# Patient Record
Sex: FEMALE | Race: WHITE | NOT HISPANIC OR LATINO | Employment: UNEMPLOYED | URBAN - NONMETROPOLITAN AREA
[De-identification: names, ages, dates, MRNs, and addresses within clinical notes are randomized per-mention and may not be internally consistent; named-entity substitution may affect disease eponyms.]

---

## 2019-05-22 ENCOUNTER — LAB (OUTPATIENT)
Dept: LAB | Facility: HOSPITAL | Age: 49
End: 2019-05-22

## 2019-05-22 ENCOUNTER — OFFICE VISIT (OUTPATIENT)
Dept: FAMILY MEDICINE CLINIC | Facility: CLINIC | Age: 49
End: 2019-05-22

## 2019-05-22 VITALS
RESPIRATION RATE: 18 BRPM | SYSTOLIC BLOOD PRESSURE: 138 MMHG | HEART RATE: 102 BPM | WEIGHT: 293 LBS | BODY MASS INDEX: 48.82 KG/M2 | HEIGHT: 65 IN | OXYGEN SATURATION: 98 % | DIASTOLIC BLOOD PRESSURE: 98 MMHG

## 2019-05-22 DIAGNOSIS — G89.29 CHRONIC PAIN OF BOTH KNEES: ICD-10-CM

## 2019-05-22 DIAGNOSIS — Z76.89 ENCOUNTER TO ESTABLISH CARE: ICD-10-CM

## 2019-05-22 DIAGNOSIS — M94.0 COSTOCHONDRITIS: ICD-10-CM

## 2019-05-22 DIAGNOSIS — R10.11 RUQ PAIN: ICD-10-CM

## 2019-05-22 DIAGNOSIS — Z00.00 ENCOUNTER FOR SCREENING AND PREVENTATIVE CARE: Primary | ICD-10-CM

## 2019-05-22 DIAGNOSIS — R07.89 LEFT-SIDED CHEST WALL PAIN: ICD-10-CM

## 2019-05-22 DIAGNOSIS — Z00.00 ENCOUNTER FOR SCREENING AND PREVENTATIVE CARE: ICD-10-CM

## 2019-05-22 DIAGNOSIS — M25.562 CHRONIC PAIN OF BOTH KNEES: ICD-10-CM

## 2019-05-22 DIAGNOSIS — M25.561 CHRONIC PAIN OF BOTH KNEES: ICD-10-CM

## 2019-05-22 LAB
ALBUMIN SERPL-MCNC: 4 G/DL (ref 3.5–5.2)
ALBUMIN/GLOB SERPL: 1.1 G/DL
ALP SERPL-CCNC: 105 U/L (ref 39–117)
ALT SERPL W P-5'-P-CCNC: 27 U/L (ref 1–33)
ANION GAP SERPL CALCULATED.3IONS-SCNC: 12 MMOL/L
AST SERPL-CCNC: 23 U/L (ref 1–32)
BASOPHILS # BLD AUTO: 0.04 10*3/MM3 (ref 0–0.2)
BASOPHILS NFR BLD AUTO: 0.7 % (ref 0–1.5)
BILIRUB SERPL-MCNC: 0.4 MG/DL (ref 0.2–1.2)
BUN BLD-MCNC: 10 MG/DL (ref 6–20)
BUN/CREAT SERPL: 10.8 (ref 7–25)
CALCIUM SPEC-SCNC: 9.1 MG/DL (ref 8.6–10.5)
CHLORIDE SERPL-SCNC: 104 MMOL/L (ref 98–107)
CHOLEST SERPL-MCNC: 186 MG/DL (ref 0–200)
CO2 SERPL-SCNC: 26 MMOL/L (ref 22–29)
CREAT BLD-MCNC: 0.93 MG/DL (ref 0.57–1)
DEPRECATED RDW RBC AUTO: 43.5 FL (ref 37–54)
EOSINOPHIL # BLD AUTO: 0.13 10*3/MM3 (ref 0–0.4)
EOSINOPHIL NFR BLD AUTO: 2.1 % (ref 0.3–6.2)
ERYTHROCYTE [DISTWIDTH] IN BLOOD BY AUTOMATED COUNT: 12.8 % (ref 12.3–15.4)
GFR SERPL CREATININE-BSD FRML MDRD: 64 ML/MIN/1.73
GLOBULIN UR ELPH-MCNC: 3.6 GM/DL
GLUCOSE BLD-MCNC: 88 MG/DL (ref 65–99)
HBA1C MFR BLD: 5.2 % (ref 4.8–5.6)
HCT VFR BLD AUTO: 44.1 % (ref 34–46.6)
HDLC SERPL-MCNC: 48 MG/DL (ref 40–60)
HGB BLD-MCNC: 14.1 G/DL (ref 12–15.9)
IMM GRANULOCYTES # BLD AUTO: 0.02 10*3/MM3 (ref 0–0.05)
IMM GRANULOCYTES NFR BLD AUTO: 0.3 % (ref 0–0.5)
LDLC SERPL CALC-MCNC: 111 MG/DL (ref 0–100)
LDLC/HDLC SERPL: 2.32 {RATIO}
LYMPHOCYTES # BLD AUTO: 2.36 10*3/MM3 (ref 0.7–3.1)
LYMPHOCYTES NFR BLD AUTO: 38.7 % (ref 19.6–45.3)
MCH RBC QN AUTO: 29.4 PG (ref 26.6–33)
MCHC RBC AUTO-ENTMCNC: 32 G/DL (ref 31.5–35.7)
MCV RBC AUTO: 92.1 FL (ref 79–97)
MONOCYTES # BLD AUTO: 0.43 10*3/MM3 (ref 0.1–0.9)
MONOCYTES NFR BLD AUTO: 7 % (ref 5–12)
NEUTROPHILS # BLD AUTO: 3.12 10*3/MM3 (ref 1.7–7)
NEUTROPHILS NFR BLD AUTO: 51.2 % (ref 42.7–76)
NRBC BLD AUTO-RTO: 0 /100 WBC (ref 0–0.2)
PLATELET # BLD AUTO: 290 10*3/MM3 (ref 140–450)
PMV BLD AUTO: 10.3 FL (ref 6–12)
POTASSIUM BLD-SCNC: 4.2 MMOL/L (ref 3.5–5.2)
PROT SERPL-MCNC: 7.6 G/DL (ref 6–8.5)
RBC # BLD AUTO: 4.79 10*6/MM3 (ref 3.77–5.28)
SODIUM BLD-SCNC: 142 MMOL/L (ref 136–145)
TRIGL SERPL-MCNC: 134 MG/DL (ref 0–150)
TSH SERPL DL<=0.05 MIU/L-ACNC: 1.73 MIU/ML (ref 0.27–4.2)
VLDLC SERPL-MCNC: 26.8 MG/DL
WBC NRBC COR # BLD: 6.1 10*3/MM3 (ref 3.4–10.8)

## 2019-05-22 PROCEDURE — 80061 LIPID PANEL: CPT

## 2019-05-22 PROCEDURE — 84443 ASSAY THYROID STIM HORMONE: CPT

## 2019-05-22 PROCEDURE — 80053 COMPREHEN METABOLIC PANEL: CPT

## 2019-05-22 PROCEDURE — 36415 COLL VENOUS BLD VENIPUNCTURE: CPT

## 2019-05-22 PROCEDURE — 83036 HEMOGLOBIN GLYCOSYLATED A1C: CPT

## 2019-05-22 PROCEDURE — 85025 COMPLETE CBC W/AUTO DIFF WBC: CPT

## 2019-05-22 PROCEDURE — 99203 OFFICE O/P NEW LOW 30 MIN: CPT | Performed by: NURSE PRACTITIONER

## 2019-05-22 RX ORDER — MELOXICAM 7.5 MG/1
7.5 TABLET ORAL DAILY
Qty: 30 TABLET | Refills: 2 | Status: SHIPPED | OUTPATIENT
Start: 2019-05-22 | End: 2019-08-17 | Stop reason: SDUPTHER

## 2019-05-22 NOTE — PROGRESS NOTES
"Subjective   Letty Bach is a 49 y.o. female.     Ms. Bach is a 49-year-old female who presents today to establish care for the management and evaluation of right upper quadrant pain, left upper quadrant/lower left chest wall pain, chronic bilateral knee pain.  She states she has recurrent right upper quadrant pain that is worse when she eats and sometimes in the evenings.  She does have her gallbladder but has had no evaluation for the functionality of her gallbladder or for gallstones.  She denies vomiting, diarrhea.  She does occasionally have nighttime nausea.  She also reports pain in her left lower chest/upper abdomen area.  Pain is made worse with eating or with range of motion.  She states she has a bowel movement 1-2 times a day.  She denies constipation or dry hard stools.  She also reports having bilateral knee pain \"for years\".  She denies any weakness or instability but states that her knees \"pop and grind\".  She takes no over-the-counter medication for her symptoms.         The following portions of the patient's history were reviewed and updated as appropriate: allergies, current medications, past family history, past medical history, past social history, past surgical history and problem list.    Review of Systems   Constitutional: Negative for activity change, appetite change, chills, diaphoresis, fatigue, fever, unexpected weight gain and unexpected weight loss.   HENT: Negative for congestion, sore throat, trouble swallowing and voice change.    Eyes: Negative for blurred vision, double vision, photophobia, pain and visual disturbance.   Respiratory: Negative for cough, chest tightness, shortness of breath and wheezing.    Cardiovascular: Negative for chest pain, palpitations and leg swelling.   Gastrointestinal: Positive for abdominal pain. Negative for abdominal distention, anal bleeding, blood in stool, constipation, diarrhea, nausea, vomiting, GERD and indigestion.   Endocrine: Negative for " cold intolerance, heat intolerance, polydipsia, polyphagia and polyuria.   Genitourinary: Negative for dysuria, frequency, hematuria and urgency.   Musculoskeletal: Positive for arthralgias and gait problem. Negative for myalgias.   Skin: Negative for rash.   Allergic/Immunologic: Negative.    Neurological: Negative for dizziness, syncope, weakness, light-headedness and headache.   Hematological: Negative.    Psychiatric/Behavioral: The patient is not nervous/anxious.        Objective   Physical Exam   Constitutional: She is oriented to person, place, and time. She appears well-developed and well-nourished. No distress.   HENT:   Head: Normocephalic and atraumatic.   Right Ear: External ear normal.   Left Ear: External ear normal.   Nose: Nose normal.   Mouth/Throat: Oropharynx is clear and moist.   Eyes: Conjunctivae and EOM are normal. Pupils are equal, round, and reactive to light.   Neck: Normal range of motion. Neck supple. No tracheal deviation present. No thyromegaly present.   Cardiovascular: Normal rate, regular rhythm, normal heart sounds and intact distal pulses. Exam reveals no gallop and no friction rub.   No murmur heard.  Pulmonary/Chest: Effort normal and breath sounds normal. No stridor. No respiratory distress. She has no wheezes. She has no rales. She exhibits tenderness. She exhibits no mass, no crepitus, no edema and no swelling.       Abdominal: Soft. Bowel sounds are normal. She exhibits no distension and no mass. There is no tenderness. There is no rebound and no guarding. No hernia.   Musculoskeletal: Normal range of motion. She exhibits no edema.        Right knee: She exhibits normal range of motion. Tenderness found.        Left knee: She exhibits normal range of motion. Tenderness found.   Crepitus noted to bilateral knees.   Lymphadenopathy:     She has no cervical adenopathy.   Neurological: She is alert and oriented to person, place, and time. No cranial nerve deficit. Coordination  normal.   Skin: Skin is warm and dry. No rash noted. She is not diaphoretic. No erythema. No pallor.   Psychiatric: She has a normal mood and affect. Her behavior is normal. Judgment and thought content normal.   Nursing note and vitals reviewed.        Assessment/Plan   Letty was seen today for establish care and abdominal pain.    Diagnoses and all orders for this visit:    Encounter for screening and preventative care  -     Hemoglobin A1c; Future  -     Comprehensive Metabolic Panel; Future  -     CBC & Differential; Future  -     TSH; Future  -     Lipid Panel; Future    Encounter to establish care    RUQ pain  -     US Gallbladder; Future    Left-sided chest wall pain  -     XR Abdomen Flat & Upright    Chronic pain of both knees  -     Cancel: XR Knee AP & Lateral; Future  -     XR knee 4+ vw bilateral; Future    Costochondritis    Other orders  -     meloxicam (MOBIC) 7.5 MG tablet; Take 1 tablet by mouth Daily.    1.  Right upper quadrant pain- ultrasound gallbladder.  Will call with results.  Instructed in low-fat diet.  No right upper quadrant pain on exam today.    2.  Left-sided chest wall pain/costochondritis- flat and upright of abdomen.  Will call with results.  Meloxicam 7 1/2 mg 1 tablet p.o. daily as needed for pain.    3.  Chronic bilateral knee pain-x-ray bilateral knees.  Will call with results.  Mobic 7 1/2 mg p.o. daily as needed for knee pain.    4.  Health maintenance-routine labs ordered.  Will call the results.    5.  Follow-up in 3 months or sooner if needed.            This document has been electronically signed by MEL Houser on May 22, 2019 12:16 PM

## 2019-05-23 NOTE — PROGRESS NOTES
Labs within normal limits except for LDL cholesterol was slightly elevated.  Instruct in low-fat diet and exercise.

## 2019-05-24 DIAGNOSIS — K80.20 GALLSTONES: ICD-10-CM

## 2019-05-24 DIAGNOSIS — R10.11 RUQ PAIN: Primary | ICD-10-CM

## 2019-06-04 ENCOUNTER — CONSULT (OUTPATIENT)
Dept: SURGERY | Facility: CLINIC | Age: 49
End: 2019-06-04

## 2019-06-04 ENCOUNTER — APPOINTMENT (OUTPATIENT)
Dept: PREADMISSION TESTING | Facility: HOSPITAL | Age: 49
End: 2019-06-04

## 2019-06-04 VITALS
OXYGEN SATURATION: 97 % | SYSTOLIC BLOOD PRESSURE: 126 MMHG | RESPIRATION RATE: 16 BRPM | WEIGHT: 293 LBS | DIASTOLIC BLOOD PRESSURE: 84 MMHG | HEIGHT: 65 IN | HEART RATE: 77 BPM | BODY MASS INDEX: 48.82 KG/M2

## 2019-06-04 VITALS
BODY MASS INDEX: 48.82 KG/M2 | TEMPERATURE: 97.2 F | DIASTOLIC BLOOD PRESSURE: 84 MMHG | SYSTOLIC BLOOD PRESSURE: 126 MMHG | HEIGHT: 65 IN | HEART RATE: 88 BPM | WEIGHT: 293 LBS

## 2019-06-04 DIAGNOSIS — K80.10 CALCULUS OF GALLBLADDER WITH CHRONIC CHOLECYSTITIS WITHOUT OBSTRUCTION: Primary | ICD-10-CM

## 2019-06-04 PROCEDURE — 99204 OFFICE O/P NEW MOD 45 MIN: CPT | Performed by: SURGERY

## 2019-06-04 RX ORDER — SODIUM CHLORIDE, SODIUM GLUCONATE, SODIUM ACETATE, POTASSIUM CHLORIDE, AND MAGNESIUM CHLORIDE 526; 502; 368; 37; 30 MG/100ML; MG/100ML; MG/100ML; MG/100ML; MG/100ML
1000 INJECTION, SOLUTION INTRAVENOUS CONTINUOUS
Status: CANCELLED | OUTPATIENT
Start: 2019-06-06

## 2019-06-04 RX ORDER — CEFAZOLIN SODIUM IN 0.9 % NACL 3 G/100 ML
3 INTRAVENOUS SOLUTION, PIGGYBACK (ML) INTRAVENOUS ONCE
Status: CANCELLED | OUTPATIENT
Start: 2019-06-06 | End: 2019-06-04

## 2019-06-04 NOTE — PATIENT INSTRUCTIONS

## 2019-06-04 NOTE — PROGRESS NOTES
Subjective   Letty Bach is a 49 y.o. female     Chief Complaint: Symptomatic gallstones    History of Present Illness referred by MEL Esteves after patient was found to have gallstones on ultrasound.  Patient presented to him to set up primary care provider and described intermittent right shoulder and intermittent left upper quadrant/flank pain.  Patient describes his pain is intermittent cramping in nature.  Is associated with foods.  No history of pancreatitis no history of being jaundiced.  No prior upper abdominal surgery.  Patient does have some mild reflux symptoms which she occasionally takes Tums for.  No dysphasia.  Never been told she had a hilar hernia or peptic ulcer disease.  No history of kidney stones.  Only past surgical history is been for hysterectomy.  Severity of the pain is described as moderate.  Duration tends to be for minutes.  Timing is 1-2 times per day  On ultrasound she looks like she has a contracted gallbladder with a fairly large stone present.  The bladder wall was measured at 5 mm and there was no pericholecystic fluid.  Common bile duct was 6.1 mm without any obvious common duct stones seen.  LFTs 3 weeks ago were all within normal limits and white count was normal.    Review of Systems   Constitutional: Negative.    HENT: Negative.    Eyes: Negative.    Respiratory: Negative.    Cardiovascular: Positive for leg swelling.   Gastrointestinal: Positive for abdominal pain.        Heartburn   Endocrine: Negative.    Genitourinary: Positive for frequency.   Musculoskeletal: Negative.    Skin: Negative.    Allergic/Immunologic: Negative.    Neurological: Negative.    Hematological: Negative.    Psychiatric/Behavioral: Negative.      Past Medical History:   Diagnosis Date   • GERD (gastroesophageal reflux disease)    • Urinary tract infection      Past Surgical History:   Procedure Laterality Date   • HYSTERECTOMY  2007     Family History   Problem Relation Age of Onset   • Breast  cancer Mother    • Thyroid disease Mother    • Hypertension Mother    • Hypertension Father    • Diabetes Father    • Kidney disease Father    • Heart disease Father    • No Known Problems Sister    • No Known Problems Brother    • Seizures Daughter    • Hypertension Daughter    • COPD Maternal Grandmother    • Asthma Maternal Grandmother    • No Known Problems Sister    • ADD / ADHD Daughter    • Polymyositis Daughter      Social History     Socioeconomic History   • Marital status:      Spouse name: Not on file   • Number of children: Not on file   • Years of education: Not on file   • Highest education level: Not on file   Tobacco Use   • Smoking status: Former Smoker   • Smokeless tobacco: Never Used   Substance and Sexual Activity   • Alcohol use: No     Frequency: Never   • Drug use: No   • Sexual activity: Yes     Partners: Male     No Known Allergies    Home Medications:  Prior to Admission medications    Medication Sig Start Date End Date Taking? Authorizing Provider   meloxicam (MOBIC) 7.5 MG tablet Take 1 tablet by mouth Daily. 5/22/19  Yes Aldair Esteves APRN       Objective   Physical Exam   Constitutional: She is oriented to person, place, and time. She appears well-developed and well-nourished. No distress.   HENT:   Head: Normocephalic and atraumatic.   Nose: Nose normal.   Eyes: Conjunctivae are normal.   Neck: Normal range of motion. No tracheal deviation present. No thyromegaly present.   Cardiovascular: Normal rate, regular rhythm and normal heart sounds.   No murmur heard.  Pulmonary/Chest: Effort normal and breath sounds normal. No respiratory distress. She has no wheezes. She has no rales. She exhibits no tenderness.   Abdominal: Soft. She exhibits no distension. There is no tenderness. There is no rebound and no guarding. No hernia.   Musculoskeletal: She exhibits no tenderness or deformity.   Neurological: She is alert and oriented to person, place, and time.   Skin: Skin is warm  and dry. No rash noted.   Psychiatric: She has a normal mood and affect. Her behavior is normal. Judgment and thought content normal.   Vitals reviewed.         Assessment/Plan  Symptomatic cholelithiasis.  Fully discussed her symptoms which a portion of her atypical and others are more typical.  Patient is not interested in further work-up for reflux she says it is not much of a problem for her at this point.  She wishes to go ahead and proceed with laparoscopic cholecystectomy and intraoperative cholangiogram.  I fully discussed the procedure alternatives risk and benefits with her.  I reviewed her ultrasound of the studies with her and her mother and they clearly understand the situation and wished to proceed.      The encounter diagnosis was Calculus of gallbladder with chronic cholecystitis without obstruction.                     This document has been electronically signed by Felipe Overton MD on June 4, 2019 11:48 AM

## 2019-06-05 ENCOUNTER — ANESTHESIA EVENT (OUTPATIENT)
Dept: PERIOP | Facility: HOSPITAL | Age: 49
End: 2019-06-05

## 2019-06-06 ENCOUNTER — HOSPITAL ENCOUNTER (OUTPATIENT)
Facility: HOSPITAL | Age: 49
Setting detail: HOSPITAL OUTPATIENT SURGERY
Discharge: HOME | End: 2019-06-06
Attending: SURGERY | Admitting: SURGERY

## 2019-06-06 ENCOUNTER — ANESTHESIA (OUTPATIENT)
Dept: PERIOP | Facility: HOSPITAL | Age: 49
End: 2019-06-06

## 2019-06-06 ENCOUNTER — APPOINTMENT (OUTPATIENT)
Dept: GENERAL RADIOLOGY | Facility: HOSPITAL | Age: 49
End: 2019-06-06

## 2019-06-06 VITALS
DIASTOLIC BLOOD PRESSURE: 78 MMHG | HEART RATE: 69 BPM | HEIGHT: 65 IN | OXYGEN SATURATION: 98 % | BODY MASS INDEX: 48.74 KG/M2 | RESPIRATION RATE: 18 BRPM | SYSTOLIC BLOOD PRESSURE: 129 MMHG | WEIGHT: 292.55 LBS | TEMPERATURE: 97.8 F

## 2019-06-06 DIAGNOSIS — K80.10 CALCULUS OF GALLBLADDER WITH CHRONIC CHOLECYSTITIS WITHOUT OBSTRUCTION: ICD-10-CM

## 2019-06-06 PROCEDURE — 47563 LAPARO CHOLECYSTECTOMY/GRAPH: CPT | Performed by: SURGERY

## 2019-06-06 PROCEDURE — 25010000002 MIDAZOLAM PER 1 MG: Performed by: NURSE ANESTHETIST, CERTIFIED REGISTERED

## 2019-06-06 PROCEDURE — 25010000002 ONDANSETRON PER 1 MG: Performed by: NURSE ANESTHETIST, CERTIFIED REGISTERED

## 2019-06-06 PROCEDURE — 88304 TISSUE EXAM BY PATHOLOGIST: CPT | Performed by: SURGERY

## 2019-06-06 PROCEDURE — 25010000002 FENTANYL CITRATE (PF) 100 MCG/2ML SOLUTION: Performed by: NURSE ANESTHETIST, CERTIFIED REGISTERED

## 2019-06-06 PROCEDURE — 25010000002 DEXAMETHASONE PER 1 MG: Performed by: NURSE ANESTHETIST, CERTIFIED REGISTERED

## 2019-06-06 PROCEDURE — 25010000002 IOPAMIDOL 61 % SOLUTION: Performed by: SURGERY

## 2019-06-06 PROCEDURE — 74300 X-RAY BILE DUCTS/PANCREAS: CPT | Performed by: SURGERY

## 2019-06-06 PROCEDURE — 76000 FLUOROSCOPY <1 HR PHYS/QHP: CPT

## 2019-06-06 PROCEDURE — 25010000002 NEOSTIGMINE 4 MG/4ML SOLUTION PREFILLED SYRINGE: Performed by: NURSE ANESTHETIST, CERTIFIED REGISTERED

## 2019-06-06 PROCEDURE — 25010000002 HYDROMORPHONE 1 MG/ML SOLUTION: Performed by: NURSE ANESTHETIST, CERTIFIED REGISTERED

## 2019-06-06 PROCEDURE — 25010000002 PROPOFOL 10 MG/ML EMULSION: Performed by: NURSE ANESTHETIST, CERTIFIED REGISTERED

## 2019-06-06 PROCEDURE — 88304 TISSUE EXAM BY PATHOLOGIST: CPT | Performed by: PATHOLOGY

## 2019-06-06 RX ORDER — ROCURONIUM BROMIDE 10 MG/ML
INJECTION, SOLUTION INTRAVENOUS AS NEEDED
Status: DISCONTINUED | OUTPATIENT
Start: 2019-06-06 | End: 2019-06-06 | Stop reason: SURG

## 2019-06-06 RX ORDER — BUPIVACAINE HYDROCHLORIDE AND EPINEPHRINE 5; 5 MG/ML; UG/ML
INJECTION, SOLUTION EPIDURAL; INTRACAUDAL; PERINEURAL AS NEEDED
Status: DISCONTINUED | OUTPATIENT
Start: 2019-06-06 | End: 2019-06-06 | Stop reason: HOSPADM

## 2019-06-06 RX ORDER — DEXAMETHASONE SODIUM PHOSPHATE 4 MG/ML
INJECTION, SOLUTION INTRA-ARTICULAR; INTRALESIONAL; INTRAMUSCULAR; INTRAVENOUS; SOFT TISSUE AS NEEDED
Status: DISCONTINUED | OUTPATIENT
Start: 2019-06-06 | End: 2019-06-06 | Stop reason: SURG

## 2019-06-06 RX ORDER — HYDROCODONE BITARTRATE AND ACETAMINOPHEN 7.5; 325 MG/1; MG/1
1 TABLET ORAL EVERY 6 HOURS PRN
Qty: 15 TABLET | Refills: 0 | Status: SHIPPED | OUTPATIENT
Start: 2019-06-06 | End: 2019-08-22

## 2019-06-06 RX ORDER — EPHEDRINE SULFATE 50 MG/ML
INJECTION, SOLUTION INTRAVENOUS AS NEEDED
Status: DISCONTINUED | OUTPATIENT
Start: 2019-06-06 | End: 2019-06-06 | Stop reason: SURG

## 2019-06-06 RX ORDER — NEOSTIGMINE METHYLSULFATE 4 MG/4 ML
SYRINGE (ML) INTRAVENOUS AS NEEDED
Status: DISCONTINUED | OUTPATIENT
Start: 2019-06-06 | End: 2019-06-06 | Stop reason: SURG

## 2019-06-06 RX ORDER — ONDANSETRON 2 MG/ML
4 INJECTION INTRAMUSCULAR; INTRAVENOUS ONCE
Status: DISCONTINUED | OUTPATIENT
Start: 2019-06-06 | End: 2019-06-06 | Stop reason: HOSPADM

## 2019-06-06 RX ORDER — MIDAZOLAM HYDROCHLORIDE 1 MG/ML
INJECTION INTRAMUSCULAR; INTRAVENOUS AS NEEDED
Status: DISCONTINUED | OUTPATIENT
Start: 2019-06-06 | End: 2019-06-06 | Stop reason: SURG

## 2019-06-06 RX ORDER — 0.9 % SODIUM CHLORIDE 0.9 %
VIAL (ML) INJECTION AS NEEDED
Status: DISCONTINUED | OUTPATIENT
Start: 2019-06-06 | End: 2019-06-06 | Stop reason: HOSPADM

## 2019-06-06 RX ORDER — PROPOFOL 10 MG/ML
VIAL (ML) INTRAVENOUS AS NEEDED
Status: DISCONTINUED | OUTPATIENT
Start: 2019-06-06 | End: 2019-06-06 | Stop reason: SURG

## 2019-06-06 RX ORDER — MAGNESIUM HYDROXIDE 1200 MG/15ML
LIQUID ORAL AS NEEDED
Status: DISCONTINUED | OUTPATIENT
Start: 2019-06-06 | End: 2019-06-06 | Stop reason: HOSPADM

## 2019-06-06 RX ORDER — FENTANYL CITRATE 50 UG/ML
INJECTION, SOLUTION INTRAMUSCULAR; INTRAVENOUS AS NEEDED
Status: DISCONTINUED | OUTPATIENT
Start: 2019-06-06 | End: 2019-06-06 | Stop reason: SURG

## 2019-06-06 RX ORDER — CEFAZOLIN SODIUM IN 0.9 % NACL 3 G/100 ML
3 INTRAVENOUS SOLUTION, PIGGYBACK (ML) INTRAVENOUS ONCE
Status: COMPLETED | OUTPATIENT
Start: 2019-06-06 | End: 2019-06-06

## 2019-06-06 RX ORDER — LIDOCAINE HYDROCHLORIDE 20 MG/ML
INJECTION, SOLUTION INFILTRATION; PERINEURAL AS NEEDED
Status: DISCONTINUED | OUTPATIENT
Start: 2019-06-06 | End: 2019-06-06 | Stop reason: SURG

## 2019-06-06 RX ORDER — ONDANSETRON 2 MG/ML
INJECTION INTRAMUSCULAR; INTRAVENOUS AS NEEDED
Status: DISCONTINUED | OUTPATIENT
Start: 2019-06-06 | End: 2019-06-06 | Stop reason: SURG

## 2019-06-06 RX ORDER — SODIUM CHLORIDE, SODIUM GLUCONATE, SODIUM ACETATE, POTASSIUM CHLORIDE, AND MAGNESIUM CHLORIDE 526; 502; 368; 37; 30 MG/100ML; MG/100ML; MG/100ML; MG/100ML; MG/100ML
1000 INJECTION, SOLUTION INTRAVENOUS CONTINUOUS
Status: DISCONTINUED | OUTPATIENT
Start: 2019-06-06 | End: 2019-06-06 | Stop reason: HOSPADM

## 2019-06-06 RX ADMIN — EPHEDRINE SULFATE 10 MG: 50 INJECTION INTRAVENOUS at 09:12

## 2019-06-06 RX ADMIN — FENTANYL CITRATE 150 MCG: 50 INJECTION, SOLUTION INTRAMUSCULAR; INTRAVENOUS at 09:06

## 2019-06-06 RX ADMIN — CEFAZOLIN 2 G: 1 INJECTION, POWDER, FOR SOLUTION INTRAMUSCULAR; INTRAVENOUS; PARENTERAL at 08:42

## 2019-06-06 RX ADMIN — SODIUM CHLORIDE, SODIUM GLUCONATE, SODIUM ACETATE, POTASSIUM CHLORIDE, AND MAGNESIUM CHLORIDE 1000 ML: 526; 502; 368; 37; 30 INJECTION, SOLUTION INTRAVENOUS at 07:35

## 2019-06-06 RX ADMIN — Medication 3 MG: at 10:25

## 2019-06-06 RX ADMIN — ROCURONIUM BROMIDE 10 MG: 10 INJECTION INTRAVENOUS at 09:39

## 2019-06-06 RX ADMIN — ROCURONIUM BROMIDE 45 MG: 10 INJECTION INTRAVENOUS at 08:51

## 2019-06-06 RX ADMIN — HYDROMORPHONE HYDROCHLORIDE 0.5 MG: 1 INJECTION, SOLUTION INTRAMUSCULAR; INTRAVENOUS; SUBCUTANEOUS at 11:03

## 2019-06-06 RX ADMIN — FENTANYL CITRATE 100 MCG: 50 INJECTION, SOLUTION INTRAMUSCULAR; INTRAVENOUS at 08:44

## 2019-06-06 RX ADMIN — ONDANSETRON 4 MG: 2 INJECTION INTRAMUSCULAR; INTRAVENOUS at 10:13

## 2019-06-06 RX ADMIN — SODIUM CHLORIDE, SODIUM GLUCONATE, SODIUM ACETATE, POTASSIUM CHLORIDE, AND MAGNESIUM CHLORIDE: 526; 502; 368; 37; 30 INJECTION, SOLUTION INTRAVENOUS at 10:13

## 2019-06-06 RX ADMIN — MIDAZOLAM HYDROCHLORIDE 2 MG: 2 INJECTION, SOLUTION INTRAMUSCULAR; INTRAVENOUS at 08:41

## 2019-06-06 RX ADMIN — Medication 1 MG: at 10:27

## 2019-06-06 RX ADMIN — LIDOCAINE HYDROCHLORIDE 80 MG: 20 INJECTION, SOLUTION INFILTRATION; PERINEURAL at 08:49

## 2019-06-06 RX ADMIN — ROCURONIUM BROMIDE 5 MG: 10 INJECTION INTRAVENOUS at 08:45

## 2019-06-06 RX ADMIN — HYDROMORPHONE HYDROCHLORIDE 0.5 MG: 1 INJECTION, SOLUTION INTRAMUSCULAR; INTRAVENOUS; SUBCUTANEOUS at 11:16

## 2019-06-06 RX ADMIN — DEXAMETHASONE SODIUM PHOSPHATE 4 MG: 4 INJECTION, SOLUTION INTRAMUSCULAR; INTRAVENOUS at 09:02

## 2019-06-06 RX ADMIN — FENTANYL CITRATE 25 MCG: 50 INJECTION, SOLUTION INTRAMUSCULAR; INTRAVENOUS at 10:09

## 2019-06-06 RX ADMIN — GLYCOPYRROLATE 0.6 MG: 0.2 INJECTION, SOLUTION INTRAMUSCULAR; INTRAVITREAL at 10:25

## 2019-06-06 RX ADMIN — HYDROMORPHONE HYDROCHLORIDE 0.5 MG: 1 INJECTION, SOLUTION INTRAMUSCULAR; INTRAVENOUS; SUBCUTANEOUS at 10:53

## 2019-06-06 RX ADMIN — PROPOFOL 180 MG: 10 INJECTION, EMULSION INTRAVENOUS at 08:48

## 2019-06-06 NOTE — OP NOTE
CHOLECYSTECTOMY LAPAROSCOPIC INTRAOPERATIVE CHOLANGIOGRAM  Procedure Note    Letty Bach  6/6/2019    Pre-op Diagnosis:   Calculus of gallbladder with chronic cholecystitis without obstruction [K80.10]    Post-op Diagnosis:     Post-Op Diagnosis Codes:     * Calculus of gallbladder with chronic cholecystitis without obstruction [K80.10]    Procedure/CPT® Codes:      Procedure(s):  CHOLECYSTECTOMY LAPAROSCOPIC INTRAOPERATIVE CHOLANGIOGRAM      (c-arm#2)    Surgeon(s):  Felipe Overton MD    Anesthesia: General    Staff:   Circulator: Koki Garrett RN  Scrub Person: Macie King  Assistant: Kaelyn Smith CSA    Estimated Blood Loss: minimal    Specimens:                ID Type Source Tests Collected by Time   A : gallbladder and contents Tissue Gallbladder TISSUE PATHOLOGY EXAM Felipe Overton MD 6/6/2019 0913         Drains:   Closed/Suction Drain 1 Right Abdomen Bulb 19 Fr. (Active)       Indications: 49-year-old female presents with intermittent upper abdominal pain as well as shoulder and left flank pain.  Found to have gallstones by ultrasound.  Gallbladder wall was 5 mm and patient has a contracted gallbladder with at least one large stone present.  Common bile duct was 6 mm.  LFTs are within normal limits.    Findings: Normal intraoperative cholangiogram.  Good spontaneous flow into the duodenum no evidence of any common duct stones with adequate flow up in the hepatic radicles and adequate cystic duct.    Complications: None    Procedure: The patient was brought to the operating room where she was placed under general endotracheal anesthesia without any difficulty. She had SCDs in place. She received Ancef IV antibiotic perioperatively. The abdomen was prepped and draped in the normal sterile fashion. She was positioned on the table with the foot rest in case she needed to be positioned in reverse Trendelenburg position. Appropriate timeout was taken. Everyone in the room was in agreement.  Cutdown was performed in the supraumbilical position 2 cm above the umbilicus due to her body habitus. A 10/11 Uzair trocar was placed without any difficulty. TAP abdominal wall block was then performed with a total of 30 mL of 0.5% Marcaine; 20 mL was injected on the right side of the abdomen and 10 mL on the left side using the laparoscope for guidance. Once that was done, a 5 mm trocar was placed in the epigastrium and two 5 mm trocars along the costal margin laterally. The patient was positioned and we were able to see and access the gallbladder. We began by taking the gallbladder down in the dome down type technique. We took it down approximately half way in the bed of the liver and then pulled the gallbladder over the edge of the liver. We opened the peritoneum at the anterior and posterior aspects of Calot's triangle up on the neck of the gallbladder and followed that down along the neck of the gallbladder to the junction with the cystic duct. We were able to get around the neck of the gallbladder and got a critical view. We placed a Srivastava clamp across the neck of the gallbladder. Catheter was placed into position and cholangiogram was performed and confirmed that we were right at the neck of the gallbladder junction with the cystic duct. Findings as described with the cholangiogram. At that point the catheter and needle were removed and we placed 2 clips at that level. We then divided the cystic duct up on the neck of the gallbladder and then placed a #1 PDS Endoloop. There was concern that we did not get completely around it so we placed a 2nd one and this time we did get around the entire cystic duct just below the clips. We then removed the gallbladder using the Harmonic scalpel. The cystic artery was addressed with the Harmonic scalpel. We copiously irrigated. There was some spillage of bile at the end of the procedure and we copiously irrigated that area. Gallbladder was placed in an Endo Catch bag. We  then placed a 10 mm Gary-Martins drain brought out through the most lateral trocar. We placed it in the bed of the liver in the area of dissection and then secured it to skin with a 2-0 silk suture. Good hemostasis was noted at the time and no evidence of any significant bile leak. The gallbladder was then removed as the trocars were removed with the Endo Catch bag. There was a large stone present within the gallbladder. Good hemostasis was noted at all sites. There fascia was closed at the cutdown site with 2-0 Vicryl figure-of-eight stitch. Skin was closed at all sites with 4-0 Monocryl subcuticular stitch. Glue was used for final skin closure. The patient was awakened, extubated, and transferred to the recovery room in awake and stable condition.             Disposition: Transfer to the recovery room awake stable condition.        Felipe Overton MD     Date: 6/6/2019  Time: 10:55 AM

## 2019-06-06 NOTE — ANESTHESIA PROCEDURE NOTES
Airway  Urgency: elective    Date/Time: 6/6/2019 8:53 AM  End Time:6/6/2019 8:54 AM  Airway not difficult    General Information and Staff    Patient location during procedure: OR    Indications and Patient Condition  Indications for airway management: airway protection    Preoxygenated: yes  MILS not maintained throughout  Mask difficulty assessment: 1 - vent by mask    Final Airway Details  Final airway type: endotracheal airway      Successful airway: ETT  Cuffed: yes   Successful intubation technique: direct laryngoscopy  Facilitating devices/methods: intubating stylet and Bougie  Endotracheal tube insertion site: oral  Blade: Sravanthi  Blade size: 3  ETT size (mm): 7.5  Cormack-Lehane Classification: grade I - full view of glottis  Placement verified by: chest auscultation and capnometry   Measured from: lips  ETT to lips (cm): 21  Number of attempts at approach: 2

## 2019-06-06 NOTE — ANESTHESIA POSTPROCEDURE EVALUATION
Patient: Letty Bach    Procedure Summary     Date:  06/06/19 Room / Location:  [facility] OR 09 / [facility] OR    Anesthesia Start:  0842 Anesthesia Stop:  1047    Procedure:  CHOLECYSTECTOMY LAPAROSCOPIC INTRAOPERATIVE CHOLANGIOGRAM      (c-arm#2) (N/A Abdomen) Diagnosis:       Calculus of gallbladder with chronic cholecystitis without obstruction      (Calculus of gallbladder with chronic cholecystitis without obstruction [K80.10])    Surgeon:  Felipe Overton MD Provider:  Awais Sales MD    Anesthesia Type:  general ASA Status:  2          Anesthesia Type: general  Last vitals  BP   123/69 (06/06/19 0724)   Temp   97.5 °F (36.4 °C) (06/06/19 0724)   Pulse   70 (06/06/19 0724)   Resp   18 (06/06/19 0724)     SpO2   95 % (06/06/19 0724)     Post Anesthesia Care and Evaluation    Patient location during evaluation: PACU  Patient participation: complete - patient participated  Level of consciousness: awake and alert  Pain score: 0  Pain management: adequate  Airway patency: patent  Anesthetic complications: No anesthetic complications  PONV Status: none  Cardiovascular status: acceptable  Respiratory status: acceptable  Hydration status: acceptable

## 2019-06-07 ENCOUNTER — OFFICE VISIT (OUTPATIENT)
Dept: SURGERY | Facility: CLINIC | Age: 49
End: 2019-06-07

## 2019-06-07 VITALS
TEMPERATURE: 97.2 F | DIASTOLIC BLOOD PRESSURE: 74 MMHG | BODY MASS INDEX: 48.82 KG/M2 | SYSTOLIC BLOOD PRESSURE: 166 MMHG | HEIGHT: 65 IN | HEART RATE: 56 BPM | WEIGHT: 293 LBS

## 2019-06-07 DIAGNOSIS — K80.10 CALCULUS OF GALLBLADDER WITH CHRONIC CHOLECYSTITIS WITHOUT OBSTRUCTION: Primary | ICD-10-CM

## 2019-06-07 LAB
LAB AP CASE REPORT: NORMAL
PATH REPORT.FINAL DX SPEC: NORMAL
PATH REPORT.GROSS SPEC: NORMAL

## 2019-06-07 PROCEDURE — 99024 POSTOP FOLLOW-UP VISIT: CPT | Performed by: SURGERY

## 2019-06-07 NOTE — PATIENT INSTRUCTIONS

## 2019-06-17 ENCOUNTER — OFFICE VISIT (OUTPATIENT)
Dept: SURGERY | Facility: CLINIC | Age: 49
End: 2019-06-17

## 2019-06-17 VITALS
TEMPERATURE: 97.5 F | WEIGHT: 293 LBS | BODY MASS INDEX: 48.82 KG/M2 | SYSTOLIC BLOOD PRESSURE: 132 MMHG | HEART RATE: 75 BPM | DIASTOLIC BLOOD PRESSURE: 84 MMHG | HEIGHT: 65 IN

## 2019-06-17 DIAGNOSIS — K80.10 CALCULUS OF GALLBLADDER WITH CHRONIC CHOLECYSTITIS WITHOUT OBSTRUCTION: Primary | ICD-10-CM

## 2019-06-17 PROCEDURE — 99024 POSTOP FOLLOW-UP VISIT: CPT | Performed by: SURGERY

## 2019-06-17 NOTE — PROGRESS NOTES
Patient is now week and half out from her laparoscopic cholecystectomy normal intraoperative cholangiogram.  Patient had acute and chronic cholecystitis and a drain was placed and that is subsequently been removed.  Patient feels much better.  Pain she had in her right shoulder has resolved.  She is nonicteric her abdomen is soft her incisions are clean and healing nicely.  She will follow-up with us on a as needed basis

## 2019-08-19 RX ORDER — MELOXICAM 7.5 MG/1
TABLET ORAL
Qty: 30 TABLET | Refills: 1 | Status: SHIPPED | OUTPATIENT
Start: 2019-08-19 | End: 2019-09-05 | Stop reason: ALTCHOICE

## 2019-08-22 ENCOUNTER — OFFICE VISIT (OUTPATIENT)
Dept: FAMILY MEDICINE CLINIC | Facility: CLINIC | Age: 49
End: 2019-08-22

## 2019-08-22 ENCOUNTER — LAB (OUTPATIENT)
Dept: LAB | Facility: HOSPITAL | Age: 49
End: 2019-08-22

## 2019-08-22 VITALS
RESPIRATION RATE: 20 BRPM | OXYGEN SATURATION: 98 % | SYSTOLIC BLOOD PRESSURE: 140 MMHG | WEIGHT: 285.1 LBS | DIASTOLIC BLOOD PRESSURE: 98 MMHG | HEIGHT: 65 IN | HEART RATE: 73 BPM | BODY MASS INDEX: 47.5 KG/M2

## 2019-08-22 DIAGNOSIS — M94.0 COSTOCHONDRITIS: ICD-10-CM

## 2019-08-22 DIAGNOSIS — M25.561 CHRONIC PAIN OF BOTH KNEES: Primary | ICD-10-CM

## 2019-08-22 DIAGNOSIS — M25.562 CHRONIC PAIN OF BOTH KNEES: Primary | ICD-10-CM

## 2019-08-22 DIAGNOSIS — G89.29 CHRONIC PAIN OF BOTH KNEES: Primary | ICD-10-CM

## 2019-08-22 DIAGNOSIS — H92.01 RIGHT EAR PAIN: ICD-10-CM

## 2019-08-22 DIAGNOSIS — E78.00 ELEVATED LDL CHOLESTEROL LEVEL: ICD-10-CM

## 2019-08-22 LAB
CHOLEST SERPL-MCNC: 144 MG/DL (ref 0–200)
HDLC SERPL-MCNC: 45 MG/DL (ref 40–60)
LDLC SERPL CALC-MCNC: 83 MG/DL (ref 0–100)
LDLC/HDLC SERPL: 1.84 {RATIO}
TRIGL SERPL-MCNC: 81 MG/DL (ref 0–150)
VLDLC SERPL-MCNC: 16.2 MG/DL (ref 5–40)

## 2019-08-22 PROCEDURE — 96372 THER/PROPH/DIAG INJ SC/IM: CPT | Performed by: NURSE PRACTITIONER

## 2019-08-22 PROCEDURE — 99214 OFFICE O/P EST MOD 30 MIN: CPT | Performed by: NURSE PRACTITIONER

## 2019-08-22 PROCEDURE — 80061 LIPID PANEL: CPT

## 2019-08-22 PROCEDURE — 36415 COLL VENOUS BLD VENIPUNCTURE: CPT

## 2019-08-22 RX ORDER — TRIAMCINOLONE ACETONIDE 40 MG/ML
40 INJECTION, SUSPENSION INTRA-ARTICULAR; INTRAMUSCULAR ONCE
Status: COMPLETED | OUTPATIENT
Start: 2019-08-22 | End: 2019-08-22

## 2019-08-22 RX ORDER — FLUTICASONE PROPIONATE 50 MCG
2 SPRAY, SUSPENSION (ML) NASAL DAILY PRN
Qty: 18.2 ML | Refills: 0 | Status: SHIPPED | OUTPATIENT
Start: 2019-08-22 | End: 2020-01-21

## 2019-08-22 RX ORDER — LORATADINE 10 MG/1
10 TABLET ORAL DAILY PRN
Qty: 30 TABLET | Refills: 5 | Status: SHIPPED | OUTPATIENT
Start: 2019-08-22 | End: 2020-01-21

## 2019-08-22 RX ADMIN — TRIAMCINOLONE ACETONIDE 40 MG: 40 INJECTION, SUSPENSION INTRA-ARTICULAR; INTRAMUSCULAR at 10:34

## 2019-08-22 NOTE — PROGRESS NOTES
Normal x-ray of chest per radiology report.  Continue meloxicam and follow-up with orthopedic surgery as scheduled.

## 2019-08-22 NOTE — PROGRESS NOTES
Subjective   Letty Bach is a 49 y.o. female.     Ms. Bach is a 49-year-old female who presents today for follow-up related to bilateral knee pain, left costochondritis, elevated LDL and with acute report of right ear pain.  Patient has tried Mobic 7.5 mg p.o. daily for knee pain.  She states little to no improvement when taking medication.  She also states that it has not affected her costochondritis pain either.  She has lost 7 pounds over the last 2 months but states this has not affected her knee pain or costochondritis pain either.  She is following a low-fat diet related to history of elevated LDL.  Over the last 3 days she has reported right ear pain and occasional dizziness with range of motion of head.  She denies any URI symptoms.  She has not taken any over-the-counter medication.         The following portions of the patient's history were reviewed and updated as appropriate: allergies, current medications, past family history, past medical history, past social history, past surgical history and problem list.    Review of Systems   Constitutional: Negative for activity change, appetite change, fatigue, unexpected weight gain and unexpected weight loss.   HENT: Positive for ear pain (Right ear). Negative for congestion, ear discharge, postnasal drip, rhinorrhea, sinus pressure, sneezing, sore throat, trouble swallowing and voice change.    Eyes: Negative.    Respiratory: Negative for cough, chest tightness, shortness of breath and wheezing.    Cardiovascular: Negative for chest pain, palpitations and leg swelling.   Gastrointestinal: Negative for abdominal pain, constipation, diarrhea, nausea and vomiting.   Endocrine: Negative.    Genitourinary: Negative for dysuria.   Musculoskeletal: Positive for arthralgias and gait problem. Negative for myalgias.        Chronic bilateral knee pain.   Skin: Negative for rash.   Allergic/Immunologic: Negative.    Hematological: Negative.    Psychiatric/Behavioral:  Negative.        Objective   Physical Exam   Constitutional: She is oriented to person, place, and time. She appears well-developed and well-nourished. No distress.   HENT:   Head: Normocephalic and atraumatic.   Right Ear: External ear normal.   Left Ear: External ear normal.   Nose: Nose normal.   Mouth/Throat: Oropharynx is clear and moist.   Eyes: Conjunctivae are normal.   Neck: Normal range of motion.   Cardiovascular: Normal rate, regular rhythm and normal heart sounds.   Pulmonary/Chest: Effort normal and breath sounds normal. No respiratory distress. She has no wheezes. She has no rales.   Musculoskeletal: She exhibits no edema.        Right knee: She exhibits decreased range of motion. Tenderness found.        Left knee: She exhibits decreased range of motion. Tenderness found.        Legs:  Lymphadenopathy:     She has no cervical adenopathy.   Neurological: She is alert and oriented to person, place, and time.   Skin: Skin is warm and dry. No rash noted. She is not diaphoretic. No erythema. No pallor.   Psychiatric: She has a normal mood and affect. Her behavior is normal. Judgment and thought content normal.   Nursing note and vitals reviewed.        Assessment/Plan   Letty was seen today for follow-up.    Diagnoses and all orders for this visit:    Chronic pain of both knees  -     triamcinolone acetonide (KENALOG-40) injection 40 mg  -     Ambulatory Referral to Orthopedic Surgery    Costochondritis  Comments:  left    Orders:  -     XR Ribs Left With PA Chest  -     triamcinolone acetonide (KENALOG-40) injection 40 mg  -     Ambulatory Referral to Orthopedic Surgery    Elevated LDL cholesterol level  -     Lipid Panel; Future    Right ear pain  -     triamcinolone acetonide (KENALOG-40) injection 40 mg    Other orders  -     loratadine (CLARITIN) 10 MG tablet; Take 1 tablet by mouth Daily As Needed for Allergies.  -     fluticasone (FLONASE) 50 MCG/ACT nasal spray; 2 sprays into the nostril(s) as  directed by provider Daily As Needed for Rhinitis.    1.  Chronic pain to bilateral knees-referral to orthopedic surgery for further evaluation.  Kenalog 40 mg IM x1 today.    2.  Left-sided costochondritis- x-ray left side ribs with PA chest.  Will call the results.  Kenalog 40 mg IM x1 today.  Referral to orthopedic surgery for further evaluation.    3.  Elevated LDL cholesterol level-lipid panel.  Will call the results.  Reinforced low-fat diet and exercise.  Continue with weight loss.    4.  Right ear pain-ear exam unremarkable.  Kenalog 40 mg IM x1 today.  Claritin 10 mg 1 tablet daily for 7 days.  Flonase 50 MCG/ACT 2 sprays each nostril daily for 7 days.    5.  Follow-up in 6 months or sooner if needed.            This document has been electronically signed by MEL Houser on August 22, 2019 12:53 PM

## 2019-09-05 ENCOUNTER — OFFICE VISIT (OUTPATIENT)
Dept: ORTHOPEDIC SURGERY | Facility: CLINIC | Age: 49
End: 2019-09-05

## 2019-09-05 VITALS — BODY MASS INDEX: 46.98 KG/M2 | HEIGHT: 65 IN | WEIGHT: 282 LBS

## 2019-09-05 DIAGNOSIS — K21.9 GERD WITHOUT ESOPHAGITIS: ICD-10-CM

## 2019-09-05 DIAGNOSIS — E66.01 MORBID OBESITY WITH BMI OF 45.0-49.9, ADULT (HCC): ICD-10-CM

## 2019-09-05 DIAGNOSIS — M25.562 CHRONIC PAIN OF BOTH KNEES: Primary | ICD-10-CM

## 2019-09-05 DIAGNOSIS — M25.561 CHRONIC PAIN OF BOTH KNEES: Primary | ICD-10-CM

## 2019-09-05 DIAGNOSIS — G89.29 CHRONIC PAIN OF BOTH KNEES: Primary | ICD-10-CM

## 2019-09-05 PROCEDURE — 99204 OFFICE O/P NEW MOD 45 MIN: CPT | Performed by: ORTHOPAEDIC SURGERY

## 2019-09-05 RX ORDER — NABUMETONE 750 MG/1
750 TABLET, FILM COATED ORAL 2 TIMES DAILY
Qty: 60 TABLET | Refills: 2 | Status: SHIPPED | OUTPATIENT
Start: 2019-09-05 | End: 2019-11-11

## 2019-09-05 NOTE — PROGRESS NOTES
Letty Bach is a 49 y.o. female   Primary provider:  Aldair Esteves APRN       Chief Complaint   Patient presents with   • Left Knee - Pain   • Right Knee - Pain       HISTORY OF PRESENT ILLNESS: mathieu. Knee pain started about 7 months ago, no known injury. Patient referred by Steve Esteves.   7-month history of bilateral knee pain.  Dull achy pain most of the time.  Occasionally severe pains.  No specific injury.  She has taken Advil and Tylenol without improvement.  Currently taking meloxicam.  No numbness or tingling.  No back pain and no radiation down her leg.    Pain   This is a chronic problem. The current episode started more than 1 month ago. The problem occurs constantly. The problem has been unchanged. Pertinent negatives include no chills or fever. Associated symptoms comments: Clicking, . Exacerbated by: standing, sitting.  She has tried NSAIDs (meloxicam. ) for the symptoms.        CONCURRENT MEDICAL HISTORY:    Past Medical History:   Diagnosis Date   • GERD (gastroesophageal reflux disease)    • Urinary tract infection        No Known Allergies      Current Outpatient Medications:   •  fluticasone (FLONASE) 50 MCG/ACT nasal spray, 2 sprays into the nostril(s) as directed by provider Daily As Needed for Rhinitis., Disp: 18.2 mL, Rfl: 0  •  loratadine (CLARITIN) 10 MG tablet, Take 1 tablet by mouth Daily As Needed for Allergies., Disp: 30 tablet, Rfl: 5  •  nabumetone (RELAFEN) 750 MG tablet, Take 1 tablet by mouth 2 (Two) Times a Day., Disp: 60 tablet, Rfl: 2    Past Surgical History:   Procedure Laterality Date   • CHOLECYSTECTOMY  06/2019   • CHOLECYSTECTOMY WITH INTRAOPERATIVE CHOLANGIOGRAM N/A 6/6/2019    Procedure: CHOLECYSTECTOMY LAPAROSCOPIC INTRAOPERATIVE CHOLANGIOGRAM      (c-arm#2);  Surgeon: Felipe Overton MD;  Location: [facility];  Service: General   • HYSTERECTOMY  2007       Family History   Problem Relation Age of Onset   • Breast cancer Mother    • Thyroid disease Mother    •  "Hypertension Mother    • Hypertension Father    • Diabetes Father    • Kidney disease Father    • Heart disease Father    • No Known Problems Sister    • No Known Problems Brother    • Seizures Daughter    • Hypertension Daughter    • COPD Maternal Grandmother    • Asthma Maternal Grandmother    • No Known Problems Sister    • ADD / ADHD Daughter    • Polymyositis Daughter        Social History     Socioeconomic History   • Marital status:      Spouse name: Not on file   • Number of children: Not on file   • Years of education: Not on file   • Highest education level: Not on file   Tobacco Use   • Smoking status: Former Smoker   • Smokeless tobacco: Never Used   Substance and Sexual Activity   • Alcohol use: No     Frequency: Never   • Drug use: No   • Sexual activity: Defer        Review of Systems   Constitutional: Negative.  Negative for chills and fever.   HENT: Negative.    Eyes: Negative.    Respiratory: Negative.    Cardiovascular: Negative.    Gastrointestinal: Negative.    Endocrine: Negative.    Genitourinary: Negative.    Musculoskeletal:        Knee pain   Skin: Negative.    Allergic/Immunologic: Negative.    Neurological: Negative.    Hematological: Negative.    Psychiatric/Behavioral: Negative.    All other systems reviewed and are negative.      PHYSICAL EXAMINATION:       Ht 165.1 cm (65\")   Wt 128 kg (282 lb)   BMI 46.93 kg/m²     Physical Exam   Constitutional: She is oriented to person, place, and time. She appears well-developed and well-nourished. No distress.   Eyes: Conjunctivae are normal. Pupils are equal, round, and reactive to light.   Neck: Neck supple. No tracheal deviation present. No thyromegaly present.   Cardiovascular: Normal rate and intact distal pulses.   Pulmonary/Chest: Effort normal. She exhibits no tenderness.   Abdominal: Soft. She exhibits no distension and no mass. There is no tenderness.   Neurological: She is alert and oriented to person, place, and time. "   Skin: Skin is warm. Capillary refill takes less than 2 seconds. No rash noted.   Psychiatric: She has a normal mood and affect. Her behavior is normal. Judgment and thought content normal.       GAIT:     []  Normal  [x]  Antalgic    Assistive device: [x]  None  []  Walker     []  Crutches  []  Cane     []  Wheelchair  []  Stretcher    Right Knee Exam     Muscle Strength   The patient has normal right knee strength.    Tenderness   Right knee tenderness location: Diffusely tender with mild tenderness over the patellar tendon.    Range of Motion   Extension: 0   Flexion: 130     Tests   Varus: negative Valgus: negative  Drawer:  Anterior - negative        Other   Erythema: absent  Sensation: normal  Pulse: present  Swelling: none      Left Knee Exam     Muscle Strength   The patient has normal left knee strength.    Tenderness   Left knee tenderness location: Diffusely tender with mild tenderness over the patellar tendon.    Range of Motion   Extension: 0   Flexion: 130     Tests   Varus: negative Valgus: negative  Drawer:  Anterior - negative         Other   Erythema: absent  Sensation: normal  Pulse: present  Swelling: none              Procedure: XR KNEE 4 OR MORE VIEWS BILATERAL.     History: bilateral knee pain, M25.561 Pain in right knee M25.562  Pain in left knee G89.29 Other chronic pain.     Comparison: None     Findings:  Four views of the right knee and four views of the left knee were  obtained.  There is no radiographic evidence of fracture, dislocation or  other significant bony abnormality. No joint effusion is seen.        IMPRESSION:  Impression: No radiographic evidence of fracture.     Electronically signed by:  Lukasz Loyola MD  5/22/2019 4:21 PM CDT  Workstation: [id number]            ASSESSMENT:    Diagnoses and all orders for this visit:    Chronic pain of both knees  -     Ambulatory Referral to Physical Therapy Evaluate and treat; Heat (aquatics), Electrotherapy; Stretching, ROM,  Strengthening    Morbid obesity with BMI of 45.0-49.9, adult (CMS/[facility])    GERD without esophagitis    Other orders  -     nabumetone (RELAFEN) 750 MG tablet; Take 1 tablet by mouth 2 (Two) Times a Day.          PLAN    We discussed beginning a different anti-inflammatory medicine and she was switched to nabumetone.  She is going to begin physical therapy with range of motion and strengthening of both knees, modalities, aquatics, and teach home exercise program.  We discussed weight loss and healthy lifestyle choices.  Slowly progress motion and activity as tolerated.  Discussed possible MRI if symptoms do not improve.    Patient's Body mass index is 46.93 kg/m². BMI is above normal parameters. Recommendations include: exercise counseling and nutrition counseling.    Return in about 6 weeks (around 10/17/2019) for recheck.    Jose Alberto Amos MD

## 2019-09-12 ENCOUNTER — HOSPITAL ENCOUNTER (OUTPATIENT)
Dept: PHYSICAL THERAPY | Facility: HOSPITAL | Age: 49
Setting detail: THERAPIES SERIES
Discharge: HOME | End: 2019-09-12

## 2019-09-12 DIAGNOSIS — G89.29 CHRONIC PAIN OF BOTH KNEES: Primary | ICD-10-CM

## 2019-09-12 DIAGNOSIS — M25.561 CHRONIC PAIN OF BOTH KNEES: Primary | ICD-10-CM

## 2019-09-12 DIAGNOSIS — M25.562 CHRONIC PAIN OF BOTH KNEES: Primary | ICD-10-CM

## 2019-09-12 PROCEDURE — 97110 THERAPEUTIC EXERCISES: CPT | Performed by: PHYSICAL THERAPIST

## 2019-09-12 PROCEDURE — 97161 PT EVAL LOW COMPLEX 20 MIN: CPT | Performed by: PHYSICAL THERAPIST

## 2019-09-12 NOTE — THERAPY EVALUATION
Outpatient Physical Therapy Ortho Initial Evaluation  [facility]     Patient Name: Letty Bach  : 1970  MRN: [id number]  Today's Date: 2019      Visit Date: 2019  Visit  recert date 10-3-19 progress 0% return to md 10-24-19    Medications (Admitted on 2019)     fluticasone (FLONASE) 50 MCG/ACT nasal spray     loratadine (CLARITIN) 10 MG tablet     nabumetone (RELAFEN) 750 MG tablet          Patient Active Problem List   Diagnosis   • Calculus of gallbladder with chronic cholecystitis without obstruction   • Chronic pain of both knees   • Costochondritis   • Elevated LDL cholesterol level        Past Medical History:   Diagnosis Date   • GERD (gastroesophageal reflux disease)    • Urinary tract infection         Past Surgical History:   Procedure Laterality Date   • CHOLECYSTECTOMY  2019   • CHOLECYSTECTOMY WITH INTRAOPERATIVE CHOLANGIOGRAM N/A 2019    Procedure: CHOLECYSTECTOMY LAPAROSCOPIC INTRAOPERATIVE CHOLANGIOGRAM      (c-arm#2);  Surgeon: Felipe Overton MD;  Location: [facility];  Service: General   • HYSTERECTOMY         Visit Dx:     ICD-10-CM ICD-9-CM   1. Chronic pain of both knees M25.561 719.46    M25.562 338.29    G89.29          Patient History     Row Name 19 0900             History    Chief Complaint  Pain  -SW      Type of Pain  Knee pain  -SW      Date Current Problem(s) Began  -- 7 months ago  -SW      Brief Description of Current Complaint  Patient reports insidious onset bilateral knee pain. She initially saw PCP and was then referred to orthopedist. She has a new med, but she is not tkaing it due to side effects.   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  yes  -SW    Pre-Treatment Pain Level  5  -SW    Post-Treatment Pain Level  5  -SW       General ROM    LT Lower Ext  --  -SW       Right Lower Ext    Rt Knee Extension/Flexion AROM  0/105  -SW       Left Lower Ext    Lt Knee Extension/Flexion AROM  0/105  -SW       MMT (Manual Muscle Testing)    Rt Lower Ext  Rt Hip WNL;Rt Knee WFL  -SW    Lt Lower Ext  Lt Hip WNL;Lt Knee WNL  -SW    General MMT Comments  mild difficulty moving sit to stand without UE support  -SW       Flexibility    Flexibility Tested?  Lower Extremity  -SW       Lower Extremity Flexibility    Hamstrings  WNL;Right:;Left:  -SW    Quadriceps  Mildly limited;Right:;Left:  -SW      User Key  (r) = Recorded By, (t) = Taken By, (c) = Cosigned By    Initials Name Provider Type    Misa Munroe Physical Therapist                      Therapy Education  Education Details: importance of developing appropriate strength and flexibility to reduce stress on knee joints  Given: HEP  Program: New  How Provided: Verbal, Demonstration, Written  Provided to: Patient  Level of Understanding: Verbalized, Demonstrated     PT OP Goals     Row Name 09/12/19 0900          PT Short Term Goals    STG Date to Achieve  09/26/19  -SW     STG 1  patient independent with HEP.  -SW        Long Term Goals    LTG Date to Achieve  09/24/20  -SW     LTG 1  Patient will demonstrate 0-120 degrees of bilateral knee flexion AROM.   -SW     LTG 2  Patient will demonstrate normal quad flexibility bilaterally.   -     LTG 3  Patient will be able to perform 20 consecutive sit to stand without difficulty.   -     LTG 4  Patient will demonstrate ability to navigate a flight of stairs without difficulty.   -        Time Calculation    PT Goal Re-Cert Due Date  10/03/19  -       User Key  (r) = Recorded By, (t) = Taken By, (c) = Cosigned By    Initials Name Provider Type    Misa Munroe Physical Therapist          PT Assessment/Plan     Row Name 09/12/19 0900          PT Assessment    Functional Limitations  Impaired locomotion;Limitations in community activities;Impaired gait;Performance in leisure activities;Performance in self-care ADL  -     Impairments  Balance;Impaired muscle endurance;Impaired muscle length;Joint mobility;Muscle strength;Pain  -     Assessment Comments  49 yof presents with insidious onset chronic bilateral knee pain, left > right, with reduced bilateral flexion ROM, bilateral quad flexibility, balance, and quadriceps strength.   -SW     Rehab Potential  Good  -SW     Patient/caregiver participated in establishment of treatment plan and goals  Yes  -SW     Patient would benefit from skilled therapy intervention  Yes  -SW        PT Plan    PT Frequency  2x/week  -     Predicted Duration of Therapy Intervention (Therapy Eval)  6 weeks  -SW     Planned CPT's?  PT EVAL LOW COMPLEXITY: 73094;PT THER PROC EA 15 MIN: 89011;PT THER ACT EA 15 MIN: 50358;PT MANUAL THERAPY EA 15 MIN: 19355;PT NEUROMUSC RE-EDUCATION EA 15 MIN: 42442;PT HOT OR COLD PACK TREAT MCARE;PT ELECTRICAL STIM UNATTEND:   -     PT Plan Comments  Focus on developing HEP that patient can continue to follow independently either in gym or at home.   -       User Key  (r) = Recorded By, (t) = Taken By, (c) = Cosigned By    Initials Name Provider Type    Misa Munroe Physical Therapist            OP Exercises     Row Name 09/12/19 0900             Subjective Pain    Able to rate subjective pain?  yes  -SW       Pre-Treatment Pain Level  5  -SW      Post-Treatment Pain Level  5  -SW         Exercise 1    Exercise Name 1  sit to stand  -SW      Reps 1  20  -SW         Exercise 2    Exercise Name 2  Straight leg raise  -SW      Reps 2  20  -SW         Exercise 3    Exercise Name 3  bridge  -SW      Reps 3  20  -SW         Exercise 4    Exercise Name 4  553  -SW        User Key  (r) = Recorded By, (t) = Taken By, (c) = Cosigned By    Initials Name Provider Type    Misa Munroe Physical Therapist                        Outcome Measure Options: Lower Extremity Functional Scale (LEFS)  Lower Extremity Functional Index  Any of your usual work, housework or school activities: Moderate difficulty  Your usual hobbies, recreational or sporting activities: Moderate difficulty  Getting into or out of the bath: A little bit of difficulty  Walking between rooms: No difficulty  Putting on your shoes or socks: No difficulty  Squatting: Moderate difficulty  Lifting an object, like a bag of groceries from the floor: No difficulty  Performing light activities around your home: No difficulty  Performing heavy activities around your home: No difficulty  Getting into or out of a car: No difficulty  Walking 2 blocks: Moderate difficulty  Walking a mile: Moderate difficulty  Going up or down 10 stairs (about 1 flight of stairs): Quite a bit of difficulty  Standing for 1 hour: A little bit of difficulty  Sitting for 1 hour: A little bit of difficulty  Running on even ground: Extreme difficulty or unable to perform activity  Running on uneven ground: Extreme difficulty or unable to perform activity  Making sharp turns while running fast: Extreme difficulty or unable to perform activity  Hopping: Extreme difficulty or unable to perform activity  Rolling over in bed: No difficulty  Total: 48      Time Calculation:     Start Time: 0900  Stop Time: 0936  Time Calculation (min): 36 min     Therapy Charges for Today     Code Description Service Date  Service Provider Modifiers Qty    68604700844 HC PT EVAL LOW COMPLEXITY 2 9/12/2019 Misa Clifton GP 1    13224084951 HC PT THER PROC EA 15 MIN 9/12/2019 Misa Clifton GP 1          PT G-Codes  Outcome Measure Options: Lower Extremity Functional Scale (LEFS)  Total: 48         Misa Clifton  9/12/2019

## 2019-09-16 ENCOUNTER — HOSPITAL ENCOUNTER (OUTPATIENT)
Dept: PHYSICAL THERAPY | Facility: HOSPITAL | Age: 49
Setting detail: THERAPIES SERIES
Discharge: HOME | End: 2019-09-16

## 2019-09-16 DIAGNOSIS — M25.562 CHRONIC PAIN OF BOTH KNEES: Primary | ICD-10-CM

## 2019-09-16 DIAGNOSIS — M25.561 CHRONIC PAIN OF BOTH KNEES: Primary | ICD-10-CM

## 2019-09-16 DIAGNOSIS — G89.29 CHRONIC PAIN OF BOTH KNEES: Primary | ICD-10-CM

## 2019-09-16 PROCEDURE — 97110 THERAPEUTIC EXERCISES: CPT

## 2019-09-16 NOTE — THERAPY TREATMENT NOTE
Outpatient Physical Therapy Ortho Treatment Note  [facility]     Patient Name: Letty Bach  : 1970  MRN: [id number]  Today's Date: 2019      Visit Date: 2019     Subjective Improvement 0  Visits 2/2  Visits approved 60 per year  RTMD 10-  Recert Date 10-    Bilateral knee pain    Visit Dx:    ICD-10-CM ICD-9-CM   1. Chronic pain of both knees M25.561 719.46    M25.562 338.29    G89.29        Patient Active Problem List   Diagnosis   • Calculus of gallbladder with chronic cholecystitis without obstruction   • Chronic pain of both knees   • Costochondritis   • Elevated LDL cholesterol level        Past Medical History:   Diagnosis Date   • GERD (gastroesophageal reflux disease)    • Urinary tract infection         Past Surgical History:   Procedure Laterality Date   • CHOLECYSTECTOMY  2019   • CHOLECYSTECTOMY WITH INTRAOPERATIVE CHOLANGIOGRAM N/A 2019    Procedure: CHOLECYSTECTOMY LAPAROSCOPIC INTRAOPERATIVE CHOLANGIOGRAM      (c-arm#2);  Surgeon: Felipe Overton MD;  Location: [facility];  Service: General   • HYSTERECTOMY                         PT Assessment/Plan     Row Name 19 1048          PT Assessment    Assessment Comments  Pain with SAQ more in left knee than right.  Patient is eager to try aquatics  -CP        PT Plan    PT Frequency  2x/week  -CP     Predicted Duration of Therapy Intervention (Therapy Eval)  3-4 weeks  -CP     PT Plan Comments  Cont with POC.  Aquatics next visit  -CP       User Key  (r) = Recorded By, (t) = Taken By, (c) = Cosigned By    Initials Name Provider Type    CP Reshma Gutierrez PTA Physical Therapy Assistant          Modalities     Row Name 19 0800             Ice    Ice Applied  Yes  -CP      Location  bilateral knee  -CP      Rx Minutes  15 mins  -CP      Ice S/P Rx  Yes  -CP        User Key  (r) = Recorded By, (t) = Taken By, (c) = Cosigned By    Initials Name Provider Type    CP Reshma Gutierrez PTA  "Physical Therapy Assistant        OP Exercises     Row Name 09/16/19 0800             Subjective Comments    Subjective Comments  States that pain isnt too bad today as she hasnt done anything.  Reports that MD wants her to get into the pool and ex.  -CP         Subjective Pain    Able to rate subjective pain?  yes  -CP      Pre-Treatment Pain Level  5  -CP      Subjective Pain Comment  left knee greater than right knee  -CP         Exercise 1    Exercise Name 1  Pro II level 3  -CP      Time 1  10  -CP         Exercise 2    Exercise Name 2  --  -CP      Cueing 2  --  -CP      Sets 2  --  -CP      Time 2  --  -CP         Exercise 3    Exercise Name 3  Standing HS stretch  -CP      Cueing 3  Verbal  -CP      Sets 3  3  -CP      Time 3  30  -CP      Additional Comments  bialteral  -CP         Exercise 4    Exercise Name 4  LAQ  -CP      Cueing 4  Verbal;Demo  -CP      Sets 4  2  -CP      Reps 4  10  -CP      Time 4  3-5\" hold  -CP      Additional Comments  bilateral  -CP         Exercise 5    Exercise Name 5  QS  -CP      Cueing 5  Verbal;Tactile  -CP      Sets 5  2  -CP      Reps 5  10  -CP      Time 5  3-5\" hold  -CP      Additional Comments  bilateral  -CP         Exercise 6    Exercise Name 6  SAQ  -CP      Cueing 6  Verbal;Tactile  -CP      Sets 6  2  -CP      Reps 6  10  -CP      Time 6  bilateral  -CP        User Key  (r) = Recorded By, (t) = Taken By, (c) = Cosigned By    Initials Name Provider Type    CP Reshma Gutierrez, PTA Physical Therapy Assistant                       PT OP Goals     Row Name 09/16/19 1000          PT Short Term Goals    STG Date to Achieve  09/26/19  -CP     STG 1  patient independent with HEP.  -CP        Long Term Goals    LTG Date to Achieve  09/24/20  -CP     LTG 1  Patient will demonstrate 0-120 degrees of bilateral knee flexion AROM.   -CP     LTG 1 Progress  Progressing  -CP     LTG 2  Patient will demonstrate normal quad flexibility bilaterally.  -CP     LTG 2 Progress  " Progressing  -CP     LTG 3  Patient will be able to perform 20 consecutive sit to stand without difficulty.   -CP     LTG 3 Progress  Not Met  -CP     LTG 4  Patient will demonstrate ability to navigate a flight of stairs without difficulty.   -CP     LTG 4 Progress  Progressing  -CP        Time Calculation    PT Goal Re-Cert Due Date  10/03/19  -CP       User Key  (r) = Recorded By, (t) = Taken By, (c) = Cosigned By    Initials Name Provider Type    CP Reshma Gutierrez, PTA Physical Therapy Assistant          Therapy Education  Education Details: LAQ, QS, SAQ  Given: HEP  Program: New  How Provided: Verbal, Demonstration, Written  Provided to: Patient  Level of Understanding: Teach back education performed, Verbalized, Demonstrated              Time Calculation:   Start Time: 0840  Stop Time: 0930  Time Calculation (min): 50 min  Total Timed Code Minutes- PT: 25 minute(s)  Therapy Charges for Today     Code Description Service Date Service Provider Modifiers Qty    24640037876 HC PT THER SUPP EA 15 MIN 9/16/2019 Reshma Gutierrez, ELISSA GP 1    19307882142 HC PT THER PROC EA 15 MIN 9/16/2019 Reshma Gutierrez PTA GP 2                    Reshma Gutierrez PTA  9/16/2019

## 2019-09-17 ENCOUNTER — TELEPHONE (OUTPATIENT)
Dept: FAMILY MEDICINE CLINIC | Facility: CLINIC | Age: 49
End: 2019-09-17

## 2019-09-17 DIAGNOSIS — Z12.31 ENCOUNTER FOR SCREENING MAMMOGRAM FOR BREAST CANCER: Primary | ICD-10-CM

## 2019-09-18 ENCOUNTER — HOSPITAL ENCOUNTER (OUTPATIENT)
Dept: PHYSICAL THERAPY | Facility: HOSPITAL | Age: 49
Setting detail: THERAPIES SERIES
Discharge: HOME | End: 2019-09-18

## 2019-09-18 DIAGNOSIS — M25.562 CHRONIC PAIN OF BOTH KNEES: Primary | ICD-10-CM

## 2019-09-18 DIAGNOSIS — G89.29 CHRONIC PAIN OF BOTH KNEES: Primary | ICD-10-CM

## 2019-09-18 DIAGNOSIS — M25.561 CHRONIC PAIN OF BOTH KNEES: Primary | ICD-10-CM

## 2019-09-18 PROCEDURE — 97110 THERAPEUTIC EXERCISES: CPT

## 2019-09-18 NOTE — THERAPY TREATMENT NOTE
Outpatient Physical Therapy Ortho Treatment Note  [facility]     Patient Name: Letty Bach  : 1970  MRN: [id number]  Today's Date: 2019      Visit Date: 2019     Subjective Improvement 0  Visits 3/3  Visits approved 60 per year  RTMD 10-  Recert Date 10-    Bilateral knee pain    Visit Dx:    ICD-10-CM ICD-9-CM   1. Chronic pain of both knees M25.561 719.46    M25.562 338.29    G89.29        Patient Active Problem List   Diagnosis   • Calculus of gallbladder with chronic cholecystitis without obstruction   • Chronic pain of both knees   • Costochondritis   • Elevated LDL cholesterol level        Past Medical History:   Diagnosis Date   • GERD (gastroesophageal reflux disease)    • Urinary tract infection         Past Surgical History:   Procedure Laterality Date   • CHOLECYSTECTOMY  2019   • CHOLECYSTECTOMY WITH INTRAOPERATIVE CHOLANGIOGRAM N/A 2019    Procedure: CHOLECYSTECTOMY LAPAROSCOPIC INTRAOPERATIVE CHOLANGIOGRAM      (c-arm#2);  Surgeon: Felipe Overton MD;  Location: [facility];  Service: General   • HYSTERECTOMY                         PT Assessment/Plan     Row Name 19 0858          PT Assessment    Assessment Comments  Patient did have icnrease left knee pain after and during aquatics.  She wants to keep trying aquatics and hopes it will help.  -CP        PT Plan    PT Frequency  2x/week  -CP     Predicted Duration of Therapy Intervention (Therapy Eval)  3-4 weeks  -CP     PT Plan Comments  Cont with POC.  aquatics next visit  -CP       User Key  (r) = Recorded By, (t) = Taken By, (c) = Cosigned By    Initials Name Provider Type    Reshma Lawrence PTA Physical Therapy Assistant            OP Exercises     Row Name 19 0800             Subjective Comments    Subjective Comments  Patient reports increase left knee pain since last therapy visit  -CP         Subjective Pain    Able to rate subjective pain?  yes  -CP      Pre-Treatment  Pain Level  6  -CP      Post-Treatment Pain Level  7  -CP         Aquatics    Aquatics performed?  Yes  -CP         Exercise 1    Exercise Name 1  see aquatic flowsheet  -CP        User Key  (r) = Recorded By, (t) = Taken By, (c) = Cosigned By    Initials Name Provider Type    Reshma Lawrence PTA Physical Therapy Assistant                       PT OP Goals     Row Name 09/18/19 0800          PT Short Term Goals    STG Date to Achieve  09/26/19  -CP     STG 1  patient independent with HEP.  -CP     STG 1 Progress  Ongoing  -CP        Long Term Goals    LTG Date to Achieve  09/24/20  -CP     LTG 1  Patient will demonstrate 0-120 degrees of bilateral knee flexion AROM.   -CP     LTG 1 Progress  Progressing  -CP     LTG 2  Patient will demonstrate normal quad flexibility bilaterally.  -CP     LTG 2 Progress  Progressing  -CP     LTG 3  Patient will be able to perform 20 consecutive sit to stand without difficulty.   -CP     LTG 3 Progress  Not Met  -CP     LTG 4  Patient will demonstrate ability to navigate a flight of stairs without difficulty.   -CP     LTG 4 Progress  Progressing  -CP        Time Calculation    PT Goal Re-Cert Due Date  10/30/19  -CP       User Key  (r) = Recorded By, (t) = Taken By, (c) = Cosigned By    Initials Name Provider Type    CP Reshma Gutierrez PTA Physical Therapy Assistant                         Time Calculation:   Start Time: 0803  Stop Time: 0843  Time Calculation (min): 40 min  Total Timed Code Minutes- PT: 40 minute(s)  Therapy Charges for Today     Code Description Service Date Service Provider Modifiers Qty    90973206924 HC PT THER PROC EA 15 MIN 9/18/2019 Reshma Gutierrez PTA GP 3                    Reshma Gutierrez PTA  9/18/2019

## 2019-09-23 ENCOUNTER — HOSPITAL ENCOUNTER (OUTPATIENT)
Dept: PHYSICAL THERAPY | Facility: HOSPITAL | Age: 49
Setting detail: THERAPIES SERIES
Discharge: HOME | End: 2019-09-23

## 2019-09-23 DIAGNOSIS — G89.29 CHRONIC PAIN OF BOTH KNEES: Primary | ICD-10-CM

## 2019-09-23 DIAGNOSIS — M25.561 CHRONIC PAIN OF BOTH KNEES: Primary | ICD-10-CM

## 2019-09-23 DIAGNOSIS — M25.562 CHRONIC PAIN OF BOTH KNEES: Primary | ICD-10-CM

## 2019-09-23 PROCEDURE — 97110 THERAPEUTIC EXERCISES: CPT

## 2019-09-23 PROCEDURE — G0283 ELEC STIM OTHER THAN WOUND: HCPCS

## 2019-09-23 NOTE — THERAPY TREATMENT NOTE
Outpatient Physical Therapy Ortho Treatment Note  [facility]     Patient Name: Letty Bach  : 1970  MRN: [id number]  Today's Date: 2019      Visit Date: 2019     Subjective Improvement not sure  Visits 4/4  Visits approved 60 per year  RTMD 10-  Recert Date 10-    Bilateral Knee pain    Visit Dx:    ICD-10-CM ICD-9-CM   1. Chronic pain of both knees M25.561 719.46    M25.562 338.29    G89.29        Patient Active Problem List   Diagnosis   • Calculus of gallbladder with chronic cholecystitis without obstruction   • Chronic pain of both knees   • Costochondritis   • Elevated LDL cholesterol level        Past Medical History:   Diagnosis Date   • GERD (gastroesophageal reflux disease)    • Urinary tract infection         Past Surgical History:   Procedure Laterality Date   • CHOLECYSTECTOMY  2019   • CHOLECYSTECTOMY WITH INTRAOPERATIVE CHOLANGIOGRAM N/A 2019    Procedure: CHOLECYSTECTOMY LAPAROSCOPIC INTRAOPERATIVE CHOLANGIOGRAM      (c-arm#2);  Surgeon: Felipe Overton MD;  Location: [facility];  Service: General   • HYSTERECTOMY                         PT Assessment/Plan     Row Name 19 0952          PT Assessment    Assessment Comments  Very good tolerance with new ther ex.  No goals met this date.  -CP        PT Plan    PT Frequency  2x/week  -CP     Predicted Duration of Therapy Intervention (Therapy Eval)  3-4 weeks  -CP     PT Plan Comments  Cont with POC.  aquatics next visit  -CP       User Key  (r) = Recorded By, (t) = Taken By, (c) = Cosigned By    Initials Name Provider Type    CP Reshma Gutierrez PTA Physical Therapy Assistant          Modalities     Row Name 19 0800             Ice    Ice Applied  Yes  -CP      Location  bilateral knee  -CP      Rx Minutes  15 mins  -CP      Ice S/P Rx  Yes  -CP         ELECTRICAL STIMULATION    Attended/Unattended  Unattended  -CP      Stimulation Type  IFC  -CP      Location/Electrode Placement/Other  " left knee  -CP       PT E-Stim Unattended (Manual) Minutes  15  -CP        User Key  (r) = Recorded By, (t) = Taken By, (c) = Cosigned By    Initials Name Provider Type    CP Reshma Gutierrez, ELISSA Physical Therapy Assistant        OP Exercises     Row Name 09/23/19 0800             Subjective Comments    Subjective Comments  Patient reports decrease pain today but states that she hasnt done much today  -CP         Subjective Pain    Able to rate subjective pain?  yes  -CP      Pre-Treatment Pain Level  1  -CP      Post-Treatment Pain Level  0  -CP      Subjective Pain Comment  left knee pain  -CP         Exercise 1    Exercise Name 1  Pro II level 3  -CP      Time 1  10  -CP         Exercise 2    Exercise Name 2  incline stretch  -CP      Cueing 2  Verbal  -CP      Sets 2  3  -CP      Time 2  30  -CP         Exercise 3    Exercise Name 3  Standing HS stretch  -CP      Cueing 3  Verbal  -CP      Sets 3  3  -CP      Time 3  30  -CP      Additional Comments  bilateral  -CP         Exercise 4    Exercise Name 4  CR/TR  -CP      Cueing 4  Verbal  -CP      Sets 4  2  -CP      Reps 4  10  -CP         Exercise 5    Exercise Name 5  Step up 4\"  -CP      Cueing 5  Verbal;Demo  -CP      Sets 5  2  -CP      Reps 5  10  -CP      Time 5  bilateral  -CP         Exercise 6    Exercise Name 6  resisted walks on CC  -CP      Cueing 6  Verbal  -CP      Reps 6  5  -CP      Time 6  FW/BW  -CP      Additional Comments  4 plates  -CP         Exercise 7    Exercise Name 7  resisted walks on CC  -CP      Cueing 7  Verbal  -CP      Reps 7  5  -CP      Time 7  lat stepping  -CP      Additional Comments  3 plates  -CP         Exercise 8    Exercise Name 8  SLR  -CP      Cueing 8  Verbal  -CP      Sets 8  2  -CP      Reps 8  10  -CP      Time 8  bilateral  -CP        User Key  (r) = Recorded By, (t) = Taken By, (c) = Cosigned By    Initials Name Provider Type    CP Reshma Gutierrez PTA Physical Therapy Assistant                 "       PT OP Goals     Row Name 09/23/19 0900          PT Short Term Goals    STG Date to Achieve  09/26/19  -CP     STG 1  patient independent with HEP.  -CP     STG 1 Progress  Ongoing  -CP        Long Term Goals    LTG Date to Achieve  09/24/20  -CP     LTG 1  Patient will demonstrate 0-120 degrees of bilateral knee flexion AROM.   -CP     LTG 1 Progress  Progressing  -CP     LTG 2  Patient will demonstrate normal quad flexibility bilaterally.  -CP     LTG 2 Progress  Progressing  -CP     LTG 3  Patient will be able to perform 20 consecutive sit to stand without difficulty.   -CP     LTG 3 Progress  Not Met  -CP     LTG 4  Patient will demonstrate ability to navigate a flight of stairs without difficulty.   -CP     LTG 4 Progress  Progressing  -CP        Time Calculation    PT Goal Re-Cert Due Date  10/03/19  -CP       User Key  (r) = Recorded By, (t) = Taken By, (c) = Cosigned By    Initials Name Provider Type    CP Reshma Gutierrez, PTA Physical Therapy Assistant                         Time Calculation:   Start Time: 0845  Stop Time: 0954  Time Calculation (min): 69 min  Total Timed Code Minutes- PT: 41 minute(s)  Therapy Charges for Today     Code Description Service Date Service Provider Modifiers Qty    51930092083 HC PT ELECTRICAL STIM UNATTENDED 9/23/2019 Reshma Gutierrez, PTA  1    44392488383 HC PT THER PROC EA 15 MIN 9/23/2019 Reshma Gutierrez PTA GP 3                    Reshma Gutierrez PTA  9/23/2019

## 2019-09-26 ENCOUNTER — HOSPITAL ENCOUNTER (OUTPATIENT)
Dept: PHYSICAL THERAPY | Facility: HOSPITAL | Age: 49
Setting detail: THERAPIES SERIES
Discharge: HOME | End: 2019-09-26

## 2019-09-26 DIAGNOSIS — G89.29 CHRONIC PAIN OF BOTH KNEES: Primary | ICD-10-CM

## 2019-09-26 DIAGNOSIS — M25.562 CHRONIC PAIN OF BOTH KNEES: Primary | ICD-10-CM

## 2019-09-26 DIAGNOSIS — M25.561 CHRONIC PAIN OF BOTH KNEES: Primary | ICD-10-CM

## 2019-09-26 PROCEDURE — 97110 THERAPEUTIC EXERCISES: CPT

## 2019-09-26 NOTE — THERAPY TREATMENT NOTE
Outpatient Physical Therapy Ortho Treatment Note  [facility]     Patient Name: Letty Bach  : 1970  MRN: [id number]  Today's Date: 2019      Visit Date: 2019    Subjective Improvement not much  Visits 5/5  Visits approved 60 per year  RTMD 10-  Recert Date 10-    Bilateral knee pain    Visit Dx:    ICD-10-CM ICD-9-CM   1. Chronic pain of both knees M25.561 719.46    M25.562 338.29    G89.29        Patient Active Problem List   Diagnosis   • Calculus of gallbladder with chronic cholecystitis without obstruction   • Chronic pain of both knees   • Costochondritis   • Elevated LDL cholesterol level        Past Medical History:   Diagnosis Date   • GERD (gastroesophageal reflux disease)    • Urinary tract infection         Past Surgical History:   Procedure Laterality Date   • CHOLECYSTECTOMY  2019   • CHOLECYSTECTOMY WITH INTRAOPERATIVE CHOLANGIOGRAM N/A 2019    Procedure: CHOLECYSTECTOMY LAPAROSCOPIC INTRAOPERATIVE CHOLANGIOGRAM      (c-arm#2);  Surgeon: Felipe Overton MD;  Location: [facility];  Service: General   • HYSTERECTOMY                         PT Assessment/Plan     Row Name 19 0957          PT Assessment    Assessment Comments  Patient present with cont reports of left knee pain.  Patient reports increase left and right knee pain with step up in pool  -CP        PT Plan    PT Frequency  2x/week  -CP     Predicted Duration of Therapy Intervention (Therapy Eval)  3-4 weeks  -CP     PT Plan Comments  Cont wit POC.  aquatics next visit, recheck scheduled for next week  -CP       User Key  (r) = Recorded By, (t) = Taken By, (c) = Cosigned By    Initials Name Provider Type    Reshma Lawrence, PTA Physical Therapy Assistant            OP Exercises     Row Name 19 0900             Subjective Comments    Subjective Comments  Patient cont to reports left knee pain which is greater than right knee pain.  States that knees are painful and stiff   -CP         Subjective Pain    Able to rate subjective pain?  yes  -CP      Pre-Treatment Pain Level  4  -CP      Post-Treatment Pain Level  5  -CP      Subjective Pain Comment  left knee pain  -CP         Aquatics    Aquatics performed?  Yes  -CP         Exercise 1    Exercise Name 1  see aquatic flowsheet  -CP        User Key  (r) = Recorded By, (t) = Taken By, (c) = Cosigned By    Initials Name Provider Type    CP Reshma Gutierrez PTA Physical Therapy Assistant                       PT OP Goals     Row Name 09/26/19 0900          PT Short Term Goals    STG Date to Achieve  09/26/19  -CP     STG 1  patient independent with HEP.  -CP     STG 1 Progress  Ongoing  -CP        Long Term Goals    LTG Date to Achieve  09/24/20  -CP     LTG 1  Patient will demonstrate 0-120 degrees of bilateral knee flexion AROM.   -CP     LTG 1 Progress  Progressing  -CP     LTG 2  Patient will demonstrate normal quad flexibility bilaterally.  -CP     LTG 2 Progress  Progressing  -CP     LTG 3  Patient will be able to perform 20 consecutive sit to stand without difficulty.   -CP     LTG 3 Progress  Not Met  -CP     LTG 4  Patient will demonstrate ability to navigate a flight of stairs without difficulty.   -CP     LTG 4 Progress  Progressing  -CP        Time Calculation    PT Goal Re-Cert Due Date  10/03/19  -CP       User Key  (r) = Recorded By, (t) = Taken By, (c) = Cosigned By    Initials Name Provider Type    CP Reshma Gutierrez PTA Physical Therapy Assistant                         Time Calculation:   Start Time: 0850  Stop Time: 0930  Time Calculation (min): 40 min  Total Timed Code Minutes- PT: 40 minute(s)  Therapy Charges for Today     Code Description Service Date Service Provider Modifiers Qty    04385659081 HC PT THER PROC EA 15 MIN 9/26/2019 Reshma Gutierrez PTA GP 3                    Reshma Gutierrez PTA  9/26/2019

## 2019-10-01 ENCOUNTER — HOSPITAL ENCOUNTER (OUTPATIENT)
Dept: PHYSICAL THERAPY | Facility: HOSPITAL | Age: 49
Setting detail: THERAPIES SERIES
Discharge: HOME | End: 2019-10-01

## 2019-10-01 DIAGNOSIS — M25.562 CHRONIC PAIN OF BOTH KNEES: Primary | ICD-10-CM

## 2019-10-01 DIAGNOSIS — G89.29 CHRONIC PAIN OF BOTH KNEES: Primary | ICD-10-CM

## 2019-10-01 DIAGNOSIS — M25.561 CHRONIC PAIN OF BOTH KNEES: Primary | ICD-10-CM

## 2019-10-01 PROCEDURE — 97110 THERAPEUTIC EXERCISES: CPT

## 2019-10-01 NOTE — THERAPY TREATMENT NOTE
Outpatient Physical Therapy Ortho Treatment Note  [facility]     Patient Name: Letty Bach  : 1970  MRN: [id number]  Today's Date: 10/1/2019      Visit Date: 10/01/2019     Subjective Improvement 0  Visits 6/  Visits approved 60 per year  RTMD 10-  Recert Date 10-    Bilateral knee pain    Visit Dx:    ICD-10-CM ICD-9-CM   1. Chronic pain of both knees M25.561 719.46    M25.562 338.29    G89.29        Patient Active Problem List   Diagnosis   • Calculus of gallbladder with chronic cholecystitis without obstruction   • Chronic pain of both knees   • Costochondritis   • Elevated LDL cholesterol level        Past Medical History:   Diagnosis Date   • GERD (gastroesophageal reflux disease)    • Urinary tract infection         Past Surgical History:   Procedure Laterality Date   • CHOLECYSTECTOMY  2019   • CHOLECYSTECTOMY WITH INTRAOPERATIVE CHOLANGIOGRAM N/A 2019    Procedure: CHOLECYSTECTOMY LAPAROSCOPIC INTRAOPERATIVE CHOLANGIOGRAM      (c-arm#2);  Surgeon: Felipe Overton MD;  Location: [facility];  Service: General   • HYSTERECTOMY                         PT Assessment/Plan     Row Name 10/01/19 0940          PT Assessment    Assessment Comments  No increase pain after pool today.  Last pool session, patient performed step up which may have increased her knee pain  -CP        PT Plan    PT Frequency  2x/week  -CP     Predicted Duration of Therapy Intervention (Therapy Eval)  3-4 weeks  -CP     PT Plan Comments  Cont with pOC.  progressing in lLE strength as tolerated  -CP       User Key  (r) = Recorded By, (t) = Taken By, (c) = Cosigned By    Initials Name Provider Type    Reshma Lawrence, PTA Physical Therapy Assistant            OP Exercises     Row Name 10/01/19 0900             Subjective Comments    Subjective Comments  Patient states that she had increase pain after last pool therapy.  The next day afterward, she had a very hard time walking.  Today pain has  decrease somewhat  -CP         Subjective Pain    Able to rate subjective pain?  yes  -CP      Pre-Treatment Pain Level  5  -CP      Post-Treatment Pain Level  3  -CP         Aquatics    Aquatics performed?  Yes  -CP         Exercise 1    Exercise Name 1  see aquatic flowsheet  -CP        User Key  (r) = Recorded By, (t) = Taken By, (c) = Cosigned By    Initials Name Provider Type    CP Reshma Gutierrez PTA Physical Therapy Assistant                       PT OP Goals     Row Name 10/01/19 0900          PT Short Term Goals    STG Date to Achieve  09/26/19  -CP     STG 1  patient independent with HEP.  -CP     STG 1 Progress  Ongoing  -CP        Long Term Goals    LTG Date to Achieve  09/24/20  -CP     LTG 1  Patient will demonstrate 0-120 degrees of bilateral knee flexion AROM.   -CP     LTG 1 Progress  Progressing  -CP     LTG 2  Patient will demonstrate normal quad flexibility bilaterally.  -CP     LTG 2 Progress  Progressing  -CP     LTG 3  Patient will be able to perform 20 consecutive sit to stand without difficulty.   -CP     LTG 3 Progress  Not Met  -CP     LTG 4  Patient will demonstrate ability to navigate a flight of stairs without difficulty.   -CP     LTG 4 Progress  Progressing  -CP        Time Calculation    PT Goal Re-Cert Due Date  10/03/19  -CP       User Key  (r) = Recorded By, (t) = Taken By, (c) = Cosigned By    Initials Name Provider Type    CP Reshma Gutierrez PTA Physical Therapy Assistant                         Time Calculation:   Start Time: 0810  Stop Time: 0846  Time Calculation (min): 36 min  Total Timed Code Minutes- PT: 36 minute(s)  Therapy Charges for Today     Code Description Service Date Service Provider Modifiers Qty    43809680479  PT THER PROC EA 15 MIN 10/1/2019 Reshma Gutierrez PTA GP 2                    Reshma Gutierrez PTA  10/1/2019

## 2019-10-02 ENCOUNTER — OFFICE VISIT (OUTPATIENT)
Dept: FAMILY MEDICINE CLINIC | Facility: CLINIC | Age: 49
End: 2019-10-02

## 2019-10-02 ENCOUNTER — LAB (OUTPATIENT)
Dept: LAB | Facility: HOSPITAL | Age: 49
End: 2019-10-02

## 2019-10-02 VITALS
HEART RATE: 60 BPM | HEIGHT: 65 IN | OXYGEN SATURATION: 98 % | BODY MASS INDEX: 46.35 KG/M2 | DIASTOLIC BLOOD PRESSURE: 98 MMHG | SYSTOLIC BLOOD PRESSURE: 140 MMHG | WEIGHT: 278.2 LBS | RESPIRATION RATE: 18 BRPM

## 2019-10-02 DIAGNOSIS — R53.83 FATIGUE, UNSPECIFIED TYPE: ICD-10-CM

## 2019-10-02 DIAGNOSIS — H92.01 RIGHT EAR PAIN: ICD-10-CM

## 2019-10-02 DIAGNOSIS — R10.12 LUQ ABDOMINAL PAIN: Primary | ICD-10-CM

## 2019-10-02 DIAGNOSIS — H93.8X1 MASS OF RIGHT EAR CANAL: ICD-10-CM

## 2019-10-02 LAB
25(OH)D3 SERPL-MCNC: 35.2 NG/ML (ref 30–100)
BASOPHILS # BLD AUTO: 0.03 10*3/MM3 (ref 0–0.2)
BASOPHILS NFR BLD AUTO: 0.5 % (ref 0–1.5)
DEPRECATED RDW RBC AUTO: 43.8 FL (ref 37–54)
EOSINOPHIL # BLD AUTO: 0.09 10*3/MM3 (ref 0–0.4)
EOSINOPHIL NFR BLD AUTO: 1.6 % (ref 0.3–6.2)
ERYTHROCYTE [DISTWIDTH] IN BLOOD BY AUTOMATED COUNT: 13.1 % (ref 12.3–15.4)
FERRITIN SERPL-MCNC: 65.6 NG/ML (ref 13–150)
HCT VFR BLD AUTO: 45 % (ref 34–46.6)
HGB BLD-MCNC: 15 G/DL (ref 12–15.9)
IMM GRANULOCYTES # BLD AUTO: 0.01 10*3/MM3 (ref 0–0.05)
IMM GRANULOCYTES NFR BLD AUTO: 0.2 % (ref 0–0.5)
IRON 24H UR-MRATE: 115 MCG/DL (ref 37–145)
IRON SATN MFR SERPL: 33 % (ref 20–50)
LYMPHOCYTES # BLD AUTO: 1.87 10*3/MM3 (ref 0.7–3.1)
LYMPHOCYTES NFR BLD AUTO: 34.2 % (ref 19.6–45.3)
MCH RBC QN AUTO: 30.6 PG (ref 26.6–33)
MCHC RBC AUTO-ENTMCNC: 33.3 G/DL (ref 31.5–35.7)
MCV RBC AUTO: 91.8 FL (ref 79–97)
MONOCYTES # BLD AUTO: 0.39 10*3/MM3 (ref 0.1–0.9)
MONOCYTES NFR BLD AUTO: 7.1 % (ref 5–12)
NEUTROPHILS # BLD AUTO: 3.08 10*3/MM3 (ref 1.7–7)
NEUTROPHILS NFR BLD AUTO: 56.4 % (ref 42.7–76)
NRBC BLD AUTO-RTO: 0 /100 WBC (ref 0–0.2)
PLATELET # BLD AUTO: 275 10*3/MM3 (ref 140–450)
PMV BLD AUTO: 11.3 FL (ref 6–12)
RBC # BLD AUTO: 4.9 10*6/MM3 (ref 3.77–5.28)
TIBC SERPL-MCNC: 349 MCG/DL (ref 298–536)
TRANSFERRIN SERPL-MCNC: 234 MG/DL (ref 200–360)
VIT B12 BLD-MCNC: 516 PG/ML (ref 211–946)
WBC NRBC COR # BLD: 5.47 10*3/MM3 (ref 3.4–10.8)

## 2019-10-02 PROCEDURE — 83540 ASSAY OF IRON: CPT

## 2019-10-02 PROCEDURE — 82306 VITAMIN D 25 HYDROXY: CPT

## 2019-10-02 PROCEDURE — 85025 COMPLETE CBC W/AUTO DIFF WBC: CPT

## 2019-10-02 PROCEDURE — 99214 OFFICE O/P EST MOD 30 MIN: CPT | Performed by: NURSE PRACTITIONER

## 2019-10-02 PROCEDURE — 82607 VITAMIN B-12: CPT

## 2019-10-02 PROCEDURE — 82728 ASSAY OF FERRITIN: CPT

## 2019-10-02 PROCEDURE — 84466 ASSAY OF TRANSFERRIN: CPT

## 2019-10-02 PROCEDURE — 36415 COLL VENOUS BLD VENIPUNCTURE: CPT

## 2019-10-02 NOTE — PROGRESS NOTES
Subjective   Letty Bach is a 49 y.o. female.     Ms. Bach is a 49-year-old female who presents today for evaluation of right ear pain, left upper quadrant pain and fatigue.  Patient has been treated symptomatically for right ear pain with antihistamines and nasal steroids.  This provided no relief in her symptoms.  Last ear exam was unremarkable.  Patient continues to have left upper quadrant pain.  Patient did have x-ray examination for this pain which was unremarkable.  However patient continues to have pain.  Pain is exacerbated with movement, deep breathing and eating.  She denies any changes in her bowel habits, fever, chills, nausea, vomiting.  Patient also reports generalized fatigue.         The following portions of the patient's history were reviewed and updated as appropriate: allergies, current medications, past family history, past medical history, past social history, past surgical history and problem list.    Review of Systems   Constitutional: Negative for activity change, appetite change, chills, diaphoresis, fatigue, fever, unexpected weight gain and unexpected weight loss.   HENT: Positive for ear pain. Negative for congestion, ear discharge, sore throat, trouble swallowing and voice change.    Eyes: Negative.    Respiratory: Negative for cough, chest tightness, shortness of breath and wheezing.    Cardiovascular: Negative for chest pain, palpitations and leg swelling.   Gastrointestinal: Positive for abdominal pain (LUQ pain, recent unremarkable xray). Negative for abdominal distention, anal bleeding, blood in stool, constipation, diarrhea, nausea, rectal pain, vomiting, GERD and indigestion.   Endocrine: Negative.    Genitourinary: Negative for dysuria.   Musculoskeletal: Negative for arthralgias and myalgias.   Skin: Negative for rash.   Allergic/Immunologic: Negative.    Neurological: Negative.  Negative for dizziness.   Hematological: Negative.    Psychiatric/Behavioral: Negative.         Objective   Physical Exam   Constitutional: She is oriented to person, place, and time. She appears well-developed and well-nourished. No distress.   HENT:   Head: Normocephalic and atraumatic.   Right Ear: External ear normal. There is tenderness. No drainage or swelling. No foreign bodies. Tympanic membrane is not injected, not perforated and not erythematous. No middle ear effusion. No decreased hearing is noted. cerumen impaction is not present.  Left Ear: External ear normal. No drainage, swelling or tenderness. No foreign bodies. Tympanic membrane is not injected, not perforated and not erythematous.  No middle ear effusion. No decreased hearing is noted. An impacted cerumen is not present.  Ears:    Nose: Nose normal.   Mouth/Throat: Oropharynx is clear and moist. No oropharyngeal exudate.   Eyes: Conjunctivae and EOM are normal. Pupils are equal, round, and reactive to light.   Neck: Normal range of motion.   Cardiovascular: Normal rate, regular rhythm and normal heart sounds.   Pulmonary/Chest: Effort normal and breath sounds normal. No respiratory distress. She has no wheezes. She has no rales.   Abdominal: Soft. Normal appearance and bowel sounds are normal. She exhibits no distension and no mass. There is tenderness in the left upper quadrant. There is no rebound and no guarding. No hernia.   Musculoskeletal: Normal range of motion. She exhibits no edema or tenderness.   Lymphadenopathy:     She has no cervical adenopathy.   Neurological: She is alert and oriented to person, place, and time.   Skin: Skin is warm and dry. No rash noted. She is not diaphoretic. No erythema. No pallor.   Psychiatric: She has a normal mood and affect. Her behavior is normal. Judgment and thought content normal.   Nursing note and vitals reviewed.        Assessment/Plan   Letty was seen today for earache.    Diagnoses and all orders for this visit:    LUQ abdominal pain  -     CT Abdomen Pelvis Without Contrast;  Future    Right ear pain  -     CT Head Without Contrast; Future  -     Ambulatory Referral to ENT (Otolaryngology)    Mass of right ear canal  Comments:  inferior    Orders:  -     Ambulatory Referral to ENT (Otolaryngology)    Fatigue, unspecified type  -     Iron Profile; Future  -     Vitamin B12; Future  -     Ferritin; Future  -     Vitamin D 25 Hydroxy; Future  -     CBC & Differential; Future    1.  Left upper quadrant abdominal pain- CT abdomen pelvis without contrast.  Will call with results.    2.  Right ear pain/mass of right ear canal- CT head without contrast.  Will call the results.  Referral to ENT for further evaluation.    3.  Fatigue-iron profile, vitamin B12, ferritin, vitamin D, CBC.  Will call the results.    4.  Further follow-up and plan of care pending lab and radiology results.            This document has been electronically signed by MEL Houser on October 2, 2019 11:55 AM

## 2019-10-03 ENCOUNTER — HOSPITAL ENCOUNTER (OUTPATIENT)
Dept: PHYSICAL THERAPY | Facility: HOSPITAL | Age: 49
Setting detail: THERAPIES SERIES
Discharge: HOME | End: 2019-10-03

## 2019-10-03 DIAGNOSIS — G89.29 CHRONIC PAIN OF BOTH KNEES: Primary | ICD-10-CM

## 2019-10-03 DIAGNOSIS — M25.561 CHRONIC PAIN OF BOTH KNEES: Primary | ICD-10-CM

## 2019-10-03 DIAGNOSIS — M25.562 CHRONIC PAIN OF BOTH KNEES: Primary | ICD-10-CM

## 2019-10-03 PROCEDURE — 97110 THERAPEUTIC EXERCISES: CPT | Performed by: PHYSICAL THERAPIST

## 2019-10-03 NOTE — THERAPY RE-EVALUATION
Outpatient Physical Therapy Ortho Re-Evaluation  [facility]     Patient Name: Letty Bach  : 1970  MRN: [id number]  Today's Date: 10/3/2019      Visit Date: 10/03/2019        Subjective Improvement 40%  Visits /  Visits approved 60 per year  RTMD 10-  Recert Date 10-         Patient Active Problem List   Diagnosis   • Calculus of gallbladder with chronic cholecystitis without obstruction   • Chronic pain of both knees   • Costochondritis   • Elevated LDL cholesterol level        Past Medical History:   Diagnosis Date   • GERD (gastroesophageal reflux disease)    • Urinary tract infection         Past Surgical History:   Procedure Laterality Date   • CHOLECYSTECTOMY  2019   • CHOLECYSTECTOMY WITH INTRAOPERATIVE CHOLANGIOGRAM N/A 2019    Procedure: CHOLECYSTECTOMY LAPAROSCOPIC INTRAOPERATIVE CHOLANGIOGRAM      (c-arm#2);  Surgeon: Felipe Overton MD;  Location: [facility];  Service: General   • HYSTERECTOMY         Visit Dx:     ICD-10-CM ICD-9-CM   1. Chronic pain of both knees M25.561 719.46    M25.562 338.29    G89.29              PT Ortho     Row Name 10/03/19 0800       Subjective Comments    Subjective Comments  Patient states she feels pool therapy is improving knee pain. She is now able to walk 2 miles with only mild pain and can navigate stairs more easily. The hamstring stretch with foot on stair is the only exercises that aggravates symptoms, so that exercise has bee eliminated from her pool routine.   -SW       Subjective Pain    Post-Treatment Pain Level  2  -SW       Right Lower Ext    Rt Knee Extension/Flexion AROM  0-115 degrees  -SW       Left Lower Ext    Lt Knee Extension/Flexion AROM  0-115 degrees (painful at end range flexion)  -SW       MMT Right Lower Ext    Rt Hip Flexion MMT, Gross Movement  (5/5) normal  -SW    Rt Hip Extension MMT, Gross Movement  (5/5) normal  -SW    Rt Hip ABduction MMT, Gross Movement  (5/5) normal  -SW    Rt Knee Extension  MMT, Gross Movement  (5/5) normal  -SW    Rt Knee Flexion MMT, Gross Movement  (5/5) normal  -SW    Rt Lower Extremity Comments   able to move sit to stand 20 consecutive times with only mild exacerbation of symptoms  -SW       MMT Left Lower Ext    Lt Hip Flexion MMT, Gross Movement  (4+/5) good plus  -SW    Lt Hip Extension MMT, Gross Movement  (4/5) good  -SW    Lt Hip ABduction MMT, Gross Movement  (4+/5) good plus  -SW    Lt Knee Extension MMT, Gross Movement  (5/5) normal  -SW    Lt Knee Flexion MMT, Gross Movement  (5/5) normal  -SW      User Key  (r) = Recorded By, (t) = Taken By, (c) = Cosigned By    Initials Name Provider Type    Misa Munroe Physical Therapist                      Therapy Education  Education Details: SLR abduction and extension  Given: HEP, Symptoms/condition management  Program: New, Reinforced  How Provided: Verbal, Demonstration  Provided to: Patient  Level of Understanding: Verbalized, Demonstrated     PT OP Goals     Row Name 10/03/19 0900          PT Short Term Goals    STG Date to Achieve  10/03/19  -     STG 1  patient independent with HEP.  -     STG 1 Progress  Met  -        Long Term Goals    LTG Date to Achieve  10/24/19  -     LTG 1  Patient will demonstrate 0-120 degrees of bilateral knee flexion AROM.   -     LTG 1 Progress  Partially Met  -     LTG 2  Patient will demonstrate normal quad flexibility bilaterally.  -     LTG 2 Progress  Partially Met  -     LTG 3  Patient will be able to perform 20 consecutive sit to stand without difficulty.   -     LTG 3 Progress  Partially Met  -     LTG 4  Patient will demonstrate ability to navigate a flight of stairs without difficulty.   -     LTG 4 Progress  Partially Met  -     LTG 5  Patient will obtain a 58 on LEFS.  -     LTG 5 Progress  New  -        Time Calculation    PT Goal Re-Cert Due Date  10/24/19  -       User Key  (r) = Recorded By, (t) = Taken By, (c) = Cosigned By    Initials Name  Provider Type    Misa Munroe Physical Therapist          PT Assessment/Plan     Row Name 10/03/19 0900          PT Assessment    Assessment Comments  Patient exhibits an improvement in bilateral knee pain, flexion ROM, strength, and quad flexilbility. She has returned to being able to walk 2 miles an dis navigating stairs with greater ease.   -SW        PT Plan    PT Frequency  2x/week  -SW     Predicted Duration of Therapy Intervention (Therapy Eval)  3 weeks  -SW     Planned CPT's?  PT RE-EVAL: 97788;PT THER PROC EA 15 MIN: 98148;PT THER ACT EA 15 MIN: 54488;PT MANUAL THERAPY EA 15 MIN: 17976;PT NEUROMUSC RE-EDUCATION EA 15 MIN: 53751;PT AQUATIC THERAPY EA 15 MIN: 89414;PT SELF CARE/HOME MGMT/TRAIN EA 15: 79256;PT HOT OR COLD PACK TREAT [facility]ARE  -     PT Plan Comments  Continue with aquatic therapy for 3 weeks then dc to independent HEP.   -SW       User Key  (r) = Recorded By, (t) = Taken By, (c) = Cosigned By    Initials Name Provider Type    Misa Munroe Physical Therapist            OP Exercises     Row Name 10/03/19 0800             Subjective Comments    Subjective Comments  Patient states she feels pool therapy is improving knee pain. She is now able to walk 2 miles with only mild pain and can navigate stairs more easily. The hamstring stretch with foot on stair is the only exercises that aggravates symptoms, so that exercise has bee eliminated from her pool routine.   -SW         Subjective Pain    Able to rate subjective pain?  yes  -SW      Pre-Treatment Pain Level  2  -SW      Post-Treatment Pain Level  2  -SW         Exercise 1    Exercise Name 1  sit to stand  -SW      Reps 1  20  -SW         Exercise 2    Exercise Name 2  bridging  -SW      Reps 2  20  -SW         Exercise 3    Exercise Name 3  SLR 3 way  -SW      Reps 3  20 ea side   -SW         Exercise 4    Exercise Name 4  553  -SW        User Key  (r) = Recorded By, (t) = Taken By, (c) = Cosigned By    Initials Name Provider Type    IVON  Misa Clifton Physical Therapist                        Outcome Measure Options: Lower Extremity Functional Scale (LEFS)  Lower Extremity Functional Index  Any of your usual work, housework or school activities: A little bit of difficulty  Your usual hobbies, recreational or sporting activities: A little bit of difficulty  Getting into or out of the bath: No difficulty  Walking between rooms: No difficulty  Putting on your shoes or socks: A little bit of difficulty  Squatting: Moderate difficulty  Lifting an object, like a bag of groceries from the floor: No difficulty  Performing light activities around your home: No difficulty  Performing heavy activities around your home: No difficulty  Getting into or out of a car: No difficulty  Walking 2 blocks: Moderate difficulty  Walking a mile: Moderate difficulty  Going up or down 10 stairs (about 1 flight of stairs): Moderate difficulty  Standing for 1 hour: Quite a bit of difficulty  Sitting for 1 hour: A little bit of difficulty  Running on even ground: Moderate difficulty  Running on uneven ground: Moderate difficulty  Making sharp turns while running fast: Extreme difficulty or unable to perform activity  Hopping: Extreme difficulty or unable to perform activity  Rolling over in bed: No difficulty  Total: 53      Time Calculation:     Start Time: 0845  Stop Time: 0915  Time Calculation (min): 30 min     Therapy Charges for Today     Code Description Service Date Service Provider Modifiers Qty    44883293027 HC PT THER PROC EA 15 MIN 10/3/2019 Misa Clifton GP 2          PT G-Codes  Outcome Measure Options: Lower Extremity Functional Scale (LEFS)  Total: 53         Misa Clifton  10/3/2019

## 2019-10-14 ENCOUNTER — HOSPITAL ENCOUNTER (OUTPATIENT)
Dept: PHYSICAL THERAPY | Facility: HOSPITAL | Age: 49
Setting detail: THERAPIES SERIES
Discharge: HOME | End: 2019-10-14

## 2019-10-14 DIAGNOSIS — G89.29 CHRONIC PAIN OF BOTH KNEES: Primary | ICD-10-CM

## 2019-10-14 DIAGNOSIS — M25.562 CHRONIC PAIN OF BOTH KNEES: Primary | ICD-10-CM

## 2019-10-14 DIAGNOSIS — M25.561 CHRONIC PAIN OF BOTH KNEES: Primary | ICD-10-CM

## 2019-10-14 PROCEDURE — 97110 THERAPEUTIC EXERCISES: CPT

## 2019-10-14 NOTE — THERAPY TREATMENT NOTE
Outpatient Physical Therapy Ortho Treatment Note  [facility]     Patient Name: Letty Bach  : 1970  MRN: [id number]  Today's Date: 10/14/2019      Visit Date: 10/14/2019     Subjective Improvement 70%  Visits   Visits approved 60 with $35 co-pay  RTMD 10-  Recert Date 10-    Bilateral knee pain    Visit Dx:    ICD-10-CM ICD-9-CM   1. Chronic pain of both knees M25.561 719.46    M25.562 338.29    G89.29        Patient Active Problem List   Diagnosis   • Calculus of gallbladder with chronic cholecystitis without obstruction   • Chronic pain of both knees   • Costochondritis   • Elevated LDL cholesterol level        Past Medical History:   Diagnosis Date   • GERD (gastroesophageal reflux disease)    • Urinary tract infection         Past Surgical History:   Procedure Laterality Date   • CHOLECYSTECTOMY  2019   • CHOLECYSTECTOMY WITH INTRAOPERATIVE CHOLANGIOGRAM N/A 2019    Procedure: CHOLECYSTECTOMY LAPAROSCOPIC INTRAOPERATIVE CHOLANGIOGRAM      (c-arm#2);  Surgeon: Felipe Overton MD;  Location: [facility];  Service: General   • HYSTERECTOMY                         PT Assessment/Plan     Row Name 10/14/19 0941          PT Assessment    Assessment Comments  No reported pain with ther ex this date.  Appears to be progressing well.  -CP        PT Plan    PT Frequency  2x/week  -CP     Predicted Duration of Therapy Intervention (Therapy Eval)  3 weeks  -CP     PT Plan Comments  Cont with POC.  May switch to [place] ther ex next week.  -CP       User Key  (r) = Recorded By, (t) = Taken By, (c) = Cosigned By    Initials Name Provider Type    Reshma Lawrence, PTA Physical Therapy Assistant            OP Exercises     Row Name 10/14/19 0900             Subjective Comments    Subjective Comments  Patient was in [place] last week.  she did have some increase bilateral knee pain while walking on the beach.  Today she reports no pain  -CP         Subjective Pain    Able to  rate subjective pain?  yes  -CP      Pre-Treatment Pain Level  0  -CP      Post-Treatment Pain Level  0  -CP         Aquatics    Aquatics performed?  Yes  -CP         Exercise 1    Exercise Name 1  see flowsheet  -CP        User Key  (r) = Recorded By, (t) = Taken By, (c) = Cosigned By    Initials Name Provider Type    CP Reshma Gutierrez PTA Physical Therapy Assistant                       PT OP Goals     Row Name 10/14/19 0900          PT Short Term Goals    STG Date to Achieve  10/03/19  -CP     STG 1  patient independent with HEP.  -CP     STG 1 Progress  Met  -CP        Long Term Goals    LTG Date to Achieve  10/24/19  -CP     LTG 1  Patient will demonstrate 0-120 degrees of bilateral knee flexion AROM.   -CP     LTG 1 Progress  Partially Met  -CP     LTG 2  Patient will demonstrate normal quad flexibility bilaterally.  -CP     LTG 2 Progress  Partially Met  -CP     LTG 3  Patient will be able to perform 20 consecutive sit to stand without difficulty.   -CP     LTG 3 Progress  Partially Met  -CP     LTG 4  Patient will demonstrate ability to navigate a flight of stairs without difficulty.   -CP     LTG 4 Progress  Partially Met  -CP     LTG 5  Patient will obtain a 58 on LEFS.  -CP     LTG 5 Progress  New  -CP        Time Calculation    PT Goal Re-Cert Due Date  10/24/19  -CP       User Key  (r) = Recorded By, (t) = Taken By, (c) = Cosigned By    Initials Name Provider Type    CP Reshma Gutierrez PTA Physical Therapy Assistant                         Time Calculation:   Start Time: 0845  Stop Time: 0925  Time Calculation (min): 40 min  Total Timed Code Minutes- PT: 40 minute(s)  Therapy Charges for Today     Code Description Service Date Service Provider Modifiers Qty    30038435423 HC PT THER PROC EA 15 MIN 10/14/2019 Reshma Gutierrez PTA GP 3                    Reshma Gutierrez PTA  10/14/2019

## 2019-10-16 ENCOUNTER — HOSPITAL ENCOUNTER (OUTPATIENT)
Dept: PHYSICAL THERAPY | Facility: HOSPITAL | Age: 49
Setting detail: THERAPIES SERIES
Discharge: HOME | End: 2019-10-16

## 2019-10-16 DIAGNOSIS — M25.561 CHRONIC PAIN OF BOTH KNEES: Primary | ICD-10-CM

## 2019-10-16 DIAGNOSIS — M25.562 CHRONIC PAIN OF BOTH KNEES: Primary | ICD-10-CM

## 2019-10-16 DIAGNOSIS — G89.29 CHRONIC PAIN OF BOTH KNEES: Primary | ICD-10-CM

## 2019-10-16 PROCEDURE — 97110 THERAPEUTIC EXERCISES: CPT

## 2019-10-16 NOTE — THERAPY TREATMENT NOTE
Outpatient Physical Therapy Ortho Treatment Note  [facility]     Patient Name: Letty Bach  : 1970  MRN: [id number]  Today's Date: 10/16/2019      Visit Date: 10/16/2019     Subjective Improvement 75%  Visits   Visits approved 60 with $35 co-pay  RTMD 10-    Bilateral Knee pain    Visit Dx:    ICD-10-CM ICD-9-CM   1. Chronic pain of both knees M25.561 719.46    M25.562 338.29    G89.29        Patient Active Problem List   Diagnosis   • Calculus of gallbladder with chronic cholecystitis without obstruction   • Chronic pain of both knees   • Costochondritis   • Elevated LDL cholesterol level        Past Medical History:   Diagnosis Date   • GERD (gastroesophageal reflux disease)    • Urinary tract infection         Past Surgical History:   Procedure Laterality Date   • CHOLECYSTECTOMY  2019   • CHOLECYSTECTOMY WITH INTRAOPERATIVE CHOLANGIOGRAM N/A 2019    Procedure: CHOLECYSTECTOMY LAPAROSCOPIC INTRAOPERATIVE CHOLANGIOGRAM      (c-arm#2);  Surgeon: Felipe Overton MD;  Location: [facility];  Service: General   • HYSTERECTOMY                         PT Assessment/Plan     Row Name 10/16/19 0963          PT Assessment    Assessment Comments  Patient had good tolerance to new aquatic ther ex without reports of increase pain  -CP        PT Plan    PT Frequency  2x/week  -CP     Predicted Duration of Therapy Intervention (Therapy Eval)  3 weeks  -CP     PT Plan Comments  Cont with POC.  Recheck next week  -CP       User Key  (r) = Recorded By, (t) = Taken By, (c) = Cosigned By    Initials Name Provider Type    Reshma Lawrence PTA Physical Therapy Assistant            OP Exercises     Row Name 10/16/19 0900             Subjective Comments    Subjective Comments  Patient states her knee is better.  she is ablt to bend the knees without a lot of pain  -CP         Subjective Pain    Able to rate subjective pain?  yes  -CP      Pre-Treatment Pain Level  1  -CP      Post-Treatment  Pain Level  1  -CP         Aquatics    Aquatics performed?  Yes  -CP         Exercise 1    Exercise Name 1  see flowsheet  -CP        User Key  (r) = Recorded By, (t) = Taken By, (c) = Cosigned By    Initials Name Provider Type    Reshma Lawrence PTA Physical Therapy Assistant                       PT OP Goals     Row Name 10/16/19 0900          PT Short Term Goals    STG Date to Achieve  10/03/19  -CP     STG 1  patient independent with HEP.  -CP     STG 1 Progress  Met  -CP        Long Term Goals    LTG Date to Achieve  10/24/19  -CP     LTG 1  Patient will demonstrate 0-120 degrees of bilateral knee flexion AROM.   -CP     LTG 1 Progress  Partially Met  -CP     LTG 2  Patient will demonstrate normal quad flexibility bilaterally.  -CP     LTG 2 Progress  Partially Met  -CP     LTG 3  Patient will be able to perform 20 consecutive sit to stand without difficulty.   -CP     LTG 3 Progress  Partially Met  -CP     LTG 4  Patient will demonstrate ability to navigate a flight of stairs without difficulty.   -CP     LTG 4 Progress  Partially Met  -CP     LTG 5  Patient will obtain a 58 on LEFS.  -CP     LTG 5 Progress  New  -CP        Time Calculation    PT Goal Re-Cert Due Date  10/24/19  -CP       User Key  (r) = Recorded By, (t) = Taken By, (c) = Cosigned By    Initials Name Provider Type    CP Reshma Gutierrez PTA Physical Therapy Assistant                         Time Calculation:   Start Time: 0848  Stop Time: 0929  Time Calculation (min): 41 min  Total Timed Code Minutes- PT: 41 minute(s)  Therapy Charges for Today     Code Description Service Date Service Provider Modifiers Qty    51264488732 HC PT THER PROC EA 15 MIN 10/16/2019 Reshma Gutierrez PTA GP 3                    Reshma Gutierrez PTA  10/16/2019

## 2019-10-21 ENCOUNTER — HOSPITAL ENCOUNTER (OUTPATIENT)
Dept: PHYSICAL THERAPY | Facility: HOSPITAL | Age: 49
Setting detail: THERAPIES SERIES
Discharge: HOME | End: 2019-10-21

## 2019-10-21 DIAGNOSIS — M25.562 CHRONIC PAIN OF BOTH KNEES: Primary | ICD-10-CM

## 2019-10-21 DIAGNOSIS — G89.29 CHRONIC PAIN OF BOTH KNEES: Primary | ICD-10-CM

## 2019-10-21 DIAGNOSIS — M25.561 CHRONIC PAIN OF BOTH KNEES: Primary | ICD-10-CM

## 2019-10-21 PROCEDURE — 97110 THERAPEUTIC EXERCISES: CPT

## 2019-10-21 NOTE — THERAPY TREATMENT NOTE
Outpatient Physical Therapy Ortho Treatment Note  [facility]     Patient Name: Letty Bach  : 1970  MRN: [id number]  Today's Date: 10/21/2019      Visit Date: 10/21/2019     Subjective Improvement 80%  Visits 10-10  Visits approved 60 combined with $35 co-pay  RTMD 2019  Recert Date 10-    Bilateral knee pain    Visit Dx:    ICD-10-CM ICD-9-CM   1. Chronic pain of both knees M25.561 719.46    M25.562 338.29    G89.29        Patient Active Problem List   Diagnosis   • Calculus of gallbladder with chronic cholecystitis without obstruction   • Chronic pain of both knees   • Costochondritis   • Elevated LDL cholesterol level        Past Medical History:   Diagnosis Date   • GERD (gastroesophageal reflux disease)    • Urinary tract infection         Past Surgical History:   Procedure Laterality Date   • CHOLECYSTECTOMY  2019   • CHOLECYSTECTOMY WITH INTRAOPERATIVE CHOLANGIOGRAM N/A 2019    Procedure: CHOLECYSTECTOMY LAPAROSCOPIC INTRAOPERATIVE CHOLANGIOGRAM      (c-arm#2);  Surgeon: Felipe Overton MD;  Location: [facility];  Service: General   • HYSTERECTOMY                         PT Assessment/Plan     Row Name 10/21/19 0944          PT Assessment    Assessment Comments  Patient is independent with aquatic ther ex.    -CP        PT Plan    PT Frequency  2x/week  -CP     Predicted Duration of Therapy Intervention (Therapy Eval)  3 weeks  -CP     PT Plan Comments  Recheck next visits with possible D/C to home management at that time.  -CP       User Key  (r) = Recorded By, (t) = Taken By, (c) = Cosigned By    Initials Name Provider Type    Reshma Lawrence, PTA Physical Therapy Assistant            OP Exercises     Row Name 10/21/19 0900             Subjective Comments    Subjective Comments  Patient states that she is 80% better.  Reprots tripping on a blanket and twisting her knee over the weekemd.  She would like to cont therapy for one more week but does also states  that she could do the ex in pool on her own.   -CP         Subjective Pain    Able to rate subjective pain?  yes  -CP      Pre-Treatment Pain Level  1  -CP      Post-Treatment Pain Level  1  -CP         Aquatics    Aquatics performed?  Yes  -CP         Exercise 1    Exercise Name 1  see flowsheet  -CP        User Key  (r) = Recorded By, (t) = Taken By, (c) = Cosigned By    Initials Name Provider Type    CP Reshma Gutierrez PTA Physical Therapy Assistant                       PT OP Goals     Row Name 10/21/19 0900          PT Short Term Goals    STG Date to Achieve  10/03/19  -CP     STG 1  patient independent with HEP.  -CP     STG 1 Progress  Met  -CP        Long Term Goals    LTG Date to Achieve  10/24/19  -CP     LTG 1  Patient will demonstrate 0-120 degrees of bilateral knee flexion AROM.   -CP     LTG 1 Progress  Partially Met  -CP     LTG 2  Patient will demonstrate normal quad flexibility bilaterally.  -CP     LTG 2 Progress  Partially Met  -CP     LTG 3  Patient will be able to perform 20 consecutive sit to stand without difficulty.   -CP     LTG 3 Progress  Partially Met  -CP     LTG 4  Patient will demonstrate ability to navigate a flight of stairs without difficulty.   -CP     LTG 4 Progress  Partially Met  -CP     LTG 5  Patient will obtain a 58 on LEFS.  -CP     LTG 5 Progress  New  -CP       User Key  (r) = Recorded By, (t) = Taken By, (c) = Cosigned By    Initials Name Provider Type    CP Reshma Gutierrez PTA Physical Therapy Assistant                         Time Calculation:   Start Time: 0850  Stop Time: 0929  Time Calculation (min): 39 min  Total Timed Code Minutes- PT: 39 minute(s)  Therapy Charges for Today     Code Description Service Date Service Provider Modifiers Qty    50613132115 HC PT THER PROC EA 15 MIN 10/21/2019 Reshma Gutierrez PTA GP 3                    Reshma Gutierrez PTA  10/21/2019

## 2019-10-22 DIAGNOSIS — R10.12 LUQ ABDOMINAL PAIN: Primary | ICD-10-CM

## 2019-10-23 ENCOUNTER — HOSPITAL ENCOUNTER (OUTPATIENT)
Dept: PHYSICAL THERAPY | Facility: HOSPITAL | Age: 49
Setting detail: THERAPIES SERIES
Discharge: HOME | End: 2019-10-23

## 2019-10-23 DIAGNOSIS — M25.561 CHRONIC PAIN OF BOTH KNEES: Primary | ICD-10-CM

## 2019-10-23 DIAGNOSIS — G89.29 CHRONIC PAIN OF BOTH KNEES: Primary | ICD-10-CM

## 2019-10-23 DIAGNOSIS — M25.562 CHRONIC PAIN OF BOTH KNEES: Primary | ICD-10-CM

## 2019-10-23 PROCEDURE — 97110 THERAPEUTIC EXERCISES: CPT | Performed by: PHYSICAL THERAPIST

## 2019-10-23 NOTE — THERAPY PROGRESS REPORT/RE-CERT
Outpatient Physical Therapy Ortho Progress Note  [facility]     Patient Name: Letty Bach  : 1970  MRN: [id number]  Today's Date: 10/23/2019      Visit Date: 10/23/2019  Subjective Improvement 80%  Visits   Visits approved 60 combined with $35 co-pay  RTMD 2019  Recert Date 2019     Visit Dx:    ICD-10-CM ICD-9-CM   1. Chronic pain of both knees M25.561 719.46    M25.562 338.29    G89.29        Patient Active Problem List   Diagnosis   • Calculus of gallbladder with chronic cholecystitis without obstruction   • Chronic pain of both knees   • Costochondritis   • Elevated LDL cholesterol level        Past Medical History:   Diagnosis Date   • GERD (gastroesophageal reflux disease)    • Urinary tract infection         Past Surgical History:   Procedure Laterality Date   • CHOLECYSTECTOMY  2019   • CHOLECYSTECTOMY WITH INTRAOPERATIVE CHOLANGIOGRAM N/A 2019    Procedure: CHOLECYSTECTOMY LAPAROSCOPIC INTRAOPERATIVE CHOLANGIOGRAM      (c-arm#2);  Surgeon: Felipe Overton MD;  Location: [facility];  Service: General   • HYSTERECTOMY  2007       PT Ortho     Row Name 10/23/19 1000       General ROM    GENERAL ROM COMMENTS  Bilateral knee flexion 125 degrees  -SW    Row Name 10/23/19 0900       Subjective Pain    Able to rate subjective pain?  yes  -SW    Pre-Treatment Pain Level  0  -SW    Post-Treatment Pain Level  0  -SW       MMT Right Lower Ext    Rt Hip Flexion MMT, Gross Movement  (5/5) normal  -SW    Rt Hip Extension MMT, Gross Movement  (5/5) normal  -SW    Rt Hip ABduction MMT, Gross Movement  (5/5) normal  -SW    Rt Knee Extension MMT, Gross Movement  (5/5) normal  -SW    Rt Knee Flexion MMT, Gross Movement  (5/5) normal  -SW    Rt Lower Extremity Comments   able to move sit to stand 20 consecutive times without increase in knee pain  -SW       MMT Left Lower Ext    Lt Hip Flexion MMT, Gross Movement  (5/5) normal  -SW    Lt Hip Extension MMT, Gross Movement  (4+/5) good  plus  -SW    Lt Hip ABduction MMT, Gross Movement  (5/5) normal  -SW    Lt Knee Extension MMT, Gross Movement  (5/5) normal  -SW    Lt Knee Flexion MMT, Gross Movement  (5/5) normal  -SW      User Key  (r) = Recorded By, (t) = Taken By, (c) = Cosigned By    Initials Name Provider Type    Misa Munroe Physical Therapist                      PT Assessment/Plan     Row Name 10/23/19 0900          PT Assessment    Functional Limitations  Performance in leisure activities;Performance in self-care ADL;Limitations in functional capacity and performance  -SW     Impairments  Joint mobility;Muscle strength;Pain  -SW     Assessment Comments  Patient exhibits an improvement in LE strength and function including ability to return to walking 2 miles and improved stair navigation.   -SW        PT Plan    PT Frequency  2x/week  -SW     Predicted Duration of Therapy Intervention (Therapy Eval)  2 weeks  -SW     Planned CPT's?  PT RE-EVAL: 84712;PT THER PROC EA 15 MIN: 05722;PT THER ACT EA 15 MIN: 28974;PT MANUAL THERAPY EA 15 MIN: 16483;PT NEUROMUSC RE-EDUCATION EA 15 MIN: 93849;PT AQUATIC THERAPY EA 15 MIN: 27252;PT HOT OR COLD PACK TREAT MCARE  -SW     PT Plan Comments  Continue this week and next week then dc to independent HEP.   -SW       User Key  (r) = Recorded By, (t) = Taken By, (c) = Cosigned By    Initials Name Provider Type    Misa Munroe Physical Therapist            OP Exercises     Row Name 10/23/19 0900             Subjective Comments    Subjective Comments  Patient reports she is walking 2 miles again. She is having less difficulty with stairs. She reports compliance with land based HEP. She reports [facility] will most likely join gym for pool membership to continue exercises in aquatice environement.   -SW         Subjective Pain    Able to rate subjective pain?  yes  -SW      Pre-Treatment Pain Level  0  -SW      Post-Treatment Pain Level  0  -SW         Aquatics    Aquatics performed?  No  -SW          "Exercise 1    Exercise Name 1  Pro II L2  -SW      Time 1  10'  -SW         Exercise 2    Exercise Name 2  sit to stand  -SW      Reps 2  20  -SW      Additional Comments  no pain  -SW         Exercise 3    Exercise Name 3  SLS  -SW      Reps 3  2 x ea side  -SW      Additional Comments  R 15\", 11\", L 6\", 14\"  -SW         Exercise 4    Exercise Name 4  bridging  -SW      Reps 4  20  -SW         Exercise 5    Exercise Name 5  SLR 3 way  -SW      Reps 5  20  -SW      Additional Comments  2#  -SW         Exercise 6    Exercise Name 6  prone quad stretch  -SW      Reps 6  30\"x2 B  -SW         Exercise 7    Exercise Name 7  stair navigation  -SW      Reps 7  3  -SW        User Key  (r) = Recorded By, (t) = Taken By, (c) = Cosigned By    Initials Name Provider Type    Misa Munroe Physical Therapist                       PT OP Goals     Row Name 10/23/19 0900          PT Short Term Goals    STG Date to Achieve  10/03/19  -     STG 1  patient independent with HEP.  -     STG 1 Progress  Met  -        Long Term Goals    LTG Date to Achieve  10/24/19  -     LTG 1  Patient will demonstrate 0-120 degrees of bilateral knee flexion AROM.   -     LTG 1 Progress  Met  -     LTG 2  Patient will demonstrate normal quad flexibility bilaterally.  -     LTG 2 Progress  Partially Met  -     LTG 3  Patient will be able to perform 20 consecutive sit to stand without difficulty.   -     LTG 3 Progress  Met  -     LTG 4  Patient will demonstrate ability to navigate a flight of stairs without difficulty.   -     LTG 4 Progress  Partially Met  -     LTG 5  Patient will obtain a 58 on LEFS.  -     LTG 5 Progress  Met  -        Time Calculation    PT Goal Re-Cert Due Date  11/13/19  -       User Key  (r) = Recorded By, (t) = Taken By, (c) = Cosigned By    Initials Name Provider Type    Misa Munroe Physical Therapist          Therapy Education  Education Details: prone quad stretch  Given: HEP, " Symptoms/condition management  Program: Reinforced, New  How Provided: Verbal, Demonstration  Provided to: Patient  Level of Understanding: Verbalized, Demonstrated    Outcome Measure Options: Lower Extremity Functional Scale (LEFS)  Lower Extremity Functional Index  Any of your usual work, housework or school activities: No difficulty  Your usual hobbies, recreational or sporting activities: No difficulty  Getting into or out of the bath: A little bit of difficulty  Walking between rooms: No difficulty  Putting on your shoes or socks: A little bit of difficulty  Squatting: No difficulty  Lifting an object, like a bag of groceries from the floor: No difficulty  Performing light activities around your home: No difficulty  Performing heavy activities around your home: No difficulty  Getting into or out of a car: A little bit of difficulty  Walking 2 blocks: A little bit of difficulty  Walking a mile: A little bit of difficulty  Going up or down 10 stairs (about 1 flight of stairs): Moderate difficulty  Standing for 1 hour: No difficulty  Sitting for 1 hour: No difficulty  Running on even ground: Moderate difficulty  Running on uneven ground: Moderate difficulty  Making sharp turns while running fast: Extreme difficulty or unable to perform activity  Hopping: Extreme difficulty or unable to perform activity  Rolling over in bed: No difficulty  Total: 61      Time Calculation:   Start Time: 0930  Stop Time: 1010  Time Calculation (min): 40 min  Therapy Charges for Today     Code Description Service Date Service Provider Modifiers Qty    89031065574 HC PT THER PROC EA 15 MIN 10/23/2019 Misa Clifton GP 3          PT G-Codes  Outcome Measure Options: Lower Extremity Functional Scale (LEFS)  Total: 61         Misa Clifton  10/23/2019

## 2019-10-24 ENCOUNTER — APPOINTMENT (OUTPATIENT)
Dept: PHYSICAL THERAPY | Facility: HOSPITAL | Age: 49
End: 2019-10-24

## 2019-10-28 ENCOUNTER — HOSPITAL ENCOUNTER (OUTPATIENT)
Dept: PHYSICAL THERAPY | Facility: HOSPITAL | Age: 49
Setting detail: THERAPIES SERIES
Discharge: HOME | End: 2019-10-28

## 2019-10-28 DIAGNOSIS — G89.29 CHRONIC PAIN OF BOTH KNEES: Primary | ICD-10-CM

## 2019-10-28 DIAGNOSIS — M25.562 CHRONIC PAIN OF BOTH KNEES: Primary | ICD-10-CM

## 2019-10-28 DIAGNOSIS — M25.561 CHRONIC PAIN OF BOTH KNEES: Primary | ICD-10-CM

## 2019-10-28 PROCEDURE — 97110 THERAPEUTIC EXERCISES: CPT

## 2019-10-28 NOTE — THERAPY TREATMENT NOTE
Outpatient Physical Therapy Ortho Treatment Note  [facility]     Patient Name: Letty Bach  : 1970  MRN: [id number]  Today's Date: 10/28/2019      Visit Date: 10/28/2019     Subjective Improvement not assessed this date  Visits   Visits approved 60 combined with $35 co-pay  RTMD 2019  Recert Date 2019    Bilateral Knee pain    Visit Dx:    ICD-10-CM ICD-9-CM   1. Chronic pain of both knees M25.561 719.46    M25.562 338.29    G89.29        Patient Active Problem List   Diagnosis   • Calculus of gallbladder with chronic cholecystitis without obstruction   • Chronic pain of both knees   • Costochondritis   • Elevated LDL cholesterol level        Past Medical History:   Diagnosis Date   • GERD (gastroesophageal reflux disease)    • Urinary tract infection         Past Surgical History:   Procedure Laterality Date   • CHOLECYSTECTOMY  2019   • CHOLECYSTECTOMY WITH INTRAOPERATIVE CHOLANGIOGRAM N/A 2019    Procedure: CHOLECYSTECTOMY LAPAROSCOPIC INTRAOPERATIVE CHOLANGIOGRAM      (c-arm#2);  Surgeon: Felipe Overton MD;  Location: [facility];  Service: General   • HYSTERECTOMY                         PT Assessment/Plan     Row Name 10/28/19 2268          PT Assessment    Assessment Comments  Very good tolerance to aquatics.  Patient is independent with aquatic program.  -CP        PT Plan    PT Plan Comments  One more aquatic visit.  D/C next visit with one free month fitness membership.  -CP       User Key  (r) = Recorded By, (t) = Taken By, (c) = Cosigned By    Initials Name Provider Type    Reshma Lawrence PTA Physical Therapy Assistant            OP Exercises     Row Name 10/28/19 7085             Subjective Comments    Subjective Comments  Patient reports no real pain today just a dull ache.  she is able to do more at home.  -CP         Subjective Pain    Able to rate subjective pain?  yes  -CP      Pre-Treatment Pain Level  0  -CP      Post-Treatment Pain Level  0   -CP         Aquatics    Aquatics performed?  Yes  -CP         Exercise 1    Exercise Name 1  see aquatics flowsheet  -CP        User Key  (r) = Recorded By, (t) = Taken By, (c) = Cosigned By    Initials Name Provider Type    CP Reshma Gutierrez PTA Physical Therapy Assistant                       PT OP Goals     Row Name 10/28/19 0900          PT Short Term Goals    STG Date to Achieve  10/03/19  -CP     STG 1  patient independent with HEP.  -CP     STG 1 Progress  Met  -CP        Long Term Goals    LTG Date to Achieve  10/24/19  -CP     LTG 1  Patient will demonstrate 0-120 degrees of bilateral knee flexion AROM.   -CP     LTG 1 Progress  Met  -CP     LTG 2  Patient will demonstrate normal quad flexibility bilaterally.  -CP     LTG 2 Progress  Partially Met  -CP     LTG 3  Patient will be able to perform 20 consecutive sit to stand without difficulty.   -CP     LTG 3 Progress  Met  -CP     LTG 4  Patient will demonstrate ability to navigate a flight of stairs without difficulty.   -CP     LTG 4 Progress  Partially Met  -CP     LTG 5  Patient will obtain a 58 on LEFS.  -CP     LTG 5 Progress  Met  -CP        Time Calculation    PT Goal Re-Cert Due Date  11/13/19  -CP       User Key  (r) = Recorded By, (t) = Taken By, (c) = Cosigned By    Initials Name Provider Type    CP Reshma Gutierrez PTA Physical Therapy Assistant                         Time Calculation:   Start Time: 0848  Stop Time: 0930  Time Calculation (min): 42 min  Total Timed Code Minutes- PT: 42 minute(s)  Therapy Charges for Today     Code Description Service Date Service Provider Modifiers Qty    68684856304 HC PT THER PROC EA 15 MIN 10/28/2019 Reshma Gutierrez PTA GP 3                    Reshma Gutierrez PTA  10/28/2019

## 2019-10-30 ENCOUNTER — HOSPITAL ENCOUNTER (OUTPATIENT)
Dept: PHYSICAL THERAPY | Facility: HOSPITAL | Age: 49
Setting detail: THERAPIES SERIES
Discharge: HOME | End: 2019-10-30

## 2019-10-30 ENCOUNTER — OFFICE VISIT (OUTPATIENT)
Dept: GASTROENTEROLOGY | Facility: CLINIC | Age: 49
End: 2019-10-30

## 2019-10-30 VITALS
SYSTOLIC BLOOD PRESSURE: 120 MMHG | HEART RATE: 78 BPM | OXYGEN SATURATION: 97 % | BODY MASS INDEX: 45.68 KG/M2 | DIASTOLIC BLOOD PRESSURE: 80 MMHG | WEIGHT: 274.2 LBS | HEIGHT: 65 IN

## 2019-10-30 DIAGNOSIS — G89.29 CHRONIC PAIN OF BOTH KNEES: Primary | ICD-10-CM

## 2019-10-30 DIAGNOSIS — M25.561 CHRONIC PAIN OF BOTH KNEES: Primary | ICD-10-CM

## 2019-10-30 DIAGNOSIS — R10.12 LUQ PAIN: Primary | ICD-10-CM

## 2019-10-30 DIAGNOSIS — M25.562 CHRONIC PAIN OF BOTH KNEES: Primary | ICD-10-CM

## 2019-10-30 PROCEDURE — 99203 OFFICE O/P NEW LOW 30 MIN: CPT | Performed by: INTERNAL MEDICINE

## 2019-10-30 PROCEDURE — 97113 AQUATIC THERAPY/EXERCISES: CPT

## 2019-10-30 RX ORDER — OMEPRAZOLE 40 MG/1
40 CAPSULE, DELAYED RELEASE ORAL DAILY
Qty: 30 CAPSULE | Refills: 11 | Status: SHIPPED | OUTPATIENT
Start: 2019-10-30 | End: 2020-10-19 | Stop reason: SDDI

## 2019-10-30 RX ORDER — DEXTROSE AND SODIUM CHLORIDE 5; .45 G/100ML; G/100ML
30 INJECTION, SOLUTION INTRAVENOUS CONTINUOUS PRN
Status: CANCELLED | OUTPATIENT
Start: 2019-11-14

## 2019-10-30 NOTE — PROGRESS NOTES
[facility]      Chief Complaint:   Chief Complaint   Patient presents with   • Left Upper Quadrant Pain       Subjective     HPI:   Patient is a 49-year-old  female with past medical history of urinary tract infection and GERD presenting for evaluation for left upper quadrant pain.  She has intermittent bouts of left upper quadrant pain for past 2 years which is worse with food intake and sometimes with activity.  She also has post prandial bloating.  Denied fatty food intolerance, nausea, vomiting, diarrhea, constipation, rectal bleeding or weight loss.  She was noted to have gallstones and acute cholecystitis requiring cholecystectomy in June of this year.  She was started on Relafen for possible costochondritis which she was not able to tolerate.  She also reports taking NSAIDs prior to the onset of symptoms.    Past Medical History:   Past Medical History:   Diagnosis Date   • GERD (gastroesophageal reflux disease)    • Urinary tract infection        Past Surgical History:  Past Surgical History:   Procedure Laterality Date   • CHOLECYSTECTOMY  06/06/2019   • CHOLECYSTECTOMY WITH INTRAOPERATIVE CHOLANGIOGRAM N/A 6/6/2019    Procedure: CHOLECYSTECTOMY LAPAROSCOPIC INTRAOPERATIVE CHOLANGIOGRAM      (c-arm#2);  Surgeon: Felipe Overton MD;  Location: [facility];  Service: General   • HYSTERECTOMY  2007       Family History:  Family History   Problem Relation Age of Onset   • Breast cancer Mother    • Thyroid disease Mother    • Hypertension Mother    • Hypertension Father    • Diabetes Father    • Kidney disease Father    • Heart disease Father    • No Known Problems Sister    • No Known Problems Brother    • Seizures Daughter    • Hypertension Daughter    • COPD Maternal Grandmother    • Asthma Maternal Grandmother    • No Known Problems Sister    • ADD / ADHD Daughter    • Polymyositis Daughter        Social History:   reports that she has quit smoking. Her smoking use included  cigarettes. She has a 2.50 pack-year smoking history. She has never used smokeless tobacco. She reports that she does not drink alcohol or use drugs.    Medications:   Prior to Admission medications    Medication Sig Start Date End Date Taking? Authorizing Provider   fluticasone (FLONASE) 50 MCG/ACT nasal spray 2 sprays into the nostril(s) as directed by provider Daily As Needed for Rhinitis. 8/22/19  Yes Aldair Esteves APRN   loratadine (CLARITIN) 10 MG tablet Take 1 tablet by mouth Daily As Needed for Allergies. 8/22/19  Yes Aldair Esteves I APRN   nabumetone (RELAFEN) 750 MG tablet Take 1 tablet by mouth 2 (Two) Times a Day. 9/5/19   Jose Alberto Amos MD   omeprazole (PrilOSEC) 40 MG capsule Take 1 capsule by mouth Daily. 10/30/19   Joao Bruce MD       Allergies:  Patient has no known allergies.    ROS:    Review of Systems   Constitutional: Negative for chills, fatigue, fever and unexpected weight change.   HENT: Negative for congestion, ear discharge, hearing loss, nosebleeds and sore throat.    Eyes: Negative for pain, discharge and redness.   Respiratory: Negative for cough, chest tightness, shortness of breath and wheezing.    Cardiovascular: Negative for chest pain and palpitations.   Gastrointestinal: Positive for abdominal pain. Negative for abdominal distention, blood in stool, constipation, diarrhea, nausea and vomiting.   Endocrine: Negative for cold intolerance, polydipsia, polyphagia and polyuria.   Genitourinary: Negative for dysuria, flank pain, frequency, hematuria and urgency.   Musculoskeletal: Negative for arthralgias, back pain, joint swelling and myalgias.   Skin: Negative for color change, pallor and rash.   Neurological: Negative for tremors, seizures, syncope, weakness and headaches.   Hematological: Negative for adenopathy. Does not bruise/bleed easily.   Psychiatric/Behavioral: Negative for behavioral problems, confusion, dysphoric mood, hallucinations and suicidal  "ideas. The patient is not nervous/anxious.      Objective     Blood pressure 120/80, pulse 78, height 165.1 cm (65\"), weight 124 kg (274 lb 3.2 oz), SpO2 97 %.    Physical Exam   Constitutional: She is oriented to person, place, and time. She appears well-developed and well-nourished.   HENT:   Head: Normocephalic and atraumatic.   Mouth/Throat: Oropharynx is clear and moist.   Eyes: Conjunctivae and EOM are normal. Pupils are equal, round, and reactive to light.   Neck: Normal range of motion. Neck supple. No thyromegaly present.   Cardiovascular: Normal rate, regular rhythm and normal heart sounds.   No murmur heard.  Pulmonary/Chest: Effort normal and breath sounds normal. She has no wheezes.   Abdominal: Soft. Bowel sounds are normal. She exhibits no distension and no mass. There is no tenderness. No hernia.   Genitourinary:   Genitourinary Comments: No lesions noted   Musculoskeletal: Normal range of motion. She exhibits no edema or tenderness.   Lymphadenopathy:     She has no cervical adenopathy.   Neurological: She is alert and oriented to person, place, and time. No cranial nerve deficit.   Skin: Skin is warm and dry. No rash noted.   Psychiatric: She has a normal mood and affect. Thought content normal.      Extremities: No edema, cyanosis or clubbing.    Assessment/Plan   Letty was seen today for left upper quadrant pain.    Diagnoses and all orders for this visit:    LUQ pain  -     Case Request; Standing  -     dextrose 5 % and sodium chloride 0.45 % infusion  -     Case Request    Other orders  -     Follow Anesthesia Guidelines / Standing Orders; Future  -     Obtain Informed Consent; Future  -     Implement Anesthesia Orders Day of Procedure; Standing  -     Obtain Informed Consent; Standing  -     POC Glucose Once; Standing  -     omeprazole (PrilOSEC) 40 MG capsule; Take 1 capsule by mouth Daily.      1.  Left upper quadrant pain with postprandial bloating rule out peptic ulcer disease, gastritis " and pancreatic or biliary pathology.  Could also be due to costochondritis.  Add Prilosec 40 mg p.o. daily.  Schedule EGD for further evaluation.  2.  Obesity, recommend exercise and diet control.  ESOPHAGOGASTRODUODENOSCOPY (N/A)     Diagnosis Plan   1. LUQ pain  Case Request    dextrose 5 % and sodium chloride 0.45 % infusion    Case Request       Anticipated Surgical Procedure:  Orders Placed This Encounter   Procedures   • Follow Anesthesia Guidelines / Standing Orders     Standing Status:   Future   • Obtain Informed Consent     Standing Status:   Future     Order Specific Question:   Informed Consent Given For     Answer:   ESOPHAGOGASTRODUODENOSCOPY       The risks, benefits, and alternatives of this procedure have been discussed with the patient or the responsible party- the patient understands and agrees to proceed.            This document has been electronically signed by Joao Bruce MD on October 30, 2019 4:51 PM

## 2019-10-30 NOTE — PATIENT INSTRUCTIONS
Abdominal Pain, Adult    Many things can cause belly (abdominal) pain. Most times, belly pain is not dangerous. Many cases of belly pain can be watched and treated at home. Sometimes belly pain is serious, though. Your doctor will try to find the cause of your belly pain.  Follow these instructions at home:  · Take over-the-counter and prescription medicines only as told by your doctor. Do not take medicines that help you poop (laxatives) unless told to by your doctor.  · Drink enough fluid to keep your pee (urine) clear or pale yellow.  · Watch your belly pain for any changes.  · Keep all follow-up visits as told by your doctor. This is important.  Contact a doctor if:  · Your belly pain changes or gets worse.  · You are not hungry, or you lose weight without trying.  · You are having trouble pooping (constipated) or have watery poop (diarrhea) for more than 2-3 days.  · You have pain when you pee or poop.  · Your belly pain wakes you up at night.  · Your pain gets worse with meals, after eating, or with certain foods.  · You are throwing up and cannot keep anything down.  · You have a fever.  Get help right away if:  · Your pain does not go away as soon as your doctor says it should.  · You cannot stop throwing up.  · Your pain is only in areas of your belly, such as the right side or the left lower part of the belly.  · You have bloody or black poop, or poop that looks like tar.  · You have very bad pain, cramping, or bloating in your belly.  · You have signs of not having enough fluid or water in your body (dehydration), such as:  ? Dark pee, very little pee, or no pee.  ? Cracked lips.  ? Dry mouth.  ? Sunken eyes.  ? Sleepiness.  ? Weakness.  This information is not intended to replace advice given to you by your health care provider. Make sure you discuss any questions you have with your health care provider.  Document Released: 06/05/2009 Document Revised: 07/07/2017 Document Reviewed: 05/31/2017  [employer]  Interactive Patient Education © 2019 [employer]

## 2019-10-30 NOTE — THERAPY TREATMENT NOTE
Outpatient Physical Therapy Ortho Treatment Note  [facility]     Patient Name: Letty Bach  : 1970  MRN: [id number]  Today's Date: 10/30/2019      Visit Date: 10/30/2019  Pt has attended 13/14 visits  Subjective improvement 95 percent  MD 19  Renettart 19  Visit Dx:    ICD-10-CM ICD-9-CM   1. Chronic pain of both knees M25.561 719.46    M25.562 338.29    G89.29        Patient Active Problem List   Diagnosis   • Calculus of gallbladder with chronic cholecystitis without obstruction   • Chronic pain of both knees   • Costochondritis   • Elevated LDL cholesterol level        Past Medical History:   Diagnosis Date   • GERD (gastroesophageal reflux disease)    • Urinary tract infection         Past Surgical History:   Procedure Laterality Date   • CHOLECYSTECTOMY  2019   • CHOLECYSTECTOMY WITH INTRAOPERATIVE CHOLANGIOGRAM N/A 2019    Procedure: CHOLECYSTECTOMY LAPAROSCOPIC INTRAOPERATIVE CHOLANGIOGRAM      (c-arm#2);  Surgeon: Felipe Overton MD;  Location: [facility];  Service: General   • HYSTERECTOMY                         PT Assessment/Plan     Row Name 10/30/19 1051          PT Assessment    Assessment Comments  Tolerates pool well--has met goals and hopeful to continue with free month fitness membership  -SP        PT Plan    PT Plan Comments  D/C with one month free fitness membership  -SP       User Key  (r) = Recorded By, (t) = Taken By, (c) = Cosigned By    Initials Name Provider Type    Martha Granger PTA Physical Therapy Assistant            OP Exercises     Row Name 10/30/19 1000             Subjective Comments    Subjective Comments  Notes she thinks the pool has helped her  -SP         Subjective Pain    Able to rate subjective pain?  yes  -SP      Pre-Treatment Pain Level  0  -SP      Post-Treatment Pain Level  0  -SP         Aquatics    Aquatics performed?  Yes  -SP         Exercise 1    Exercise Name 1  see aquatics flow sheet  -SP        User Key  (r) =  Recorded By, (t) = Taken By, (c) = Cosigned By    Initials Name Provider Type    Martha Granger PTA Physical Therapy Assistant                       PT OP Goals     Row Name 10/30/19 1052 10/30/19 1000       PT Short Term Goals    STG Date to Achieve  --  10/03/19  -SP    STG 1  --  patient independent with HEP.  -SP    STG 1 Progress  --  Met  -SP       Long Term Goals    LTG Date to Achieve  --  10/24/19  -SP    LTG 1  --  Patient will demonstrate 0-120 degrees of bilateral knee flexion AROM.   -SP    LTG 1 Progress  --  Met  -SP    LTG 2  --  Patient will demonstrate normal quad flexibility bilaterally.  -SP    LTG 2 Progress  --  Progressing  -SP    LTG 3  --  Patient will be able to perform 20 consecutive sit to stand without difficulty.   -SP    LTG 3 Progress  --  Met  -SP    LTG 4  --  Patient will demonstrate ability to navigate a flight of stairs without difficulty.   -SP    LTG 4 Progress  --  Partially Met  -SP    LTG 5  --  Patient will obtain a 58 on LEFS.  -SP    LTG 5 Progress  --  Met  -SP       Time Calculation    PT Goal Re-Cert Due Date  11/13/19  -SP  11/13/19  -SP      User Key  (r) = Recorded By, (t) = Taken By, (c) = Cosigned By    Initials Name Provider Type    Martha Granger PTA Physical Therapy Assistant                         Time Calculation:   Start Time: 0932  Stop Time: 1015  Time Calculation (min): 43 min  Total Timed Code Minutes- PT: 43 minute(s)  Therapy Charges for Today     Code Description Service Date Service Provider Modifiers Qty    [id number]  PT AQUATIC THERAPY EA 15 MIN 10/30/2019 Martha Malik PTA GP 3                    Martha Malik PTA  10/30/2019

## 2019-11-11 ENCOUNTER — OFFICE VISIT (OUTPATIENT)
Dept: OTOLARYNGOLOGY | Facility: CLINIC | Age: 49
End: 2019-11-11

## 2019-11-11 ENCOUNTER — CLINICAL SUPPORT (OUTPATIENT)
Dept: AUDIOLOGY | Facility: CLINIC | Age: 49
End: 2019-11-11

## 2019-11-11 VITALS — BODY MASS INDEX: 45.48 KG/M2 | TEMPERATURE: 96.8 F | WEIGHT: 273 LBS | HEIGHT: 65 IN

## 2019-11-11 DIAGNOSIS — H92.01 OTALGIA, RIGHT: ICD-10-CM

## 2019-11-11 DIAGNOSIS — H93.8X1 EAR FULLNESS, RIGHT: ICD-10-CM

## 2019-11-11 DIAGNOSIS — Q18.1 CYST OF EAR CANAL: Primary | ICD-10-CM

## 2019-11-11 DIAGNOSIS — H92.01 OTALGIA, RIGHT EAR: Primary | ICD-10-CM

## 2019-11-11 PROCEDURE — 92567 TYMPANOMETRY: CPT | Performed by: AUDIOLOGIST

## 2019-11-11 PROCEDURE — 92557 COMPREHENSIVE HEARING TEST: CPT | Performed by: AUDIOLOGIST

## 2019-11-11 PROCEDURE — 99204 OFFICE O/P NEW MOD 45 MIN: CPT | Performed by: OTOLARYNGOLOGY

## 2019-11-11 RX ORDER — MELOXICAM 15 MG/1
15 TABLET ORAL DAILY
Qty: 15 TABLET | Refills: 0 | Status: SHIPPED | OUTPATIENT
Start: 2019-11-11 | End: 2019-11-20

## 2019-11-11 RX ORDER — NEOMYCIN SULFATE, POLYMYXIN B SULFATE, HYDROCORTISONE 3.5; 10000; 1 MG/ML; [USP'U]/ML; MG/ML
3 SOLUTION/ DROPS AURICULAR (OTIC) 2 TIMES DAILY
Qty: 10 ML | Refills: 0 | Status: SHIPPED | OUTPATIENT
Start: 2019-11-11 | End: 2019-11-20

## 2019-11-11 NOTE — PROGRESS NOTES
STANDARD AUDIOMETRIC EVALUATION      Name:  Letty Bach  :  1970  Age:  49 y.o.  Date of Evaluation:  2019      HISTORY    Reason for visit:  Letty Bach was seen today for a hearing evaluation at the request of Dr. Marques Baires.   She presented with complaints of otalgia and aural pressure in the right ear. She reported that the onset of her symptoms was in May approximately six months ago. Ms. Bach's history is negative for recurrent otitis media or prior ear surgeries. She does not feel the pressure and pain has affected her hearing sensitivity. Ms. Bach does not have any complaints regarding her left ear. No other significant audiologic information was reported.      EVALUATION    See Audiogram    RESULTS        Otoscopy and Tympanometry 226 Hz :  Right Ear:  Otoscopy:  Visible ear drum          Tympanometry:  Middle ear function within normal limits    Left Ear:   Otoscopy:  Visible ear drum        Tympanometry:  Middle ear function within normal limits    Test technique:  Standard Audiometry     Pure Tone Audiometry:   Patient responded to pure tones at 5-15 dB for 250-8000 Hz in right ear, and at 0-15 dB for 250-8000 Hz in left ear.       Speech Audiometry:        Right Ear:  Speech Reception Threshold (SRT) was obtained at 10 dBHL                 Speech Discrimination scores were 100% in quiet when words were presented at 50 dBHL       Left Ear:  Speech Reception Threshold (SRT) was obtained at 0 dBHL                 Speech Discrimination scores were 100% in quiet when words were presented at 40 dBHL    Reliability:   excellent    IMPRESSIONS:  1.  Tympanometry results are consistent with Middle ear function within normal limits in both ears.  2.  Pure tone results are consistent with normal peripheral hearing sensitivity across all frequencies tested bilaterally.      RECOMMENDATIONS:  Patient is seeing the Ear Nose and Throat physician immediately following this examination.  It was a  pleasure seeing Letty Bach in Audiology today.  We would be happy to do further testing or discuss these test as necessary.              This document has been electronically signed by CORNELL Verduzco on November 11, 2019 10:47 AM

## 2019-11-11 NOTE — PATIENT INSTRUCTIONS

## 2019-11-11 NOTE — PROGRESS NOTES
Subjective   Letty Bach is a 49 y.o. female.   Intermittent right otalgia  History of Present Illness   Patient's had intermittent right otalgia told she had fluid on one occasion and then an abnormality of her ear canal and another does not have any drainage do not have any current pain she says the pain can last a day or can be a few minutes comes and goes no apparent reason not associated with chewing swallowing she has no otorrhea does feel like her ear stopped up with some slight decrease in hearing      The following portions of the patient's history were reviewed and updated as appropriate: allergies, current medications, past family history, past medical history, past social history, past surgical history and problem list.      Letty Bach reports that she has quit smoking. Her smoking use included cigarettes. She has a 2.50 pack-year smoking history. She has never used smokeless tobacco. She reports that she does not drink alcohol or use drugs.  Patient is not a tobacco user and has been counseled for use of tobacco products    Family History   Problem Relation Age of Onset   • Breast cancer Mother    • Thyroid disease Mother    • Hypertension Mother    • Hypertension Father    • Diabetes Father    • Kidney disease Father    • Heart disease Father    • No Known Problems Sister    • No Known Problems Brother    • Seizures Daughter    • Hypertension Daughter    • COPD Maternal Grandmother    • Asthma Maternal Grandmother    • No Known Problems Sister    • ADD / ADHD Daughter    • Polymyositis Daughter          Current Outpatient Medications:   •  loratadine (CLARITIN) 10 MG tablet, Take 1 tablet by mouth Daily As Needed for Allergies., Disp: 30 tablet, Rfl: 5  •  omeprazole (PrilOSEC) 40 MG capsule, Take 1 capsule by mouth Daily., Disp: 30 capsule, Rfl: 11  •  fluticasone (FLONASE) 50 MCG/ACT nasal spray, 2 sprays into the nostril(s) as directed by provider Daily As Needed for Rhinitis., Disp: 18.2 mL,  Rfl: 0  •  nabumetone (RELAFEN) 750 MG tablet, Take 1 tablet by mouth 2 (Two) Times a Day., Disp: 60 tablet, Rfl: 2    No Known Allergies    Past Medical History:   Diagnosis Date   • GERD (gastroesophageal reflux disease)    • Urinary tract infection        Past Surgical History:   Procedure Laterality Date   • CHOLECYSTECTOMY  06/06/2019   • CHOLECYSTECTOMY WITH INTRAOPERATIVE CHOLANGIOGRAM N/A 6/6/2019    Procedure: CHOLECYSTECTOMY LAPAROSCOPIC INTRAOPERATIVE CHOLANGIOGRAM      (c-arm#2);  Surgeon: Felipe Overton MD;  Location: [facility];  Service: General   • HYSTERECTOMY  2007       Review of Systems   HENT: Positive for ear pain. Negative for ear discharge, hearing loss, rhinorrhea and sore throat.    Respiratory: Negative for cough.    Gastrointestinal: Negative for abdominal pain, diarrhea and vomiting.   Musculoskeletal: Negative for neck pain.   Skin: Negative for rash.   Neurological: Negative for headaches.           Objective   Physical Exam   Constitutional: She is oriented to person, place, and time. She appears well-developed.   HENT:   Head: Normocephalic.   Right Ear: Tympanic membrane and external ear normal.   Left Ear: Tympanic membrane and external ear normal.   Ears:    Nose: Nose normal.   Mouth/Throat: Oropharynx is clear and moist.   Eyes: Conjunctivae are normal.   Neck: Normal range of motion.   Lymphadenopathy:     She has no cervical adenopathy.   Neurological: She is alert and oriented to person, place, and time. No cranial nerve deficit.   Skin: Skin is warm.   Psychiatric: She has a normal mood and affect. Thought content normal.   Nursing note and vitals reviewed.           Audiogram reveals normal hearing overall the slightly worse than the right there is no evidence of fluid or pressure and actual tympanogram was shown to the patient    Otomicroscopic exam reveals small cyst outer ear canal left and one mid canal and right not particularly tender eardrums otherwise normal  without evidence of fluid    Assessment/Plan   Letty was seen today for ear problem.    Diagnoses and all orders for this visit:    Cyst of ear canal    Otalgia, right      However he can use the medicines to use the drops for 5 days and then the pills for 2 weeks  When using his preventive medicine she is to take with food she is already off her previous anti-inflammatories while use this I do not see any evidence of significant fluid or hearing loss I do not think the cyst actually account for pain persisted we could consider removal though she actually has a cyst on the opposite side as well not having pain on that side it does not appear to be inflamed and many times these are benign and can be followed.  We will recheck in 4 weeks but she is not better within 2 she is to let us know and all questions were answered

## 2019-11-12 ENCOUNTER — OFFICE VISIT (OUTPATIENT)
Dept: ORTHOPEDIC SURGERY | Facility: CLINIC | Age: 49
End: 2019-11-12

## 2019-11-12 VITALS — HEIGHT: 65 IN | WEIGHT: 273 LBS | BODY MASS INDEX: 45.48 KG/M2

## 2019-11-12 DIAGNOSIS — M25.562 CHRONIC PAIN OF BOTH KNEES: ICD-10-CM

## 2019-11-12 DIAGNOSIS — E66.01 MORBID OBESITY WITH BMI OF 45.0-49.9, ADULT (HCC): ICD-10-CM

## 2019-11-12 DIAGNOSIS — G89.29 CHRONIC PAIN OF BOTH KNEES: ICD-10-CM

## 2019-11-12 DIAGNOSIS — K21.9 GERD WITHOUT ESOPHAGITIS: ICD-10-CM

## 2019-11-12 DIAGNOSIS — M23.92 INTERNAL DERANGEMENT OF LEFT KNEE: Primary | ICD-10-CM

## 2019-11-12 DIAGNOSIS — M25.561 CHRONIC PAIN OF BOTH KNEES: ICD-10-CM

## 2019-11-12 PROCEDURE — 99213 OFFICE O/P EST LOW 20 MIN: CPT | Performed by: ORTHOPAEDIC SURGERY

## 2019-11-12 NOTE — PROGRESS NOTES
"Letty Bach is a 49 y.o. female returns for     Chief Complaint   Patient presents with   • Left Knee - Follow-up, Pain   • Right Knee - Pain, Follow-up       HISTORY OF PRESENT ILLNESS: f/u bilateral knee pain. Patient completed 13 visits of physical therapy at sports medicine, patient states that therapy has helped still having problems with left knee.   Right knee doing well  Left knee is some better but still with pain pivoting and twisting   Still having swelling and pain with activity   Pain with prolonged standing and walking    Mostly dull ache but occasional sharp stabbing pains.     CONCURRENT MEDICAL HISTORY:    The following portions of the patient's history were reviewed and updated as appropriate: allergies, current medications, past family history, past medical history, past social history, past surgical history and problem list.     ROS  No fevers or chills.  No chest pain or shortness of air.  No GI or  disturbances.    PHYSICAL EXAMINATION:       Ht 165.1 cm (65\")   Wt 124 kg (273 lb)   BMI 45.43 kg/m²     Physical Exam   Constitutional: She is oriented to person, place, and time. She appears well-developed and well-nourished.   Neurological: She is alert and oriented to person, place, and time.   Psychiatric: She has a normal mood and affect. Her behavior is normal. Judgment and thought content normal.       GAIT:     []  Normal  [x]  Antalgic    Assistive device: [x]  None  []  Walker     []  Crutches  []  Cane     []  Wheelchair  []  Stretcher    Left Knee Exam     Muscle Strength   The patient has normal left knee strength.    Tenderness   Left knee tenderness location: Diffusely tender with mild tenderness over the patellar tendon and specific tenderness over the medial joint line.    Range of Motion   Extension: 0   Flexion: 130     Tests   Brodie:  Medial - positive   Varus: negative Valgus: negative  Drawer:  Anterior - negative         Other   Erythema: absent  Sensation: " normal  Pulse: present  Swelling: none                        ASSESSMENT:    Diagnoses and all orders for this visit:    Internal derangement of left knee    Chronic pain of both knees    Morbid obesity with BMI of 45.0-49.9, adult (CMS/HCC)    GERD without esophagitis          PLAN    She is continued having pain and has not gotten better with conservative measures.  We discussed proceeding with an MRI to assess for internal derangement and to help direct further treatment options.  Follow-up after the MRI.        Patient's Body mass index is 45.43 kg/m². BMI is above normal parameters. Recommendations include: exercise counseling and nutrition counseling.    Return for recheck for MRI results.    Jose Alberto Amos MD

## 2019-11-13 PROBLEM — E78.00 ELEVATED LDL CHOLESTEROL LEVEL: Status: RESOLVED | Noted: 2019-08-22 | Resolved: 2019-11-13

## 2019-11-14 ENCOUNTER — HOSPITAL ENCOUNTER (OUTPATIENT)
Facility: HOSPITAL | Age: 49
Setting detail: HOSPITAL OUTPATIENT SURGERY
Discharge: HOME | End: 2019-11-14
Attending: INTERNAL MEDICINE | Admitting: INTERNAL MEDICINE

## 2019-11-14 ENCOUNTER — ANESTHESIA (OUTPATIENT)
Dept: GASTROENTEROLOGY | Facility: HOSPITAL | Age: 49
End: 2019-11-14

## 2019-11-14 ENCOUNTER — ANESTHESIA EVENT (OUTPATIENT)
Dept: GASTROENTEROLOGY | Facility: HOSPITAL | Age: 49
End: 2019-11-14

## 2019-11-14 VITALS
DIASTOLIC BLOOD PRESSURE: 60 MMHG | HEART RATE: 91 BPM | TEMPERATURE: 97.9 F | HEIGHT: 65 IN | SYSTOLIC BLOOD PRESSURE: 112 MMHG | BODY MASS INDEX: 44.9 KG/M2 | OXYGEN SATURATION: 95 % | WEIGHT: 269.5 LBS | RESPIRATION RATE: 18 BRPM

## 2019-11-14 DIAGNOSIS — R10.12 LUQ PAIN: ICD-10-CM

## 2019-11-14 PROCEDURE — 88305 TISSUE EXAM BY PATHOLOGIST: CPT | Performed by: INTERNAL MEDICINE

## 2019-11-14 PROCEDURE — 25010000002 PROPOFOL 10 MG/ML EMULSION: Performed by: NURSE ANESTHETIST, CERTIFIED REGISTERED

## 2019-11-14 PROCEDURE — 88305 TISSUE EXAM BY PATHOLOGIST: CPT | Performed by: PATHOLOGY

## 2019-11-14 PROCEDURE — 25010000002 MIDAZOLAM PER 1 MG: Performed by: NURSE ANESTHETIST, CERTIFIED REGISTERED

## 2019-11-14 PROCEDURE — 43239 EGD BIOPSY SINGLE/MULTIPLE: CPT | Performed by: INTERNAL MEDICINE

## 2019-11-14 RX ORDER — SUCRALFATE 1 G/1
1 TABLET ORAL 4 TIMES DAILY
Qty: 120 TABLET | Refills: 5 | Status: SHIPPED | OUTPATIENT
Start: 2019-11-14 | End: 2020-10-19 | Stop reason: SDDI

## 2019-11-14 RX ORDER — DEXTROSE AND SODIUM CHLORIDE 5; .45 G/100ML; G/100ML
30 INJECTION, SOLUTION INTRAVENOUS CONTINUOUS PRN
Status: DISCONTINUED | OUTPATIENT
Start: 2019-11-14 | End: 2019-11-14 | Stop reason: HOSPADM

## 2019-11-14 RX ORDER — MIDAZOLAM HYDROCHLORIDE 1 MG/ML
INJECTION INTRAMUSCULAR; INTRAVENOUS AS NEEDED
Status: DISCONTINUED | OUTPATIENT
Start: 2019-11-14 | End: 2019-11-14 | Stop reason: SURG

## 2019-11-14 RX ORDER — PROPOFOL 10 MG/ML
VIAL (ML) INTRAVENOUS AS NEEDED
Status: DISCONTINUED | OUTPATIENT
Start: 2019-11-14 | End: 2019-11-14 | Stop reason: SURG

## 2019-11-14 RX ORDER — LIDOCAINE HYDROCHLORIDE 20 MG/ML
INJECTION, SOLUTION INTRAVENOUS AS NEEDED
Status: DISCONTINUED | OUTPATIENT
Start: 2019-11-14 | End: 2019-11-14 | Stop reason: SURG

## 2019-11-14 RX ADMIN — PROPOFOL 20 MG: 10 INJECTION, EMULSION INTRAVENOUS at 13:53

## 2019-11-14 RX ADMIN — PROPOFOL 150 MG: 10 INJECTION, EMULSION INTRAVENOUS at 13:51

## 2019-11-14 RX ADMIN — DEXTROSE AND SODIUM CHLORIDE 30 ML/HR: 5; 450 INJECTION, SOLUTION INTRAVENOUS at 13:46

## 2019-11-14 RX ADMIN — MIDAZOLAM HYDROCHLORIDE 2 MG: 2 INJECTION, SOLUTION INTRAMUSCULAR; INTRAVENOUS at 13:47

## 2019-11-14 RX ADMIN — LIDOCAINE HYDROCHLORIDE 100 MG: 20 INJECTION, SOLUTION INTRAVENOUS at 13:51

## 2019-11-14 NOTE — ANESTHESIA PREPROCEDURE EVALUATION
Anesthesia Evaluation     NPO Solid Status: > 8 hours  NPO Liquid Status: > 2 hours           Airway   Mallampati: III  TM distance: >3 FB  Neck ROM: full  Possible difficult intubation  Dental - normal exam     Pulmonary - normal exam   Cardiovascular - normal exam        Neuro/Psych  GI/Hepatic/Renal/Endo    (+) morbid obesity, GERD,      Musculoskeletal     Abdominal    Substance History      OB/GYN          Other                      Anesthesia Plan    ASA 3     MAC     intravenous induction     Anesthetic plan, all risks, benefits, and alternatives have been provided, discussed and informed consent has been obtained with: patient.

## 2019-11-14 NOTE — ANESTHESIA POSTPROCEDURE EVALUATION
Patient: Letty Bach    Procedure Summary     Date:  11/14/19 Room / Location:  [facility] ENDOSCOPY 1 / [facility] ENDOSCOPY    Anesthesia Start:  1347 Anesthesia Stop:  1356    Procedure:  ESOPHAGOGASTRODUODENOSCOPY (N/A ) Diagnosis:       LUQ pain      (LUQ pain [R10.12])    Surgeon:  Joao Bruce MD Provider:  Tu Gil CRNA    Anesthesia Type:  MAC ASA Status:  3          Anesthesia Type: MAC  Last vitals  BP   142/86 (11/14/19 1331)   Temp   97.5 °F (36.4 °C) (11/14/19 1331)   Pulse   80 (11/14/19 1331)   Resp   18 (11/14/19 1331)     SpO2   96 % (11/14/19 1331)     Post Anesthesia Care and Evaluation    Patient location during evaluation: bedside  Patient participation: waiting for patient participation  Level of consciousness: responsive to verbal stimuli  Pain management: adequate  Airway patency: patent  Anesthetic complications: No anesthetic complications  PONV Status: none  Cardiovascular status: acceptable  Respiratory status: acceptable and nasal cannula  Hydration status: acceptable

## 2019-11-18 LAB
LAB AP CASE REPORT: NORMAL
PATH REPORT.FINAL DX SPEC: NORMAL
PATH REPORT.GROSS SPEC: NORMAL

## 2019-11-20 ENCOUNTER — OFFICE VISIT (OUTPATIENT)
Dept: GASTROENTEROLOGY | Facility: CLINIC | Age: 49
End: 2019-11-20

## 2019-11-20 VITALS
WEIGHT: 270.2 LBS | HEART RATE: 99 BPM | BODY MASS INDEX: 45.02 KG/M2 | HEIGHT: 65 IN | DIASTOLIC BLOOD PRESSURE: 74 MMHG | SYSTOLIC BLOOD PRESSURE: 116 MMHG

## 2019-11-20 DIAGNOSIS — R10.12 LUQ PAIN: ICD-10-CM

## 2019-11-20 DIAGNOSIS — E66.01 MORBID OBESITY WITH BMI OF 45.0-49.9, ADULT (HCC): Primary | ICD-10-CM

## 2019-11-20 PROCEDURE — 99213 OFFICE O/P EST LOW 20 MIN: CPT | Performed by: INTERNAL MEDICINE

## 2019-11-20 RX ORDER — DICYCLOMINE HCL 20 MG
20 TABLET ORAL EVERY 6 HOURS
Qty: 90 TABLET | Refills: 5 | Status: SHIPPED | OUTPATIENT
Start: 2019-11-20 | End: 2019-12-20

## 2019-11-21 NOTE — PROGRESS NOTES
Chief Complaint   Patient presents with   • Abdominal Pain       Subjective    Letty Bach is a 49 y.o. female.    History of Present Illness  She presented to GI clinic for follow-up visit today.  Has intermittent bouts of left upper quadrant abdominal pain.  Pain is aching in nature, moderate, nonradiating and is unchanged with activity, food intake and defecation.  EGD was consistent with bile reflux gastritis.       The following portions of the patient's history were reviewed and updated as appropriate:   Past Medical History:   Diagnosis Date   • GERD (gastroesophageal reflux disease)    • Urinary tract infection      Past Surgical History:   Procedure Laterality Date   • CHOLECYSTECTOMY  06/06/2019   • CHOLECYSTECTOMY WITH INTRAOPERATIVE CHOLANGIOGRAM N/A 6/6/2019    Procedure: CHOLECYSTECTOMY LAPAROSCOPIC INTRAOPERATIVE CHOLANGIOGRAM      (c-arm#2);  Surgeon: Felipe Overton MD;  Location: Health system OR;  Service: General   • ENDOSCOPY N/A 11/14/2019    Procedure: ESOPHAGOGASTRODUODENOSCOPY;  Surgeon: Joao Bruce MD;  Location: Health system ENDOSCOPY;  Service: Gastroenterology   • HYSTERECTOMY  2007     Family History   Problem Relation Age of Onset   • Breast cancer Mother    • Thyroid disease Mother    • Hypertension Mother    • Hypertension Father    • Diabetes Father    • Kidney disease Father    • Heart disease Father    • No Known Problems Sister    • No Known Problems Brother    • Seizures Daughter    • Hypertension Daughter    • COPD Maternal Grandmother    • Asthma Maternal Grandmother    • No Known Problems Sister    • ADD / ADHD Daughter    • Polymyositis Daughter      OB History     No data available        Prior to Admission medications    Medication Sig Start Date End Date Taking? Authorizing Provider   fluticasone (FLONASE) 50 MCG/ACT nasal spray 2 sprays into the nostril(s) as directed by provider Daily As Needed for Rhinitis. 8/22/19  Yes Aldair Esteves APRN   loratadine (CLARITIN)  10 MG tablet Take 1 tablet by mouth Daily As Needed for Allergies. 8/22/19  Yes Aldair Esteves APRN   omeprazole (PrilOSEC) 40 MG capsule Take 1 capsule by mouth Daily. 10/30/19  Yes Joao Bruce MD   sucralfate (CARAFATE) 1 g tablet Take 1 tablet by mouth 4 (Four) Times a Day. 11/14/19  Yes Joao Bruce MD   dicyclomine (BENTYL) 20 MG tablet Take 1 tablet by mouth Every 6 (Six) Hours for 30 days. 11/20/19 12/20/19  Joao Bruce MD   meloxicam (MOBIC) 15 MG tablet Take 1 tablet by mouth Daily. 11/11/19 11/20/19  Marques Baires MD   neomycin-polymyxin-hydrocortisone (CORTISPORIN) 1 % solution otic solution Administer 3 drops to the right ear 2 (Two) Times a Day. For 5 days 11/11/19 11/20/19  Marques Baires MD     No Known Allergies  Social History     Socioeconomic History   • Marital status:      Spouse name: Not on file   • Number of children: Not on file   • Years of education: Not on file   • Highest education level: Not on file   Tobacco Use   • Smoking status: Former Smoker     Packs/day: 0.50     Years: 5.00     Pack years: 2.50     Types: Cigarettes   • Smokeless tobacco: Never Used   Substance and Sexual Activity   • Alcohol use: No     Frequency: Never   • Drug use: No   • Sexual activity: Defer       Review of Systems  Review of Systems   Constitutional: Negative for chills, fatigue, fever and unexpected weight change.   HENT: Negative for congestion, ear discharge, hearing loss, nosebleeds and sore throat.    Eyes: Negative for pain, discharge and redness.   Respiratory: Negative for cough, chest tightness, shortness of breath and wheezing.    Cardiovascular: Negative for chest pain and palpitations.   Gastrointestinal: Positive for abdominal pain. Negative for abdominal distention, blood in stool, constipation, diarrhea, nausea and vomiting.   Endocrine: Negative for cold intolerance, polydipsia, polyphagia and polyuria.   Genitourinary: Negative for dysuria, flank  "pain, frequency, hematuria and urgency.   Musculoskeletal: Negative for arthralgias, back pain, joint swelling and myalgias.   Skin: Negative for color change, pallor and rash.   Neurological: Negative for tremors, seizures, syncope, weakness and headaches.   Hematological: Negative for adenopathy. Does not bruise/bleed easily.   Psychiatric/Behavioral: Negative for behavioral problems, confusion, dysphoric mood, hallucinations and suicidal ideas. The patient is not nervous/anxious.         /74 (BP Location: Left arm)   Pulse 99   Ht 165.1 cm (65\")   Wt 123 kg (270 lb 3.2 oz)   BMI 44.96 kg/m²     Objective    Physical Exam   Constitutional: She is oriented to person, place, and time. She appears well-developed and well-nourished.   HENT:   Head: Normocephalic and atraumatic.   Mouth/Throat: Oropharynx is clear and moist.   Eyes: Conjunctivae and EOM are normal. Pupils are equal, round, and reactive to light.   Neck: Normal range of motion. Neck supple. No thyromegaly present.   Cardiovascular: Normal rate, regular rhythm and normal heart sounds.   No murmur heard.  Pulmonary/Chest: Effort normal and breath sounds normal. She has no wheezes.   Abdominal: Soft. Bowel sounds are normal. She exhibits no distension and no mass. There is no tenderness. No hernia.   Genitourinary:   Genitourinary Comments: No lesions noted   Musculoskeletal: Normal range of motion. She exhibits no edema or tenderness.   Lymphadenopathy:     She has no cervical adenopathy.   Neurological: She is alert and oriented to person, place, and time. No cranial nerve deficit.   Skin: Skin is warm and dry. No rash noted.   Psychiatric: She has a normal mood and affect. Thought content normal.     Admission on 11/14/2019, Discharged on 11/14/2019   Component Date Value Ref Range Status   • Case Report 11/14/2019    Final                    Value:Surgical Pathology Report                         Case: [id number]                               "    Authorizing Provider:  Joao Bruce MD      Collected:           11/14/2019 01:54 PM          Ordering Location:     [facility]             Received:            11/15/2019 07:15 AM                                 [place] [facility]                                                     Pathologist:           Adal Becker MD                                                         Specimen:    Gastric, Antrum                                                                           • Final Diagnosis 11/14/2019    Final                    Value:This result contains rich text formatting which cannot be displayed here.   • Gross Description 11/14/2019    Final                    Value:This result contains rich text formatting which cannot be displayed here.     Assessment/Plan    No diagnosis found..   1.  Left upper quadrant pain likely due to bile reflux gastritis.  Could also be due to pancreatic or biliary pathology and costochondritis.  Add Carafate 1 g p.o. 4 times a day and continue Prilosec 40 mg p.o. daily.    CT abdomen if symptoms continue  2.  Obesity, recommend exercise and diet control.    Orders placed during this encounter include:  No orders of the defined types were placed in this encounter.      * Surgery not found *    Review and/or summary of lab tests, radiology, procedures, medications. Review and summary of old records and obtaining of history. The risks and benefits of my recommendations, as well as other treatment options were discussed with the patient today. Questions were answered.    New Medications Ordered This Visit   Medications   • dicyclomine (BENTYL) 20 MG tablet     Sig: Take 1 tablet by mouth Every 6 (Six) Hours for 30 days.     Dispense:  90 tablet     Refill:  5       Follow-up: Return in about 2 months (around 1/20/2020).               Results for orders placed or performed during the hospital encounter of 11/14/19   Tissue Pathology Exam   Result Value Ref  "Range    Case Report       Surgical Pathology Report                         Case: [id number]                                  Authorizing Provider:  Joao Bruce MD      Collected:           11/14/2019 01:54 PM          Ordering Location:     [facility]             Received:            11/15/2019 07:15 AM                                 [place] [facility]                                                     Pathologist:           Adal Becker MD                                                         Specimen:    Gastric, Antrum                                                                            Final Diagnosis       MUCOSA, ANTRUM OF STOMACH:  REACTIVE GASTROPATHY.      Gross Description       The container is labeled \"antrum of stomach\" and has a nodular fragment of white soft tissue measuring 0.4 cm in greatest dimension.  It is entirely embedded as 1A.     Results for orders placed or performed in visit on 10/02/19   CBC Auto Differential   Result Value Ref Range    WBC 5.47 3.40 - 10.80 10*3/mm3    RBC 4.90 3.77 - 5.28 10*6/mm3    Hemoglobin 15.0 12.0 - 15.9 g/dL    Hematocrit 45.0 34.0 - 46.6 %    MCV 91.8 79.0 - 97.0 fL    MCH 30.6 26.6 - 33.0 pg    MCHC 33.3 31.5 - 35.7 g/dL    RDW 13.1 12.3 - 15.4 %    RDW-SD 43.8 37.0 - 54.0 fl    MPV 11.3 6.0 - 12.0 fL    Platelets 275 140 - 450 10*3/mm3    Neutrophil % 56.4 42.7 - 76.0 %    Lymphocyte % 34.2 19.6 - 45.3 %    Monocyte % 7.1 5.0 - 12.0 %    Eosinophil % 1.6 0.3 - 6.2 %    Basophil % 0.5 0.0 - 1.5 %    Immature Grans % 0.2 0.0 - 0.5 %    Neutrophils, Absolute 3.08 1.70 - 7.00 10*3/mm3    Lymphocytes, Absolute 1.87 0.70 - 3.10 10*3/mm3    Monocytes, Absolute 0.39 0.10 - 0.90 10*3/mm3    Eosinophils, Absolute 0.09 0.00 - 0.40 10*3/mm3    Basophils, Absolute 0.03 0.00 - 0.20 10*3/mm3    Immature Grans, Absolute 0.01 0.00 - 0.05 10*3/mm3    nRBC 0.0 0.0 - 0.2 /100 WBC   Iron Profile   Result Value Ref Range    Iron 115 37 - 145 " mcg/dL    Iron Saturation 33 20 - 50 %    Transferrin 234 200 - 360 mg/dL    TIBC 349 298 - 536 mcg/dL   Vitamin D 25 Hydroxy   Result Value Ref Range    25 Hydroxy, Vitamin D 35.2 30.0 - 100.0 ng/ml   Ferritin   Result Value Ref Range    Ferritin 65.60 13.00 - 150.00 ng/mL   Vitamin B12   Result Value Ref Range    Vitamin B-12 516 211 - 946 pg/mL   Results for orders placed or performed in visit on 08/22/19   Lipid Panel   Result Value Ref Range    Total Cholesterol 144 0 - 200 mg/dL    Triglycerides 81 0 - 150 mg/dL    HDL Cholesterol 45 40 - 60 mg/dL    LDL Cholesterol  83 0 - 100 mg/dL    VLDL Cholesterol 16.2 5 - 40 mg/dL    LDL/HDL Ratio 1.84    Results for orders placed or performed during the hospital encounter of 06/06/19   Tissue Pathology Exam   Result Value Ref Range    Case Report       Surgical Pathology Report                         Case: [id number]                                  Authorizing Provider:  Felipe Overton MD      Collected:           06/06/2019 09:13 AM          Ordering Location:     [facility]             Received:            06/06/2019 10:44 AM                                 [place] [facility]                                                              Pathologist:           Artur Faustin MD                                                           Specimen:    Gallbladder, gallbladder and contents                                                      Final Diagnosis       GALLBLADDER:  CHRONIC CHOLECYSTITIS.  CHOLELITHIASIS.      Gross Description       The gallbladder measures 8.0 cm in length and 4.0 cm in maximum diameter.  The serosal surface is invested in adipose tissue.  Impacted in the fundus of the gallbladder is a greenish brown stone measuring 4.0 x 3.0 x 2.2 cm.  The gallbladder mucosa is reddish tan, and the wall measures up to 0.3 cm in thickness.  Sections of gallbladder are submitted in one block.  A small fragment of tan coagulum is also present in the  container and is included in the same block.     Results for orders placed or performed in visit on 05/22/19   CBC Auto Differential   Result Value Ref Range    WBC 6.10 3.40 - 10.80 10*3/mm3    RBC 4.79 3.77 - 5.28 10*6/mm3    Hemoglobin 14.1 12.0 - 15.9 g/dL    Hematocrit 44.1 34.0 - 46.6 %    MCV 92.1 79.0 - 97.0 fL    MCH 29.4 26.6 - 33.0 pg    MCHC 32.0 31.5 - 35.7 g/dL    RDW 12.8 12.3 - 15.4 %    RDW-SD 43.5 37.0 - 54.0 fl    MPV 10.3 6.0 - 12.0 fL    Platelets 290 140 - 450 10*3/mm3    Neutrophil % 51.2 42.7 - 76.0 %    Lymphocyte % 38.7 19.6 - 45.3 %    Monocyte % 7.0 5.0 - 12.0 %    Eosinophil % 2.1 0.3 - 6.2 %    Basophil % 0.7 0.0 - 1.5 %    Immature Grans % 0.3 0.0 - 0.5 %    Neutrophils, Absolute 3.12 1.70 - 7.00 10*3/mm3    Lymphocytes, Absolute 2.36 0.70 - 3.10 10*3/mm3    Monocytes, Absolute 0.43 0.10 - 0.90 10*3/mm3    Eosinophils, Absolute 0.13 0.00 - 0.40 10*3/mm3    Basophils, Absolute 0.04 0.00 - 0.20 10*3/mm3    Immature Grans, Absolute 0.02 0.00 - 0.05 10*3/mm3    nRBC 0.0 0.0 - 0.2 /100 WBC   TSH   Result Value Ref Range    TSH 1.730 0.270 - 4.200 mIU/mL   Hemoglobin A1c   Result Value Ref Range    Hemoglobin A1C 5.20 4.80 - 5.60 %   Lipid Panel   Result Value Ref Range    Total Cholesterol 186 0 - 200 mg/dL    Triglycerides 134 0 - 150 mg/dL    HDL Cholesterol 48 40 - 60 mg/dL    LDL Cholesterol  111 (H) 0 - 100 mg/dL    VLDL Cholesterol 26.8 mg/dL    LDL/HDL Ratio 2.32    Comprehensive Metabolic Panel   Result Value Ref Range    Glucose 88 65 - 99 mg/dL    BUN 10 6 - 20 mg/dL    Creatinine 0.93 0.57 - 1.00 mg/dL    Sodium 142 136 - 145 mmol/L    Potassium 4.2 3.5 - 5.2 mmol/L    Chloride 104 98 - 107 mmol/L    CO2 26.0 22.0 - 29.0 mmol/L    Calcium 9.1 8.6 - 10.5 mg/dL    Total Protein 7.6 6.0 - 8.5 g/dL    Albumin 4.00 3.50 - 5.20 g/dL    ALT (SGPT) 27 1 - 33 U/L    AST (SGOT) 23 1 - 32 U/L    Alkaline Phosphatase 105 39 - 117 U/L    Total Bilirubin 0.4 0.2 - 1.2 mg/dL    eGFR Non African  Amer 64 >60 mL/min/1.73    Globulin 3.6 gm/dL    A/G Ratio 1.1 g/dL    BUN/Creatinine Ratio 10.8 7.0 - 25.0    Anion Gap 12.0 mmol/L   Results for orders placed or performed in visit on 11/08/07   Converted Surgical Pathology   Result Value Ref Range    Spec Descr 1 SPECIMEN(S): A OVARY & FALLOPIAN TUBE, BILATERAL     Specimen description 2 SPECIMEN(S): B UTERUS     Clinical Information   CLINICAL HISTORY:  None Given       Gross Description         GROSS DESCRIPTION:  The first specimen consists of 2 ovaries with their corresponding  fallopian tubes.  One ovary is larger weighing 30 grams and measuring  5.0 x 4.0 x 4.0 cm.  Its cortex is shiny with underlying engorged  vessels.  Upon further examination several subcapsular cystic structures  of different sizes are noted measuring from 1.0-2.0 cm in greatest  diameter.  These cysts have slimy colorless fluid and their internal  linings are smooth and free of any papillary excrescences.  The  remainder of the ovarian parenchyma shows mild edema and devoid of any  other significant lesions.  Firmly attached to it is its corresponding  fallopian tube with fimbria measuring 5.0 x 1.0 cm.  A few paratubal  cysts are noted distally measuring up to 0.5 cm in greatest dimension  containing colorless fluid.  The smaller ovary is normal in size and  shape measuring 2.5 x 2.0 x 2.0 cm.  Its cortex is cerebriform and on  serial sectioning the ovarian parenchyma is of the usual appearance an d  no significant mass is noted.  Firmly attached to it is its  corresponding fallopian tube with intact fimbria measuring 5.0 x 0.8 cm.  Its external surface as well as cut surface is grossly unremarkable.  Representative sections are embedded as A.  The second specimen consists of a uterus weighing 115 grams and  measuring 9.0 x 6.0 x 5.0 cm.  The corpus uteri is symmetrical and on  serial sectioning no myometrial lesions are identified.  The myometrium  is 2.0 cm in thickness and the  endometrial cavity is lined by a mildly  gray endometrium which is 0.3 cm in thickness.  The endocervical canal  is lined by a mucous coated epithelium but the exocervix appears clean.  Representative sections are embedded as B.      Frozen Diagnosis         FROZEN SECTION DIAGNOSIS:  A.  Big ovary with follicular cyst.      Final Diagnosis         FINAL DIAGNOSIS:  A.  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  B.  UTERUS:            PROLIFERATIVE ENDOMETRIUM.             SUPERFICIAL ADENOMYOSIS.        CERVIX:             CHRONIC CERVICITIS.    DIAGNOSIS CODE:  8      Comment         CLINICAL DIAGNOSIS:  Ovary & fallopian tube, bilateral, uterus      CONVERTED (HISTORICAL) FINAL PATHOLOGIST       Diagnostician:  AMANDA WILLIAM M.D.  Pathologist  Electronically Signed 11/14/2007       *Note: Due to a large number of results and/or encounters for the requested time period, some results have not been displayed. A complete set of results can be found in Results Review.         This document has been electronically signed by Joao Bruce MD on November 20, 2019 10:14 PM

## 2019-11-22 DIAGNOSIS — M25.562 LEFT KNEE PAIN, UNSPECIFIED CHRONICITY: ICD-10-CM

## 2019-11-22 DIAGNOSIS — M23.92 INTERNAL DERANGEMENT OF LEFT KNEE: Primary | ICD-10-CM

## 2019-12-09 ENCOUNTER — HOSPITAL ENCOUNTER (OUTPATIENT)
Dept: MRI IMAGING | Facility: HOSPITAL | Age: 49
Discharge: HOME | End: 2019-12-09
Admitting: ORTHOPAEDIC SURGERY

## 2019-12-09 DIAGNOSIS — M25.562 LEFT KNEE PAIN, UNSPECIFIED CHRONICITY: ICD-10-CM

## 2019-12-09 DIAGNOSIS — M23.92 INTERNAL DERANGEMENT OF LEFT KNEE: ICD-10-CM

## 2019-12-09 PROCEDURE — 73721 MRI JNT OF LWR EXTRE W/O DYE: CPT

## 2019-12-13 ENCOUNTER — OFFICE VISIT (OUTPATIENT)
Dept: ORTHOPEDIC SURGERY | Facility: CLINIC | Age: 49
End: 2019-12-13

## 2019-12-13 VITALS — HEIGHT: 65 IN | WEIGHT: 268 LBS | BODY MASS INDEX: 44.65 KG/M2

## 2019-12-13 DIAGNOSIS — E66.01 MORBID OBESITY WITH BMI OF 40.0-44.9, ADULT (HCC): ICD-10-CM

## 2019-12-13 DIAGNOSIS — M25.562 LEFT KNEE PAIN, UNSPECIFIED CHRONICITY: ICD-10-CM

## 2019-12-13 DIAGNOSIS — M23.92 INTERNAL DERANGEMENT OF LEFT KNEE: Primary | ICD-10-CM

## 2019-12-13 PROCEDURE — 20610 DRAIN/INJ JOINT/BURSA W/O US: CPT | Performed by: ORTHOPAEDIC SURGERY

## 2019-12-13 PROCEDURE — 99213 OFFICE O/P EST LOW 20 MIN: CPT | Performed by: ORTHOPAEDIC SURGERY

## 2019-12-13 RX ADMIN — TRIAMCINOLONE ACETONIDE 40 MG: 40 INJECTION, SUSPENSION INTRA-ARTICULAR; INTRAMUSCULAR at 10:36

## 2019-12-13 RX ADMIN — LIDOCAINE HYDROCHLORIDE 2 ML: 10 INJECTION, SOLUTION EPIDURAL; INFILTRATION; INTRACAUDAL; PERINEURAL at 10:36

## 2019-12-13 NOTE — PROGRESS NOTES
"Letty Bach is a 49 y.o. female returns for     Chief Complaint   Patient presents with   • Left Knee - Follow-up   • Results     MRI Restorationist 12/9/19       HISTORY OF PRESENT ILLNESS:  Left knee is some better but still with pain pivoting and twisting              Still having swelling and pain with activity              Pain with prolonged standing and walking     Mostly dull ache but occasional sharp stabbing pains.    Has been taken off of NSAIDS by GI due to stomach irritation.     CONCURRENT MEDICAL HISTORY:    The following portions of the patient's history were reviewed and updated as appropriate: allergies, current medications, past family history, past medical history, past social history, past surgical history and problem list.     ROS  No fevers or chills.  No chest pain or shortness of air.  No GI or  disturbances.    PHYSICAL EXAMINATION:       Ht 165.1 cm (65\")   Wt 122 kg (268 lb)   BMI 44.60 kg/m²     Physical Exam   Constitutional: She is oriented to person, place, and time. She appears well-developed and well-nourished.   Neurological: She is alert and oriented to person, place, and time.   Psychiatric: She has a normal mood and affect. Her behavior is normal. Judgment and thought content normal.       GAIT:     [x]  Normal  []  Antalgic    Assistive device: [x]  None  []  Walker     []  Crutches  []  Cane     []  Wheelchair  []  Stretcher    Left Knee Exam     Muscle Strength   The patient has normal left knee strength.    Tenderness   Left knee tenderness location: Diffusely tender with mild tenderness over the patellar tendon and specific tenderness over the medial joint line.    Range of Motion   Extension: 0   Flexion: 130     Tests   Brodie:  Medial - positive   Varus: negative Valgus: negative  Drawer:  Anterior - negative         Other   Erythema: absent  Sensation: normal  Pulse: present  Swelling: mild              Mri Knee Left Without Contrast    Result Date: " 12/9/2019  Narrative: MRI left knee. HISTORY: Left knee pain. Prior exam bilateral knee x-ray May 22, 2019. TECHNIQUE: Multiplanar multisequence noncontrast images left knee. FINDINGS: Small intra-articular and supra patellar effusion. Areas of grade IV chondromalacia  osteochondral defects patellar articular hyaline cartilage midline and lateral articular facet. Series 2 image 12. There is also 0.9 cm lateral patellar tracking. Normal patellar and quadriceps tendon. Normal medial meniscus. Normal lateral meniscus. Normal anterior and posterior cruciate ligaments.     Impression: CONCLUSION: Small intra-articular and supra patellar effusion. Areas of grade IV chondromalacia , osteochondral defects patellar articular hyaline cartilage. 0.92 cm lateral patellar tracking with respect to the distal femur. MRI left knee is otherwise unremarkable. Electronically signed by:  Manolo King MD  12/9/2019 1:08 PM CST Workstation: [id number]            ASSESSMENT:    Diagnoses and all orders for this visit:    Internal derangement of left knee  -     Large Joint Arthrocentesis: L knee    Left knee pain, unspecified chronicity  -     Large Joint Arthrocentesis: L knee    Morbid obesity with BMI of 40.0-44.9, adult (CMS/[facility])  -     Large Joint Arthrocentesis: L knee          PLAN      We discussed a trial of a steroid injection into her left knee.  She will continue with strength and conditioning exercises as tolerated.  Should continue with activity as tolerated.  We discussed the possibility of repeat steroid injection or the possibility of arthroscopy depending on her results and response to this injection.  We discussed continued efforts for healthy lifestyle choices.        Large Joint Arthrocentesis: L knee  Date/Time: 12/13/2019 10:36 AM  Consent given by: patient  Site marked: site marked  Timeout: Immediately prior to procedure a time out was called to verify the correct patient, procedure, equipment, support staff and  site/side marked as required   Supporting Documentation  Indications: pain   Procedure Details  Location: knee - L knee  Preparation: Patient was prepped and draped in the usual sterile fashion  Needle size: 22 G  Approach: anteromedial  Medications administered: 40 mg triamcinolone acetonide 40 MG/ML; 2 mL lidocaine PF 1% 1 %  Patient tolerance: patient tolerated the procedure well with no immediate complications            Patient's Body mass index is 44.6 kg/m². BMI is above normal parameters. Recommendations include: exercise counseling and nutrition counseling.      Return in about 6 weeks (around 1/24/2020) for recheck.    Jose Alberto Amos MD

## 2019-12-15 RX ORDER — LIDOCAINE HYDROCHLORIDE 10 MG/ML
2 INJECTION, SOLUTION EPIDURAL; INFILTRATION; INTRACAUDAL; PERINEURAL
Status: COMPLETED | OUTPATIENT
Start: 2019-12-13 | End: 2019-12-13

## 2019-12-15 RX ORDER — TRIAMCINOLONE ACETONIDE 40 MG/ML
40 INJECTION, SUSPENSION INTRA-ARTICULAR; INTRAMUSCULAR
Status: COMPLETED | OUTPATIENT
Start: 2019-12-13 | End: 2019-12-13

## 2020-01-21 ENCOUNTER — OFFICE VISIT (OUTPATIENT)
Dept: GASTROENTEROLOGY | Facility: CLINIC | Age: 50
End: 2020-01-21

## 2020-01-21 VITALS
BODY MASS INDEX: 42.45 KG/M2 | HEIGHT: 65 IN | HEART RATE: 89 BPM | DIASTOLIC BLOOD PRESSURE: 85 MMHG | WEIGHT: 254.8 LBS | SYSTOLIC BLOOD PRESSURE: 129 MMHG

## 2020-01-21 DIAGNOSIS — R10.12 LUQ PAIN: Primary | ICD-10-CM

## 2020-01-21 PROCEDURE — 99212 OFFICE O/P EST SF 10 MIN: CPT | Performed by: INTERNAL MEDICINE

## 2020-01-21 RX ORDER — DICYCLOMINE HCL 20 MG
TABLET ORAL
COMMUNITY
Start: 2020-01-07 | End: 2020-10-19 | Stop reason: SDDI

## 2020-01-24 ENCOUNTER — OFFICE VISIT (OUTPATIENT)
Dept: ORTHOPEDIC SURGERY | Facility: CLINIC | Age: 50
End: 2020-01-24

## 2020-01-24 VITALS — WEIGHT: 253 LBS | HEIGHT: 65 IN | BODY MASS INDEX: 42.15 KG/M2

## 2020-01-24 DIAGNOSIS — M23.92 INTERNAL DERANGEMENT OF LEFT KNEE: Primary | ICD-10-CM

## 2020-01-24 DIAGNOSIS — E66.01 MORBID OBESITY WITH BMI OF 45.0-49.9, ADULT (HCC): ICD-10-CM

## 2020-01-24 DIAGNOSIS — M25.562 LEFT KNEE PAIN, UNSPECIFIED CHRONICITY: ICD-10-CM

## 2020-01-24 PROCEDURE — 99213 OFFICE O/P EST LOW 20 MIN: CPT | Performed by: ORTHOPAEDIC SURGERY

## 2020-01-24 NOTE — PROGRESS NOTES
"Letty Bach is a 49 y.o. female returns for     Chief Complaint   Patient presents with   • Left Knee - Follow-up       HISTORY OF PRESENT ILLNESS:  Follow up left knee pain.  Patient was given a steroid injection on 12/13/19.  She reports no improvement.  She says her knee pain got worse and it felt her her lower leg was \"asleep\" for a few days after.  Pain level 3.5 today.  Doing HEP after having gone to PT  Has lost 15 pounds  No new injury       CONCURRENT MEDICAL HISTORY:    The following portions of the patient's history were reviewed and updated as appropriate: allergies, current medications, past family history, past medical history, past social history, past surgical history and problem list.     ROS  No fevers or chills.  No chest pain or shortness of air.  No GI or  disturbances.    PHYSICAL EXAMINATION:       Ht 165.1 cm (65\")   Wt 115 kg (253 lb)   BMI 42.10 kg/m²     Physical Exam   Constitutional: She is oriented to person, place, and time. She appears well-developed and well-nourished.   Neurological: She is alert and oriented to person, place, and time.   Psychiatric: She has a normal mood and affect. Her behavior is normal. Judgment and thought content normal.       GAIT:     [x]  Normal  []  Antalgic    Assistive device: [x]  None  []  Walker     []  Crutches  []  Cane     []  Wheelchair  []  Stretcher    Left Knee Exam     Muscle Strength   The patient has normal left knee strength.    Tenderness   Left knee tenderness location: Diffusely tender with mild tenderness over the patellar tendon and specific tenderness over the medial joint line.    Range of Motion   Extension: 0   Flexion: 130     Tests   Brodie:  Medial - positive   Varus: negative Valgus: negative  Drawer:  Anterior - negative         Other   Erythema: absent  Sensation: normal  Pulse: present  Swelling: mild                        ASSESSMENT:    Diagnoses and all orders for this visit:    Internal derangement of left " knee    Left knee pain, unspecified chronicity    Morbid obesity with BMI of 45.0-49.9, adult (CMS/[facility])          PLAN    Will try viscosupplentation for chondromalacia patella and patellofemoral groove.  Continue strength and conditioning  Continue healthy lifestyle choices.  Slowly progress as tolerated.      Patient's Body mass index is 42.1 kg/m². BMI is above normal parameters. Recommendations include: exercise counseling and nutrition counseling.      Return for visco-supplementation.    Jose Alberto Amos MD

## 2020-02-14 ENCOUNTER — DOCUMENTATION (OUTPATIENT)
Dept: OTOLARYNGOLOGY | Facility: CLINIC | Age: 50
End: 2020-02-14

## 2020-02-24 ENCOUNTER — OFFICE VISIT (OUTPATIENT)
Dept: FAMILY MEDICINE CLINIC | Facility: CLINIC | Age: 50
End: 2020-02-24

## 2020-02-24 VITALS
WEIGHT: 249.6 LBS | DIASTOLIC BLOOD PRESSURE: 98 MMHG | SYSTOLIC BLOOD PRESSURE: 150 MMHG | HEIGHT: 65 IN | RESPIRATION RATE: 20 BRPM | BODY MASS INDEX: 41.59 KG/M2 | HEART RATE: 74 BPM | OXYGEN SATURATION: 98 %

## 2020-02-24 DIAGNOSIS — R10.12 LUQ PAIN: ICD-10-CM

## 2020-02-24 DIAGNOSIS — M25.562 CHRONIC PAIN OF BOTH KNEES: Primary | ICD-10-CM

## 2020-02-24 DIAGNOSIS — M25.561 CHRONIC PAIN OF BOTH KNEES: Primary | ICD-10-CM

## 2020-02-24 DIAGNOSIS — F41.9 ANXIETY: ICD-10-CM

## 2020-02-24 DIAGNOSIS — G89.29 CHRONIC PAIN OF BOTH KNEES: Primary | ICD-10-CM

## 2020-02-24 DIAGNOSIS — E78.00 ELEVATED LDL CHOLESTEROL LEVEL: ICD-10-CM

## 2020-02-24 DIAGNOSIS — E66.01 CLASS 3 SEVERE OBESITY DUE TO EXCESS CALORIES WITHOUT SERIOUS COMORBIDITY WITH BODY MASS INDEX (BMI) OF 40.0 TO 44.9 IN ADULT (HCC): ICD-10-CM

## 2020-02-24 PROCEDURE — 99214 OFFICE O/P EST MOD 30 MIN: CPT | Performed by: NURSE PRACTITIONER

## 2020-02-24 NOTE — PROGRESS NOTES
Subjective   Letty Bach is a 49 y.o. female.     Ms. Bach is a 49-year-old female who presents today for follow-up related to elevated LDL, bilateral knee pain, left upper quadrant pain, obesity and anxiety.  Patient has been actively dieting and exercising in an effort to control her obesity, bilateral knee pain and elevated cholesterol.  She has lost almost 50 pounds since May of last year.  She has done this through diet and exercise.  She is still following up with orthopedic surgery related to bilateral knee pain.  Her left upper quadrant pain has significantly improved if she takes Carafate, Prilosec and Bentyl as prescribed by GI.  She has upcoming scheduled follow-up with GI.  Patient reports having some anxiety but denies suicidal homicidal ideations.  She has many family members with numerous medical issues which is adding significant stress.       The following portions of the patient's history were reviewed and updated as appropriate: allergies, current medications, past family history, past medical history, past social history, past surgical history and problem list.    Review of Systems   Constitutional: Negative for activity change, appetite change, fatigue, unexpected weight gain and unexpected weight loss.   HENT: Negative for congestion, sore throat, trouble swallowing and voice change.    Eyes: Negative.    Respiratory: Negative for cough, chest tightness, shortness of breath and wheezing.    Cardiovascular: Negative for chest pain, palpitations and leg swelling.   Gastrointestinal: Negative for abdominal pain (LUQ resolved with medication), constipation, diarrhea, nausea and vomiting.   Endocrine: Negative.  Negative for cold intolerance, heat intolerance, polydipsia, polyphagia and polyuria.   Genitourinary: Negative for dysuria.   Musculoskeletal: Positive for arthralgias (bilateral knees). Negative for myalgias.   Skin: Negative for rash.   Allergic/Immunologic: Negative.     Neurological: Negative.    Hematological: Negative.    Psychiatric/Behavioral: Negative.  Positive for stress (numerous medical issues with  and daughter). Negative for self-injury, suicidal ideas and depressed mood. The patient is not nervous/anxious.        Objective   Physical Exam   Constitutional: She is oriented to person, place, and time. She appears well-developed and well-nourished. No distress.   HENT:   Head: Normocephalic and atraumatic.   Eyes: Conjunctivae are normal.   Neck: Normal range of motion.   Cardiovascular: Normal rate, regular rhythm, normal heart sounds and intact distal pulses. Exam reveals no gallop and no friction rub.   No murmur heard.  Pulmonary/Chest: Effort normal and breath sounds normal. No respiratory distress. She has no wheezes. She has no rales.   Abdominal: Soft. Bowel sounds are normal. She exhibits no distension and no mass. There is no tenderness. There is no rebound and no guarding. No hernia.   Musculoskeletal: Normal range of motion. She exhibits no edema.        Right knee: Tenderness found.        Left knee: Tenderness found.   Neurological: She is alert and oriented to person, place, and time.   Skin: Skin is warm and dry. No rash noted. She is not diaphoretic. No erythema. No pallor.   Psychiatric: Her speech is normal and behavior is normal. Judgment and thought content normal. Her mood appears anxious. Her affect is not angry, not blunt, not labile and not inappropriate. She is not actively hallucinating. Cognition and memory are normal. She does not exhibit a depressed mood. She is attentive.   Nursing note and vitals reviewed.        Assessment/Plan   Letty was seen today for follow-up.    Diagnoses and all orders for this visit:    Chronic pain of both knees    LUQ pain    Class 3 severe obesity due to excess calories without serious comorbidity with body mass index (BMI) of 40.0 to 44.9 in adult (CMS/[facility])    Elevated LDL cholesterol  level    Anxiety    1.  Chronic pain of bilateral knees-stable.  Continue follow-up with orthopedic surgery.    2.  Left upper quadrant pain-improving.  Continue medication as prescribed and continue follow-up with GI.    3.  Class III severe obesity due to excess calories without serious comorbidity with a BMI of 41.5- continue diet and exercise.  Congratulated patient on weight loss thus far.    4.  Elevated LDL cholesterol- continue diet and exercise.  We will recheck cholesterol in next follow-up.    5.  Anxiety-patient states anxiety is moderately controlled.  She wishes to continue to pursue diet and exercise as a form of treatment as opposed to medicine or counseling.  She denies suicidal homicidal ideations.  We will continue to monitor.    6.  Follow-up in 6 months or sooner if needed.            This document has been electronically signed by MEL Houser on February 24, 2020 11:32 AM

## 2020-03-12 ENCOUNTER — OFFICE VISIT (OUTPATIENT)
Dept: ORTHOPEDIC SURGERY | Facility: CLINIC | Age: 50
End: 2020-03-12

## 2020-03-12 VITALS — HEIGHT: 65 IN | BODY MASS INDEX: 41.32 KG/M2 | WEIGHT: 248 LBS

## 2020-03-12 DIAGNOSIS — M23.92 INTERNAL DERANGEMENT OF LEFT KNEE: Primary | ICD-10-CM

## 2020-03-12 DIAGNOSIS — M25.562 LEFT KNEE PAIN, UNSPECIFIED CHRONICITY: ICD-10-CM

## 2020-03-12 PROCEDURE — 20610 DRAIN/INJ JOINT/BURSA W/O US: CPT | Performed by: ORTHOPAEDIC SURGERY

## 2020-03-12 RX ADMIN — TRIAMCINOLONE ACETONIDE 40 MG: 40 INJECTION, SUSPENSION INTRA-ARTICULAR; INTRAMUSCULAR at 10:55

## 2020-03-12 RX ADMIN — LIDOCAINE HYDROCHLORIDE 2 ML: 10 INJECTION, SOLUTION EPIDURAL; INFILTRATION; INTRACAUDAL; PERINEURAL at 10:55

## 2020-03-12 NOTE — PROGRESS NOTES
"Letty Bach is a 50 y.o. female returns for     Chief Complaint   Patient presents with   • Left Knee - Follow-up       HISTORY OF PRESENT ILLNESS: Follow up on left knee. Patient is wanting injection. Pain level of 1/10.     CONCURRENT MEDICAL HISTORY:    The following portions of the patient's history were reviewed and updated as appropriate: allergies, current medications, past family history, past medical history, past social history, past surgical history and problem list.     ROS  No fevers or chills.  No chest pain or shortness of air.  No GI or  disturbances.    PHYSICAL EXAMINATION:       Ht 165.1 cm (65\")   Wt 112 kg (248 lb)   BMI 41.27 kg/m²     Physical Exam   Constitutional: She is oriented to person, place, and time. She appears well-developed and well-nourished.   Neurological: She is alert and oriented to person, place, and time.   Psychiatric: She has a normal mood and affect. Her behavior is normal. Judgment and thought content normal.         GAIT:     [x]  Normal  []  Antalgic    Assistive device: [x]  None  []  Walker     []  Crutches  []  Cane     []  Wheelchair  []  Stretcher                    ASSESSMENT:    Diagnoses and all orders for this visit:    Internal derangement of left knee  -     Large Joint Arthrocentesis: L knee    Left knee pain, unspecified chronicity  -     Large Joint Arthrocentesis: L knee          PLAN    Inject left knee  Slowly progress as tolerated  Activity as pain allows      Large Joint Arthrocentesis: L knee  Date/Time: 3/12/2020 10:55 AM  Consent given by: patient  Site marked: site marked  Timeout: Immediately prior to procedure a time out was called to verify the correct patient, procedure, equipment, support staff and site/side marked as required   Supporting Documentation  Indications: pain   Procedure Details  Location: knee - L knee  Preparation: Patient was prepped and draped in the usual sterile fashion  Needle size: 22 G  Approach: " anteromedial  Medications administered: 40 mg triamcinolone acetonide 40 MG/ML; 2 mL lidocaine PF 1% 1 %  Patient tolerance: patient tolerated the procedure well with no immediate complications          Patient's Body mass index is 41.27 kg/m². BMI is above normal parameters. Recommendations include: exercise counseling and nutrition counseling.    Return if symptoms worsen or fail to improve, for recheck.    Jose Alberto Amos MD

## 2020-03-15 RX ORDER — TRIAMCINOLONE ACETONIDE 40 MG/ML
40 INJECTION, SUSPENSION INTRA-ARTICULAR; INTRAMUSCULAR
Status: COMPLETED | OUTPATIENT
Start: 2020-03-12 | End: 2020-03-12

## 2020-03-15 RX ORDER — LIDOCAINE HYDROCHLORIDE 10 MG/ML
2 INJECTION, SOLUTION EPIDURAL; INFILTRATION; INTRACAUDAL; PERINEURAL
Status: COMPLETED | OUTPATIENT
Start: 2020-03-12 | End: 2020-03-12

## 2020-04-21 ENCOUNTER — OFFICE VISIT (OUTPATIENT)
Dept: GASTROENTEROLOGY | Facility: CLINIC | Age: 50
End: 2020-04-21

## 2020-04-21 VITALS
WEIGHT: 237.6 LBS | HEIGHT: 65 IN | DIASTOLIC BLOOD PRESSURE: 84 MMHG | HEART RATE: 72 BPM | BODY MASS INDEX: 39.58 KG/M2 | SYSTOLIC BLOOD PRESSURE: 124 MMHG

## 2020-04-21 DIAGNOSIS — E66.01 MORBID OBESITY WITH BMI OF 45.0-49.9, ADULT (HCC): Primary | ICD-10-CM

## 2020-04-21 DIAGNOSIS — R10.12 LUQ PAIN: ICD-10-CM

## 2020-04-21 PROCEDURE — 99212 OFFICE O/P EST SF 10 MIN: CPT | Performed by: INTERNAL MEDICINE

## 2020-04-21 NOTE — PROGRESS NOTES
Chief Complaint   Patient presents with   • 3 month follow-up       Subjective    Letty Bach is a 50 y.o. female.    History of Present Illness    Patient presented to GI clinic for follow-up visit today.  She feels well currently.  No further abdominal pain.  Bloating is well controlled with medications.  Lost 17 pounds weight since last visit on diet and exercise regimen.     The following portions of the patient's history were reviewed and updated as appropriate:   Past Medical History:   Diagnosis Date   • GERD (gastroesophageal reflux disease)    • Urinary tract infection      Past Surgical History:   Procedure Laterality Date   • CHOLECYSTECTOMY  06/06/2019   • CHOLECYSTECTOMY WITH INTRAOPERATIVE CHOLANGIOGRAM N/A 6/6/2019    Procedure: CHOLECYSTECTOMY LAPAROSCOPIC INTRAOPERATIVE CHOLANGIOGRAM      (c-arm#2);  Surgeon: Felipe Overton MD;  Location: [facility] OR;  Service: General   • ENDOSCOPY N/A 11/14/2019    Procedure: ESOPHAGOGASTRODUODENOSCOPY;  Surgeon: Joao Bruce MD;  Location: [facility] ENDOSCOPY;  Service: Gastroenterology   • HYSTERECTOMY  2007     Family History   Problem Relation Age of Onset   • Breast cancer Mother    • Thyroid disease Mother    • Hypertension Mother    • Hypertension Father    • Diabetes Father    • Kidney disease Father    • Heart disease Father    • No Known Problems Sister    • No Known Problems Brother    • Seizures Daughter    • Hypertension Daughter    • COPD Maternal Grandmother    • Asthma Maternal Grandmother    • No Known Problems Sister    • ADD / ADHD Daughter    • Polymyositis Daughter      OB History    None       Prior to Admission medications    Medication Sig Start Date End Date Taking? Authorizing Provider   dicyclomine (BENTYL) 20 MG tablet  1/7/20  Yes ProviderDarryn MD   omeprazole (PrilOSEC) 40 MG capsule Take 1 capsule by mouth Daily. 10/30/19  Yes Joao Bruce MD   sucralfate (CARAFATE) 1 g tablet Take 1 tablet by mouth 4 (Four)  "Times a Day. 11/14/19  Yes Joao Bruce MD     No Known Allergies  Social History     Socioeconomic History   • Marital status:      Spouse name: Not on file   • Number of children: Not on file   • Years of education: Not on file   • Highest education level: Not on file   Tobacco Use   • Smoking status: Former Smoker     Packs/day: 0.50     Years: 5.00     Pack years: 2.50     Types: Cigarettes   • Smokeless tobacco: Never Used   Substance and Sexual Activity   • Alcohol use: No     Frequency: Never   • Drug use: No   • Sexual activity: Defer       Review of Systems  Review of Systems   Constitutional: Positive for unexpected weight change. Negative for chills, fatigue and fever.   HENT: Negative for congestion, ear discharge, hearing loss, nosebleeds and sore throat.    Eyes: Negative for pain, discharge and redness.   Respiratory: Negative for cough, chest tightness, shortness of breath and wheezing.    Cardiovascular: Negative for chest pain and palpitations.   Gastrointestinal: Positive for abdominal pain, nausea and vomiting. Negative for abdominal distention, blood in stool, constipation and diarrhea.   Endocrine: Negative for cold intolerance, polydipsia, polyphagia and polyuria.   Genitourinary: Negative for dysuria, flank pain, frequency, hematuria and urgency.   Musculoskeletal: Negative for arthralgias, back pain, joint swelling and myalgias.   Skin: Negative for color change, pallor and rash.   Neurological: Negative for tremors, seizures, syncope, weakness and headaches.   Hematological: Negative for adenopathy. Does not bruise/bleed easily.   Psychiatric/Behavioral: Negative for behavioral problems, confusion, dysphoric mood, hallucinations and suicidal ideas. The patient is not nervous/anxious.         /84 (BP Location: Left arm, Patient Position: Sitting)   Pulse 72   Ht 165.1 cm (65\")   Wt 108 kg (237 lb 9.6 oz)   BMI 39.54 kg/m²     Objective    Physical Exam "   Constitutional: She is oriented to person, place, and time. She appears well-developed and well-nourished.   HENT:   Head: Normocephalic and atraumatic.   Mouth/Throat: Oropharynx is clear and moist.   Eyes: Pupils are equal, round, and reactive to light. Conjunctivae and EOM are normal.   Neck: Normal range of motion. Neck supple. No thyromegaly present.   Cardiovascular: Normal rate, regular rhythm and normal heart sounds.   No murmur heard.  Pulmonary/Chest: Effort normal and breath sounds normal. She has no wheezes.   Abdominal: Soft. Bowel sounds are normal. She exhibits no distension and no mass. There is no tenderness. No hernia.   Genitourinary:   Genitourinary Comments: No lesions noted   Musculoskeletal: Normal range of motion. She exhibits no edema or tenderness.   Lymphadenopathy:     She has no cervical adenopathy.   Neurological: She is alert and oriented to person, place, and time. No cranial nerve deficit.   Skin: Skin is warm and dry. No rash noted.   Psychiatric: She has a normal mood and affect. Thought content normal.     Admission on 11/14/2019, Discharged on 11/14/2019   Component Date Value Ref Range Status   • Case Report 11/14/2019    Final                    Value:Surgical Pathology Report                         Case: [id number]                                  Authorizing Provider:  Joao Bruce MD      Collected:           11/14/2019 01:54 PM          Ordering Location:     [facility]             Received:            11/15/2019 07:15 AM                                 [facility]                                                     Pathologist:           Adal Becker MD                                                         Specimen:    Gastric, Antrum                                                                           • Final Diagnosis 11/14/2019    Final                    Value:This result contains rich text formatting which cannot be displayed  "here.   • Gross Description 11/14/2019    Final                    Value:This result contains rich text formatting which cannot be displayed here.     Assessment/Plan    No diagnosis found..   1. Left upper quadrant pain and bloating, resolved.  2.  Obesity, continue exercise and diet control.    Orders placed during this encounter include:  No orders of the defined types were placed in this encounter.      * Surgery not found *    Review and/or summary of lab tests, radiology, procedures, medications. Review and summary of old records and obtaining of history. The risks and benefits of my recommendations, as well as other treatment options were discussed with the patient today. Questions were answered.    No orders of the defined types were placed in this encounter.      Follow-up: No follow-ups on file.               Results for orders placed or performed during the hospital encounter of 11/14/19   Tissue Pathology Exam   Result Value Ref Range    Case Report       Surgical Pathology Report                         Case: [id number]                                  Authorizing Provider:  Joao Bruce MD      Collected:           11/14/2019 01:54 PM          Ordering Location:     [facility]             Received:            11/15/2019 07:15 AM                                 [place] [facility]                                                     Pathologist:           Adal Becker MD                                                         Specimen:    Gastric, Antrum                                                                            Final Diagnosis       MUCOSA, ANTRUM OF STOMACH:  REACTIVE GASTROPATHY.      Gross Description       The container is labeled \"antrum of stomach\" and has a nodular fragment of white soft tissue measuring 0.4 cm in greatest dimension.  It is entirely embedded as 1A.     Results for orders placed or performed in visit on 10/02/19   CBC Auto Differential   Result " Value Ref Range    WBC 5.47 3.40 - 10.80 10*3/mm3    RBC 4.90 3.77 - 5.28 10*6/mm3    Hemoglobin 15.0 12.0 - 15.9 g/dL    Hematocrit 45.0 34.0 - 46.6 %    MCV 91.8 79.0 - 97.0 fL    MCH 30.6 26.6 - 33.0 pg    MCHC 33.3 31.5 - 35.7 g/dL    RDW 13.1 12.3 - 15.4 %    RDW-SD 43.8 37.0 - 54.0 fl    MPV 11.3 6.0 - 12.0 fL    Platelets 275 140 - 450 10*3/mm3    Neutrophil % 56.4 42.7 - 76.0 %    Lymphocyte % 34.2 19.6 - 45.3 %    Monocyte % 7.1 5.0 - 12.0 %    Eosinophil % 1.6 0.3 - 6.2 %    Basophil % 0.5 0.0 - 1.5 %    Immature Grans % 0.2 0.0 - 0.5 %    Neutrophils, Absolute 3.08 1.70 - 7.00 10*3/mm3    Lymphocytes, Absolute 1.87 0.70 - 3.10 10*3/mm3    Monocytes, Absolute 0.39 0.10 - 0.90 10*3/mm3    Eosinophils, Absolute 0.09 0.00 - 0.40 10*3/mm3    Basophils, Absolute 0.03 0.00 - 0.20 10*3/mm3    Immature Grans, Absolute 0.01 0.00 - 0.05 10*3/mm3    nRBC 0.0 0.0 - 0.2 /100 WBC   Iron Profile   Result Value Ref Range    Iron 115 37 - 145 mcg/dL    Iron Saturation 33 20 - 50 %    Transferrin 234 200 - 360 mg/dL    TIBC 349 298 - 536 mcg/dL   Vitamin D 25 Hydroxy   Result Value Ref Range    25 Hydroxy, Vitamin D 35.2 30.0 - 100.0 ng/ml   Ferritin   Result Value Ref Range    Ferritin 65.60 13.00 - 150.00 ng/mL   Vitamin B12   Result Value Ref Range    Vitamin B-12 516 211 - 946 pg/mL   Results for orders placed or performed in visit on 08/22/19   Lipid Panel   Result Value Ref Range    Total Cholesterol 144 0 - 200 mg/dL    Triglycerides 81 0 - 150 mg/dL    HDL Cholesterol 45 40 - 60 mg/dL    LDL Cholesterol  83 0 - 100 mg/dL    VLDL Cholesterol 16.2 5 - 40 mg/dL    LDL/HDL Ratio 1.84    Results for orders placed or performed during the hospital encounter of 06/06/19   Tissue Pathology Exam   Result Value Ref Range    Case Report       Surgical Pathology Report                         Case: [id number]                                  Authorizing Provider:  Felipe Overton MD      Collected:           06/06/2019 09:13  AM          Ordering Location:     [facility]             Received:            06/06/2019 10:44 AM                                 [facility]                                                              Pathologist:           Artur Faustin MD                                                           Specimen:    Gallbladder, gallbladder and contents                                                      Final Diagnosis       GALLBLADDER:  CHRONIC CHOLECYSTITIS.  CHOLELITHIASIS.      Gross Description       The gallbladder measures 8.0 cm in length and 4.0 cm in maximum diameter.  The serosal surface is invested in adipose tissue.  Impacted in the fundus of the gallbladder is a greenish brown stone measuring 4.0 x 3.0 x 2.2 cm.  The gallbladder mucosa is reddish tan, and the wall measures up to 0.3 cm in thickness.  Sections of gallbladder are submitted in one block.  A small fragment of tan coagulum is also present in the container and is included in the same block.     Results for orders placed or performed in visit on 05/22/19   CBC Auto Differential   Result Value Ref Range    WBC 6.10 3.40 - 10.80 10*3/mm3    RBC 4.79 3.77 - 5.28 10*6/mm3    Hemoglobin 14.1 12.0 - 15.9 g/dL    Hematocrit 44.1 34.0 - 46.6 %    MCV 92.1 79.0 - 97.0 fL    MCH 29.4 26.6 - 33.0 pg    MCHC 32.0 31.5 - 35.7 g/dL    RDW 12.8 12.3 - 15.4 %    RDW-SD 43.5 37.0 - 54.0 fl    MPV 10.3 6.0 - 12.0 fL    Platelets 290 140 - 450 10*3/mm3    Neutrophil % 51.2 42.7 - 76.0 %    Lymphocyte % 38.7 19.6 - 45.3 %    Monocyte % 7.0 5.0 - 12.0 %    Eosinophil % 2.1 0.3 - 6.2 %    Basophil % 0.7 0.0 - 1.5 %    Immature Grans % 0.3 0.0 - 0.5 %    Neutrophils, Absolute 3.12 1.70 - 7.00 10*3/mm3    Lymphocytes, Absolute 2.36 0.70 - 3.10 10*3/mm3    Monocytes, Absolute 0.43 0.10 - 0.90 10*3/mm3    Eosinophils, Absolute 0.13 0.00 - 0.40 10*3/mm3    Basophils, Absolute 0.04 0.00 - 0.20 10*3/mm3    Immature Grans, Absolute 0.02 0.00 - 0.05 10*3/mm3     nRBC 0.0 0.0 - 0.2 /100 WBC   TSH   Result Value Ref Range    TSH 1.730 0.270 - 4.200 mIU/mL   Hemoglobin A1c   Result Value Ref Range    Hemoglobin A1C 5.20 4.80 - 5.60 %   Lipid Panel   Result Value Ref Range    Total Cholesterol 186 0 - 200 mg/dL    Triglycerides 134 0 - 150 mg/dL    HDL Cholesterol 48 40 - 60 mg/dL    LDL Cholesterol  111 (H) 0 - 100 mg/dL    VLDL Cholesterol 26.8 mg/dL    LDL/HDL Ratio 2.32    Comprehensive Metabolic Panel   Result Value Ref Range    Glucose 88 65 - 99 mg/dL    BUN 10 6 - 20 mg/dL    Creatinine 0.93 0.57 - 1.00 mg/dL    Sodium 142 136 - 145 mmol/L    Potassium 4.2 3.5 - 5.2 mmol/L    Chloride 104 98 - 107 mmol/L    CO2 26.0 22.0 - 29.0 mmol/L    Calcium 9.1 8.6 - 10.5 mg/dL    Total Protein 7.6 6.0 - 8.5 g/dL    Albumin 4.00 3.50 - 5.20 g/dL    ALT (SGPT) 27 1 - 33 U/L    AST (SGOT) 23 1 - 32 U/L    Alkaline Phosphatase 105 39 - 117 U/L    Total Bilirubin 0.4 0.2 - 1.2 mg/dL    eGFR Non African Amer 64 >60 mL/min/1.73    Globulin 3.6 gm/dL    A/G Ratio 1.1 g/dL    BUN/Creatinine Ratio 10.8 7.0 - 25.0    Anion Gap 12.0 mmol/L   Results for orders placed or performed in visit on 11/08/07   Converted Surgical Pathology   Result Value Ref Range    Spec Descr 1 SPECIMEN(S): A OVARY & FALLOPIAN TUBE, BILATERAL     Specimen description 2 SPECIMEN(S): B UTERUS     Clinical Information   CLINICAL HISTORY:  None Given       Gross Description         GROSS DESCRIPTION:  The first specimen consists of 2 ovaries with their corresponding  fallopian tubes.  One ovary is larger weighing 30 grams and measuring  5.0 x 4.0 x 4.0 cm.  Its cortex is shiny with underlying engorged  vessels.  Upon further examination several subcapsular cystic structures  of different sizes are noted measuring from 1.0-2.0 cm in greatest  diameter.  These cysts have slimy colorless fluid and their internal  linings are smooth and free of any papillary excrescences.  The  remainder of the ovarian parenchyma shows mild  edema and devoid of any  other significant lesions.  Firmly attached to it is its corresponding  fallopian tube with fimbria measuring 5.0 x 1.0 cm.  A few paratubal  cysts are noted distally measuring up to 0.5 cm in greatest dimension  containing colorless fluid.  The smaller ovary is normal in size and  shape measuring 2.5 x 2.0 x 2.0 cm.  Its cortex is cerebriform and on  serial sectioning the ovarian parenchyma is of the usual appearance an d  no significant mass is noted.  Firmly attached to it is its  corresponding fallopian tube with intact fimbria measuring 5.0 x 0.8 cm.  Its external surface as well as cut surface is grossly unremarkable.  Representative sections are embedded as A.  The second specimen consists of a uterus weighing 115 grams and  measuring 9.0 x 6.0 x 5.0 cm.  The corpus uteri is symmetrical and on  serial sectioning no myometrial lesions are identified.  The myometrium  is 2.0 cm in thickness and the endometrial cavity is lined by a mildly  gray endometrium which is 0.3 cm in thickness.  The endocervical canal  is lined by a mucous coated epithelium but the exocervix appears clean.  Representative sections are embedded as B.      Frozen Diagnosis         FROZEN SECTION DIAGNOSIS:  A.  Big ovary with follicular cyst.      Final Diagnosis         FINAL DIAGNOSIS:  A.  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  B.  UTERUS:            PROLIFERATIVE ENDOMETRIUM.             SUPERFICIAL ADENOMYOSIS.        CERVIX:             CHRONIC CERVICITIS.    DIAGNOSIS CODE:  8      Comment         CLINICAL DIAGNOSIS:  Ovary & fallopian tube, bilateral, uterus      CONVERTED (HISTORICAL) FINAL PATHOLOGIST       Diagnostician:  AMANDA WILLIAM M.D.  Pathologist  Electronically Signed 11/14/2007       *Note: Due to a large number of results and/or encounters for the requested time period, some results have not been displayed. A complete set of results can be  found in Results Review.         This document has been electronically signed by Joao Bruce MD on April 21, 2020 11:41

## 2020-04-21 NOTE — PATIENT INSTRUCTIONS

## 2020-08-24 ENCOUNTER — LAB (OUTPATIENT)
Dept: LAB | Facility: HOSPITAL | Age: 50
End: 2020-08-24

## 2020-08-24 ENCOUNTER — OFFICE VISIT (OUTPATIENT)
Dept: FAMILY MEDICINE CLINIC | Facility: CLINIC | Age: 50
End: 2020-08-24

## 2020-08-24 VITALS
RESPIRATION RATE: 20 BRPM | BODY MASS INDEX: 37.14 KG/M2 | DIASTOLIC BLOOD PRESSURE: 90 MMHG | OXYGEN SATURATION: 100 % | HEART RATE: 65 BPM | SYSTOLIC BLOOD PRESSURE: 124 MMHG | HEIGHT: 65 IN | WEIGHT: 222.9 LBS

## 2020-08-24 DIAGNOSIS — M25.561 CHRONIC PAIN OF BOTH KNEES: ICD-10-CM

## 2020-08-24 DIAGNOSIS — Z11.59 NEED FOR HEPATITIS C SCREENING TEST: ICD-10-CM

## 2020-08-24 DIAGNOSIS — E66.09 CLASS 2 OBESITY DUE TO EXCESS CALORIES WITHOUT SERIOUS COMORBIDITY WITH BODY MASS INDEX (BMI) OF 37.0 TO 37.9 IN ADULT: ICD-10-CM

## 2020-08-24 DIAGNOSIS — G89.29 CHRONIC PAIN OF BOTH KNEES: ICD-10-CM

## 2020-08-24 DIAGNOSIS — K21.9 GASTROESOPHAGEAL REFLUX DISEASE WITHOUT ESOPHAGITIS: ICD-10-CM

## 2020-08-24 DIAGNOSIS — M25.562 CHRONIC PAIN OF BOTH KNEES: ICD-10-CM

## 2020-08-24 DIAGNOSIS — Z00.00 ANNUAL PHYSICAL EXAM: Primary | ICD-10-CM

## 2020-08-24 DIAGNOSIS — Z00.00 ANNUAL PHYSICAL EXAM: ICD-10-CM

## 2020-08-24 LAB
ALBUMIN SERPL-MCNC: 4 G/DL (ref 3.5–5.2)
ALBUMIN/GLOB SERPL: 1.3 G/DL
ALP SERPL-CCNC: 123 U/L (ref 39–117)
ALT SERPL W P-5'-P-CCNC: 22 U/L (ref 1–33)
ANION GAP SERPL CALCULATED.3IONS-SCNC: 10.6 MMOL/L (ref 5–15)
AST SERPL-CCNC: 24 U/L (ref 1–32)
BASOPHILS # BLD AUTO: 0.04 10*3/MM3 (ref 0–0.2)
BASOPHILS NFR BLD AUTO: 0.8 % (ref 0–1.5)
BILIRUB SERPL-MCNC: 0.5 MG/DL (ref 0–1.2)
BUN SERPL-MCNC: 10 MG/DL (ref 6–20)
BUN/CREAT SERPL: 12.7 (ref 7–25)
CALCIUM SPEC-SCNC: 9.6 MG/DL (ref 8.6–10.5)
CHLORIDE SERPL-SCNC: 103 MMOL/L (ref 98–107)
CHOLEST SERPL-MCNC: 179 MG/DL (ref 0–200)
CO2 SERPL-SCNC: 24.4 MMOL/L (ref 22–29)
CREAT SERPL-MCNC: 0.79 MG/DL (ref 0.57–1)
DEPRECATED RDW RBC AUTO: 38 FL (ref 37–54)
EOSINOPHIL # BLD AUTO: 0.09 10*3/MM3 (ref 0–0.4)
EOSINOPHIL NFR BLD AUTO: 1.7 % (ref 0.3–6.2)
ERYTHROCYTE [DISTWIDTH] IN BLOOD BY AUTOMATED COUNT: 11.8 % (ref 12.3–15.4)
GFR SERPL CREATININE-BSD FRML MDRD: 77 ML/MIN/1.73
GLOBULIN UR ELPH-MCNC: 3 GM/DL
GLUCOSE SERPL-MCNC: 83 MG/DL (ref 65–99)
HBA1C MFR BLD: 5 % (ref 4.8–5.6)
HCT VFR BLD AUTO: 40.4 % (ref 34–46.6)
HDLC SERPL-MCNC: 61 MG/DL (ref 40–60)
HGB BLD-MCNC: 13.9 G/DL (ref 12–15.9)
IMM GRANULOCYTES # BLD AUTO: 0.01 10*3/MM3 (ref 0–0.05)
IMM GRANULOCYTES NFR BLD AUTO: 0.2 % (ref 0–0.5)
LDLC SERPL CALC-MCNC: 102 MG/DL (ref 0–100)
LDLC/HDLC SERPL: 1.68 {RATIO}
LYMPHOCYTES # BLD AUTO: 1.77 10*3/MM3 (ref 0.7–3.1)
LYMPHOCYTES NFR BLD AUTO: 33.2 % (ref 19.6–45.3)
MCH RBC QN AUTO: 31 PG (ref 26.6–33)
MCHC RBC AUTO-ENTMCNC: 34.4 G/DL (ref 31.5–35.7)
MCV RBC AUTO: 90 FL (ref 79–97)
MONOCYTES # BLD AUTO: 0.31 10*3/MM3 (ref 0.1–0.9)
MONOCYTES NFR BLD AUTO: 5.8 % (ref 5–12)
NEUTROPHILS NFR BLD AUTO: 3.11 10*3/MM3 (ref 1.7–7)
NEUTROPHILS NFR BLD AUTO: 58.3 % (ref 42.7–76)
NRBC BLD AUTO-RTO: 0.2 /100 WBC (ref 0–0.2)
PLATELET # BLD AUTO: 288 10*3/MM3 (ref 140–450)
PMV BLD AUTO: 11.1 FL (ref 6–12)
POTASSIUM SERPL-SCNC: 4.3 MMOL/L (ref 3.5–5.2)
PROT SERPL-MCNC: 7 G/DL (ref 6–8.5)
RBC # BLD AUTO: 4.49 10*6/MM3 (ref 3.77–5.28)
SODIUM SERPL-SCNC: 138 MMOL/L (ref 136–145)
T4 FREE SERPL-MCNC: 1.37 NG/DL (ref 0.93–1.7)
TRIGL SERPL-MCNC: 79 MG/DL (ref 0–150)
TSH SERPL DL<=0.05 MIU/L-ACNC: 1.08 UIU/ML (ref 0.27–4.2)
VLDLC SERPL-MCNC: 15.8 MG/DL (ref 5–40)
WBC # BLD AUTO: 5.33 10*3/MM3 (ref 3.4–10.8)

## 2020-08-24 PROCEDURE — 99214 OFFICE O/P EST MOD 30 MIN: CPT | Performed by: NURSE PRACTITIONER

## 2020-08-24 PROCEDURE — 84443 ASSAY THYROID STIM HORMONE: CPT

## 2020-08-24 PROCEDURE — 84439 ASSAY OF FREE THYROXINE: CPT

## 2020-08-24 PROCEDURE — 80061 LIPID PANEL: CPT

## 2020-08-24 PROCEDURE — 85025 COMPLETE CBC W/AUTO DIFF WBC: CPT

## 2020-08-24 PROCEDURE — 83036 HEMOGLOBIN GLYCOSYLATED A1C: CPT

## 2020-08-24 PROCEDURE — 80053 COMPREHEN METABOLIC PANEL: CPT

## 2020-08-24 PROCEDURE — 36415 COLL VENOUS BLD VENIPUNCTURE: CPT

## 2020-08-24 NOTE — PROGRESS NOTES
Subjective   Letty Bach is a 50 y.o. female.     Ms. Bach is a 50-year-old female who presents today for annual follow-up for obesity, bilateral knee pain and GERD.  Blood pressure today is 124/90, heart rate 65.  She denies chest pain, shortness of breath, palpitations or edema.  Patient continues to lose weight through diet and exercise.  Her current weight is 222 pounds with a BMI of 37.1.  She reports over the last 3 months her weight has remained stable and she has not lost any weight.  During this time she is continued to diet and exercise.  GERD symptoms well controlled and she reports taking Prilosec only as needed.  Patient continues to have bilateral knee pain.  She follows with orthopedic surgery for the management of this.       The following portions of the patient's history were reviewed and updated as appropriate: allergies, current medications, past family history, past medical history, past social history, past surgical history and problem list.    Review of Systems   Constitutional: Negative for activity change, appetite change, chills, diaphoresis, fatigue, fever, unexpected weight gain and unexpected weight loss.        Plateaued weight loss over the last 3 months.  Patient continues to control portion size and exercise 3-5 times a week typically for at least 30 minutes.   HENT: Negative for congestion, sore throat, trouble swallowing and voice change.    Eyes: Negative for blurred vision, double vision, photophobia, pain and visual disturbance.   Respiratory: Negative for cough, chest tightness, shortness of breath and wheezing.    Cardiovascular: Negative for chest pain, palpitations and leg swelling.   Gastrointestinal: Negative for abdominal distention, abdominal pain, anal bleeding, blood in stool, constipation, diarrhea, nausea, vomiting, GERD and indigestion.   Endocrine: Negative for cold intolerance, heat intolerance, polydipsia, polyphagia and polyuria.   Genitourinary: Negative  for dysuria, frequency, hematuria and urgency.   Musculoskeletal: Positive for arthralgias and gait problem. Negative for myalgias.        Bilateral knee pain.  Follows with orthopedic surgery.   Skin: Negative for rash.   Allergic/Immunologic: Negative.    Neurological: Negative for dizziness, syncope, weakness, light-headedness and headache.   Hematological: Negative.    Psychiatric/Behavioral: The patient is not nervous/anxious.        Objective   Physical Exam   Constitutional: She is oriented to person, place, and time. She appears well-developed and well-nourished. No distress.   HENT:   Head: Normocephalic and atraumatic.   Right Ear: External ear normal.   Left Ear: External ear normal.   Nose: Nose normal.   Mouth/Throat: Oropharynx is clear and moist.   Eyes: Pupils are equal, round, and reactive to light. Conjunctivae and EOM are normal.   Neck: Normal range of motion. Neck supple. No tracheal deviation present. No thyromegaly present.   Cardiovascular: Normal rate, regular rhythm, normal heart sounds and intact distal pulses. Exam reveals no gallop and no friction rub.   No murmur heard.  Pulmonary/Chest: Effort normal and breath sounds normal. No stridor. No respiratory distress. She has no wheezes. She has no rales.   Abdominal: Soft. Bowel sounds are normal. She exhibits no distension and no mass. There is no tenderness. There is no rebound and no guarding. No hernia.   Musculoskeletal: Normal range of motion. She exhibits no edema.        Right knee: Tenderness found.        Left knee: Tenderness found.   Lymphadenopathy:     She has no cervical adenopathy.   Neurological: She is alert and oriented to person, place, and time. No cranial nerve deficit. Coordination normal.   Skin: Skin is warm and dry. No rash noted. She is not diaphoretic. No erythema. No pallor.   Psychiatric: She has a normal mood and affect. Her behavior is normal. Judgment and thought content normal.   Nursing note and vitals  reviewed.        Assessment/Plan   Letty was seen today for follow-up.    Diagnoses and all orders for this visit:    Annual physical exam  -     CBC & Differential; Future  -     Comprehensive Metabolic Panel; Future  -     Hemoglobin A1c; Future  -     Lipid Panel; Future  -     TSH; Future  -     T4, Free; Future    Need for hepatitis C screening test  -     Hepatitis C Antibody; Future    Class 2 obesity due to excess calories without serious comorbidity with body mass index (BMI) of 37.0 to 37.9 in adult    Chronic pain of both knees    Gastroesophageal reflux disease without esophagitis    1.  Class II obesity due to excess calories without serious comorbidity with a BMI of 37.1- encouraged patient to continue portion control and routine exercise 3-5 times a week for at least 30 minutes.  Patient currently is not counting calories.  Encourage patient to download a mariza on her phone and monitor her calories.  Her goal caloric intake per day should be 1500 saw for weight loss.  Discussed the possibility of integrating medicine if calories restriction does not restart her weight loss.  We will continue to monitor.    2.  Chronic pain of both knees- stable.  Encourage follow-up with orthopedic surgery.    3.  GERD without esophagitis- controlled.  Continue medication and trigger food avoidance.  Continue follow-up with GI.    4.  Health maintenance-routine labs ordered.  Will call with results.    5.  Follow-up in 6 months or sooner for any acute needs.            This document has been electronically signed by MEL Houser on August 24, 2020 11:08

## 2020-08-25 DIAGNOSIS — R74.8 ELEVATED LIVER ENZYMES: Primary | ICD-10-CM

## 2020-08-25 NOTE — PROGRESS NOTES
1 of her liver enzymes is slightly elevated.  We will repeat in 1 month.  Slight elevation in LDL cholesterol.  Continue low-fat diet, routine exercise and weight loss efforts.  Follow-up as scheduled.

## 2020-09-03 ENCOUNTER — TELEPHONE (OUTPATIENT)
Dept: FAMILY MEDICINE CLINIC | Facility: CLINIC | Age: 50
End: 2020-09-03

## 2020-09-03 RX ORDER — AZITHROMYCIN 250 MG/1
TABLET, FILM COATED ORAL
Qty: 6 TABLET | Refills: 0 | Status: SHIPPED | OUTPATIENT
Start: 2020-09-03 | End: 2020-10-19

## 2020-09-03 NOTE — TELEPHONE ENCOUNTER
Patient was called to let her know medication was sent to pharmacy.  Follow up with PCP if symptoms do not improve. Patient verbalized understanding.      ----- Message from MEL Houser sent at 9/3/2020  7:59 AM CDT -----  Regarding: RE: medication  z pac sent to pharmacy. Follow up if symptoms do not improve.   ----- Message -----  From: Karen Duong MA  Sent: 9/2/2020  11:50 AM CDT  To: MEL Houser  Subject: medication                                       Steve,    Patient states the ear drops are not helping.  She wants to know if you will prescribe her an antibiotic.    Please advise,  Thanks so much.

## 2020-10-19 ENCOUNTER — OFFICE VISIT (OUTPATIENT)
Dept: GASTROENTEROLOGY | Facility: CLINIC | Age: 50
End: 2020-10-19

## 2020-10-19 VITALS
HEIGHT: 65 IN | BODY MASS INDEX: 37.85 KG/M2 | WEIGHT: 227.2 LBS | HEART RATE: 69 BPM | SYSTOLIC BLOOD PRESSURE: 120 MMHG | DIASTOLIC BLOOD PRESSURE: 81 MMHG

## 2020-10-19 DIAGNOSIS — R79.89 ELEVATED LFTS: Primary | ICD-10-CM

## 2020-10-19 PROCEDURE — 99213 OFFICE O/P EST LOW 20 MIN: CPT | Performed by: INTERNAL MEDICINE

## 2020-10-19 NOTE — PROGRESS NOTES
Chief Complaint   Patient presents with   • Abdominal Pain   • Follow-up     6 MONTH       Subjective    Letty Bach is a 50 y.o. female.    History of Present Illness  Patient presented to clinic for follow-up visit today.  She feels well currently.  No GI complaints at this time.  Lost 10 pounds weight on a diet.  Most recent alkaline phosphatase was mildly elevated at 123.  It was 105 last year.  She underwent cholecystectomy last year for gallstones with chronic cholecystitis.       The following portions of the patient's history were reviewed and updated as appropriate:   Past Medical History:   Diagnosis Date   • GERD (gastroesophageal reflux disease)    • Urinary tract infection      Past Surgical History:   Procedure Laterality Date   • CHOLECYSTECTOMY  06/06/2019   • CHOLECYSTECTOMY WITH INTRAOPERATIVE CHOLANGIOGRAM N/A 6/6/2019    Procedure: CHOLECYSTECTOMY LAPAROSCOPIC INTRAOPERATIVE CHOLANGIOGRAM      (c-arm#2);  Surgeon: Felipe Overton MD;  Location: [facility] OR;  Service: General   • ENDOSCOPY N/A 11/14/2019    Procedure: ESOPHAGOGASTRODUODENOSCOPY;  Surgeon: Joao Bruce MD;  Location: [facility] ENDOSCOPY;  Service: Gastroenterology   • HYSTERECTOMY  2007     Family History   Problem Relation Age of Onset   • Breast cancer Mother    • Thyroid disease Mother    • Hypertension Mother    • Hypertension Father    • Diabetes Father    • Kidney disease Father    • Heart disease Father    • No Known Problems Sister    • No Known Problems Brother    • Seizures Daughter    • Hypertension Daughter    • COPD Maternal Grandmother    • Asthma Maternal Grandmother    • No Known Problems Sister    • ADD / ADHD Daughter    • Polymyositis Daughter      OB History    No obstetric history on file.       Prior to Admission medications    Medication Sig Start Date End Date Taking? Authorizing Provider   azithromycin (ZITHROMAX) 250 MG tablet Take 2 tablets the first day, then 1 tablet daily for 4 days. 9/3/20  10/19/20  Aldair Esteves APRN   dicyclomine (BENTYL) 20 MG tablet  1/7/20 10/19/20  Provider, MD Darryn   omeprazole (PrilOSEC) 40 MG capsule Take 1 capsule by mouth Daily. 10/30/19 10/19/20  Joao Bruce MD   sucralfate (CARAFATE) 1 g tablet Take 1 tablet by mouth 4 (Four) Times a Day. 11/14/19 10/19/20  Joao Bruce MD     No Known Allergies  Social History     Socioeconomic History   • Marital status:      Spouse name: Not on file   • Number of children: Not on file   • Years of education: Not on file   • Highest education level: Not on file   Tobacco Use   • Smoking status: Former Smoker     Packs/day: 0.50     Years: 5.00     Pack years: 2.50     Types: Cigarettes   • Smokeless tobacco: Never Used   Substance and Sexual Activity   • Alcohol use: No     Frequency: Never   • Drug use: No   • Sexual activity: Defer       Review of Systems  Review of Systems   Constitutional: Positive for unexpected weight change. Negative for chills, fatigue and fever.   HENT: Negative for congestion, ear discharge, hearing loss, nosebleeds and sore throat.    Eyes: Negative for pain, discharge and redness.   Respiratory: Negative for cough, chest tightness, shortness of breath and wheezing.    Cardiovascular: Negative for chest pain and palpitations.   Gastrointestinal: Negative for abdominal distention, abdominal pain, blood in stool, constipation, diarrhea, nausea and vomiting.   Endocrine: Negative for cold intolerance, polydipsia, polyphagia and polyuria.   Genitourinary: Negative for dysuria, flank pain, frequency, hematuria and urgency.   Musculoskeletal: Negative for arthralgias, back pain, joint swelling and myalgias.   Skin: Negative for color change, pallor and rash.   Neurological: Negative for tremors, seizures, syncope, weakness and headaches.   Hematological: Negative for adenopathy. Does not bruise/bleed easily.   Psychiatric/Behavioral: Negative for behavioral problems, confusion,  "dysphoric mood, hallucinations and suicidal ideas. The patient is not nervous/anxious.         /81 (BP Location: Left arm, Patient Position: Sitting)   Pulse 69   Ht 165.1 cm (65\")   Wt 103 kg (227 lb 3.2 oz)   BMI 37.81 kg/m²     Objective    Physical Exam  Constitutional:       Appearance: She is well-developed.   HENT:      Head: Normocephalic and atraumatic.   Eyes:      Conjunctiva/sclera: Conjunctivae normal.      Pupils: Pupils are equal, round, and reactive to light.   Neck:      Musculoskeletal: Normal range of motion and neck supple.      Thyroid: No thyromegaly.   Cardiovascular:      Rate and Rhythm: Normal rate and regular rhythm.      Heart sounds: Normal heart sounds. No murmur.   Pulmonary:      Effort: Pulmonary effort is normal.      Breath sounds: Normal breath sounds. No wheezing.   Abdominal:      General: Bowel sounds are normal. There is no distension.      Palpations: Abdomen is soft. There is no mass.      Tenderness: There is no abdominal tenderness.      Hernia: No hernia is present.   Genitourinary:     Comments: No lesions noted  Musculoskeletal: Normal range of motion.         General: No tenderness.   Lymphadenopathy:      Cervical: No cervical adenopathy.   Skin:     General: Skin is warm and dry.      Findings: No rash.   Neurological:      Mental Status: She is alert and oriented to person, place, and time.      Cranial Nerves: No cranial nerve deficit.   Psychiatric:         Thought Content: Thought content normal.       Lab on 08/24/2020   Component Date Value Ref Range Status   • Glucose 08/24/2020 83  65 - 99 mg/dL Final   • BUN 08/24/2020 10  6 - 20 mg/dL Final   • Creatinine 08/24/2020 0.79  0.57 - 1.00 mg/dL Final   • Sodium 08/24/2020 138  136 - 145 mmol/L Final   • Potassium 08/24/2020 4.3  3.5 - 5.2 mmol/L Final    Specimen hemolyzed.  Results may be affected.   • Chloride 08/24/2020 103  98 - 107 mmol/L Final   • CO2 08/24/2020 24.4  22.0 - 29.0 mmol/L Final   • " Calcium 08/24/2020 9.6  8.6 - 10.5 mg/dL Final   • Total Protein 08/24/2020 7.0  6.0 - 8.5 g/dL Final   • Albumin 08/24/2020 4.00  3.50 - 5.20 g/dL Final   • ALT (SGPT) 08/24/2020 22  1 - 33 U/L Final   • AST (SGOT) 08/24/2020 24  1 - 32 U/L Final   • Alkaline Phosphatase 08/24/2020 123* 39 - 117 U/L Final   • Total Bilirubin 08/24/2020 0.5  0.0 - 1.2 mg/dL Final   • eGFR Non  Amer 08/24/2020 77  >60 mL/min/1.73 Final   • Globulin 08/24/2020 3.0  gm/dL Final   • A/G Ratio 08/24/2020 1.3  g/dL Final   • BUN/Creatinine Ratio 08/24/2020 12.7  7.0 - 25.0 Final   • Anion Gap 08/24/2020 10.6  5.0 - 15.0 mmol/L Final   • Hemoglobin A1C 08/24/2020 5.00  4.80 - 5.60 % Final   • Total Cholesterol 08/24/2020 179  0 - 200 mg/dL Final   • Triglycerides 08/24/2020 79  0 - 150 mg/dL Final   • HDL Cholesterol 08/24/2020 61* 40 - 60 mg/dL Final   • LDL Cholesterol  08/24/2020 102* 0 - 100 mg/dL Final   • VLDL Cholesterol 08/24/2020 15.8  5 - 40 mg/dL Final   • LDL/HDL Ratio 08/24/2020 1.68   Final   • TSH 08/24/2020 1.080  0.270 - 4.200 uIU/mL Final   • Free T4 08/24/2020 1.37  0.93 - 1.70 ng/dL Final   • WBC 08/24/2020 5.33  3.40 - 10.80 10*3/mm3 Final   • RBC 08/24/2020 4.49  3.77 - 5.28 10*6/mm3 Final   • Hemoglobin 08/24/2020 13.9  12.0 - 15.9 g/dL Final   • Hematocrit 08/24/2020 40.4  34.0 - 46.6 % Final   • MCV 08/24/2020 90.0  79.0 - 97.0 fL Final   • MCH 08/24/2020 31.0  26.6 - 33.0 pg Final   • MCHC 08/24/2020 34.4  31.5 - 35.7 g/dL Final   • RDW 08/24/2020 11.8* 12.3 - 15.4 % Final   • RDW-SD 08/24/2020 38.0  37.0 - 54.0 fl Final   • MPV 08/24/2020 11.1  6.0 - 12.0 fL Final   • Platelets 08/24/2020 288  140 - 450 10*3/mm3 Final   • Neutrophil % 08/24/2020 58.3  42.7 - 76.0 % Final   • Lymphocyte % 08/24/2020 33.2  19.6 - 45.3 % Final   • Monocyte % 08/24/2020 5.8  5.0 - 12.0 % Final   • Eosinophil % 08/24/2020 1.7  0.3 - 6.2 % Final   • Basophil % 08/24/2020 0.8  0.0 - 1.5 % Final   • Immature Grans % 08/24/2020 0.2   0.0 - 0.5 % Final   • Neutrophils, Absolute 08/24/2020 3.11  1.70 - 7.00 10*3/mm3 Final   • Lymphocytes, Absolute 08/24/2020 1.77  0.70 - 3.10 10*3/mm3 Final   • Monocytes, Absolute 08/24/2020 0.31  0.10 - 0.90 10*3/mm3 Final   • Eosinophils, Absolute 08/24/2020 0.09  0.00 - 0.40 10*3/mm3 Final   • Basophils, Absolute 08/24/2020 0.04  0.00 - 0.20 10*3/mm3 Final   • Immature Grans, Absolute 08/24/2020 0.01  0.00 - 0.05 10*3/mm3 Final   • nRBC 08/24/2020 0.2  0.0 - 0.2 /100 WBC Final     Assessment/Plan      1. Elevated LFTs    1.  Elevated alkaline phosphatase level likely due to fatty liver disease.  Could also be due to biliary pathology given the history of gallstones requiring cholecystectomy last year.  Obtain right upper quadrant sonogram.  Follow LFTs closely.  2.  Obesity with recent weight loss, continue exercise and diet control regimen.  3.  Left upper quadrant pain with bloating, resolved.  Continue PPI.      Orders placed during this encounter include:  Orders Placed This Encounter   Procedures   • US Liver     Order Specific Question:   Reason for Exam:     Answer:   elevated LFTs       * Surgery not found *    Review and/or summary of lab tests, radiology, procedures, medications. Review and summary of old records and obtaining of history. The risks and benefits of my recommendations, as well as other treatment options were discussed with the patient today. Questions were answered.    No orders of the defined types were placed in this encounter.      Follow-up: No follow-ups on file.               Results for orders placed or performed in visit on 08/24/20   CBC Auto Differential    Specimen: Blood   Result Value Ref Range    WBC 5.33 3.40 - 10.80 10*3/mm3    RBC 4.49 3.77 - 5.28 10*6/mm3    Hemoglobin 13.9 12.0 - 15.9 g/dL    Hematocrit 40.4 34.0 - 46.6 %    MCV 90.0 79.0 - 97.0 fL    MCH 31.0 26.6 - 33.0 pg    MCHC 34.4 31.5 - 35.7 g/dL    RDW 11.8 (L) 12.3 - 15.4 %    RDW-SD 38.0 37.0 - 54.0 fl     MPV 11.1 6.0 - 12.0 fL    Platelets 288 140 - 450 10*3/mm3    Neutrophil % 58.3 42.7 - 76.0 %    Lymphocyte % 33.2 19.6 - 45.3 %    Monocyte % 5.8 5.0 - 12.0 %    Eosinophil % 1.7 0.3 - 6.2 %    Basophil % 0.8 0.0 - 1.5 %    Immature Grans % 0.2 0.0 - 0.5 %    Neutrophils, Absolute 3.11 1.70 - 7.00 10*3/mm3    Lymphocytes, Absolute 1.77 0.70 - 3.10 10*3/mm3    Monocytes, Absolute 0.31 0.10 - 0.90 10*3/mm3    Eosinophils, Absolute 0.09 0.00 - 0.40 10*3/mm3    Basophils, Absolute 0.04 0.00 - 0.20 10*3/mm3    Immature Grans, Absolute 0.01 0.00 - 0.05 10*3/mm3    nRBC 0.2 0.0 - 0.2 /100 WBC   TSH    Specimen: Blood   Result Value Ref Range    TSH 1.080 0.270 - 4.200 uIU/mL   T4, Free    Specimen: Blood   Result Value Ref Range    Free T4 1.37 0.93 - 1.70 ng/dL   Hemoglobin A1c    Specimen: Blood   Result Value Ref Range    Hemoglobin A1C 5.00 4.80 - 5.60 %   Lipid Panel    Specimen: Blood   Result Value Ref Range    Total Cholesterol 179 0 - 200 mg/dL    Triglycerides 79 0 - 150 mg/dL    HDL Cholesterol 61 (H) 40 - 60 mg/dL    LDL Cholesterol  102 (H) 0 - 100 mg/dL    VLDL Cholesterol 15.8 5 - 40 mg/dL    LDL/HDL Ratio 1.68    Comprehensive Metabolic Panel    Specimen: Blood   Result Value Ref Range    Glucose 83 65 - 99 mg/dL    BUN 10 6 - 20 mg/dL    Creatinine 0.79 0.57 - 1.00 mg/dL    Sodium 138 136 - 145 mmol/L    Potassium 4.3 3.5 - 5.2 mmol/L    Chloride 103 98 - 107 mmol/L    CO2 24.4 22.0 - 29.0 mmol/L    Calcium 9.6 8.6 - 10.5 mg/dL    Total Protein 7.0 6.0 - 8.5 g/dL    Albumin 4.00 3.50 - 5.20 g/dL    ALT (SGPT) 22 1 - 33 U/L    AST (SGOT) 24 1 - 32 U/L    Alkaline Phosphatase 123 (H) 39 - 117 U/L    Total Bilirubin 0.5 0.0 - 1.2 mg/dL    eGFR Non African Amer 77 >60 mL/min/1.73    Globulin 3.0 gm/dL    A/G Ratio 1.3 g/dL    BUN/Creatinine Ratio 12.7 7.0 - 25.0    Anion Gap 10.6 5.0 - 15.0 mmol/L   Results for orders placed or performed during the hospital encounter of 11/14/19   Tissue Pathology Exam     "Specimen: Gastric, Antrum; Tissue   Result Value Ref Range    Case Report       Surgical Pathology Report                         Case: [id number]                                  Authorizing Provider:  Joao Bruce MD      Collected:           11/14/2019 01:54 PM          Ordering Location:     [facility]             Received:            11/15/2019 07:15 AM                                 [facility]                                                     Pathologist:           Adal Becker MD                                                         Specimen:    Gastric, Antrum                                                                            Final Diagnosis       MUCOSA, ANTRUM OF STOMACH:  REACTIVE GASTROPATHY.      Gross Description       The container is labeled \"antrum of stomach\" and has a nodular fragment of white soft tissue measuring 0.4 cm in greatest dimension.  It is entirely embedded as 1A.     Results for orders placed or performed in visit on 10/02/19   CBC Auto Differential    Specimen: Blood   Result Value Ref Range    WBC 5.47 3.40 - 10.80 10*3/mm3    RBC 4.90 3.77 - 5.28 10*6/mm3    Hemoglobin 15.0 12.0 - 15.9 g/dL    Hematocrit 45.0 34.0 - 46.6 %    MCV 91.8 79.0 - 97.0 fL    MCH 30.6 26.6 - 33.0 pg    MCHC 33.3 31.5 - 35.7 g/dL    RDW 13.1 12.3 - 15.4 %    RDW-SD 43.8 37.0 - 54.0 fl    MPV 11.3 6.0 - 12.0 fL    Platelets 275 140 - 450 10*3/mm3    Neutrophil % 56.4 42.7 - 76.0 %    Lymphocyte % 34.2 19.6 - 45.3 %    Monocyte % 7.1 5.0 - 12.0 %    Eosinophil % 1.6 0.3 - 6.2 %    Basophil % 0.5 0.0 - 1.5 %    Immature Grans % 0.2 0.0 - 0.5 %    Neutrophils, Absolute 3.08 1.70 - 7.00 10*3/mm3    Lymphocytes, Absolute 1.87 0.70 - 3.10 10*3/mm3    Monocytes, Absolute 0.39 0.10 - 0.90 10*3/mm3    Eosinophils, Absolute 0.09 0.00 - 0.40 10*3/mm3    Basophils, Absolute 0.03 0.00 - 0.20 10*3/mm3    Immature Grans, Absolute 0.01 0.00 - 0.05 10*3/mm3    nRBC 0.0 0.0 - 0.2 " /100 WBC   Iron Profile    Specimen: Blood   Result Value Ref Range    Iron 115 37 - 145 mcg/dL    Iron Saturation 33 20 - 50 %    Transferrin 234 200 - 360 mg/dL    TIBC 349 298 - 536 mcg/dL   Vitamin D 25 Hydroxy    Specimen: Blood   Result Value Ref Range    25 Hydroxy, Vitamin D 35.2 30.0 - 100.0 ng/ml   Ferritin    Specimen: Blood   Result Value Ref Range    Ferritin 65.60 13.00 - 150.00 ng/mL   Vitamin B12    Specimen: Blood   Result Value Ref Range    Vitamin B-12 516 211 - 946 pg/mL   Results for orders placed or performed in visit on 08/22/19   Lipid Panel    Specimen: Blood   Result Value Ref Range    Total Cholesterol 144 0 - 200 mg/dL    Triglycerides 81 0 - 150 mg/dL    HDL Cholesterol 45 40 - 60 mg/dL    LDL Cholesterol  83 0 - 100 mg/dL    VLDL Cholesterol 16.2 5 - 40 mg/dL    LDL/HDL Ratio 1.84    Results for orders placed or performed during the hospital encounter of 06/06/19   Tissue Pathology Exam    Specimen: Gallbladder; Tissue   Result Value Ref Range    Case Report       Surgical Pathology Report                         Case: [id number]                                  Authorizing Provider:  Felipe Overton MD      Collected:           06/06/2019 09:13 AM          Ordering Location:     [facility]             Received:            06/06/2019 10:44 AM                                 [facility]                                                              Pathologist:           Artur Faustin MD                                                           Specimen:    Gallbladder, gallbladder and contents                                                      Final Diagnosis       GALLBLADDER:  CHRONIC CHOLECYSTITIS.  CHOLELITHIASIS.      Gross Description       The gallbladder measures 8.0 cm in length and 4.0 cm in maximum diameter.  The serosal surface is invested in adipose tissue.  Impacted in the fundus of the gallbladder is a greenish brown stone measuring 4.0 x 3.0 x 2.2 cm.  The  gallbladder mucosa is reddish tan, and the wall measures up to 0.3 cm in thickness.  Sections of gallbladder are submitted in one block.  A small fragment of tan coagulum is also present in the container and is included in the same block.     Results for orders placed or performed in visit on 05/22/19   CBC Auto Differential    Specimen: Blood   Result Value Ref Range    WBC 6.10 3.40 - 10.80 10*3/mm3    RBC 4.79 3.77 - 5.28 10*6/mm3    Hemoglobin 14.1 12.0 - 15.9 g/dL    Hematocrit 44.1 34.0 - 46.6 %    MCV 92.1 79.0 - 97.0 fL    MCH 29.4 26.6 - 33.0 pg    MCHC 32.0 31.5 - 35.7 g/dL    RDW 12.8 12.3 - 15.4 %    RDW-SD 43.5 37.0 - 54.0 fl    MPV 10.3 6.0 - 12.0 fL    Platelets 290 140 - 450 10*3/mm3    Neutrophil % 51.2 42.7 - 76.0 %    Lymphocyte % 38.7 19.6 - 45.3 %    Monocyte % 7.0 5.0 - 12.0 %    Eosinophil % 2.1 0.3 - 6.2 %    Basophil % 0.7 0.0 - 1.5 %    Immature Grans % 0.3 0.0 - 0.5 %    Neutrophils, Absolute 3.12 1.70 - 7.00 10*3/mm3    Lymphocytes, Absolute 2.36 0.70 - 3.10 10*3/mm3    Monocytes, Absolute 0.43 0.10 - 0.90 10*3/mm3    Eosinophils, Absolute 0.13 0.00 - 0.40 10*3/mm3    Basophils, Absolute 0.04 0.00 - 0.20 10*3/mm3    Immature Grans, Absolute 0.02 0.00 - 0.05 10*3/mm3    nRBC 0.0 0.0 - 0.2 /100 WBC     *Note: Due to a large number of results and/or encounters for the requested time period, some results have not been displayed. A complete set of results can be found in Results Review.         This document has been electronically signed by Joao Bruce MD on October 19, 2020 12:57 CDT

## 2020-10-19 NOTE — PATIENT INSTRUCTIONS
"BMI for Adults  What is BMI?  Body mass index (BMI) is a number that is calculated from a person's weight and height. BMI can help estimate how much of a person's weight is composed of fat. BMI does not measure body fat directly. Rather, it is an alternative to procedures that directly measure body fat, which can be difficult and expensive.  BMI can help identify people who may be at higher risk for certain medical problems.  What are BMI measurements used for?  BMI is used as a screening tool to identify possible weight problems. It helps determine whether a person is obese, overweight, a healthy weight, or underweight.  BMI is useful for:  · Identifying a weight problem that may be related to a medical condition or may increase the risk for medical problems.  · Promoting changes, such as changes in diet and exercise, to help reach a healthy weight. BMI screening can be repeated to see if these changes are working.  How is BMI calculated?  BMI involves measuring your weight in relation to your height. Both height and weight are measured, and the BMI is calculated from those numbers. This can be done either in English ([place] or metric measurements. Note that charts and online BMI calculators are available to help you find your BMI quickly and easily without having to do these calculations yourself.  To calculate your BMI in English ([place] measurements:    1. Measure your weight in pounds (lb).  2. Multiply the number of pounds by 703.  ? For example, for a person who weighs 180 lb, multiply that number by 703, which equals 126,540.  3. Measure your height in inches. Then multiply that number by itself to get a measurement called \"inches squared.\"  ? For example, for a person who is 70 inches tall, the \"inches squared\" measurement is 70 inches x 70 inches, which equals 4,900 inches squared.  4. Divide the total from step 2 (number of lb x 703) by the total from step 3 (inches squared): 126,540 ÷ 4,900 = 25.8. This is " "your BMI.  To calculate your BMI in metric measurements:  1. Measure your weight in kilograms (kg).  2. Measure your height in meters (m). Then multiply that number by itself to get a measurement called \"meters squared.\"  ? For example, for a person who is 1.75 m tall, the \"meters squared\" measurement is 1.75 m x 1.75 m, which is equal to 3.1 meters squared.  3. Divide the number of kilograms (your weight) by the meters squared number. In this example: 70 ÷ 3.1 = 22.6. This is your BMI.  What do the results mean?  BMI charts are used to identify whether you are underweight, normal weight, overweight, or obese. The following guidelines will be used:  · Underweight: BMI less than 18.5.  · Normal weight: BMI between 18.5 and 24.9.  · Overweight: BMI between 25 and 29.9.  · Obese: BMI of 30 or above.  Keep these notes in mind:  · Weight includes both fat and muscle, so someone with a muscular build, such as an athlete, may have a BMI that is higher than 24.9. In cases like these, BMI is not an accurate measure of body fat.  · To determine if excess body fat is the cause of a BMI of 25 or higher, further assessments may need to be done by a health care provider.  · BMI is usually interpreted in the same way for men and women.  Where to find more information  For more information about BMI, including tools to quickly calculate your BMI, go to these websites:  · Centers for Disease Control and Prevention: www.cdc.gov  · American Heart Association: www.heart.org  · National Heart, Lung, and [employer] [place]: www.nhlbi.nih.gov  Summary  · Body mass index (BMI) is a number that is calculated from a person's weight and height.  · BMI may help estimate how much of a person's weight is composed of fat. BMI can help identify those who may be at higher risk for certain medical problems.  · BMI can be measured using English measurements or metric measurements.  · BMI charts are used to identify whether you are underweight, normal " weight, overweight, or obese.  This information is not intended to replace advice given to you by your health care provider. Make sure you discuss any questions you have with your health care provider.  Document Released: 08/29/2005 Document Revised: 09/09/2020 Document Reviewed: 07/17/2020  Elsevier Patient Education © 2020 [employer]

## 2020-12-14 ENCOUNTER — HOSPITAL ENCOUNTER (OUTPATIENT)
Dept: ULTRASOUND IMAGING | Facility: HOSPITAL | Age: 50
Discharge: HOME | End: 2020-12-14
Admitting: INTERNAL MEDICINE

## 2020-12-14 PROCEDURE — 76705 ECHO EXAM OF ABDOMEN: CPT

## 2021-01-18 ENCOUNTER — OFFICE VISIT (OUTPATIENT)
Dept: GASTROENTEROLOGY | Facility: CLINIC | Age: 51
End: 2021-01-18

## 2021-01-18 ENCOUNTER — LAB (OUTPATIENT)
Dept: LAB | Facility: HOSPITAL | Age: 51
End: 2021-01-18

## 2021-01-18 VITALS
DIASTOLIC BLOOD PRESSURE: 89 MMHG | HEIGHT: 65 IN | WEIGHT: 236.2 LBS | HEART RATE: 61 BPM | BODY MASS INDEX: 39.35 KG/M2 | SYSTOLIC BLOOD PRESSURE: 145 MMHG

## 2021-01-18 DIAGNOSIS — Z12.11 ENCOUNTER FOR SCREENING FOR MALIGNANT NEOPLASM OF COLON: Primary | ICD-10-CM

## 2021-01-18 DIAGNOSIS — R79.89 ELEVATED LFTS: ICD-10-CM

## 2021-01-18 LAB
ALBUMIN SERPL-MCNC: 4 G/DL (ref 3.5–5.2)
ALP SERPL-CCNC: 135 U/L (ref 39–117)
ALT SERPL W P-5'-P-CCNC: 16 U/L (ref 1–33)
AST SERPL-CCNC: 14 U/L (ref 1–32)
BILIRUB CONJ SERPL-MCNC: <0.2 MG/DL (ref 0–0.3)
BILIRUB INDIRECT SERPL-MCNC: ABNORMAL MG/DL
BILIRUB SERPL-MCNC: 0.3 MG/DL (ref 0–1.2)
PROT SERPL-MCNC: 7.1 G/DL (ref 6–8.5)

## 2021-01-18 PROCEDURE — 80076 HEPATIC FUNCTION PANEL: CPT | Performed by: INTERNAL MEDICINE

## 2021-01-18 PROCEDURE — 99214 OFFICE O/P EST MOD 30 MIN: CPT | Performed by: INTERNAL MEDICINE

## 2021-01-18 PROCEDURE — 36415 COLL VENOUS BLD VENIPUNCTURE: CPT | Performed by: INTERNAL MEDICINE

## 2021-01-18 RX ORDER — SODIUM, POTASSIUM,MAG SULFATES 17.5-3.13G
SOLUTION, RECONSTITUTED, ORAL ORAL
Qty: 177 ML | Refills: 0 | Status: SHIPPED | OUTPATIENT
Start: 2021-01-18 | End: 2021-02-18

## 2021-01-18 RX ORDER — DEXTROSE AND SODIUM CHLORIDE 5; .45 G/100ML; G/100ML
30 INJECTION, SOLUTION INTRAVENOUS CONTINUOUS PRN
Status: CANCELLED | OUTPATIENT
Start: 2021-02-12

## 2021-01-18 NOTE — PATIENT INSTRUCTIONS
MyPlate from USDA    MyPlate is an outline of a general healthy diet based on the 2010 Dietary Guidelines for Americans, from the [place] Department of Agriculture (USDA). It sets guidelines for how much food you should eat from each food group based on your age, sex, and level of physical activity.  What are tips for following MyPlate?  To follow MyPlate recommendations:  · Eat a wide variety of fruits and vegetables, grains, and protein foods.  · Serve smaller portions and eat less food throughout the day.  · Limit portion sizes to avoid overeating.  · Enjoy your food.  · Get at least 150 minutes of exercise every week. This is about 30 minutes each day, 5 or more days per week.  It can be difficult to have every meal look like MyPlate. Think about MyPlate as eating guidelines for an entire day, rather than each individual meal.  Fruits and vegetables  · Make half of your plate fruits and vegetables.  · Eat many different colors of fruits and vegetables each day.  · For a 2,000 calorie daily food plan, eat:  ? 2½ cups of vegetables every day.  ? 2 cups of fruit every day.  · 1 cup is equal to:  ? 1 cup raw or cooked vegetables.  ? 1 cup raw fruit.  ? 1 medium-sized orange, apple, or banana.  ? 1 cup 100% fruit or vegetable juice.  ? 2 cups raw leafy greens, such as lettuce, spinach, or kale.  ? ½ cup dried fruit.  Grains  · One fourth of your plate should be grains.  · Make at least half of the grains you eat each day whole grains.  · For a 2,000 calorie daily food plan, eat 6 oz of grains every day.  · 1 oz is equal to:  ? 1 slice bread.  ? 1 cup cereal.  ? ½ cup cooked rice, cereal, or pasta.  Protein  · One fourth of your plate should be protein.  · Eat a wide variety of protein foods, including meat, poultry, fish, eggs, beans, nuts, and tofu.  · For a 2,000 calorie daily food plan, eat 5½ oz of protein every day.  · 1 oz is equal to:  ? 1 oz meat, poultry, or fish.  ? ¼ cup cooked beans.  ? 1 egg.  ? ½ oz nuts  or seeds.  ? 1 Tbsp peanut butter.  Dairy  · Drink fat-free or low-fat (1%) milk.  · Eat or drink dairy as a side to meals.  · For a 2,000 calorie daily food plan, eat or drink 3 cups of dairy every day.  · 1 cup is equal to:  ? 1 cup milk, yogurt, cottage cheese, or soy milk (soy beverage).  ? 2 oz processed cheese.  ? 1½ oz natural cheese.  Fats, oils, salt, and sugars  · Only small amounts of oils are recommended.  · Avoid foods that are high in calories and low in nutritional value (empty calories), like foods high in fat or added sugars.  · Choose foods that are low in salt (sodium). Choose foods that have less than 140 milligrams (mg) of sodium per serving.  · Drink water instead of sugary drinks. Drink enough water each day to keep your urine pale yellow.  Where to find support  · Work with your health care provider or a nutrition specialist (dietitian) to develop a customized eating plan that is right for you.  · Download an mariza (mobile application) to help you track your daily food intake.  Where to find more information  · Go to ChooseMyPlate.gov for more information.  Summary  · MyPlate is a general guideline for healthy eating from the USDA. It is based on the 2010 Dietary Guidelines for Americans.  · In general, fruits and vegetables should take up ½ of your plate, grains should take up ¼ of your plate, and protein should take up ¼ of your plate.  This information is not intended to replace advice given to you by your health care provider. Make sure you discuss any questions you have with your health care provider.  Document Revised: 05/21/2020 Document Reviewed: 03/19/2018  Elsevier Patient Education © 2020 [employer]

## 2021-01-18 NOTE — PROGRESS NOTES
Chief Complaint   Patient presents with   • 3 Month Clinical Appointment     Elevated Liver Function Testing   • Ultrasound Of Liver Performed 12/14/2020       Subjective    Letty Bach is a 50 y.o. female.    History of Present Illness  Patient presented to clinic for follow-up visit today.  She feels better currently.  No GI complaints at this time.  Gained 9 pounds weight since last visit.  Right upper quadrant sonogram was consistent with fatty liver disease and status post cholecystectomy.  Had mild elevation of alk phos level with reminder of LFTs being normal.  Abdominal pain has resolved.  GERD is well controlled with PPI.       The following portions of the patient's history were reviewed and updated as appropriate:   Past Medical History:   Diagnosis Date   • GERD (gastroesophageal reflux disease)    • Urinary tract infection      Past Surgical History:   Procedure Laterality Date   • CHOLECYSTECTOMY  06/06/2019   • CHOLECYSTECTOMY WITH INTRAOPERATIVE CHOLANGIOGRAM N/A 6/6/2019    Procedure: CHOLECYSTECTOMY LAPAROSCOPIC INTRAOPERATIVE CHOLANGIOGRAM      (c-arm#2);  Surgeon: Felipe Overton MD;  Location: [facility] OR;  Service: General   • ENDOSCOPY N/A 11/14/2019    Procedure: ESOPHAGOGASTRODUODENOSCOPY;  Surgeon: Joao Bruce MD;  Location: [facility] ENDOSCOPY;  Service: Gastroenterology   • HYSTERECTOMY  2007   • UPPER GASTROINTESTINAL ENDOSCOPY  11/14/2019     Family History   Problem Relation Age of Onset   • Breast cancer Mother    • Thyroid disease Mother    • Hypertension Mother    • Hypertension Father    • Diabetes Father    • Kidney disease Father    • Heart disease Father    • No Known Problems Sister    • No Known Problems Brother    • Seizures Daughter    • Hypertension Daughter    • COPD Maternal Grandmother    • Asthma Maternal Grandmother    • No Known Problems Sister    • ADD / ADHD Daughter    • Polymyositis Daughter      OB History    No obstetric history on file.       Prior to  "Admission medications    Not on File     No Known Allergies  Social History     Socioeconomic History   • Marital status:      Spouse name: Not on file   • Number of children: Not on file   • Years of education: Not on file   • Highest education level: Not on file   Tobacco Use   • Smoking status: Former Smoker     Packs/day: 0.50     Years: 5.00     Pack years: 2.50     Types: Cigarettes   • Smokeless tobacco: Never Used   Substance and Sexual Activity   • Alcohol use: No     Frequency: Never   • Drug use: No   • Sexual activity: Defer       Review of Systems  Review of Systems   Constitutional: Negative for chills, fatigue, fever and unexpected weight change.   HENT: Negative for congestion, ear discharge, hearing loss, nosebleeds and sore throat.    Eyes: Negative for pain, discharge and redness.   Respiratory: Negative for cough, chest tightness, shortness of breath and wheezing.    Cardiovascular: Negative for chest pain and palpitations.   Gastrointestinal: Negative for abdominal distention, abdominal pain, blood in stool, constipation, diarrhea, nausea and vomiting.   Endocrine: Negative for cold intolerance, polydipsia, polyphagia and polyuria.   Genitourinary: Negative for dysuria, flank pain, frequency, hematuria and urgency.   Musculoskeletal: Negative for arthralgias, back pain, joint swelling and myalgias.   Skin: Negative for color change, pallor and rash.   Neurological: Negative for tremors, seizures, syncope, weakness and headaches.   Hematological: Negative for adenopathy. Does not bruise/bleed easily.   Psychiatric/Behavioral: Negative for behavioral problems, confusion, dysphoric mood, hallucinations and suicidal ideas. The patient is not nervous/anxious.         /89   Pulse 61   Ht 165.1 cm (65\")   Wt 107 kg (236 lb 3.2 oz)   BMI 39.31 kg/m²     Objective    Physical Exam  Constitutional:       Appearance: She is well-developed.   HENT:      Head: Normocephalic and atraumatic. "   Eyes:      Conjunctiva/sclera: Conjunctivae normal.      Pupils: Pupils are equal, round, and reactive to light.   Neck:      Musculoskeletal: Normal range of motion and neck supple.      Thyroid: No thyromegaly.   Cardiovascular:      Rate and Rhythm: Normal rate and regular rhythm.      Heart sounds: Normal heart sounds. No murmur.   Pulmonary:      Effort: Pulmonary effort is normal.      Breath sounds: Normal breath sounds. No wheezing.   Abdominal:      General: Bowel sounds are normal. There is no distension.      Palpations: Abdomen is soft. There is no mass.      Tenderness: There is no abdominal tenderness.      Hernia: No hernia is present.   Genitourinary:     Comments: No lesions noted  Musculoskeletal: Normal range of motion.         General: No tenderness.   Lymphadenopathy:      Cervical: No cervical adenopathy.   Skin:     General: Skin is warm and dry.      Findings: No rash.   Neurological:      Mental Status: She is alert and oriented to person, place, and time.      Cranial Nerves: No cranial nerve deficit.   Psychiatric:         Thought Content: Thought content normal.       Lab on 08/24/2020   Component Date Value Ref Range Status   • Glucose 08/24/2020 83  65 - 99 mg/dL Final   • BUN 08/24/2020 10  6 - 20 mg/dL Final   • Creatinine 08/24/2020 0.79  0.57 - 1.00 mg/dL Final   • Sodium 08/24/2020 138  136 - 145 mmol/L Final   • Potassium 08/24/2020 4.3  3.5 - 5.2 mmol/L Final    Specimen hemolyzed.  Results may be affected.   • Chloride 08/24/2020 103  98 - 107 mmol/L Final   • CO2 08/24/2020 24.4  22.0 - 29.0 mmol/L Final   • Calcium 08/24/2020 9.6  8.6 - 10.5 mg/dL Final   • Total Protein 08/24/2020 7.0  6.0 - 8.5 g/dL Final   • Albumin 08/24/2020 4.00  3.50 - 5.20 g/dL Final   • ALT (SGPT) 08/24/2020 22  1 - 33 U/L Final   • AST (SGOT) 08/24/2020 24  1 - 32 U/L Final   • Alkaline Phosphatase 08/24/2020 123* 39 - 117 U/L Final   • Total Bilirubin 08/24/2020 0.5  0.0 - 1.2 mg/dL Final   • eGFR  Non  Amer 08/24/2020 77  >60 mL/min/1.73 Final   • Globulin 08/24/2020 3.0  gm/dL Final   • A/G Ratio 08/24/2020 1.3  g/dL Final   • BUN/Creatinine Ratio 08/24/2020 12.7  7.0 - 25.0 Final   • Anion Gap 08/24/2020 10.6  5.0 - 15.0 mmol/L Final   • Hemoglobin A1C 08/24/2020 5.00  4.80 - 5.60 % Final   • Total Cholesterol 08/24/2020 179  0 - 200 mg/dL Final   • Triglycerides 08/24/2020 79  0 - 150 mg/dL Final   • HDL Cholesterol 08/24/2020 61* 40 - 60 mg/dL Final   • LDL Cholesterol  08/24/2020 102* 0 - 100 mg/dL Final   • VLDL Cholesterol 08/24/2020 15.8  5 - 40 mg/dL Final   • LDL/HDL Ratio 08/24/2020 1.68   Final   • TSH 08/24/2020 1.080  0.270 - 4.200 uIU/mL Final   • Free T4 08/24/2020 1.37  0.93 - 1.70 ng/dL Final   • WBC 08/24/2020 5.33  3.40 - 10.80 10*3/mm3 Final   • RBC 08/24/2020 4.49  3.77 - 5.28 10*6/mm3 Final   • Hemoglobin 08/24/2020 13.9  12.0 - 15.9 g/dL Final   • Hematocrit 08/24/2020 40.4  34.0 - 46.6 % Final   • MCV 08/24/2020 90.0  79.0 - 97.0 fL Final   • MCH 08/24/2020 31.0  26.6 - 33.0 pg Final   • MCHC 08/24/2020 34.4  31.5 - 35.7 g/dL Final   • RDW 08/24/2020 11.8* 12.3 - 15.4 % Final   • RDW-SD 08/24/2020 38.0  37.0 - 54.0 fl Final   • MPV 08/24/2020 11.1  6.0 - 12.0 fL Final   • Platelets 08/24/2020 288  140 - 450 10*3/mm3 Final   • Neutrophil % 08/24/2020 58.3  42.7 - 76.0 % Final   • Lymphocyte % 08/24/2020 33.2  19.6 - 45.3 % Final   • Monocyte % 08/24/2020 5.8  5.0 - 12.0 % Final   • Eosinophil % 08/24/2020 1.7  0.3 - 6.2 % Final   • Basophil % 08/24/2020 0.8  0.0 - 1.5 % Final   • Immature Grans % 08/24/2020 0.2  0.0 - 0.5 % Final   • Neutrophils, Absolute 08/24/2020 3.11  1.70 - 7.00 10*3/mm3 Final   • Lymphocytes, Absolute 08/24/2020 1.77  0.70 - 3.10 10*3/mm3 Final   • Monocytes, Absolute 08/24/2020 0.31  0.10 - 0.90 10*3/mm3 Final   • Eosinophils, Absolute 08/24/2020 0.09  0.00 - 0.40 10*3/mm3 Final   • Basophils, Absolute 08/24/2020 0.04  0.00 - 0.20 10*3/mm3 Final   •  Immature Grans, Absolute 08/24/2020 0.01  0.00 - 0.05 10*3/mm3 Final   • nRBC 08/24/2020 0.2  0.0 - 0.2 /100 WBC Final     Assessment/Plan      1. Encounter for screening for malignant neoplasm of colon    2. Elevated LFTs    1.  Elevated alkaline phosphatase level likely due to fatty liver disease.  Recommend exercise and diet control.  Patient was counseled. Follow LFTs closely.  2.  Obesity with recent weight gain, continue exercise and diet control regimen.  3.  Left upper quadrant pain with bloating, resolved.  Continue PPI.  4.  GERD, well controlled with PPI.  Continue PPI and antireflux lifestyle.  5.  Colorectal cancer screening, average risk.  Schedule colonoscopy.    Orders placed during this encounter include:  Orders Placed This Encounter   Procedures   • Hepatic Function Panel   • Follow Anesthesia Guidelines / Standing Orders     Standing Status:   Future   • Obtain Informed Consent     Standing Status:   Future     Order Specific Question:   Informed Consent Given For     Answer:   colonoscopy       COLONOSCOPY (N/A)    Review and/or summary of lab tests, radiology, procedures, medications. Review and summary of old records and obtaining of history. The risks and benefits of my recommendations, as well as other treatment options were discussed with the patient today. Questions were answered.    No orders of the defined types were placed in this encounter.      Follow-up: No follow-ups on file.               Results for orders placed or performed in visit on 08/24/20   CBC Auto Differential    Specimen: Blood   Result Value Ref Range    WBC 5.33 3.40 - 10.80 10*3/mm3    RBC 4.49 3.77 - 5.28 10*6/mm3    Hemoglobin 13.9 12.0 - 15.9 g/dL    Hematocrit 40.4 34.0 - 46.6 %    MCV 90.0 79.0 - 97.0 fL    MCH 31.0 26.6 - 33.0 pg    MCHC 34.4 31.5 - 35.7 g/dL    RDW 11.8 (L) 12.3 - 15.4 %    RDW-SD 38.0 37.0 - 54.0 fl    MPV 11.1 6.0 - 12.0 fL    Platelets 288 140 - 450 10*3/mm3    Neutrophil % 58.3 42.7 - 76.0  %    Lymphocyte % 33.2 19.6 - 45.3 %    Monocyte % 5.8 5.0 - 12.0 %    Eosinophil % 1.7 0.3 - 6.2 %    Basophil % 0.8 0.0 - 1.5 %    Immature Grans % 0.2 0.0 - 0.5 %    Neutrophils, Absolute 3.11 1.70 - 7.00 10*3/mm3    Lymphocytes, Absolute 1.77 0.70 - 3.10 10*3/mm3    Monocytes, Absolute 0.31 0.10 - 0.90 10*3/mm3    Eosinophils, Absolute 0.09 0.00 - 0.40 10*3/mm3    Basophils, Absolute 0.04 0.00 - 0.20 10*3/mm3    Immature Grans, Absolute 0.01 0.00 - 0.05 10*3/mm3    nRBC 0.2 0.0 - 0.2 /100 WBC   TSH    Specimen: Blood   Result Value Ref Range    TSH 1.080 0.270 - 4.200 uIU/mL   T4, Free    Specimen: Blood   Result Value Ref Range    Free T4 1.37 0.93 - 1.70 ng/dL   Hemoglobin A1c    Specimen: Blood   Result Value Ref Range    Hemoglobin A1C 5.00 4.80 - 5.60 %   Lipid Panel    Specimen: Blood   Result Value Ref Range    Total Cholesterol 179 0 - 200 mg/dL    Triglycerides 79 0 - 150 mg/dL    HDL Cholesterol 61 (H) 40 - 60 mg/dL    LDL Cholesterol  102 (H) 0 - 100 mg/dL    VLDL Cholesterol 15.8 5 - 40 mg/dL    LDL/HDL Ratio 1.68    Comprehensive Metabolic Panel    Specimen: Blood   Result Value Ref Range    Glucose 83 65 - 99 mg/dL    BUN 10 6 - 20 mg/dL    Creatinine 0.79 0.57 - 1.00 mg/dL    Sodium 138 136 - 145 mmol/L    Potassium 4.3 3.5 - 5.2 mmol/L    Chloride 103 98 - 107 mmol/L    CO2 24.4 22.0 - 29.0 mmol/L    Calcium 9.6 8.6 - 10.5 mg/dL    Total Protein 7.0 6.0 - 8.5 g/dL    Albumin 4.00 3.50 - 5.20 g/dL    ALT (SGPT) 22 1 - 33 U/L    AST (SGOT) 24 1 - 32 U/L    Alkaline Phosphatase 123 (H) 39 - 117 U/L    Total Bilirubin 0.5 0.0 - 1.2 mg/dL    eGFR Non African Amer 77 >60 mL/min/1.73    Globulin 3.0 gm/dL    A/G Ratio 1.3 g/dL    BUN/Creatinine Ratio 12.7 7.0 - 25.0    Anion Gap 10.6 5.0 - 15.0 mmol/L   Results for orders placed or performed during the hospital encounter of 11/14/19   Tissue Pathology Exam    Specimen: Gastric, Antrum; Tissue   Result Value Ref Range    Case Report       Surgical  "Pathology Report                         Case: [id number]                                  Authorizing Provider:  Joao Bruce MD      Collected:           11/14/2019 01:54 PM          Ordering Location:     [facility]             Received:            11/15/2019 07:15 AM                                 [facility]                                                     Pathologist:           Adal Becker MD                                                         Specimen:    Gastric, Antrum                                                                            Final Diagnosis       MUCOSA, ANTRUM OF STOMACH:  REACTIVE GASTROPATHY.      Gross Description       The container is labeled \"antrum of stomach\" and has a nodular fragment of white soft tissue measuring 0.4 cm in greatest dimension.  It is entirely embedded as 1A.     Results for orders placed or performed in visit on 10/02/19   CBC Auto Differential    Specimen: Blood   Result Value Ref Range    WBC 5.47 3.40 - 10.80 10*3/mm3    RBC 4.90 3.77 - 5.28 10*6/mm3    Hemoglobin 15.0 12.0 - 15.9 g/dL    Hematocrit 45.0 34.0 - 46.6 %    MCV 91.8 79.0 - 97.0 fL    MCH 30.6 26.6 - 33.0 pg    MCHC 33.3 31.5 - 35.7 g/dL    RDW 13.1 12.3 - 15.4 %    RDW-SD 43.8 37.0 - 54.0 fl    MPV 11.3 6.0 - 12.0 fL    Platelets 275 140 - 450 10*3/mm3    Neutrophil % 56.4 42.7 - 76.0 %    Lymphocyte % 34.2 19.6 - 45.3 %    Monocyte % 7.1 5.0 - 12.0 %    Eosinophil % 1.6 0.3 - 6.2 %    Basophil % 0.5 0.0 - 1.5 %    Immature Grans % 0.2 0.0 - 0.5 %    Neutrophils, Absolute 3.08 1.70 - 7.00 10*3/mm3    Lymphocytes, Absolute 1.87 0.70 - 3.10 10*3/mm3    Monocytes, Absolute 0.39 0.10 - 0.90 10*3/mm3    Eosinophils, Absolute 0.09 0.00 - 0.40 10*3/mm3    Basophils, Absolute 0.03 0.00 - 0.20 10*3/mm3    Immature Grans, Absolute 0.01 0.00 - 0.05 10*3/mm3    nRBC 0.0 0.0 - 0.2 /100 WBC   Iron Profile    Specimen: Blood   Result Value Ref Range    Iron 115 37 - 145 " mcg/dL    Iron Saturation 33 20 - 50 %    Transferrin 234 200 - 360 mg/dL    TIBC 349 298 - 536 mcg/dL   Vitamin D 25 Hydroxy    Specimen: Blood   Result Value Ref Range    25 Hydroxy, Vitamin D 35.2 30.0 - 100.0 ng/ml   Ferritin    Specimen: Blood   Result Value Ref Range    Ferritin 65.60 13.00 - 150.00 ng/mL   Vitamin B12    Specimen: Blood   Result Value Ref Range    Vitamin B-12 516 211 - 946 pg/mL   Results for orders placed or performed in visit on 08/22/19   Lipid Panel    Specimen: Blood   Result Value Ref Range    Total Cholesterol 144 0 - 200 mg/dL    Triglycerides 81 0 - 150 mg/dL    HDL Cholesterol 45 40 - 60 mg/dL    LDL Cholesterol  83 0 - 100 mg/dL    VLDL Cholesterol 16.2 5 - 40 mg/dL    LDL/HDL Ratio 1.84    Results for orders placed or performed during the hospital encounter of 06/06/19   Tissue Pathology Exam    Specimen: Gallbladder; Tissue   Result Value Ref Range    Case Report       Surgical Pathology Report                         Case: [id number]                                  Authorizing Provider:  Felipe Overton MD      Collected:           06/06/2019 09:13 AM          Ordering Location:     [facility]             Received:            06/06/2019 10:44 AM                                 [facility]                                                              Pathologist:           Artur Faustin MD                                                           Specimen:    Gallbladder, gallbladder and contents                                                      Final Diagnosis       GALLBLADDER:  CHRONIC CHOLECYSTITIS.  CHOLELITHIASIS.      Gross Description       The gallbladder measures 8.0 cm in length and 4.0 cm in maximum diameter.  The serosal surface is invested in adipose tissue.  Impacted in the fundus of the gallbladder is a greenish brown stone measuring 4.0 x 3.0 x 2.2 cm.  The gallbladder mucosa is reddish tan, and the wall measures up to 0.3 cm in thickness.   Sections of gallbladder are submitted in one block.  A small fragment of tan coagulum is also present in the container and is included in the same block.     Results for orders placed or performed in visit on 05/22/19   CBC Auto Differential    Specimen: Blood   Result Value Ref Range    WBC 6.10 3.40 - 10.80 10*3/mm3    RBC 4.79 3.77 - 5.28 10*6/mm3    Hemoglobin 14.1 12.0 - 15.9 g/dL    Hematocrit 44.1 34.0 - 46.6 %    MCV 92.1 79.0 - 97.0 fL    MCH 29.4 26.6 - 33.0 pg    MCHC 32.0 31.5 - 35.7 g/dL    RDW 12.8 12.3 - 15.4 %    RDW-SD 43.5 37.0 - 54.0 fl    MPV 10.3 6.0 - 12.0 fL    Platelets 290 140 - 450 10*3/mm3    Neutrophil % 51.2 42.7 - 76.0 %    Lymphocyte % 38.7 19.6 - 45.3 %    Monocyte % 7.0 5.0 - 12.0 %    Eosinophil % 2.1 0.3 - 6.2 %    Basophil % 0.7 0.0 - 1.5 %    Immature Grans % 0.3 0.0 - 0.5 %    Neutrophils, Absolute 3.12 1.70 - 7.00 10*3/mm3    Lymphocytes, Absolute 2.36 0.70 - 3.10 10*3/mm3    Monocytes, Absolute 0.43 0.10 - 0.90 10*3/mm3    Eosinophils, Absolute 0.13 0.00 - 0.40 10*3/mm3    Basophils, Absolute 0.04 0.00 - 0.20 10*3/mm3    Immature Grans, Absolute 0.02 0.00 - 0.05 10*3/mm3    nRBC 0.0 0.0 - 0.2 /100 WBC     *Note: Due to a large number of results and/or encounters for the requested time period, some results have not been displayed. A complete set of results can be found in Results Review.         This document has been electronically signed by Joao Bruce MD on January 18, 2021 10:46 CST

## 2021-02-09 ENCOUNTER — LAB (OUTPATIENT)
Dept: LAB | Facility: HOSPITAL | Age: 51
End: 2021-02-09

## 2021-02-09 DIAGNOSIS — Z01.818 PREOP TESTING: Primary | ICD-10-CM

## 2021-02-09 LAB — SARS-COV-2 ORF1AB RESP QL NAA+PROBE: NOT DETECTED

## 2021-02-09 PROCEDURE — C9803 HOPD COVID-19 SPEC COLLECT: HCPCS

## 2021-02-09 PROCEDURE — U0004 COV-19 TEST NON-CDC HGH THRU: HCPCS

## 2021-02-12 ENCOUNTER — ANESTHESIA EVENT (OUTPATIENT)
Dept: GASTROENTEROLOGY | Facility: HOSPITAL | Age: 51
End: 2021-02-12

## 2021-02-12 ENCOUNTER — HOSPITAL ENCOUNTER (OUTPATIENT)
Facility: HOSPITAL | Age: 51
Setting detail: HOSPITAL OUTPATIENT SURGERY
Discharge: HOME | End: 2021-02-12
Attending: INTERNAL MEDICINE | Admitting: INTERNAL MEDICINE

## 2021-02-12 ENCOUNTER — ANESTHESIA (OUTPATIENT)
Dept: GASTROENTEROLOGY | Facility: HOSPITAL | Age: 51
End: 2021-02-12

## 2021-02-12 VITALS
HEART RATE: 82 BPM | OXYGEN SATURATION: 98 % | SYSTOLIC BLOOD PRESSURE: 117 MMHG | TEMPERATURE: 97.3 F | WEIGHT: 230 LBS | HEIGHT: 65 IN | DIASTOLIC BLOOD PRESSURE: 66 MMHG | RESPIRATION RATE: 16 BRPM | BODY MASS INDEX: 38.32 KG/M2

## 2021-02-12 DIAGNOSIS — Z12.11 ENCOUNTER FOR SCREENING FOR MALIGNANT NEOPLASM OF COLON: ICD-10-CM

## 2021-02-12 PROCEDURE — 25010000002 PROPOFOL 10 MG/ML EMULSION: Performed by: NURSE ANESTHETIST, CERTIFIED REGISTERED

## 2021-02-12 PROCEDURE — 25010000002 FENTANYL CITRATE (PF) 100 MCG/2ML SOLUTION: Performed by: NURSE ANESTHETIST, CERTIFIED REGISTERED

## 2021-02-12 PROCEDURE — 45380 COLONOSCOPY AND BIOPSY: CPT | Performed by: INTERNAL MEDICINE

## 2021-02-12 RX ORDER — ONDANSETRON 2 MG/ML
4 INJECTION INTRAMUSCULAR; INTRAVENOUS ONCE AS NEEDED
Status: DISCONTINUED | OUTPATIENT
Start: 2021-02-12 | End: 2021-02-12 | Stop reason: HOSPADM

## 2021-02-12 RX ORDER — DEXTROSE AND SODIUM CHLORIDE 5; .45 G/100ML; G/100ML
30 INJECTION, SOLUTION INTRAVENOUS CONTINUOUS PRN
Status: DISCONTINUED | OUTPATIENT
Start: 2021-02-12 | End: 2021-02-12 | Stop reason: HOSPADM

## 2021-02-12 RX ORDER — MEPERIDINE HYDROCHLORIDE 25 MG/ML
12.5 INJECTION INTRAMUSCULAR; INTRAVENOUS; SUBCUTANEOUS
Status: DISCONTINUED | OUTPATIENT
Start: 2021-02-12 | End: 2021-02-12 | Stop reason: HOSPADM

## 2021-02-12 RX ORDER — PROMETHAZINE HYDROCHLORIDE 25 MG/1
25 SUPPOSITORY RECTAL ONCE AS NEEDED
Status: DISCONTINUED | OUTPATIENT
Start: 2021-02-12 | End: 2021-02-12 | Stop reason: HOSPADM

## 2021-02-12 RX ORDER — FENTANYL CITRATE 50 UG/ML
INJECTION, SOLUTION INTRAMUSCULAR; INTRAVENOUS AS NEEDED
Status: DISCONTINUED | OUTPATIENT
Start: 2021-02-12 | End: 2021-02-12 | Stop reason: SURG

## 2021-02-12 RX ORDER — PROPOFOL 10 MG/ML
VIAL (ML) INTRAVENOUS AS NEEDED
Status: DISCONTINUED | OUTPATIENT
Start: 2021-02-12 | End: 2021-02-12 | Stop reason: SURG

## 2021-02-12 RX ORDER — PROMETHAZINE HYDROCHLORIDE 25 MG/1
25 TABLET ORAL ONCE AS NEEDED
Status: DISCONTINUED | OUTPATIENT
Start: 2021-02-12 | End: 2021-02-12 | Stop reason: HOSPADM

## 2021-02-12 RX ORDER — LIDOCAINE HYDROCHLORIDE 20 MG/ML
INJECTION, SOLUTION INTRAVENOUS AS NEEDED
Status: DISCONTINUED | OUTPATIENT
Start: 2021-02-12 | End: 2021-02-12 | Stop reason: SURG

## 2021-02-12 RX ADMIN — DEXTROSE AND SODIUM CHLORIDE 30 ML/HR: 5; 450 INJECTION, SOLUTION INTRAVENOUS at 13:24

## 2021-02-12 RX ADMIN — PROPOFOL 50 MG: 10 INJECTION, EMULSION INTRAVENOUS at 14:43

## 2021-02-12 RX ADMIN — PROPOFOL 100 MG: 10 INJECTION, EMULSION INTRAVENOUS at 14:33

## 2021-02-12 RX ADMIN — FENTANYL CITRATE 100 MCG: 50 INJECTION, SOLUTION INTRAMUSCULAR; INTRAVENOUS at 14:33

## 2021-02-12 RX ADMIN — PROPOFOL 50 MG: 10 INJECTION, EMULSION INTRAVENOUS at 14:39

## 2021-02-12 RX ADMIN — LIDOCAINE HYDROCHLORIDE 100 MG: 20 INJECTION, SOLUTION INTRAVENOUS at 14:33

## 2021-02-12 NOTE — ANESTHESIA PREPROCEDURE EVALUATION
Anesthesia Evaluation     NPO Solid Status: > 8 hours  NPO Liquid Status: > 8 hours           Airway   Mallampati: III  TM distance: >3 FB  Neck ROM: full  Dental - normal exam     Pulmonary - normal exam   Cardiovascular - normal exam        Neuro/Psych  (+) psychiatric history,     GI/Hepatic/Renal/Endo    (+) obesity, morbid obesity, GERD,      Musculoskeletal     Abdominal    Substance History      OB/GYN          Other                        Anesthesia Plan    ASA 3     MAC     intravenous induction     Anesthetic plan, all risks, benefits, and alternatives have been provided, discussed and informed consent has been obtained with: patient.

## 2021-02-12 NOTE — ANESTHESIA POSTPROCEDURE EVALUATION
Patient: Letty Bach    Procedure Summary     Date: 02/12/21 Room / Location: [facility] ENDOSCOPY 1 / [facility] ENDOSCOPY    Anesthesia Start: 1430 Anesthesia Stop: 1452    Procedure: COLONOSCOPY (N/A ) Diagnosis:       Encounter for screening for malignant neoplasm of colon      (Encounter for screening for malignant neoplasm of colon [Z12.11])    Surgeon: Joao Bruce MD Provider: Santos Falk CRNA    Anesthesia Type: MAC ASA Status: 3          Anesthesia Type: MAC    Vitals  No vitals data found for the desired time range.          Post Anesthesia Care and Evaluation    Patient location during evaluation: bedside  Patient participation: complete - patient cannot participate  Level of consciousness: awake  Pain score: 0  Pain management: adequate  Airway patency: patent  Anesthetic complications: No anesthetic complications  PONV Status: none  Cardiovascular status: acceptable  Respiratory status: acceptable  Hydration status: acceptable

## 2021-02-16 LAB
LAB AP CASE REPORT: NORMAL
PATH REPORT.FINAL DX SPEC: NORMAL

## 2021-02-18 ENCOUNTER — OFFICE VISIT (OUTPATIENT)
Dept: GASTROENTEROLOGY | Facility: CLINIC | Age: 51
End: 2021-02-18

## 2021-02-18 VITALS
DIASTOLIC BLOOD PRESSURE: 71 MMHG | SYSTOLIC BLOOD PRESSURE: 122 MMHG | HEIGHT: 65 IN | HEART RATE: 77 BPM | WEIGHT: 236 LBS | BODY MASS INDEX: 39.32 KG/M2

## 2021-02-18 DIAGNOSIS — R79.89 ELEVATED LFTS: Primary | ICD-10-CM

## 2021-02-18 PROCEDURE — 99213 OFFICE O/P EST LOW 20 MIN: CPT | Performed by: INTERNAL MEDICINE

## 2021-02-18 NOTE — PROGRESS NOTES
Chief Complaint   Patient presents with   • Colon Polyps       Subjective    Letty Bach is a 50 y.o. female.    History of Present Illness  Patient is a GI clinic for follow-up visit today.  She feels better currently.  Abdominal pain is well controlled.  No GI complaints at this time.  Colonoscopy was consistent with hyperplastic polyp, sigmoid diverticulosis and hemorrhoids.  Most recent alkaline phosphatase was 135.       The following portions of the patient's history were reviewed and updated as appropriate:   Past Medical History:   Diagnosis Date   • GERD (gastroesophageal reflux disease)    • Urinary tract infection      Past Surgical History:   Procedure Laterality Date   • CHOLECYSTECTOMY  06/06/2019   • CHOLECYSTECTOMY WITH INTRAOPERATIVE CHOLANGIOGRAM N/A 6/6/2019    Procedure: CHOLECYSTECTOMY LAPAROSCOPIC INTRAOPERATIVE CHOLANGIOGRAM      (c-arm#2);  Surgeon: Felipe Overton MD;  Location: [facility] OR;  Service: General   • COLONOSCOPY N/A 2/12/2021    Procedure: COLONOSCOPY;  Surgeon: Joao Bruce MD;  Location: [facility] ENDOSCOPY;  Service: Gastroenterology;  Laterality: N/A;   • ENDOSCOPY N/A 11/14/2019    Procedure: ESOPHAGOGASTRODUODENOSCOPY;  Surgeon: Joao Bruce MD;  Location: [facility] ENDOSCOPY;  Service: Gastroenterology   • HYSTERECTOMY  2007   • UPPER GASTROINTESTINAL ENDOSCOPY  11/14/2019     Family History   Problem Relation Age of Onset   • Breast cancer Mother    • Thyroid disease Mother    • Hypertension Mother    • Hypertension Father    • Diabetes Father    • Kidney disease Father    • Heart disease Father    • No Known Problems Sister    • No Known Problems Brother    • Seizures Daughter    • Hypertension Daughter    • COPD Maternal Grandmother    • Asthma Maternal Grandmother    • No Known Problems Sister    • ADD / ADHD Daughter    • Polymyositis Daughter      OB History    No obstetric history on file.       Prior to Admission medications    Medication Sig Start Date  End Date Taking? Authorizing Provider   sodium-potassium-magnesium sulfates (Suprep Bowel Prep Kit) 17.5-3.13-1.6 GM/177ML solution oral solution Utilize as directed per instructions received from office of Dr. Joao Bruce M.D. 1/18/21 2/18/21  Joao Bruce MD     No Known Allergies  Social History     Socioeconomic History   • Marital status:      Spouse name: Not on file   • Number of children: Not on file   • Years of education: Not on file   • Highest education level: Not on file   Tobacco Use   • Smoking status: Former Smoker     Packs/day: 0.50     Years: 5.00     Pack years: 2.50     Types: Cigarettes   • Smokeless tobacco: Never Used   Substance and Sexual Activity   • Alcohol use: No     Frequency: Never   • Drug use: No   • Sexual activity: Defer       Review of Systems  Review of Systems   Constitutional: Negative for chills, fatigue, fever and unexpected weight change.   HENT: Negative for congestion, ear discharge, hearing loss, nosebleeds and sore throat.    Eyes: Negative for pain, discharge and redness.   Respiratory: Negative for cough, chest tightness, shortness of breath and wheezing.    Cardiovascular: Negative for chest pain and palpitations.   Gastrointestinal: Negative for abdominal distention, abdominal pain, blood in stool, constipation, diarrhea, nausea and vomiting.   Endocrine: Negative for cold intolerance, polydipsia, polyphagia and polyuria.   Genitourinary: Negative for dysuria, flank pain, frequency, hematuria and urgency.   Musculoskeletal: Negative for arthralgias, back pain, joint swelling and myalgias.   Skin: Negative for color change, pallor and rash.   Neurological: Negative for tremors, seizures, syncope, weakness and headaches.   Hematological: Negative for adenopathy. Does not bruise/bleed easily.   Psychiatric/Behavioral: Negative for behavioral problems, confusion, dysphoric mood, hallucinations and suicidal ideas. The patient is not nervous/anxious.   "       /71 (BP Location: Left arm)   Pulse 77   Ht 165.1 cm (65\")   Wt 107 kg (236 lb)   BMI 39.27 kg/m²     Objective    Physical Exam  Constitutional:       Appearance: She is well-developed.   HENT:      Head: Normocephalic and atraumatic.   Eyes:      Conjunctiva/sclera: Conjunctivae normal.      Pupils: Pupils are equal, round, and reactive to light.   Neck:      Musculoskeletal: Normal range of motion and neck supple.      Thyroid: No thyromegaly.   Cardiovascular:      Rate and Rhythm: Normal rate and regular rhythm.      Heart sounds: Normal heart sounds. No murmur.   Pulmonary:      Effort: Pulmonary effort is normal.      Breath sounds: Normal breath sounds. No wheezing.   Abdominal:      General: Bowel sounds are normal. There is no distension.      Palpations: Abdomen is soft. There is no mass.      Tenderness: There is no abdominal tenderness.      Hernia: No hernia is present.   Genitourinary:     Comments: No lesions noted  Musculoskeletal: Normal range of motion.         General: No tenderness.   Lymphadenopathy:      Cervical: No cervical adenopathy.   Skin:     General: Skin is warm and dry.      Findings: No rash.   Neurological:      Mental Status: She is alert and oriented to person, place, and time.      Cranial Nerves: No cranial nerve deficit.   Psychiatric:         Thought Content: Thought content normal.       Admission on 02/12/2021, Discharged on 02/12/2021   Component Date Value Ref Range Status   • Case Report 02/12/2021    Final                    Value:Surgical Pathology Report                         Case: [id number]                                  Authorizing Provider:  Joao Bruce MD      Collected:           02/12/2021 02:47 PM          Ordering Location:     [facility]             Received:            02/15/2021 07:01 AM                                 [place] [facility]                                                     Pathologist:           " Law Lujan MD                                                     Specimen:    Large Intestine, Sigmoid Colon, polyps                                                    • Final Diagnosis 02/12/2021    Final                    Value:This result contains rich text formatting which cannot be displayed here.     Assessment/Plan    No diagnosis found..   1.  Elevated alkaline phosphatase level likely due to fatty liver disease.  Recommend exercise and diet control.  Patient was counseled. Follow LFTs closely.  2.  Obesity with recent weight gain, continue exercise and diet control regimen.  3.  Left upper quadrant pain with bloating, resolved.  Continue PPI.  4.  GERD, well controlled with PPI.  Continue PPI and antireflux lifestyle.  5.    Diverticulosis, high-fiber diet.  6.  Colorectal cancer screening, repeat colonoscopy in 5 years.    Orders placed during this encounter include:  No orders of the defined types were placed in this encounter.      * Surgery not found *    Review and/or summary of lab tests, radiology, procedures, medications. Review and summary of old records and obtaining of history. The risks and benefits of my recommendations, as well as other treatment options were discussed with the patient today. Questions were answered.    No orders of the defined types were placed in this encounter.      Follow-up: No follow-ups on file.               Results for orders placed or performed during the hospital encounter of 02/12/21   Tissue Pathology Exam    Specimen: Large Intestine, Sigmoid Colon; Polyp   Result Value Ref Range    Case Report       Surgical Pathology Report                         Case: [id number]                                  Authorizing Provider:  Joao Bruce MD      Collected:           02/12/2021 02:47 PM          Ordering Location:     [facility]             Received:            02/15/2021 07:01 AM                                 [place] [facility]                                                      Pathologist:           Law Lujan MD                                                     Specimen:    Large Intestine, Sigmoid Colon, polyps                                                     Final Diagnosis       SEE SCANNED REPORT       Results for orders placed or performed in visit on 02/09/21   COVID-19,[facility], NP/OP SWAB IN UTM/VTM/[facility]SP[facility] MEDIA,24 HR TAT - Swab, Nasopharynx    Specimen: Nasopharynx; Swab   Result Value Ref Range    COVID19 Not Detected Not Detected - Ref. Range   Results for orders placed or performed in visit on 01/18/21   Hepatic Function Panel    Specimen: Blood   Result Value Ref Range    Total Protein 7.1 6.0 - 8.5 g/dL    Albumin 4.00 3.50 - 5.20 g/dL    ALT (SGPT) 16 1 - 33 U/L    AST (SGOT) 14 1 - 32 U/L    Alkaline Phosphatase 135 (H) 39 - 117 U/L    Total Bilirubin 0.3 0.0 - 1.2 mg/dL    Bilirubin, Direct <0.2 0.0 - 0.3 mg/dL    Bilirubin, Indirect     Results for orders placed or performed in visit on 08/24/20   CBC Auto Differential    Specimen: Blood   Result Value Ref Range    WBC 5.33 3.40 - 10.80 10*3/mm3    RBC 4.49 3.77 - 5.28 10*6/mm3    Hemoglobin 13.9 12.0 - 15.9 g/dL    Hematocrit 40.4 34.0 - 46.6 %    MCV 90.0 79.0 - 97.0 fL    MCH 31.0 26.6 - 33.0 pg    MCHC 34.4 31.5 - 35.7 g/dL    RDW 11.8 (L) 12.3 - 15.4 %    RDW-SD 38.0 37.0 - 54.0 fl    MPV 11.1 6.0 - 12.0 fL    Platelets 288 140 - 450 10*3/mm3    Neutrophil % 58.3 42.7 - 76.0 %    Lymphocyte % 33.2 19.6 - 45.3 %    Monocyte % 5.8 5.0 - 12.0 %    Eosinophil % 1.7 0.3 - 6.2 %    Basophil % 0.8 0.0 - 1.5 %    Immature Grans % 0.2 0.0 - 0.5 %    Neutrophils, Absolute 3.11 1.70 - 7.00 10*3/mm3    Lymphocytes, Absolute 1.77 0.70 - 3.10 10*3/mm3    Monocytes, Absolute 0.31 0.10 - 0.90 10*3/mm3    Eosinophils, Absolute 0.09 0.00 - 0.40 10*3/mm3    Basophils, Absolute 0.04 0.00 - 0.20 10*3/mm3    Immature Grans, Absolute 0.01 0.00 - 0.05  10*3/mm3    nRBC 0.2 0.0 - 0.2 /100 WBC   TSH    Specimen: Blood   Result Value Ref Range    TSH 1.080 0.270 - 4.200 uIU/mL   T4, Free    Specimen: Blood   Result Value Ref Range    Free T4 1.37 0.93 - 1.70 ng/dL   Hemoglobin A1c    Specimen: Blood   Result Value Ref Range    Hemoglobin A1C 5.00 4.80 - 5.60 %   Lipid Panel    Specimen: Blood   Result Value Ref Range    Total Cholesterol 179 0 - 200 mg/dL    Triglycerides 79 0 - 150 mg/dL    HDL Cholesterol 61 (H) 40 - 60 mg/dL    LDL Cholesterol  102 (H) 0 - 100 mg/dL    VLDL Cholesterol 15.8 5 - 40 mg/dL    LDL/HDL Ratio 1.68    Comprehensive Metabolic Panel    Specimen: Blood   Result Value Ref Range    Glucose 83 65 - 99 mg/dL    BUN 10 6 - 20 mg/dL    Creatinine 0.79 0.57 - 1.00 mg/dL    Sodium 138 136 - 145 mmol/L    Potassium 4.3 3.5 - 5.2 mmol/L    Chloride 103 98 - 107 mmol/L    CO2 24.4 22.0 - 29.0 mmol/L    Calcium 9.6 8.6 - 10.5 mg/dL    Total Protein 7.0 6.0 - 8.5 g/dL    Albumin 4.00 3.50 - 5.20 g/dL    ALT (SGPT) 22 1 - 33 U/L    AST (SGOT) 24 1 - 32 U/L    Alkaline Phosphatase 123 (H) 39 - 117 U/L    Total Bilirubin 0.5 0.0 - 1.2 mg/dL    eGFR Non African Amer 77 >60 mL/min/1.73    Globulin 3.0 gm/dL    A/G Ratio 1.3 g/dL    BUN/Creatinine Ratio 12.7 7.0 - 25.0    Anion Gap 10.6 5.0 - 15.0 mmol/L   Results for orders placed or performed during the hospital encounter of 11/14/19   Tissue Pathology Exam    Specimen: Gastric, Antrum; Tissue   Result Value Ref Range    Case Report       Surgical Pathology Report                         Case: [id number]                                  Authorizing Provider:  Joao Bruce MD      Collected:           11/14/2019 01:54 PM          Ordering Location:     [facility]             Received:            11/15/2019 07:15 AM                                 [place] [facility]                                                     Pathologist:           Adal Becker MD                                    "                      Specimen:    Gastric, Antrum                                                                            Final Diagnosis       MUCOSA, ANTRUM OF STOMACH:  REACTIVE GASTROPATHY.      Gross Description       The container is labeled \"antrum of stomach\" and has a nodular fragment of white soft tissue measuring 0.4 cm in greatest dimension.  It is entirely embedded as 1A.     Results for orders placed or performed in visit on 10/02/19   CBC Auto Differential    Specimen: Blood   Result Value Ref Range    WBC 5.47 3.40 - 10.80 10*3/mm3    RBC 4.90 3.77 - 5.28 10*6/mm3    Hemoglobin 15.0 12.0 - 15.9 g/dL    Hematocrit 45.0 34.0 - 46.6 %    MCV 91.8 79.0 - 97.0 fL    MCH 30.6 26.6 - 33.0 pg    MCHC 33.3 31.5 - 35.7 g/dL    RDW 13.1 12.3 - 15.4 %    RDW-SD 43.8 37.0 - 54.0 fl    MPV 11.3 6.0 - 12.0 fL    Platelets 275 140 - 450 10*3/mm3    Neutrophil % 56.4 42.7 - 76.0 %    Lymphocyte % 34.2 19.6 - 45.3 %    Monocyte % 7.1 5.0 - 12.0 %    Eosinophil % 1.6 0.3 - 6.2 %    Basophil % 0.5 0.0 - 1.5 %    Immature Grans % 0.2 0.0 - 0.5 %    Neutrophils, Absolute 3.08 1.70 - 7.00 10*3/mm3    Lymphocytes, Absolute 1.87 0.70 - 3.10 10*3/mm3    Monocytes, Absolute 0.39 0.10 - 0.90 10*3/mm3    Eosinophils, Absolute 0.09 0.00 - 0.40 10*3/mm3    Basophils, Absolute 0.03 0.00 - 0.20 10*3/mm3    Immature Grans, Absolute 0.01 0.00 - 0.05 10*3/mm3    nRBC 0.0 0.0 - 0.2 /100 WBC   Iron Profile    Specimen: Blood   Result Value Ref Range    Iron 115 37 - 145 mcg/dL    Iron Saturation 33 20 - 50 %    Transferrin 234 200 - 360 mg/dL    TIBC 349 298 - 536 mcg/dL   Vitamin D 25 Hydroxy    Specimen: Blood   Result Value Ref Range    25 Hydroxy, Vitamin D 35.2 30.0 - 100.0 ng/ml   Ferritin    Specimen: Blood   Result Value Ref Range    Ferritin 65.60 13.00 - 150.00 ng/mL   Vitamin B12    Specimen: Blood   Result Value Ref Range    Vitamin B-12 516 211 - 946 pg/mL   Results for orders placed or performed in visit on 08/22/19 "   Lipid Panel    Specimen: Blood   Result Value Ref Range    Total Cholesterol 144 0 - 200 mg/dL    Triglycerides 81 0 - 150 mg/dL    HDL Cholesterol 45 40 - 60 mg/dL    LDL Cholesterol  83 0 - 100 mg/dL    VLDL Cholesterol 16.2 5 - 40 mg/dL    LDL/HDL Ratio 1.84      *Note: Due to a large number of results and/or encounters for the requested time period, some results have not been displayed. A complete set of results can be found in Results Review.         This document has been electronically signed by Joao Bruce MD on February 18, 2021 12:00 CST

## 2021-02-22 ENCOUNTER — OFFICE VISIT (OUTPATIENT)
Dept: FAMILY MEDICINE CLINIC | Facility: CLINIC | Age: 51
End: 2021-02-22

## 2021-02-22 VITALS
OXYGEN SATURATION: 97 % | TEMPERATURE: 96.1 F | HEIGHT: 65 IN | RESPIRATION RATE: 20 BRPM | SYSTOLIC BLOOD PRESSURE: 140 MMHG | BODY MASS INDEX: 39.75 KG/M2 | HEART RATE: 69 BPM | DIASTOLIC BLOOD PRESSURE: 92 MMHG | WEIGHT: 238.6 LBS

## 2021-02-22 DIAGNOSIS — Z12.31 SCREENING MAMMOGRAM, ENCOUNTER FOR: ICD-10-CM

## 2021-02-22 DIAGNOSIS — R74.8 ELEVATED LIVER ENZYMES: ICD-10-CM

## 2021-02-22 DIAGNOSIS — Z00.00 PREVENTATIVE HEALTH CARE: ICD-10-CM

## 2021-02-22 DIAGNOSIS — R10.12 LUQ PAIN: ICD-10-CM

## 2021-02-22 DIAGNOSIS — E66.01 CLASS 2 SEVERE OBESITY DUE TO EXCESS CALORIES WITH SERIOUS COMORBIDITY AND BODY MASS INDEX (BMI) OF 39.0 TO 39.9 IN ADULT (HCC): ICD-10-CM

## 2021-02-22 DIAGNOSIS — E78.00 ELEVATED LDL CHOLESTEROL LEVEL: Primary | ICD-10-CM

## 2021-02-22 PROCEDURE — 99214 OFFICE O/P EST MOD 30 MIN: CPT | Performed by: NURSE PRACTITIONER

## 2021-02-22 NOTE — PROGRESS NOTES
Subjective   Letty Bach is a 50 y.o. female.     CC: follow up-hyperlipidemia, obesity, LUQ pain    Hyperlipidemia  This is a chronic problem. The current episode started more than 1 year ago. The problem is controlled. Exacerbating diseases include obesity. Factors aggravating her hyperlipidemia include fatty foods. Pertinent negatives include no chest pain, focal sensory loss, focal weakness, leg pain, myalgias or shortness of breath. Current antihyperlipidemic treatment includes diet change and exercise. The current treatment provides significant improvement of lipids. Compliance problems include adherence to exercise and adherence to diet.  Risk factors for coronary artery disease include family history, dyslipidemia, obesity and a sedentary lifestyle.   Obesity  This is a chronic problem. The current episode started more than 1 year ago. The problem occurs constantly. The problem has been waxing and waning. Associated symptoms include abdominal pain (luq pain with palpation). Pertinent negatives include no anorexia, arthralgias, chest pain, chills, congestion, coughing, fatigue, fever, headaches, myalgias, nausea, rash, sore throat or vomiting. The symptoms are aggravated by drinking and eating. She has tried eating, drinking and walking for the symptoms. The treatment provided significant relief.   Abdominal Pain  This is a chronic problem. The current episode started more than 1 year ago. The problem occurs intermittently. The problem has been waxing and waning. The pain is located in the LUQ. The pain is at a severity of 3/10. The pain is mild. The quality of the pain is aching. The abdominal pain does not radiate. Pertinent negatives include no anorexia, arthralgias, belching, constipation, diarrhea, dysuria, fever, flatus, frequency, headaches, hematochezia, hematuria, melena, myalgias, nausea, vomiting or weight loss. The pain is aggravated by palpation and movement. The pain is relieved by being  still. She has tried nothing for the symptoms. The treatment provided no relief.        The following portions of the patient's history were reviewed and updated as appropriate: allergies, current medications, past family history, past medical history, past social history, past surgical history and problem list.    Review of Systems   Constitutional: Negative for activity change, appetite change, chills, fatigue, fever, unexpected weight gain and unexpected weight loss.   HENT: Negative for congestion, sore throat, trouble swallowing and voice change.    Eyes: Negative.    Respiratory: Negative for cough, chest tightness, shortness of breath and wheezing.    Cardiovascular: Negative for chest pain, palpitations and leg swelling.   Gastrointestinal: Positive for abdominal pain (luq pain with palpation). Negative for anorexia, constipation, diarrhea, flatus, hematochezia, melena, nausea and vomiting.   Endocrine: Negative.    Genitourinary: Negative for dysuria, frequency and hematuria.   Musculoskeletal: Negative for arthralgias and myalgias.   Skin: Negative for rash.   Allergic/Immunologic: Negative.    Neurological: Negative.  Negative for focal weakness.   Hematological: Negative.    Psychiatric/Behavioral: Negative.        Objective   Physical Exam  Vitals signs and nursing note reviewed.   Constitutional:       General: She is not in acute distress.     Appearance: Normal appearance. She is well-developed. She is obese. She is not ill-appearing, toxic-appearing or diaphoretic.   HENT:      Head: Normocephalic and atraumatic.      Nose:      Right Sinus: No maxillary sinus tenderness.      Mouth/Throat:      Pharynx: No posterior oropharyngeal erythema.   Eyes:      Conjunctiva/sclera: Conjunctivae normal.   Neck:      Musculoskeletal: Normal range of motion and neck supple.   Cardiovascular:      Rate and Rhythm: Normal rate and regular rhythm.      Heart sounds: Normal heart sounds. No murmur. No friction rub.  No gallop.    Pulmonary:      Effort: Pulmonary effort is normal. No respiratory distress.      Breath sounds: Normal breath sounds. No stridor. No wheezing, rhonchi or rales.   Abdominal:      Tenderness: There is abdominal tenderness in the left upper quadrant.   Musculoskeletal: Normal range of motion.         General: No tenderness.   Lymphadenopathy:      Cervical: No cervical adenopathy.   Skin:     General: Skin is warm and dry.      Coloration: Skin is not pale.      Findings: No erythema or rash.   Neurological:      Mental Status: She is alert and oriented to person, place, and time.   Psychiatric:         Attention and Perception: Attention and perception normal.         Mood and Affect: Mood and affect normal.         Speech: Speech normal.         Behavior: Behavior normal. Behavior is cooperative.         Thought Content: Thought content normal. Thought content is not paranoid or delusional. Thought content does not include homicidal or suicidal ideation. Thought content does not include homicidal or suicidal plan.         Cognition and Memory: Cognition and memory normal.         Judgment: Judgment normal.           Assessment/Plan   Diagnoses and all orders for this visit:    1. Elevated LDL cholesterol level (Primary)  -     Lipid Panel; Future, will call with results.  Continue low-fat diet and routine exercise.    2. Class 2 severe obesity due to excess calories with serious comorbidity and body mass index (BMI) of 39.0 to 39.9 in adult (CMS/[facility])  -     We will start a trial of Contrave.  Patient does slowly titrate as noted below.  Discontinue for any side effects.  Recommended highly plant-based diet with lean meat and protein choices, portion control and exercise 3-5 times a week for least 30 minutes.  We will continue to monitor.  -Naltrexone-bupropion ER (CONTRAVE) 8-90 MG tablet; Wk 1: 1 tab daily, Wk 2: 1 tab twice a day, Wk 3: 2 tabs in AM, 1 tab in PM, Wk 4: 2 tabs twice a day,  Maintenance dose: 2 tabs twice daily.  Dispense: 120 tablet; Refill: 0    3. Elevated liver enzymes   CMP.  Will call with results.    4. LUQ pain  -     CT Abdomen Pelvis Without Contrast; Future, will call with results.    5. Screening mammogram, encounter for  -     Mammo Screening Digital Tomosynthesis Bilateral With CAD; Future will call with results.    6. Preventative health care  -     CBC & Differential; Future  -     Comprehensive Metabolic Panel; Future  -     Hemoglobin A1c; Future  -     Lipid Panel; Future  -     TSH; Future  -     T4, Free; Future, will call with results.    7.  Follow-up in 6 months or sooner for any acute needs.            This document has been electronically signed by MEL Houser on February 22, 2021 17:45 CST

## 2021-02-26 ENCOUNTER — HOSPITAL ENCOUNTER (OUTPATIENT)
Dept: CT IMAGING | Facility: HOSPITAL | Age: 51
Discharge: HOME | End: 2021-02-26
Admitting: NURSE PRACTITIONER

## 2021-02-26 DIAGNOSIS — R10.12 LUQ PAIN: ICD-10-CM

## 2021-02-26 PROCEDURE — 74176 CT ABD & PELVIS W/O CONTRAST: CPT

## 2021-03-16 ENCOUNTER — DOCUMENTATION (OUTPATIENT)
Dept: FAMILY MEDICINE CLINIC | Facility: CLINIC | Age: 51
End: 2021-03-16

## 2021-03-16 NOTE — PROGRESS NOTES
Prior authorization for CONTRAVE was denied; excluded from benefit plan. Patient/pharmacy notified.

## 2021-03-23 DIAGNOSIS — Z12.31 SCREENING MAMMOGRAM, ENCOUNTER FOR: ICD-10-CM

## 2021-04-08 ENCOUNTER — E-VISIT (OUTPATIENT)
Dept: FAMILY MEDICINE CLINIC | Facility: TELEHEALTH | Age: 51
End: 2021-04-08

## 2021-04-08 DIAGNOSIS — R39.89 SUSPECTED UTI: Primary | ICD-10-CM

## 2021-04-08 PROCEDURE — 99422 OL DIG E/M SVC 11-20 MIN: CPT | Performed by: NURSE PRACTITIONER

## 2021-04-08 RX ORDER — NITROFURANTOIN 25; 75 MG/1; MG/1
100 CAPSULE ORAL 2 TIMES DAILY
Qty: 10 CAPSULE | Refills: 0 | Status: SHIPPED | OUTPATIENT
Start: 2021-04-08 | End: 2021-04-13

## 2021-04-09 NOTE — PROGRESS NOTES
Letty Bach    1970  [id number]    I have reviewed the e-Visit questionnaire and patient's answers, my assessment and plan are as follows:    HPI  Patient reports dark yellow urine with odor and increased urine frequency for more than 2 weeks. Does not report fever, pain or burning with urination, back or belly pain, flank pain, or blood in urine. Denies pregnancy or breastfeeding. Denies genital sores, history of kidney problems, recent urological surgeries, or catheter use. Denies antibiotic use in the last 3 months. Reports she has had similar symptoms in the past and was treated with medication for urine infection.     Review of Systems - Negative except as listed in the HPI.  History obtained from chart review and the patient.      Diagnoses and all orders for this visit:    1. Suspected UTI (Primary)  -     nitrofurantoin, macrocrystal-monohydrate, (Macrobid) 100 MG capsule; Take 1 capsule by mouth 2 (Two) Times a Day for 5 days.  Dispense: 10 capsule; Refill: 0    Plan of Care:  -Complete entire course of medication even if you begin to feel better.   -Alternate Tylenol and Ibuprofen per package directions for pain  -Increase your fluid intake; avoid bladder irritants such as caffeine and alcohol  -Abstain from intercourse during antibiotic treatment.   -Practice good perineal hygiene: wipe front to back; showers preferred over tub baths   -Do not hold your urine- go to the bathroom every 2-3 hours.  Follow up in person with your primary care provider for no improvement in 2-3 days; go to the nearest urgent care center or ER for new or worsening symptoms.    Any medications prescribed have been sent electronically to   [facility] - [address] [place] [address] [address] AT [facility] &  [address] - [phone]  - [phone] [address]  [facility] [address]  Phone: [phone] Fax: [phone]    Time spent on this patient approx 15 minutes.    Nargis Sandhu,  APRN  04/08/21  23:49 EDT

## 2021-04-09 NOTE — PATIENT INSTRUCTIONS
Urinary Tract Infection, Adult  A urinary tract infection (UTI) is an infection of any part of the urinary tract. The urinary tract includes:  · The kidneys.  · The ureters.  · The bladder.  · The urethra.  These organs make, store, and get rid of pee (urine) in the body.  What are the causes?  This is caused by germs (bacteria) in your genital area. These germs grow and cause swelling (inflammation) of your urinary tract.  What increases the risk?  You are more likely to develop this condition if:  · You have a small, thin tube (catheter) to drain pee.  · You cannot control when you pee or poop (incontinence).  · You are female, and:  ? You use these methods to prevent pregnancy:  § A medicine that kills sperm (spermicide).  § A device that blocks sperm (diaphragm).  ? You have low levels of a female hormone (estrogen).  ? You are pregnant.  · You have genes that add to your risk.  · You are sexually active.  · You take antibiotic medicines.  · You have trouble peeing because of:  ? A prostate that is bigger than normal, if you are male.  ? A blockage in the part of your body that drains pee from the bladder (urethra).  ? A kidney stone.  ? A nerve condition that affects your bladder (neurogenic bladder).  ? Not getting enough to drink.  ? Not peeing often enough.  · You have other conditions, such as:  ? Diabetes.  ? A weak disease-fighting system (immune system).  ? Sickle cell disease.  ? Gout.  ? Injury of the spine.  What are the signs or symptoms?  Symptoms of this condition include:  · Needing to pee right away (urgently).  · Peeing often.  · Peeing small amounts often.  · Pain or burning when peeing.  · Blood in the pee.  · Pee that smells bad or not like normal.  · Trouble peeing.  · Pee that is cloudy.  · Fluid coming from the vagina, if you are female.  · Pain in the belly or lower back.  Other symptoms include:  · Throwing up (vomiting).  · No urge to eat.  · Feeling mixed up (confused).  · Being tired  and grouchy (irritable).  · A fever.  · Watery poop (diarrhea).  How is this treated?  This condition may be treated with:  · Antibiotic medicine.  · Other medicines.  · Drinking enough water.  Follow these instructions at home:    Medicines  · Take over-the-counter and prescription medicines only as told by your doctor.  · If you were prescribed an antibiotic medicine, take it as told by your doctor. Do not stop taking it even if you start to feel better.  General instructions  · Make sure you:  ? Pee until your bladder is empty.  ? Do not hold pee for a long time.  ? Empty your bladder after sex.  ? Wipe from front to back after pooping if you are a female. Use each tissue one time when you wipe.  · Drink enough fluid to keep your pee pale yellow.  · Keep all follow-up visits as told by your doctor. This is important.  Contact a doctor if:  · You do not get better after 1-2 days.  · Your symptoms go away and then come back.  Get help right away if:  · You have very bad back pain.  · You have very bad pain in your lower belly.  · You have a fever.  · You are sick to your stomach (nauseous).  · You are throwing up.  Summary  · A urinary tract infection (UTI) is an infection of any part of the urinary tract.  · This condition is caused by germs in your genital area.  · There are many risk factors for a UTI. These include having a small, thin tube to drain pee and not being able to control when you pee or poop.  · Treatment includes antibiotic medicines for germs.  · Drink enough fluid to keep your pee pale yellow.  This information is not intended to replace advice given to you by your health care provider. Make sure you discuss any questions you have with your health care provider.  Document Revised: 12/05/2019 Document Reviewed: 06/27/2019  [employer] Patient Education © 2021 [employer]  Nitrofurantoin tablets or capsules  What is this medicine?  NITROFURANTOIN (seb DYER toyn) is an antibiotic. It is used  to treat urinary tract infections.  This medicine may be used for other purposes; ask your health care provider or pharmacist if you have questions.  COMMON BRAND NAME(S): Macrobid, Macrodantin, Urotoin  What should I tell my health care provider before I take this medicine?  They need to know if you have any of these conditions:  · anemia  · diabetes  · glucose-6-phosphate dehydrogenase deficiency  · kidney disease  · liver disease  · lung disease  · other chronic illness  · an unusual or allergic reaction to nitrofurantoin, other antibiotics, other medicines, foods, dyes or preservatives  · pregnant or trying to get pregnant  · breast-feeding  How should I use this medicine?  Take this medicine by mouth with a glass of water. Follow the directions on the prescription label. Take this medicine with food or milk. Take your doses at regular intervals. Do not take your medicine more often than directed. Do not stop taking except on your doctor's advice.  Talk to your pediatrician regarding the use of this medicine in children. While this drug may be prescribed for selected conditions, precautions do apply.  Overdosage: If you think you have taken too much of this medicine contact a poison control center or emergency room at once.  NOTE: This medicine is only for you. Do not share this medicine with others.  What if I miss a dose?  If you miss a dose, take it as soon as you can. If it is almost time for your next dose, take only that dose. Do not take double or extra doses.  What may interact with this medicine?  · antacids containing magnesium trisilicate  · probenecid  · quinolone antibiotics like ciprofloxacin, lomefloxacin, norfloxacin and ofloxacin  · sulfinpyrazone  This list may not describe all possible interactions. Give your health care provider a list of all the medicines, herbs, non-prescription drugs, or dietary supplements you use. Also tell them if you smoke, drink alcohol, or use illegal drugs. Some  items may interact with your medicine.  What should I watch for while using this medicine?  Tell your doctor or health care professional if your symptoms do not improve or if you get new symptoms. Drink several glasses of water a day. If you are taking this medicine for a long time, visit your doctor for regular checks on your progress.  If you are diabetic, you may get a false positive result for sugar in your urine with certain brands of urine tests. Check with your doctor.  What side effects may I notice from receiving this medicine?  Side effects that you should report to your doctor or health care professional as soon as possible:  · allergic reactions like skin rash or hives, swelling of the face, lips, or tongue  · chest pain  · cough  · difficulty breathing  · dizziness, drowsiness  · fever or infection  · joint aches or pains  · pale or blue-tinted skin  · redness, blistering, peeling or loosening of the skin, including inside the mouth  · tingling, burning, pain, or numbness in hands or feet  · unusual bleeding or bruising  · unusually weak or tired  · yellowing of eyes or skin  Side effects that usually do not require medical attention (report to your doctor or health care professional if they continue or are bothersome):  · dark urine  · diarrhea  · headache  · loss of appetite  · nausea or vomiting  · temporary hair loss  This list may not describe all possible side effects. Call your doctor for medical advice about side effects. You may report side effects to FDA at 1-353-FDA-1914.  Where should I keep my medicine?  Keep out of the reach of children.  Store at room temperature between 15 and 30 degrees C (59 and 86 degrees F). Protect from light. Throw away any unused medicine after the expiration date.  NOTE: This sheet is a summary. It may not cover all possible information. If you have questions about this medicine, talk to your doctor, pharmacist, or health care provider.  © 2021 [employer] (2009-07-08 15:56:47)

## 2021-04-30 ENCOUNTER — OFFICE VISIT (OUTPATIENT)
Dept: FAMILY MEDICINE CLINIC | Facility: CLINIC | Age: 51
End: 2021-04-30

## 2021-04-30 VITALS
OXYGEN SATURATION: 96 % | BODY MASS INDEX: 40.2 KG/M2 | HEIGHT: 65 IN | RESPIRATION RATE: 20 BRPM | SYSTOLIC BLOOD PRESSURE: 140 MMHG | HEART RATE: 69 BPM | TEMPERATURE: 96 F | DIASTOLIC BLOOD PRESSURE: 90 MMHG | WEIGHT: 241.3 LBS

## 2021-04-30 DIAGNOSIS — R00.2 PALPITATIONS: ICD-10-CM

## 2021-04-30 DIAGNOSIS — R29.810 FACIAL DROOP: Primary | ICD-10-CM

## 2021-04-30 PROCEDURE — 93010 ELECTROCARDIOGRAM REPORT: CPT | Performed by: INTERNAL MEDICINE

## 2021-04-30 PROCEDURE — 99214 OFFICE O/P EST MOD 30 MIN: CPT | Performed by: NURSE PRACTITIONER

## 2021-04-30 PROCEDURE — 93005 ELECTROCARDIOGRAM TRACING: CPT | Performed by: NURSE PRACTITIONER

## 2021-04-30 NOTE — PROGRESS NOTES
Subjective   Letty Bach is a 51 y.o. female.     CC: Mild facial droop, palpitations    Neurologic Problem  The patient's primary symptoms include focal weakness and weakness (left sided facial droop per family). The patient's pertinent negatives include no altered mental status, clumsiness, focal sensory loss, loss of balance, memory loss, near-syncope, slurred speech, syncope or visual change. Primary symptoms comment: Family reports noticing mild facial droop and recent pictures.  Patient denies any neurologic deficits or recent illnesses.. This is a new problem. The current episode started more than 1 month ago. The neurological problem developed suddenly. There was no focality noted. Associated symptoms include palpitations (intermittent over the last 3-4 weeks). Pertinent negatives include no abdominal pain, auditory change, aura, back pain, bladder incontinence, bowel incontinence, chest pain, confusion, diaphoresis, dizziness, fatigue, fever, headaches, light-headedness, nausea, neck pain, shortness of breath, vertigo or vomiting. Past treatments include nothing. The treatment provided no relief. There is no history of a CVA, head trauma or seizures.   Palpitations   This is a new problem. The current episode started 1 to 4 weeks ago. The problem occurs intermittently. The problem has been waxing and waning. The symptoms are aggravated by stress. Associated symptoms include an irregular heartbeat and weakness (left sided facial droop per family). Pertinent negatives include no anxiety, chest fullness, chest pain, coughing, diaphoresis, dizziness, fever, malaise/fatigue, nausea, near-syncope, numbness, shortness of breath, syncope or vomiting. Associated symptoms comments: Increased work stress  . She has tried nothing for the symptoms. The treatment provided moderate relief. Risk factors include obesity, sedentary lifestyle and stress.        The following portions of the patient's history were reviewed  and updated as appropriate: allergies, current medications, past family history, past medical history, past social history, past surgical history and problem list.    Review of Systems   Constitutional: Negative for activity change, appetite change, chills, diaphoresis, fatigue, fever, malaise/fatigue, unexpected weight gain and unexpected weight loss.   HENT: Negative for congestion, sore throat, trouble swallowing and voice change.    Eyes: Negative.    Respiratory: Negative for cough, chest tightness, shortness of breath and wheezing.    Cardiovascular: Positive for palpitations (intermittent over the last 3-4 weeks). Negative for chest pain, leg swelling, syncope and near-syncope.   Gastrointestinal: Negative for abdominal pain, bowel incontinence, constipation, diarrhea, nausea and vomiting.   Endocrine: Negative.    Genitourinary: Negative for dysuria and urinary incontinence.   Musculoskeletal: Negative for arthralgias, back pain, myalgias and neck pain.   Skin: Negative for rash.   Allergic/Immunologic: Negative.    Neurological: Positive for focal weakness and weakness (left sided facial droop per family). Negative for dizziness, vertigo, tremors, seizures, syncope, facial asymmetry, speech difficulty, light-headedness, numbness, headache, loss of balance, memory problem and confusion.   Hematological: Negative.    Psychiatric/Behavioral: Negative.  Negative for memory loss. The patient is not nervous/anxious.        Objective   Physical Exam  Vitals and nursing note reviewed.   Constitutional:       General: She is not in acute distress.     Appearance: Normal appearance. She is well-developed. She is obese. She is not ill-appearing, toxic-appearing or diaphoretic.   HENT:      Head: Normocephalic and atraumatic.   Eyes:      Extraocular Movements: Extraocular movements intact.      Conjunctiva/sclera: Conjunctivae normal.      Pupils: Pupils are equal, round, and reactive to light.   Neck:      Vascular:  No carotid bruit.   Cardiovascular:      Rate and Rhythm: Normal rate and regular rhythm.      Heart sounds: Normal heart sounds. No murmur heard.   No friction rub. No gallop.    Pulmonary:      Effort: Pulmonary effort is normal. No respiratory distress.      Breath sounds: Normal breath sounds. No stridor. No wheezing, rhonchi or rales.   Musculoskeletal:         General: No tenderness. Normal range of motion.      Cervical back: Normal range of motion.   Skin:     General: Skin is warm and dry.      Coloration: Skin is not pale.      Findings: No erythema or rash.   Neurological:      General: No focal deficit present.      Mental Status: She is alert and oriented to person, place, and time.      Cranial Nerves: No cranial nerve deficit.      Sensory: No sensory deficit.      Motor: No weakness.      Coordination: Coordination normal.      Gait: Gait normal.      Comments: Possibly very mild left-sided facial droop noted in the corner of the mouth on the left side.  However cranial nerve/neuro assessment grossly unremarkable.   Psychiatric:         Behavior: Behavior normal.         Thought Content: Thought content normal.         Judgment: Judgment normal.           Assessment/Plan   Diagnoses and all orders for this visit:    1. Facial droop (Primary)  -     US Carotid Bilateral; Future  -     CT Head Without Contrast; Future   -Neuro exam grossly unremarkable.  Will call with ultrasound and CT results.  Instructed patient to present to the ER for any new, recurrent or worsening symptoms.  Patient verbalized understanding of instruction.    2. Palpitations  -     ECG 12 Lead   -EKG shows sinus bradycardia with a heart rate of 55.  Patient reports increase in stress at work and that is when she noticed her palpitations.  Encourage measures to reduce stress.  Instructed patient that if symptoms continue to please let me know and we will order a Holter monitor for further evaluation.  Blood pressure controlled.   Patient has no chest pain, shortness of breath.  Patient verbalized understanding of instruction.    3.  Further follow-up plan of care pending radiology results.  Otherwise follow-up as scheduled or sooner for any acute needs.            This document has been electronically signed by MEL Houser on April 30, 2021 09:39 CDT

## 2021-05-06 LAB
QT INTERVAL: 414 MS
QTC INTERVAL: 396 MS

## 2021-05-13 ENCOUNTER — LAB (OUTPATIENT)
Dept: LAB | Facility: HOSPITAL | Age: 51
End: 2021-05-13

## 2021-05-13 DIAGNOSIS — Z00.00 PREVENTATIVE HEALTH CARE: ICD-10-CM

## 2021-05-13 DIAGNOSIS — R74.8 ELEVATED LIVER ENZYMES: ICD-10-CM

## 2021-05-13 DIAGNOSIS — E78.00 ELEVATED LDL CHOLESTEROL LEVEL: ICD-10-CM

## 2021-05-13 LAB
ALBUMIN SERPL-MCNC: 3.9 G/DL (ref 3.5–5.2)
ALBUMIN/GLOB SERPL: 1.2 G/DL
ALP SERPL-CCNC: 118 U/L (ref 39–117)
ALT SERPL W P-5'-P-CCNC: 18 U/L (ref 1–33)
ANION GAP SERPL CALCULATED.3IONS-SCNC: 6.2 MMOL/L (ref 5–15)
AST SERPL-CCNC: 16 U/L (ref 1–32)
BASOPHILS # BLD AUTO: 0.03 10*3/MM3 (ref 0–0.2)
BASOPHILS NFR BLD AUTO: 0.6 % (ref 0–1.5)
BILIRUB CONJ SERPL-MCNC: <0.2 MG/DL (ref 0–0.3)
BILIRUB SERPL-MCNC: 0.4 MG/DL (ref 0–1.2)
BUN SERPL-MCNC: 13 MG/DL (ref 6–20)
BUN/CREAT SERPL: 17.1 (ref 7–25)
CALCIUM SPEC-SCNC: 9.5 MG/DL (ref 8.6–10.5)
CHLORIDE SERPL-SCNC: 106 MMOL/L (ref 98–107)
CHOLEST SERPL-MCNC: 186 MG/DL (ref 0–200)
CO2 SERPL-SCNC: 30.8 MMOL/L (ref 22–29)
CREAT SERPL-MCNC: 0.76 MG/DL (ref 0.57–1)
DEPRECATED RDW RBC AUTO: 43.2 FL (ref 37–54)
EOSINOPHIL # BLD AUTO: 0.1 10*3/MM3 (ref 0–0.4)
EOSINOPHIL NFR BLD AUTO: 1.8 % (ref 0.3–6.2)
ERYTHROCYTE [DISTWIDTH] IN BLOOD BY AUTOMATED COUNT: 12.2 % (ref 12.3–15.4)
GFR SERPL CREATININE-BSD FRML MDRD: 81 ML/MIN/1.73
GLOBULIN UR ELPH-MCNC: 3.2 GM/DL
GLUCOSE SERPL-MCNC: 87 MG/DL (ref 65–99)
HBA1C MFR BLD: 5.16 % (ref 4.8–5.6)
HCT VFR BLD AUTO: 41.7 % (ref 34–46.6)
HCV AB SER DONR QL: NORMAL
HDLC SERPL-MCNC: 58 MG/DL (ref 40–60)
HGB BLD-MCNC: 13.7 G/DL (ref 12–15.9)
IMM GRANULOCYTES # BLD AUTO: 0.01 10*3/MM3 (ref 0–0.05)
IMM GRANULOCYTES NFR BLD AUTO: 0.2 % (ref 0–0.5)
LDLC SERPL CALC-MCNC: 117 MG/DL (ref 0–100)
LDLC/HDLC SERPL: 2.01 {RATIO}
LYMPHOCYTES # BLD AUTO: 1.77 10*3/MM3 (ref 0.7–3.1)
LYMPHOCYTES NFR BLD AUTO: 32.7 % (ref 19.6–45.3)
MCH RBC QN AUTO: 31.4 PG (ref 26.6–33)
MCHC RBC AUTO-ENTMCNC: 32.9 G/DL (ref 31.5–35.7)
MCV RBC AUTO: 95.4 FL (ref 79–97)
MONOCYTES # BLD AUTO: 0.33 10*3/MM3 (ref 0.1–0.9)
MONOCYTES NFR BLD AUTO: 6.1 % (ref 5–12)
NEUTROPHILS NFR BLD AUTO: 3.18 10*3/MM3 (ref 1.7–7)
NEUTROPHILS NFR BLD AUTO: 58.6 % (ref 42.7–76)
NRBC BLD AUTO-RTO: 0 /100 WBC (ref 0–0.2)
PLATELET # BLD AUTO: 276 10*3/MM3 (ref 140–450)
PMV BLD AUTO: 10.6 FL (ref 6–12)
POTASSIUM SERPL-SCNC: 4.8 MMOL/L (ref 3.5–5.2)
PROT SERPL-MCNC: 7.1 G/DL (ref 6–8.5)
RBC # BLD AUTO: 4.37 10*6/MM3 (ref 3.77–5.28)
SODIUM SERPL-SCNC: 143 MMOL/L (ref 136–145)
T4 FREE SERPL-MCNC: 1.22 NG/DL (ref 0.93–1.7)
TRIGL SERPL-MCNC: 57 MG/DL (ref 0–150)
TSH SERPL DL<=0.05 MIU/L-ACNC: 1.15 UIU/ML (ref 0.27–4.2)
VLDLC SERPL-MCNC: 11 MG/DL (ref 5–40)
WBC # BLD AUTO: 5.42 10*3/MM3 (ref 3.4–10.8)

## 2021-05-13 PROCEDURE — 84439 ASSAY OF FREE THYROXINE: CPT

## 2021-05-13 PROCEDURE — 80053 COMPREHEN METABOLIC PANEL: CPT

## 2021-05-13 PROCEDURE — 83036 HEMOGLOBIN GLYCOSYLATED A1C: CPT

## 2021-05-13 PROCEDURE — 82248 BILIRUBIN DIRECT: CPT | Performed by: INTERNAL MEDICINE

## 2021-05-13 PROCEDURE — 86803 HEPATITIS C AB TEST: CPT

## 2021-05-13 PROCEDURE — 84443 ASSAY THYROID STIM HORMONE: CPT

## 2021-05-13 PROCEDURE — 85025 COMPLETE CBC W/AUTO DIFF WBC: CPT

## 2021-05-13 PROCEDURE — 80061 LIPID PANEL: CPT

## 2021-05-13 PROCEDURE — 36415 COLL VENOUS BLD VENIPUNCTURE: CPT

## 2021-05-18 ENCOUNTER — OFFICE VISIT (OUTPATIENT)
Dept: GASTROENTEROLOGY | Facility: CLINIC | Age: 51
End: 2021-05-18

## 2021-05-18 VITALS
DIASTOLIC BLOOD PRESSURE: 73 MMHG | BODY MASS INDEX: 40.29 KG/M2 | HEIGHT: 65 IN | WEIGHT: 241.8 LBS | HEART RATE: 70 BPM | SYSTOLIC BLOOD PRESSURE: 123 MMHG

## 2021-05-18 DIAGNOSIS — R10.12 LUQ PAIN: Primary | ICD-10-CM

## 2021-05-18 PROCEDURE — 99214 OFFICE O/P EST MOD 30 MIN: CPT | Performed by: INTERNAL MEDICINE

## 2021-05-18 RX ORDER — OMEPRAZOLE 40 MG/1
40 CAPSULE, DELAYED RELEASE ORAL DAILY
Qty: 30 CAPSULE | Refills: 11 | Status: SHIPPED | OUTPATIENT
Start: 2021-05-18 | End: 2021-08-27

## 2021-05-18 NOTE — PATIENT INSTRUCTIONS
"BMI for Adults  What is BMI?  Body mass index (BMI) is a number that is calculated from a person's weight and height. BMI can help estimate how much of a person's weight is composed of fat. BMI does not measure body fat directly. Rather, it is an alternative to procedures that directly measure body fat, which can be difficult and expensive.  BMI can help identify people who may be at higher risk for certain medical problems.  What are BMI measurements used for?  BMI is used as a screening tool to identify possible weight problems. It helps determine whether a person is obese, overweight, a healthy weight, or underweight.  BMI is useful for:  · Identifying a weight problem that may be related to a medical condition or may increase the risk for medical problems.  · Promoting changes, such as changes in diet and exercise, to help reach a healthy weight. BMI screening can be repeated to see if these changes are working.  How is BMI calculated?  BMI involves measuring your weight in relation to your height. Both height and weight are measured, and the BMI is calculated from those numbers. This can be done either in English ([place] or metric measurements. Note that charts and online BMI calculators are available to help you find your BMI quickly and easily without having to do these calculations yourself.  To calculate your BMI in English ([place] measurements:    1. Measure your weight in pounds (lb).  2. Multiply the number of pounds by 703.  ? For example, for a person who weighs 180 lb, multiply that number by 703, which equals 126,540.  3. Measure your height in inches. Then multiply that number by itself to get a measurement called \"inches squared.\"  ? For example, for a person who is 70 inches tall, the \"inches squared\" measurement is 70 inches x 70 inches, which equals 4,900 inches squared.  4. Divide the total from step 2 (number of lb x 703) by the total from step 3 (inches squared): 126,540 ÷ 4,900 = 25.8. This is " "your BMI.  To calculate your BMI in metric measurements:  1. Measure your weight in kilograms (kg).  2. Measure your height in meters (m). Then multiply that number by itself to get a measurement called \"meters squared.\"  ? For example, for a person who is 1.75 m tall, the \"meters squared\" measurement is 1.75 m x 1.75 m, which is equal to 3.1 meters squared.  3. Divide the number of kilograms (your weight) by the meters squared number. In this example: 70 ÷ 3.1 = 22.6. This is your BMI.  What do the results mean?  BMI charts are used to identify whether you are underweight, normal weight, overweight, or obese. The following guidelines will be used:  · Underweight: BMI less than 18.5.  · Normal weight: BMI between 18.5 and 24.9.  · Overweight: BMI between 25 and 29.9.  · Obese: BMI of 30 or above.  Keep these notes in mind:  · Weight includes both fat and muscle, so someone with a muscular build, such as an athlete, may have a BMI that is higher than 24.9. In cases like these, BMI is not an accurate measure of body fat.  · To determine if excess body fat is the cause of a BMI of 25 or higher, further assessments may need to be done by a health care provider.  · BMI is usually interpreted in the same way for men and women.  Where to find more information  For more information about BMI, including tools to quickly calculate your BMI, go to these websites:  · Centers for Disease Control and Prevention: www.cdc.gov  · American Heart Association: www.heart.org  · National Heart, Lung, [facility] [employer] [place]: www.nhlbi.nih.gov  Summary  · Body mass index (BMI) is a number that is calculated from a person's weight and height.  · BMI may help estimate how much of a person's weight is composed of fat. BMI can help identify those who may be at higher risk for certain medical problems.  · BMI can be measured using English measurements or metric measurements.  · BMI charts are used to identify whether you are underweight, normal " weight, overweight, or obese.  This information is not intended to replace advice given to you by your health care provider. Make sure you discuss any questions you have with your health care provider.  Document Revised: 09/09/2020 Document Reviewed: 07/17/2020  [employer] Patient Education © 2021 [employer]

## 2021-05-18 NOTE — PROGRESS NOTES
Chief Complaint   Patient presents with   • 3 month f/u       Subjective    Letty Bach is a 51 y.o. female.    History of Present Illness  Patient presented to GI clinic for follow-up visit today.  Has worsening GERD for past few weeks associated with left upper quadrant pain.  Denied nausea, vomiting, diarrhea, constipation, rectal bleeding or weight loss.  Off PPI currently.  CT abdomen pelvis was unremarkable.       The following portions of the patient's history were reviewed and updated as appropriate:   Past Medical History:   Diagnosis Date   • GERD (gastroesophageal reflux disease)    • Urinary tract infection      Past Surgical History:   Procedure Laterality Date   • CHOLECYSTECTOMY  06/06/2019   • CHOLECYSTECTOMY WITH INTRAOPERATIVE CHOLANGIOGRAM N/A 6/6/2019    Procedure: CHOLECYSTECTOMY LAPAROSCOPIC INTRAOPERATIVE CHOLANGIOGRAM      (c-arm#2);  Surgeon: Felipe Overton MD;  Location: [facility] OR;  Service: General   • COLONOSCOPY N/A 2/12/2021    Procedure: COLONOSCOPY;  Surgeon: Joao Bruce MD;  Location: [facility] ENDOSCOPY;  Service: Gastroenterology;  Laterality: N/A;   • ENDOSCOPY N/A 11/14/2019    Procedure: ESOPHAGOGASTRODUODENOSCOPY;  Surgeon: Joao Bruce MD;  Location: [facility] ENDOSCOPY;  Service: Gastroenterology   • HYSTERECTOMY  2007   • UPPER GASTROINTESTINAL ENDOSCOPY  11/14/2019     Family History   Problem Relation Age of Onset   • Breast cancer Mother    • Thyroid disease Mother    • Hypertension Mother    • Hypertension Father    • Diabetes Father    • Kidney disease Father    • Heart disease Father    • No Known Problems Sister    • No Known Problems Brother    • Seizures Daughter    • Hypertension Daughter    • COPD Maternal Grandmother    • Asthma Maternal Grandmother    • No Known Problems Sister    • ADD / ADHD Daughter    • Polymyositis Daughter      OB History    No obstetric history on file.       Prior to Admission medications    Medication Sig Start Date End  Date Taking? Authorizing Provider   naltrexone-bupropion ER (CONTRAVE) 8-90 MG tablet Wk 1: 1 tab daily, Wk 2: 1 tab twice a day, Wk 3: 2 tabs in AM, 1 tab in PM, Wk 4: 2 tabs twice a day, Maintenance dose: 2 tabs twice daily. 2/22/21   Aldair Esteves APRN   omeprazole (priLOSEC) 40 MG capsule Take 1 capsule by mouth Daily. 5/18/21   Joao Bruce MD     No Known Allergies  Social History     Socioeconomic History   • Marital status:      Spouse name: Not on file   • Number of children: Not on file   • Years of education: Not on file   • Highest education level: Not on file   Tobacco Use   • Smoking status: Former Smoker     Packs/day: 0.50     Years: 5.00     Pack years: 2.50     Types: Cigarettes   • Smokeless tobacco: Never Used   Vaping Use   • Vaping Use: Never used   Substance and Sexual Activity   • Alcohol use: No   • Drug use: No   • Sexual activity: Defer       Review of Systems  Review of Systems   Constitutional: Negative for chills, fatigue, fever and unexpected weight change.   HENT: Negative for congestion, ear discharge, hearing loss, nosebleeds and sore throat.    Eyes: Negative for pain, discharge and redness.   Respiratory: Negative for cough, chest tightness, shortness of breath and wheezing.    Cardiovascular: Negative for chest pain and palpitations.   Gastrointestinal: Positive for abdominal pain. Negative for abdominal distention, blood in stool, constipation, diarrhea, nausea and vomiting.   Endocrine: Negative for cold intolerance, polydipsia, polyphagia and polyuria.   Genitourinary: Negative for dysuria, flank pain, frequency, hematuria and urgency.   Musculoskeletal: Negative for arthralgias, back pain, joint swelling and myalgias.   Skin: Negative for color change, pallor and rash.   Neurological: Negative for tremors, seizures, syncope, weakness and headaches.   Hematological: Negative for adenopathy. Does not bruise/bleed easily.   Psychiatric/Behavioral: Negative for  "behavioral problems, confusion, dysphoric mood, hallucinations and suicidal ideas. The patient is not nervous/anxious.         /73 (BP Location: Right arm)   Pulse 70   Ht 165.1 cm (65\")   Wt 110 kg (241 lb 12.8 oz)   BMI 40.24 kg/m²     Objective    Physical Exam  Constitutional:       Appearance: She is well-developed.   HENT:      Head: Normocephalic and atraumatic.   Eyes:      Conjunctiva/sclera: Conjunctivae normal.      Pupils: Pupils are equal, round, and reactive to light.   Neck:      Thyroid: No thyromegaly.   Cardiovascular:      Rate and Rhythm: Normal rate and regular rhythm.      Heart sounds: Normal heart sounds. No murmur heard.     Pulmonary:      Effort: Pulmonary effort is normal.      Breath sounds: Normal breath sounds. No wheezing.   Abdominal:      General: Bowel sounds are normal. There is no distension.      Palpations: Abdomen is soft. There is no mass.      Tenderness: There is no abdominal tenderness.      Hernia: No hernia is present.   Genitourinary:     Comments: No lesions noted  Musculoskeletal:         General: No tenderness. Normal range of motion.      Cervical back: Normal range of motion and neck supple.   Lymphadenopathy:      Cervical: No cervical adenopathy.   Skin:     General: Skin is warm and dry.      Findings: No rash.   Neurological:      Mental Status: She is alert and oriented to person, place, and time.      Cranial Nerves: No cranial nerve deficit.   Psychiatric:         Thought Content: Thought content normal.       Lab on 05/13/2021   Component Date Value Ref Range Status   • Glucose 05/13/2021 87  65 - 99 mg/dL Final   • BUN 05/13/2021 13  6 - 20 mg/dL Final   • Creatinine 05/13/2021 0.76  0.57 - 1.00 mg/dL Final   • Sodium 05/13/2021 143  136 - 145 mmol/L Final   • Potassium 05/13/2021 4.8  3.5 - 5.2 mmol/L Final   • Chloride 05/13/2021 106  98 - 107 mmol/L Final   • CO2 05/13/2021 30.8* 22.0 - 29.0 mmol/L Final   • Calcium 05/13/2021 9.5  8.6 - 10.5 " mg/dL Final   • Total Protein 05/13/2021 7.1  6.0 - 8.5 g/dL Final   • Albumin 05/13/2021 3.90  3.50 - 5.20 g/dL Final   • ALT (SGPT) 05/13/2021 18  1 - 33 U/L Final   • AST (SGOT) 05/13/2021 16  1 - 32 U/L Final   • Alkaline Phosphatase 05/13/2021 118* 39 - 117 U/L Final   • Total Bilirubin 05/13/2021 0.4  0.0 - 1.2 mg/dL Final   • eGFR Non African Amer 05/13/2021 81  >60 mL/min/1.73 Final   • Globulin 05/13/2021 3.2  gm/dL Final   • A/G Ratio 05/13/2021 1.2  g/dL Final   • BUN/Creatinine Ratio 05/13/2021 17.1  7.0 - 25.0 Final   • Anion Gap 05/13/2021 6.2  5.0 - 15.0 mmol/L Final   • Total Cholesterol 05/13/2021 186  0 - 200 mg/dL Final   • Triglycerides 05/13/2021 57  0 - 150 mg/dL Final   • HDL Cholesterol 05/13/2021 58  40 - 60 mg/dL Final   • LDL Cholesterol  05/13/2021 117* 0 - 100 mg/dL Final   • VLDL Cholesterol 05/13/2021 11  5 - 40 mg/dL Final   • LDL/HDL Ratio 05/13/2021 2.01   Final   • Hemoglobin A1C 05/13/2021 5.16  4.80 - 5.60 % Final   • TSH 05/13/2021 1.150  0.270 - 4.200 uIU/mL Final   • Free T4 05/13/2021 1.22  0.93 - 1.70 ng/dL Final   • WBC 05/13/2021 5.42  3.40 - 10.80 10*3/mm3 Final   • RBC 05/13/2021 4.37  3.77 - 5.28 10*6/mm3 Final   • Hemoglobin 05/13/2021 13.7  12.0 - 15.9 g/dL Final   • Hematocrit 05/13/2021 41.7  34.0 - 46.6 % Final   • MCV 05/13/2021 95.4  79.0 - 97.0 fL Final   • MCH 05/13/2021 31.4  26.6 - 33.0 pg Final   • MCHC 05/13/2021 32.9  31.5 - 35.7 g/dL Final   • RDW 05/13/2021 12.2* 12.3 - 15.4 % Final   • RDW-SD 05/13/2021 43.2  37.0 - 54.0 fl Final   • MPV 05/13/2021 10.6  6.0 - 12.0 fL Final   • Platelets 05/13/2021 276  140 - 450 10*3/mm3 Final   • Neutrophil % 05/13/2021 58.6  42.7 - 76.0 % Final   • Lymphocyte % 05/13/2021 32.7  19.6 - 45.3 % Final   • Monocyte % 05/13/2021 6.1  5.0 - 12.0 % Final   • Eosinophil % 05/13/2021 1.8  0.3 - 6.2 % Final   • Basophil % 05/13/2021 0.6  0.0 - 1.5 % Final   • Immature Grans % 05/13/2021 0.2  0.0 - 0.5 % Final   • Neutrophils,  Absolute 05/13/2021 3.18  1.70 - 7.00 10*3/mm3 Final   • Lymphocytes, Absolute 05/13/2021 1.77  0.70 - 3.10 10*3/mm3 Final   • Monocytes, Absolute 05/13/2021 0.33  0.10 - 0.90 10*3/mm3 Final   • Eosinophils, Absolute 05/13/2021 0.10  0.00 - 0.40 10*3/mm3 Final   • Basophils, Absolute 05/13/2021 0.03  0.00 - 0.20 10*3/mm3 Final   • Immature Grans, Absolute 05/13/2021 0.01  0.00 - 0.05 10*3/mm3 Final   • nRBC 05/13/2021 0.0  0.0 - 0.2 /100 WBC Final     Assessment/Plan    No diagnosis found..   1.  Elevated alkaline phosphatase level likely due to fatty liver disease.  Recommend exercise and diet control.  Patient was counseled.  Repeat LFTs in next visit.  2.  Obesity, continue exercise and diet control regimen.  3.  Left upper quadrant pain, likely due to gastritis.  Resume PPI.  4.  GERD, has recurrent symptoms with discontinuation of PPI.  Resume PPI and antireflux lifestyle.  5.    Diverticulosis, high-fiber diet.  6.  Colorectal cancer screening, repeat colonoscopy in 5 years.    Orders placed during this encounter include:  No orders of the defined types were placed in this encounter.      * Surgery not found *    Review and/or summary of lab tests, radiology, procedures, medications. Review and summary of old records and obtaining of history. The risks and benefits of my recommendations, as well as other treatment options were discussed with the patient today. Questions were answered.    New Medications Ordered This Visit   Medications   • omeprazole (priLOSEC) 40 MG capsule     Sig: Take 1 capsule by mouth Daily.     Dispense:  30 capsule     Refill:  11       Follow-up: Return in about 1 month (around 6/18/2021).               Results for orders placed or performed in visit on 05/13/21   CBC Auto Differential    Specimen: Blood   Result Value Ref Range    WBC 5.42 3.40 - 10.80 10*3/mm3    RBC 4.37 3.77 - 5.28 10*6/mm3    Hemoglobin 13.7 12.0 - 15.9 g/dL    Hematocrit 41.7 34.0 - 46.6 %    MCV 95.4 79.0 -  97.0 fL    MCH 31.4 26.6 - 33.0 pg    MCHC 32.9 31.5 - 35.7 g/dL    RDW 12.2 (L) 12.3 - 15.4 %    RDW-SD 43.2 37.0 - 54.0 fl    MPV 10.6 6.0 - 12.0 fL    Platelets 276 140 - 450 10*3/mm3    Neutrophil % 58.6 42.7 - 76.0 %    Lymphocyte % 32.7 19.6 - 45.3 %    Monocyte % 6.1 5.0 - 12.0 %    Eosinophil % 1.8 0.3 - 6.2 %    Basophil % 0.6 0.0 - 1.5 %    Immature Grans % 0.2 0.0 - 0.5 %    Neutrophils, Absolute 3.18 1.70 - 7.00 10*3/mm3    Lymphocytes, Absolute 1.77 0.70 - 3.10 10*3/mm3    Monocytes, Absolute 0.33 0.10 - 0.90 10*3/mm3    Eosinophils, Absolute 0.10 0.00 - 0.40 10*3/mm3    Basophils, Absolute 0.03 0.00 - 0.20 10*3/mm3    Immature Grans, Absolute 0.01 0.00 - 0.05 10*3/mm3    nRBC 0.0 0.0 - 0.2 /100 WBC   TSH    Specimen: Blood   Result Value Ref Range    TSH 1.150 0.270 - 4.200 uIU/mL   T4, Free    Specimen: Blood   Result Value Ref Range    Free T4 1.22 0.93 - 1.70 ng/dL   Hemoglobin A1c    Specimen: Blood   Result Value Ref Range    Hemoglobin A1C 5.16 4.80 - 5.60 %   Lipid Panel    Specimen: Blood   Result Value Ref Range    Total Cholesterol 186 0 - 200 mg/dL    Triglycerides 57 0 - 150 mg/dL    HDL Cholesterol 58 40 - 60 mg/dL    LDL Cholesterol  117 (H) 0 - 100 mg/dL    VLDL Cholesterol 11 5 - 40 mg/dL    LDL/HDL Ratio 2.01    Comprehensive Metabolic Panel    Specimen: Blood   Result Value Ref Range    Glucose 87 65 - 99 mg/dL    BUN 13 6 - 20 mg/dL    Creatinine 0.76 0.57 - 1.00 mg/dL    Sodium 143 136 - 145 mmol/L    Potassium 4.8 3.5 - 5.2 mmol/L    Chloride 106 98 - 107 mmol/L    CO2 30.8 (H) 22.0 - 29.0 mmol/L    Calcium 9.5 8.6 - 10.5 mg/dL    Total Protein 7.1 6.0 - 8.5 g/dL    Albumin 3.90 3.50 - 5.20 g/dL    ALT (SGPT) 18 1 - 33 U/L    AST (SGOT) 16 1 - 32 U/L    Alkaline Phosphatase 118 (H) 39 - 117 U/L    Total Bilirubin 0.4 0.0 - 1.2 mg/dL    eGFR Non African Amer 81 >60 mL/min/1.73    Globulin 3.2 gm/dL    A/G Ratio 1.2 g/dL    BUN/Creatinine Ratio 17.1 7.0 - 25.0    Anion Gap 6.2 5.0 -  15.0 mmol/L   Results for orders placed or performed in visit on 04/30/21   ECG 12 Lead   Result Value Ref Range    QT Interval 414 ms    QTC Interval 396 ms   Results for orders placed or performed in visit on 02/18/21   Bilirubin, Direct    Specimen: Blood   Result Value Ref Range    Bilirubin, Direct <0.2 0.0 - 0.3 mg/dL   Results for orders placed or performed during the hospital encounter of 02/12/21   Tissue Pathology Exam    Specimen: Large Intestine, Sigmoid Colon; Polyp   Result Value Ref Range    Case Report       Surgical Pathology Report                         Case: [id number]                                  Authorizing Provider:  Joao Bruce MD      Collected:           02/12/2021 02:47 PM          Ordering Location:     [facility]             Received:            02/15/2021 07:01 AM                                 [place]                                                     Pathologist:           Law Lujan MD                                                     Specimen:    Large Intestine, Sigmoid Colon, polyps                                                     Final Diagnosis       SEE SCANNED REPORT       Results for orders placed or performed in visit on 02/09/21   COVID-19,[facility] PANTHER,[facility] IN-HOUSE, NP/OP SWAB IN UTM/VTM/[facility] TRANSPORT MEDIA,24 HR TAT - Swab, Nasopharynx    Specimen: Nasopharynx; Swab   Result Value Ref Range    COVID19 Not Detected Not Detected - Ref. Range   Results for orders placed or performed in visit on 01/18/21   Hepatic Function Panel    Specimen: Blood   Result Value Ref Range    Total Protein 7.1 6.0 - 8.5 g/dL    Albumin 4.00 3.50 - 5.20 g/dL    ALT (SGPT) 16 1 - 33 U/L    AST (SGOT) 14 1 - 32 U/L    Alkaline Phosphatase 135 (H) 39 - 117 U/L    Total Bilirubin 0.3 0.0 - 1.2 mg/dL    Bilirubin, Direct <0.2 0.0 - 0.3 mg/dL    Bilirubin, Indirect     Results for orders placed or performed in visit on 08/24/20   CBC Auto  Differential    Specimen: Blood   Result Value Ref Range    WBC 5.33 3.40 - 10.80 10*3/mm3    RBC 4.49 3.77 - 5.28 10*6/mm3    Hemoglobin 13.9 12.0 - 15.9 g/dL    Hematocrit 40.4 34.0 - 46.6 %    MCV 90.0 79.0 - 97.0 fL    MCH 31.0 26.6 - 33.0 pg    MCHC 34.4 31.5 - 35.7 g/dL    RDW 11.8 (L) 12.3 - 15.4 %    RDW-SD 38.0 37.0 - 54.0 fl    MPV 11.1 6.0 - 12.0 fL    Platelets 288 140 - 450 10*3/mm3    Neutrophil % 58.3 42.7 - 76.0 %    Lymphocyte % 33.2 19.6 - 45.3 %    Monocyte % 5.8 5.0 - 12.0 %    Eosinophil % 1.7 0.3 - 6.2 %    Basophil % 0.8 0.0 - 1.5 %    Immature Grans % 0.2 0.0 - 0.5 %    Neutrophils, Absolute 3.11 1.70 - 7.00 10*3/mm3    Lymphocytes, Absolute 1.77 0.70 - 3.10 10*3/mm3    Monocytes, Absolute 0.31 0.10 - 0.90 10*3/mm3    Eosinophils, Absolute 0.09 0.00 - 0.40 10*3/mm3    Basophils, Absolute 0.04 0.00 - 0.20 10*3/mm3    Immature Grans, Absolute 0.01 0.00 - 0.05 10*3/mm3    nRBC 0.2 0.0 - 0.2 /100 WBC   Hepatitis C Antibody    Specimen: Blood   Result Value Ref Range    Hepatitis C Ab Non-Reactive Non-Reactive   TSH    Specimen: Blood   Result Value Ref Range    TSH 1.080 0.270 - 4.200 uIU/mL   T4, Free    Specimen: Blood   Result Value Ref Range    Free T4 1.37 0.93 - 1.70 ng/dL   Hemoglobin A1c    Specimen: Blood   Result Value Ref Range    Hemoglobin A1C 5.00 4.80 - 5.60 %   Lipid Panel    Specimen: Blood   Result Value Ref Range    Total Cholesterol 179 0 - 200 mg/dL    Triglycerides 79 0 - 150 mg/dL    HDL Cholesterol 61 (H) 40 - 60 mg/dL    LDL Cholesterol  102 (H) 0 - 100 mg/dL    VLDL Cholesterol 15.8 5 - 40 mg/dL    LDL/HDL Ratio 1.68      *Note: Due to a large number of results and/or encounters for the requested time period, some results have not been displayed. A complete set of results can be found in Results Review.         This document has been electronically signed by Joao Bruce MD on May 18, 2021 12:43 CDT

## 2021-05-21 ENCOUNTER — HOSPITAL ENCOUNTER (OUTPATIENT)
Dept: ULTRASOUND IMAGING | Facility: HOSPITAL | Age: 51
Discharge: HOME | End: 2021-05-21

## 2021-05-21 ENCOUNTER — HOSPITAL ENCOUNTER (OUTPATIENT)
Dept: CT IMAGING | Facility: HOSPITAL | Age: 51
Discharge: HOME | End: 2021-05-21

## 2021-05-21 DIAGNOSIS — R29.810 FACIAL DROOP: ICD-10-CM

## 2021-05-21 DIAGNOSIS — E04.1 THYROID NODULE: Primary | ICD-10-CM

## 2021-05-21 PROCEDURE — 93880 EXTRACRANIAL BILAT STUDY: CPT

## 2021-05-21 PROCEDURE — 70450 CT HEAD/BRAIN W/O DYE: CPT

## 2021-05-28 ENCOUNTER — HOSPITAL ENCOUNTER (OUTPATIENT)
Dept: ULTRASOUND IMAGING | Facility: HOSPITAL | Age: 51
Discharge: HOME | End: 2021-05-28
Admitting: NURSE PRACTITIONER

## 2021-05-28 DIAGNOSIS — E04.1 THYROID NODULE: ICD-10-CM

## 2021-05-28 PROCEDURE — 76536 US EXAM OF HEAD AND NECK: CPT

## 2021-06-01 DIAGNOSIS — E04.1 THYROID NODULE: Primary | ICD-10-CM

## 2021-06-04 DIAGNOSIS — E66.01 CLASS 2 SEVERE OBESITY DUE TO EXCESS CALORIES WITH SERIOUS COMORBIDITY AND BODY MASS INDEX (BMI) OF 39.0 TO 39.9 IN ADULT (HCC): ICD-10-CM

## 2021-07-13 DIAGNOSIS — E78.00 ELEVATED LDL CHOLESTEROL LEVEL: Primary | ICD-10-CM

## 2021-07-13 DIAGNOSIS — R74.8 ELEVATED LIVER ENZYMES: ICD-10-CM

## 2021-08-12 NOTE — ANESTHESIA PREPROCEDURE EVALUATION
Anesthesia Evaluation     Patient summary reviewed   NPO Solid Status: > 8 hours  NPO Liquid Status: > 8 hours           Airway   Mallampati: II  TM distance: >3 FB  Neck ROM: full  No difficulty expected  Dental    (+) poor dentition    Pulmonary - normal exam   Cardiovascular - normal exam  Exercise tolerance: good (4-7 METS)    NYHA Classification: II        Neuro/Psych  (+) psychiatric history Anxiety and Depression,     GI/Hepatic/Renal/Endo    (+)  GERD poorly controlled,      Musculoskeletal     Abdominal    Substance History      OB/GYN          Other   (+) arthritis                     Anesthesia Plan    ASA 2     general   Rapid sequence  intravenous induction   Anesthetic plan, all risks, benefits, and alternatives have been provided, discussed and informed consent has been obtained with: patient.       Medical Records Request signed by pt  To release information to [facility]     Fax # [phone]  Ph. [phone]    Sent to [employer]

## 2021-08-18 ENCOUNTER — LAB (OUTPATIENT)
Dept: LAB | Facility: HOSPITAL | Age: 51
End: 2021-08-18

## 2021-08-18 DIAGNOSIS — E78.00 ELEVATED LDL CHOLESTEROL LEVEL: ICD-10-CM

## 2021-08-18 DIAGNOSIS — R74.8 ELEVATED LIVER ENZYMES: ICD-10-CM

## 2021-08-18 LAB
ALBUMIN SERPL-MCNC: 4 G/DL (ref 3.5–5.2)
ALBUMIN/GLOB SERPL: 1.2 G/DL
ALP SERPL-CCNC: 124 U/L (ref 39–117)
ALT SERPL W P-5'-P-CCNC: 17 U/L (ref 1–33)
ANION GAP SERPL CALCULATED.3IONS-SCNC: 7 MMOL/L (ref 5–15)
AST SERPL-CCNC: 20 U/L (ref 1–32)
BILIRUB SERPL-MCNC: 0.3 MG/DL (ref 0–1.2)
BUN SERPL-MCNC: 15 MG/DL (ref 6–20)
BUN/CREAT SERPL: 17 (ref 7–25)
CALCIUM SPEC-SCNC: 9.3 MG/DL (ref 8.6–10.5)
CHLORIDE SERPL-SCNC: 103 MMOL/L (ref 98–107)
CHOLEST SERPL-MCNC: 204 MG/DL (ref 0–200)
CO2 SERPL-SCNC: 32 MMOL/L (ref 22–29)
CREAT SERPL-MCNC: 0.88 MG/DL (ref 0.57–1)
GFR SERPL CREATININE-BSD FRML MDRD: 68 ML/MIN/1.73
GLOBULIN UR ELPH-MCNC: 3.3 GM/DL
GLUCOSE SERPL-MCNC: 69 MG/DL (ref 65–99)
HDLC SERPL-MCNC: 61 MG/DL (ref 40–60)
LDLC SERPL CALC-MCNC: 127 MG/DL (ref 0–100)
LDLC/HDLC SERPL: 2.06 {RATIO}
POTASSIUM SERPL-SCNC: 4.2 MMOL/L (ref 3.5–5.2)
PROT SERPL-MCNC: 7.3 G/DL (ref 6–8.5)
SODIUM SERPL-SCNC: 142 MMOL/L (ref 136–145)
TRIGL SERPL-MCNC: 87 MG/DL (ref 0–150)
VLDLC SERPL-MCNC: 16 MG/DL (ref 5–40)

## 2021-08-18 PROCEDURE — 36415 COLL VENOUS BLD VENIPUNCTURE: CPT

## 2021-08-18 PROCEDURE — 80061 LIPID PANEL: CPT

## 2021-08-18 PROCEDURE — 80053 COMPREHEN METABOLIC PANEL: CPT

## 2021-08-23 ENCOUNTER — OFFICE VISIT (OUTPATIENT)
Dept: FAMILY MEDICINE CLINIC | Facility: CLINIC | Age: 51
End: 2021-08-23

## 2021-08-23 VITALS
BODY MASS INDEX: 40.44 KG/M2 | HEART RATE: 71 BPM | HEIGHT: 65 IN | SYSTOLIC BLOOD PRESSURE: 132 MMHG | WEIGHT: 242.7 LBS | DIASTOLIC BLOOD PRESSURE: 78 MMHG | RESPIRATION RATE: 20 BRPM | OXYGEN SATURATION: 95 % | TEMPERATURE: 96.7 F

## 2021-08-23 DIAGNOSIS — R74.8 ELEVATED LIVER ENZYMES: ICD-10-CM

## 2021-08-23 DIAGNOSIS — E66.01 CLASS 2 SEVERE OBESITY DUE TO EXCESS CALORIES WITH SERIOUS COMORBIDITY AND BODY MASS INDEX (BMI) OF 39.0 TO 39.9 IN ADULT (HCC): ICD-10-CM

## 2021-08-23 DIAGNOSIS — R10.12 LUQ PAIN: ICD-10-CM

## 2021-08-23 DIAGNOSIS — E78.00 ELEVATED LDL CHOLESTEROL LEVEL: Primary | ICD-10-CM

## 2021-08-23 PROCEDURE — 99214 OFFICE O/P EST MOD 30 MIN: CPT | Performed by: NURSE PRACTITIONER

## 2021-08-23 NOTE — PROGRESS NOTES
Subjective   Letty Bach is a 51 y.o. female.     CC: Elevated LDL, elevated liver enzymes, left upper quadrant pain, obesity    Obesity  This is a chronic problem. The current episode started more than 1 year ago. The problem occurs constantly. The problem has been unchanged. Associated symptoms include abdominal pain. Pertinent negatives include no anorexia, arthralgias, chest pain, chills, congestion, coughing, fatigue, fever, myalgias, nausea, rash, sore throat or vomiting. The symptoms are aggravated by drinking and eating. She has tried eating, drinking and walking for the symptoms. The treatment provided moderate relief.   Abdominal Pain  This is a chronic problem. The current episode started more than 1 year ago. The problem occurs intermittently. The problem has been waxing and waning. The pain is located in the LUQ. The quality of the pain is aching and sharp. The abdominal pain does not radiate. Pertinent negatives include no anorexia, arthralgias, constipation, diarrhea, dysuria, fever, myalgias, nausea, vomiting or weight loss. Nothing aggravates the pain. The pain is relieved by nothing. She has tried nothing for the symptoms. The treatment provided no relief. Prior diagnostic workup includes GI consult.        The following portions of the patient's history were reviewed and updated as appropriate: allergies, current medications, past family history, past medical history, past social history, past surgical history and problem list.    Review of Systems   Constitutional: Negative for activity change, appetite change, chills, fatigue, fever, unexpected weight gain and unexpected weight loss.   HENT: Negative for congestion, sore throat, trouble swallowing and voice change.    Eyes: Negative.    Respiratory: Negative for cough, chest tightness, shortness of breath and wheezing.    Cardiovascular: Negative for chest pain, palpitations and leg swelling.   Gastrointestinal: Positive for abdominal pain.  Negative for anorexia, constipation, diarrhea, nausea, vomiting and GERD.   Endocrine: Negative.    Genitourinary: Negative for dysuria.   Musculoskeletal: Negative for arthralgias and myalgias.   Skin: Negative for rash.   Allergic/Immunologic: Negative.    Neurological: Negative.    Hematological: Negative.    Psychiatric/Behavioral: Negative.        Objective   Physical Exam  Vitals and nursing note reviewed.   Constitutional:       General: She is not in acute distress.     Appearance: Normal appearance. She is well-developed. She is obese. She is not ill-appearing, toxic-appearing or diaphoretic.   HENT:      Head: Normocephalic and atraumatic.   Eyes:      Conjunctiva/sclera: Conjunctivae normal.   Cardiovascular:      Rate and Rhythm: Normal rate and regular rhythm.      Heart sounds: Normal heart sounds.   Pulmonary:      Effort: Pulmonary effort is normal. No respiratory distress.      Breath sounds: Normal breath sounds. No stridor. No wheezing, rhonchi or rales.   Abdominal:      General: Bowel sounds are decreased. There is no distension.      Palpations: There is no mass.      Tenderness: There is abdominal tenderness in the left upper quadrant. There is no guarding or rebound.      Hernia: No hernia is present.   Musculoskeletal:         General: No tenderness. Normal range of motion.      Cervical back: Normal range of motion.   Skin:     General: Skin is warm and dry.      Coloration: Skin is not pale.      Findings: No erythema or rash.   Neurological:      Mental Status: She is alert and oriented to person, place, and time.   Psychiatric:         Mood and Affect: Mood normal.         Behavior: Behavior normal.         Thought Content: Thought content normal.         Judgment: Judgment normal.           Assessment/Plan   Diagnoses and all orders for this visit:    1. Elevated LDL cholesterol level (Primary)   -Continues to be slightly elevated.  Stressed the importance of a low-fat diet and routine  exercise along with weight loss.  We'll continue to try lifestyle modification as patient's liver enzymes are slightly elevated.  We'll continue to monitor.    2. Class 2 severe obesity due to excess calories with serious comorbidity and body mass index (BMI) of 39.0 to 39.9 in adult (CMS/HCC)   -Highly plant-based diet with lean meat and protein choices, exercise 3-5 times a week for least 30 minutes, 1500-calorie day diet.  We'll continue to monitor.    3. Elevated liver enzymes  -     Ambulatory Referral to Gastroenterology, follow-up as scheduled.    4. LUQ pain  -     Ambulatory Referral to Gastroenterology, follow-up as scheduled.    5.  Follow-up in 6 months or sooner for any acute needs.          This document has been electronically signed by MEL Houser on August 23, 2021 11:30 CDT

## 2021-08-27 ENCOUNTER — OFFICE VISIT (OUTPATIENT)
Dept: ENDOCRINOLOGY | Facility: CLINIC | Age: 51
End: 2021-08-27

## 2021-08-27 VITALS
HEIGHT: 65 IN | SYSTOLIC BLOOD PRESSURE: 116 MMHG | OXYGEN SATURATION: 97 % | HEART RATE: 88 BPM | WEIGHT: 243.4 LBS | DIASTOLIC BLOOD PRESSURE: 74 MMHG | BODY MASS INDEX: 40.55 KG/M2

## 2021-08-27 DIAGNOSIS — E04.1 SOLITARY THYROID NODULE: Primary | ICD-10-CM

## 2021-08-27 PROCEDURE — 99214 OFFICE O/P EST MOD 30 MIN: CPT | Performed by: NURSE PRACTITIONER

## 2021-08-27 NOTE — PROGRESS NOTES
"Chief Complaint  Thyroid Problem (Nodule)    Subjective          Letty Bach presents to [facility] ENDOCRINOLOGY  History of Present Illness     Referring provider MEL Chapman     In office visit     51 year old female presents for consultation     REASON - thyroid nodule     Duration  May 21, 2021     Context -- incidental finding on carotid ultrasound     Quality -- 2.4 cm hypoechoic nodule   Timing constant    Severity moderate    Lab Results   Component Value Date    TSH 1.150 05/13/2021         THYROID ULTRASOUND     CLINICAL INFORMATION:  Thyroid nodule, follow-up        TECHNIQUE:  Sagittal and axial images.        COMPARISON:  Doppler carotid ultrasound May 21st, 2021.     FINDINGS:  The right lobe is enlarged 5.37 x 1.98 x 2.43 cm.  Well-circumscribed dominant nodule upper pole. Slightly  hypoechoic heterogenous with speckled calcifications. 2.44 x 1.65  x 1.59 cm. Ultrasound-guided fine-needle aspiration biopsy should  be considered.     Left lobe is normal size 4.34 x 1.08 x 1.55 cm. There are no  nodules.     IMPRESSION:  Dominant nodule right lobe. Ultrasound guided  fine-needle aspiration biopsy should be considered.     Electronically signed by:  Manolo King MD  5/30/2021 9:30 AM CDT  Workstation: [id number]          Mother -- right thyroidectomy     Review of Systems - General ROS: negative        Objective   Vital Signs:   /74   Pulse 88   Ht 165.1 cm (65\")   Wt 110 kg (243 lb 6.4 oz)   SpO2 97%   BMI 40.50 kg/m²     Physical Exam  Constitutional:       Appearance: Normal appearance.   Cardiovascular:      Rate and Rhythm: Regular rhythm.      Heart sounds: Normal heart sounds.   Pulmonary:      Breath sounds: Normal breath sounds.   Musculoskeletal:      Cervical back: Normal range of motion and neck supple.   Neurological:      Mental Status: She is alert.        Result Review :   The following data was reviewed by: MEL Brown on " 08/27/2021:  Common labs    Common Labsle 1/18/21 5/13/21 5/13/21 5/13/21 5/13/21 8/18/21 8/18/21     0755 0755 0755 [id number] [id number]   Glucose     87 69    BUN     13 15    Creatinine     0.76 0.88    eGFR Non African Am     81 68    Sodium     143 142    Potassium     4.8 4.2    Chloride     106 103    Calcium     9.5 9.3    Albumin 4.00    3.90 4.00    Total Bilirubin 0.3    0.4 0.3    Alkaline Phosphatase 135 (A)    118 (A) 124 (A)    AST (SGOT) 14    16 20    ALT (SGPT) 16    18 17    WBC  5.42        Hemoglobin  13.7        Hematocrit  41.7        Platelets  276        Total Cholesterol    186   204 (A)   Triglycerides    57   87   HDL Cholesterol    58   61 (A)   LDL Cholesterol     117 (A)   127 (A)   Hemoglobin A1C   5.16       (A) Abnormal value                                     Assessment and Plan    Diagnoses and all orders for this visit:    1. Solitary thyroid nodule (Primary)  -     US Thyroid; Future  -     Obtain Informed Consent; Standing  -     US Fine Needle Aspiration BX 1st Lesion Right; Future  -     Fine Needle Aspiration; Standing           In summary Mrs. Bach is been referred for thyroid nodules    Thyroid nodule      2.4 cm hypoechoic nodule right superior    Intermediate risk    Scheduled for FNA biopsy September 1 at 1:00    Procedure explained    No blood thinners 5 days prior 2 days after    As soon as results are back we will call with results    If benign we will plan on following up in 6 months with a repeat ultrasound        Records received from Mr. Esteves and reviewed from 2021    Images reviewed and seen above    Thank you for this consultation        Follow Up   Return in about 6 months (around 2/27/2022) for Recheck.  Patient was given instructions and counseling regarding her condition or for health maintenance advice. Please see specific information pulled into the AVS if appropriate.         This document has been electronically signed by MEL Brown on  August 27, 2021 13:06 CDT.

## 2021-09-01 ENCOUNTER — HOSPITAL ENCOUNTER (OUTPATIENT)
Dept: ULTRASOUND IMAGING | Facility: HOSPITAL | Age: 51
Discharge: HOME | End: 2021-09-01
Admitting: NURSE PRACTITIONER

## 2021-09-01 VITALS
OXYGEN SATURATION: 100 % | HEART RATE: 67 BPM | RESPIRATION RATE: 18 BRPM | DIASTOLIC BLOOD PRESSURE: 92 MMHG | TEMPERATURE: 97.7 F | SYSTOLIC BLOOD PRESSURE: 146 MMHG

## 2021-09-01 DIAGNOSIS — E04.1 SOLITARY THYROID NODULE: ICD-10-CM

## 2021-09-01 PROCEDURE — 10005 FNA BX W/US GDN 1ST LES: CPT | Performed by: INTERNAL MEDICINE

## 2021-09-02 ENCOUNTER — OFFICE VISIT (OUTPATIENT)
Dept: GASTROENTEROLOGY | Facility: CLINIC | Age: 51
End: 2021-09-02

## 2021-09-02 VITALS
DIASTOLIC BLOOD PRESSURE: 97 MMHG | SYSTOLIC BLOOD PRESSURE: 145 MMHG | HEIGHT: 65 IN | HEART RATE: 72 BPM | WEIGHT: 245.2 LBS | BODY MASS INDEX: 40.85 KG/M2

## 2021-09-02 DIAGNOSIS — R10.12 LUQ PAIN: Primary | ICD-10-CM

## 2021-09-02 DIAGNOSIS — R79.89 ELEVATED LFTS: ICD-10-CM

## 2021-09-02 DIAGNOSIS — R74.8 ALKALINE PHOSPHATASE ELEVATION: ICD-10-CM

## 2021-09-02 DIAGNOSIS — K76.0 FATTY LIVER: ICD-10-CM

## 2021-09-02 LAB
LAB AP CASE REPORT: NORMAL
PATH REPORT.FINAL DX SPEC: NORMAL

## 2021-09-02 PROCEDURE — 99214 OFFICE O/P EST MOD 30 MIN: CPT | Performed by: PHYSICIAN ASSISTANT

## 2021-09-02 NOTE — PROGRESS NOTES
Chief Complaint   Patient presents with   • Elevated Hepatic Enzymes     ref: Ruthy       ENDO PROCEDURE ORDERED:    Subjective    Letty Bach is a 51 y.o. female. she is here today for follow-up.    History of Present Illness    This is a 51-year-old female who was sent for evaluation of elevated LFTs and alkaline phosphatase by TORREY Chapman. She previously saw Dr. Bruce with gastroesophageal reflux disease and fatty liver on 05/18/2021. She had a thyroid biopsy yesterday by Dr. Bledsoe, it is still pending. She has had a prior cholecystectomy. She states that for about 2 years she has had a lot of pain in the left upper quadrant, usually worse after meals. It seems to hit her at random, it does not really matter what time of day, what she eats. Sometimes it may be last minute, it is usually a sharp pain. She denied heartburn, nausea and vomiting, and dysphagia. Bowel movements are generally regular with occasional bouts of diarrhea. No blood or mucus in her stools. Weight is up 3.3 pounds since last visit. She is on no current medications. Last EGD showed gastritis with reactive change on biopsy on 11/14/2019. Last colonoscopy 02/12/2021 showed a hyperplastic polyp removed from the sigmoid, diverticulosis, and lipoma of the ileocecal valve.     The patient had liver ultrasound on 12/14/2020 which showed a fatty liver, post cholecystectomy 7.5 mm common bile duct. She had a CT scan of the abdomen and pelvis without contrast 02/26/2021 showed post cholecystectomy hysterectomy, no acute abnormalities.     Laboratories 05/13/2021 showed normal TSH, T4, A1c, CBC, hepatitis C antibody nonreactive. Cholesterol panel showed , CMP showed a CO2 of 30.8, alkaline phosphatase 118, otherwise normal. Laboratories 08/18/2021 cholesterol 204 and . CMP showed CO2 of 32 and alkaline phosphatase 124, otherwise normal.     ASSESSMENT/PLAN: This is a patient with fatty liver on ultrasound with elevation of  alkaline phosphatase, presumed secondary to above. I did recommend further laboratories for possible pancreatic abnormality although this was not overtly present on previous imaging. Imaging was limited and was non-contrast in February. I do recommend amylase, lipase, and serum trypsin. I would consider pancreas divisum. We will also repeat LFTs, hepatitis diagnostic panel, VELA FibroSure, WALDEMAR, AMA, SMA, iron studies. We will also check recurrent GI distress panel, serum trypsin with the pancreatic studies. I suggested a trial on Levsin 0.125 mg q 4-6 hours p.r.n. for the abdominal pain. She was encouraged on dietary modification, significant weight loss, avoiding fructose. We will plan follow-up after the above, further pending clinical course, and the results of the above.        The following portions of the patient's history were reviewed and updated as appropriate:   Past Medical History:   Diagnosis Date   • Elevated liver enzymes    • GERD (gastroesophageal reflux disease)    • Urinary tract infection      Past Surgical History:   Procedure Laterality Date   • CHOLECYSTECTOMY  06/06/2019   • CHOLECYSTECTOMY WITH INTRAOPERATIVE CHOLANGIOGRAM N/A 6/6/2019    Procedure: CHOLECYSTECTOMY LAPAROSCOPIC INTRAOPERATIVE CHOLANGIOGRAM      (c-arm#2);  Surgeon: Felipe Overton MD;  Location: [facility] OR;  Service: General   • COLONOSCOPY N/A 2/12/2021    Procedure: COLONOSCOPY;  Surgeon: Joao Bruce MD;  Location: [facility] ENDOSCOPY;  Service: Gastroenterology;  Laterality: N/A;   • COLONOSCOPY     • ENDOSCOPY N/A 11/14/2019    Procedure: ESOPHAGOGASTRODUODENOSCOPY;  Surgeon: Joao Bruce MD;  Location: [facility] ENDOSCOPY;  Service: Gastroenterology   • HYSTERECTOMY  2007   • UPPER GASTROINTESTINAL ENDOSCOPY  11/14/2019     Family History   Problem Relation Age of Onset   • Breast cancer Mother    • Thyroid disease Mother    • Hypertension Mother    • Hypertension Father    • Diabetes Father    • Kidney disease  "Father    • Heart disease Father    • No Known Problems Sister    • No Known Problems Brother    • Seizures Daughter    • Hypertension Daughter    • COPD Maternal Grandmother    • Asthma Maternal Grandmother    • No Known Problems Sister    • ADD / ADHD Daughter    • Polymyositis Daughter      OB History    No obstetric history on file.       Allergies   Allergen Reactions   • Contrave [Naltrexone-Bupropion Hcl Er] Dizziness     Social History     Socioeconomic History   • Marital status:      Spouse name: Not on file   • Number of children: Not on file   • Years of education: Not on file   • Highest education level: Not on file   Tobacco Use   • Smoking status: Former Smoker     Packs/day: 0.50     Years: 5.00     Pack years: 2.50     Types: Cigarettes   • Smokeless tobacco: Never Used   • Tobacco comment: passive at work and home   Vaping Use   • Vaping Use: Never used   Substance and Sexual Activity   • Alcohol use: No   • Drug use: Never   • Sexual activity: Defer     Current Medications:  Prior to Admission medications    Medication Sig Start Date End Date Taking? Authorizing Provider   hyoscyamine (LEVSIN) 0.125 MG SL tablet Take 1 tablet by mouth Every 4 (Four) Hours As Needed for Cramping or Diarrhea (abd pain). 9/2/21   Quinten Landry PA-C     Review of Systems  Review of Systems       Objective    /97 (BP Location: Other (Comment), Patient Position: Sitting) Comment (BP Location): left wrist-auto  Pulse 72   Ht 165.1 cm (65\")   Wt 111 kg (245 lb 3.2 oz)   BMI 40.80 kg/m²   Physical Exam  Vitals and nursing note reviewed.   Constitutional:       General: She is not in acute distress.     Appearance: She is well-developed. She is obese.   HENT:      Head: Normocephalic and atraumatic.   Eyes:      Pupils: Pupils are equal, round, and reactive to light.   Cardiovascular:      Rate and Rhythm: Normal rate and regular rhythm.      Heart sounds: Normal heart sounds.   Pulmonary:      Effort: " Pulmonary effort is normal.      Breath sounds: Normal breath sounds.   Abdominal:      General: Bowel sounds are normal. There is no distension or abdominal bruit.      Palpations: Abdomen is soft. Abdomen is not rigid. There is no shifting dullness or mass.      Tenderness: There is abdominal tenderness. There is no guarding or rebound.      Hernia: No hernia is present. There is no hernia in the ventral area.      Comments: Obese, mild diffuse   Musculoskeletal:         General: Normal range of motion.      Cervical back: Normal range of motion.   Skin:     General: Skin is warm and dry.   Neurological:      Mental Status: She is alert and oriented to person, place, and time.   Psychiatric:         Behavior: Behavior normal.         Thought Content: Thought content normal.         Judgment: Judgment normal.       Assessment/Plan      1. LUQ pain    2. Alkaline phosphatase elevation    3. Elevated LFTs    4. Fatty liver    .   Diagnoses and all orders for this visit:    1. LUQ pain (Primary)  -     Amylase  -     Lipase  -     Hepatic Function Panel  -     Trypsin  -     Iron and TIBC  -     Ferritin  -     AFP Tumor Marker  -     Alpha - 1 - Antitrypsin  -     Anti-Smooth Muscle Antibody Titer  -     Ceruloplasmin  -     Protime-INR  -     Nuclear Antigen Antibody, IFA  -     VELA Fibrosure  -     Mitochondrial Antibodies, M2  -     Hepatitis Panel, Acute  -     Recurrent Gastrointestinal Distress    2. Alkaline phosphatase elevation  -     Amylase  -     Lipase  -     Hepatic Function Panel  -     Trypsin  -     Iron and TIBC  -     Ferritin  -     AFP Tumor Marker  -     Alpha - 1 - Antitrypsin  -     Anti-Smooth Muscle Antibody Titer  -     Ceruloplasmin  -     Protime-INR  -     Nuclear Antigen Antibody, IFA  -     VELA Fibrosure  -     Mitochondrial Antibodies, M2  -     Hepatitis Panel, Acute  -     Recurrent Gastrointestinal Distress    3. Elevated LFTs  -     Amylase  -     Lipase  -     Hepatic Function  Panel  -     Trypsin  -     Iron and TIBC  -     Ferritin  -     AFP Tumor Marker  -     Alpha - 1 - Antitrypsin  -     Anti-Smooth Muscle Antibody Titer  -     Ceruloplasmin  -     Protime-INR  -     Nuclear Antigen Antibody, IFA  -     VELA Fibrosure  -     Mitochondrial Antibodies, M2  -     Hepatitis Panel, Acute  -     Recurrent Gastrointestinal Distress    4. Fatty liver  -     Amylase  -     Lipase  -     Hepatic Function Panel  -     Trypsin  -     Iron and TIBC  -     Ferritin  -     AFP Tumor Marker  -     Alpha - 1 - Antitrypsin  -     Anti-Smooth Muscle Antibody Titer  -     Ceruloplasmin  -     Protime-INR  -     Nuclear Antigen Antibody, IFA  -     VELA Fibrosure  -     Mitochondrial Antibodies, M2  -     Hepatitis Panel, Acute  -     Recurrent Gastrointestinal Distress    Other orders  -     hyoscyamine (LEVSIN) 0.125 MG SL tablet; Take 1 tablet by mouth Every 4 (Four) Hours As Needed for Cramping or Diarrhea (abd pain).  Dispense: 45 tablet; Refill: 1        Orders placed during this encounter include:  Orders Placed This Encounter   Procedures   • Amylase     Order Specific Question:   Release to patient     Answer:   Immediate   • Lipase     Order Specific Question:   Release to patient     Answer:   Immediate   • Hepatic Function Panel     Order Specific Question:   Release to patient     Answer:   Immediate   • Trypsin     Order Specific Question:   Release to patient     Answer:   Immediate   • Iron and TIBC   • Ferritin   • AFP Tumor Marker     Order Specific Question:   Release to patient     Answer:   Immediate   • Alpha - 1 - Antitrypsin     Order Specific Question:   Release to patient     Answer:   Immediate   • Anti-Smooth Muscle Antibody Titer     Order Specific Question:   Release to patient     Answer:   Immediate   • Ceruloplasmin     Order Specific Question:   Release to patient     Answer:   Immediate   • Protime-INR   • Nuclear Antigen Antibody, IFA     Order Specific Question:    Release to patient     Answer:   Immediate   • VELA Fibrosure     Order Specific Question:   Release to patient     Answer:   Immediate   • Mitochondrial Antibodies, M2     Order Specific Question:   Release to patient     Answer:   Immediate   • Hepatitis Panel, Acute     Order Specific Question:   Release to patient     Answer:   Immediate   • Recurrent Gastrointestinal Distress     Order Specific Question:   Release to patient     Answer:   Immediate   • Allergens (12) Foods     Order Specific Question:   Release to patient     Answer:   Immediate   • Glia(IgA / G) & TTG(IgA / G)     Order Specific Question:   Release to patient     Answer:   Immediate       Medications prescribed:  New Medications Ordered This Visit   Medications   • hyoscyamine (LEVSIN) 0.125 MG SL tablet     Sig: Take 1 tablet by mouth Every 4 (Four) Hours As Needed for Cramping or Diarrhea (abd pain).     Dispense:  45 tablet     Refill:  1       Requested Prescriptions     Signed Prescriptions Disp Refills   • hyoscyamine (LEVSIN) 0.125 MG SL tablet 45 tablet 1     Sig: Take 1 tablet by mouth Every 4 (Four) Hours As Needed for Cramping or Diarrhea (abd pain).       Review and/or summary of lab tests, radiology, procedures, medications. Review and summary of old records and obtaining of history. The risks and benefits of my recommendations, as well as other treatment options were discussed with the patient today. Questions were answered.    Follow-up: Return in about 1 month (around 10/2/2021), or if symptoms worsen or fail to improve.     * Surgery not found *      This document has been electronically signed by Quinten Landry PA-C on September 13, 2021 18:26 CDT      Results for orders placed or performed in visit on 09/02/21   VELA Fibrosure    Specimen: Blood   Result Value Ref Range    Fibrosis Score (References) 0.02 0.00 - 0.21    Fibrosis Stage (Reference) Comment     Steatosis Score (Reference) 0.45 (H) 0.00 - 0.30    Steatosis  Grade (Reference) S1 - Mild Steatosis     VELA Score (Reference) 0.50 (H) 0.25    Vela Grade (Reference) Comment     Height: (Reference) 65 in    Weight: (Reference) 245 LBS    Alpha 2-Macroglobulins, Qn 126 110 - 276 mg/dL    Haptoglobin 240 42 - 296 mg/dL    Apolipoprotein A-1 163 116 - 209 mg/dL    Total Bilirubin 0.3 0.0 - 1.2 mg/dL    GGT 13 0 - 60 IU/L    ALT (SGPT) 22 0 - 40 IU/L    AST (SGOT) P5P (Reference) 23 0 - 40 IU/L    Cholesterol, Total (Reference) 230 (H) 100 - 199 mg/dL    Glucose, Serum (Reference) 89 65 - 99 mg/dL    Triglycerides 89 0 - 149 mg/dL    Interpretations: (Reference) Comment     Fibrosis Scoring: Comment     Steatosis Grading (Reference) Comment     Vela Scoring (Reference) Comment     Limitations: (Reference) Comment     Comment (Reference) Comment    Glia(IgA / G) & TTG(IgA / G)    Specimen: Blood   Result Value Ref Range    Gliadin Deamidated Peptide Ab, IgA 8 0 - 19 units    Deaminated Gliadin Ab IgG 2 0 - 19 units    Tissue Transglutaminase IgA <2 0 - 3 U/mL    Tissue Transglutaminase IgG 3 0 - 5 U/mL   Allergens (12) Foods    Specimen: Blood   Result Value Ref Range    Class Description Comment     Egg White <0.10 Class 0 kU/L    Milk, Cow's <0.10 Class 0 kU/L    CodFish <0.10 Class 0 kU/L    Sesame Seed <0.10 Class 0 kU/L    Peanut <0.10 Class 0 kU/L    Soybean <0.10 Class 0 kU/L    Hazelnut <0.10 Class 0 kU/L    Shrimp <0.10 Class 0 kU/L    Scallop <0.10 Class 0 kU/L    Gluten <0.10 Class 0 kU/L    Cecil <0.10 Class 0 kU/L    Wheat <0.10 Class 0 kU/L   Nuclear Antigen Antibody, IFA    Specimen: Blood   Result Value Ref Range    WALDEMAR Negative    Iron and TIBC    Specimen: Blood   Result Value Ref Range    Iron 114 37 - 145 mcg/dL    Iron Saturation 35 20 - 50 %    Transferrin 220 200 - 360 mg/dL    TIBC 328 298 - 536 mcg/dL   Alpha - 1 - Antitrypsin    Specimen: Blood   Result Value Ref Range    ALPHA -1 ANTITRYPSIN 151 90 - 200 mg/dL   Mitochondrial Antibodies, M2    Specimen:  Blood   Result Value Ref Range    Mitochondrial Ab <20.0 0.0 - 20.0 Units   Ceruloplasmin    Specimen: Blood   Result Value Ref Range    Ceruloplasmin 27 19 - 39 mg/dL   AFP Tumor Marker    Specimen: Blood   Result Value Ref Range    ALPHA-FETOPROTEIN 6.22 0 - 8.3 ng/mL   Hepatitis Panel, Acute    Specimen: Blood   Result Value Ref Range    Hepatitis B Surface Ag Non-Reactive Non-Reactive    Hep A IgM Non-Reactive Non-Reactive    Hep B C IgM Non-Reactive Non-Reactive    Hepatitis C Ab Non-Reactive Non-Reactive   Anti-Smooth Muscle Antibody Titer    Specimen: Blood   Result Value Ref Range    Smooth Muscle Ab 10 0 - 19 Units   Protime-INR    Specimen: Blood   Result Value Ref Range    Protime 12.6 11.1 - 15.3 Seconds    INR 0.95 0.80 - 1.20   Lipase    Specimen: Blood   Result Value Ref Range    Lipase 40 13 - 60 U/L   Ferritin    Specimen: Blood   Result Value Ref Range    Ferritin 63.60 13.00 - 150.00 ng/mL   Amylase    Specimen: Blood   Result Value Ref Range    Amylase 42 28 - 100 U/L   Hepatic Function Panel    Specimen: Blood   Result Value Ref Range    Total Protein 7.4 6.0 - 8.5 g/dL    Albumin 4.30 3.50 - 5.20 g/dL    ALT (SGPT) 20 1 - 33 U/L    AST (SGOT) 21 1 - 32 U/L    Alkaline Phosphatase 119 (H) 39 - 117 U/L    Total Bilirubin 0.4 0.0 - 1.2 mg/dL    Bilirubin, Direct <0.2 0.0 - 0.3 mg/dL    Bilirubin, Indirect     Results for orders placed or performed during the hospital encounter of 09/01/21   Fine Needle Aspiration    Specimen: Thyroid; Fine Needle Aspirate   Result Value Ref Range    Case Report       Medical Cytology Report                           Case: [id number]                                 Authorizing Provider:  Rajendra Baires,    Collected:           09/01/2021 01:02 PM                                 APRN                                                                         Ordering Location:     [facility]   Received:            09/01/2021 01:24 PM                                  [place]                                                              Specimen:    Thyroid, right lobe                                                                        Final Diagnosis       SEE SCANNED REPORT       Results for orders placed or performed in visit on 08/18/21   Lipid Panel    Specimen: Blood   Result Value Ref Range    Total Cholesterol 204 (H) 0 - 200 mg/dL    Triglycerides 87 0 - 150 mg/dL    HDL Cholesterol 61 (H) 40 - 60 mg/dL    LDL Cholesterol  127 (H) 0 - 100 mg/dL    VLDL Cholesterol 16 5 - 40 mg/dL    LDL/HDL Ratio 2.06    Comprehensive Metabolic Panel    Specimen: Blood   Result Value Ref Range    Glucose 69 65 - 99 mg/dL    BUN 15 6 - 20 mg/dL    Creatinine 0.88 0.57 - 1.00 mg/dL    Sodium 142 136 - 145 mmol/L    Potassium 4.2 3.5 - 5.2 mmol/L    Chloride 103 98 - 107 mmol/L    CO2 32.0 (H) 22.0 - 29.0 mmol/L    Calcium 9.3 8.6 - 10.5 mg/dL    Total Protein 7.3 6.0 - 8.5 g/dL    Albumin 4.00 3.50 - 5.20 g/dL    ALT (SGPT) 17 1 - 33 U/L    AST (SGOT) 20 1 - 32 U/L    Alkaline Phosphatase 124 (H) 39 - 117 U/L    Total Bilirubin 0.3 0.0 - 1.2 mg/dL    eGFR Non African Amer 68 >60 mL/min/1.73    Globulin 3.3 gm/dL    A/G Ratio 1.2 g/dL    BUN/Creatinine Ratio 17.0 7.0 - 25.0    Anion Gap 7.0 5.0 - 15.0 mmol/L   Results for orders placed or performed in visit on 05/13/21   CBC Auto Differential    Specimen: Blood   Result Value Ref Range    WBC 5.42 3.40 - 10.80 10*3/mm3    RBC 4.37 3.77 - 5.28 10*6/mm3    Hemoglobin 13.7 12.0 - 15.9 g/dL    Hematocrit 41.7 34.0 - 46.6 %    MCV 95.4 79.0 - 97.0 fL    MCH 31.4 26.6 - 33.0 pg    MCHC 32.9 31.5 - 35.7 g/dL    RDW 12.2 (L) 12.3 - 15.4 %    RDW-SD 43.2 37.0 - 54.0 fl    MPV 10.6 6.0 - 12.0 fL    Platelets 276 140 - 450 10*3/mm3    Neutrophil % 58.6 42.7 - 76.0 %    Lymphocyte % 32.7 19.6 - 45.3 %    Monocyte % 6.1 5.0 - 12.0 %    Eosinophil % 1.8 0.3 - 6.2 %    Basophil % 0.6 0.0 - 1.5 %    Immature Grans % 0.2 0.0 -  0.5 %    Neutrophils, Absolute 3.18 1.70 - 7.00 10*3/mm3    Lymphocytes, Absolute 1.77 0.70 - 3.10 10*3/mm3    Monocytes, Absolute 0.33 0.10 - 0.90 10*3/mm3    Eosinophils, Absolute 0.10 0.00 - 0.40 10*3/mm3    Basophils, Absolute 0.03 0.00 - 0.20 10*3/mm3    Immature Grans, Absolute 0.01 0.00 - 0.05 10*3/mm3    nRBC 0.0 0.0 - 0.2 /100 WBC     *Note: Due to a large number of results and/or encounters for the requested time period, some results have not been displayed. A complete set of results can be found in Results Review.

## 2021-09-02 NOTE — PATIENT INSTRUCTIONS
MyPlate from USDA    MyPlate is an outline of a general healthy diet based on the 2010 Dietary Guidelines for Americans, from the U.[place]. Department of Agriculture (USDA). It sets guidelines for how much food you should eat from each food group based on your age, sex, and level of physical activity.  What are tips for following MyPlate?  To follow MyPlate recommendations:  · Eat a wide variety of fruits and vegetables, grains, and protein foods.  · Serve smaller portions and eat less food throughout the day.  · Limit portion sizes to avoid overeating.  · Enjoy your food.  · Get at least 150 minutes of exercise every week. This is about 30 minutes each day, 5 or more days per week.  It can be difficult to have every meal look like MyPlate. Think about MyPlate as eating guidelines for an entire day, rather than each individual meal.  Fruits and vegetables  · Make half of your plate fruits and vegetables.  · Eat many different colors of fruits and vegetables each day.  · For a 2,000 calorie daily food plan, eat:  ? 2½ cups of vegetables every day.  ? 2 cups of fruit every day.  · 1 cup is equal to:  ? 1 cup raw or cooked vegetables.  ? 1 cup raw fruit.  ? 1 medium-sized orange, apple, or banana.  ? 1 cup 100% fruit or vegetable juice.  ? 2 cups raw leafy greens, such as lettuce, spinach, or kale.  ? ½ cup dried fruit.  Grains  · One fourth of your plate should be grains.  · Make at least half of the grains you eat each day whole grains.  · For a 2,000 calorie daily food plan, eat 6 oz of grains every day.  · 1 oz is equal to:  ? 1 slice bread.  ? 1 cup cereal.  ? ½ cup cooked rice, cereal, or pasta.  Protein  · One fourth of your plate should be protein.  · Eat a wide variety of protein foods, including meat, poultry, fish, eggs, beans, nuts, and tofu.  · For a 2,000 calorie daily food plan, eat 5½ oz of protein every day.  · 1 oz is equal to:  ? 1 oz meat, poultry, or fish.  ? ¼ cup cooked beans.  ? 1 egg.  ? ½ oz nuts  or seeds.  ? 1 Tbsp peanut butter.  Dairy  · Drink fat-free or low-fat (1%) milk.  · Eat or drink dairy as a side to meals.  · For a 2,000 calorie daily food plan, eat or drink 3 cups of dairy every day.  · 1 cup is equal to:  ? 1 cup milk, yogurt, cottage cheese, or soy milk (soy beverage).  ? 2 oz processed cheese.  ? 1½ oz natural cheese.  Fats, oils, salt, and sugars  · Only small amounts of oils are recommended.  · Avoid foods that are high in calories and low in nutritional value (empty calories), like foods high in fat or added sugars.  · Choose foods that are low in salt (sodium). Choose foods that have less than 140 milligrams (mg) of sodium per serving.  · Drink water instead of sugary drinks. Drink enough water each day to keep your urine pale yellow.  Where to find support  · Work with your health care provider or a nutrition specialist (dietitian) to develop a customized eating plan that is right for you.  · Download an mariza (mobile application) to help you track your daily food intake.  Where to find more information  · Go to ChooseMyPlate.gov for more information.  Summary  · MyPlate is a general guideline for healthy eating from the USDA. It is based on the 2010 Dietary Guidelines for Americans.  · In general, fruits and vegetables should take up ½ of your plate, grains should take up ¼ of your plate, and protein should take up ¼ of your plate.  This information is not intended to replace advice given to you by your health care provider. Make sure you discuss any questions you have with your health care provider.  Document Revised: 05/21/2020 Document Reviewed: 03/19/2018  Elsevier Patient Education © 2021 [employer].

## 2021-09-03 DIAGNOSIS — E04.1 SOLITARY THYROID NODULE: Primary | ICD-10-CM

## 2021-09-09 ENCOUNTER — LAB (OUTPATIENT)
Dept: LAB | Facility: HOSPITAL | Age: 51
End: 2021-09-09

## 2021-09-09 LAB
ALBUMIN SERPL-MCNC: 4.3 G/DL (ref 3.5–5.2)
ALP SERPL-CCNC: 119 U/L (ref 39–117)
ALPHA-FETOPROTEIN: 6.22 NG/ML (ref 0–8.3)
ALPHA1 GLOB MFR UR ELPH: 151 MG/DL (ref 90–200)
ALT SERPL W P-5'-P-CCNC: 20 U/L (ref 1–33)
AMYLASE SERPL-CCNC: 42 U/L (ref 28–100)
AST SERPL-CCNC: 21 U/L (ref 1–32)
BILIRUB CONJ SERPL-MCNC: <0.2 MG/DL (ref 0–0.3)
BILIRUB INDIRECT SERPL-MCNC: ABNORMAL MG/DL
BILIRUB SERPL-MCNC: 0.4 MG/DL (ref 0–1.2)
CERULOPLASMIN SERPL-MCNC: 27 MG/DL (ref 19–39)
FERRITIN SERPL-MCNC: 63.6 NG/ML (ref 13–150)
HAV IGM SERPL QL IA: NORMAL
HBV CORE IGM SERPL QL IA: NORMAL
HBV SURFACE AG SERPL QL IA: NORMAL
HCV AB SER DONR QL: NORMAL
INR PPP: 0.95 (ref 0.8–1.2)
IRON 24H UR-MRATE: 114 MCG/DL (ref 37–145)
IRON SATN MFR SERPL: 35 % (ref 20–50)
LIPASE SERPL-CCNC: 40 U/L (ref 13–60)
PROT SERPL-MCNC: 7.4 G/DL (ref 6–8.5)
PROTHROMBIN TIME: 12.6 SECONDS (ref 11.1–15.3)
TIBC SERPL-MCNC: 328 MCG/DL (ref 298–536)
TRANSFERRIN SERPL-MCNC: 220 MG/DL (ref 200–360)

## 2021-09-09 PROCEDURE — 82103 ALPHA-1-ANTITRYPSIN TOTAL: CPT | Performed by: PHYSICIAN ASSISTANT

## 2021-09-09 PROCEDURE — 80076 HEPATIC FUNCTION PANEL: CPT | Performed by: PHYSICIAN ASSISTANT

## 2021-09-09 PROCEDURE — 82947 ASSAY GLUCOSE BLOOD QUANT: CPT | Performed by: PHYSICIAN ASSISTANT

## 2021-09-09 PROCEDURE — 86003 ALLG SPEC IGE CRUDE XTRC EA: CPT | Performed by: PHYSICIAN ASSISTANT

## 2021-09-09 PROCEDURE — 82150 ASSAY OF AMYLASE: CPT | Performed by: PHYSICIAN ASSISTANT

## 2021-09-09 PROCEDURE — 82390 ASSAY OF CERULOPLASMIN: CPT | Performed by: PHYSICIAN ASSISTANT

## 2021-09-09 PROCEDURE — 83516 IMMUNOASSAY NONANTIBODY: CPT | Performed by: PHYSICIAN ASSISTANT

## 2021-09-09 PROCEDURE — 86038 ANTINUCLEAR ANTIBODIES: CPT | Performed by: PHYSICIAN ASSISTANT

## 2021-09-09 PROCEDURE — 83690 ASSAY OF LIPASE: CPT | Performed by: PHYSICIAN ASSISTANT

## 2021-09-09 PROCEDURE — 80074 ACUTE HEPATITIS PANEL: CPT | Performed by: PHYSICIAN ASSISTANT

## 2021-09-09 PROCEDURE — 84450 TRANSFERASE (AST) (SGOT): CPT | Performed by: PHYSICIAN ASSISTANT

## 2021-09-09 PROCEDURE — 83883 ASSAY NEPHELOMETRY NOT SPEC: CPT | Performed by: PHYSICIAN ASSISTANT

## 2021-09-09 PROCEDURE — 83540 ASSAY OF IRON: CPT | Performed by: PHYSICIAN ASSISTANT

## 2021-09-09 PROCEDURE — 36415 COLL VENOUS BLD VENIPUNCTURE: CPT | Performed by: PHYSICIAN ASSISTANT

## 2021-09-09 PROCEDURE — 82728 ASSAY OF FERRITIN: CPT | Performed by: PHYSICIAN ASSISTANT

## 2021-09-09 PROCEDURE — 82172 ASSAY OF APOLIPOPROTEIN: CPT | Performed by: PHYSICIAN ASSISTANT

## 2021-09-09 PROCEDURE — 82977 ASSAY OF GGT: CPT | Performed by: PHYSICIAN ASSISTANT

## 2021-09-09 PROCEDURE — 82465 ASSAY BLD/SERUM CHOLESTEROL: CPT | Performed by: PHYSICIAN ASSISTANT

## 2021-09-09 PROCEDURE — 82105 ALPHA-FETOPROTEIN SERUM: CPT | Performed by: PHYSICIAN ASSISTANT

## 2021-09-09 PROCEDURE — 84460 ALANINE AMINO (ALT) (SGPT): CPT | Performed by: PHYSICIAN ASSISTANT

## 2021-09-09 PROCEDURE — 85610 PROTHROMBIN TIME: CPT | Performed by: PHYSICIAN ASSISTANT

## 2021-09-09 PROCEDURE — 82247 BILIRUBIN TOTAL: CPT | Performed by: PHYSICIAN ASSISTANT

## 2021-09-09 PROCEDURE — 84466 ASSAY OF TRANSFERRIN: CPT | Performed by: PHYSICIAN ASSISTANT

## 2021-09-09 PROCEDURE — 83519 RIA NONANTIBODY: CPT | Performed by: PHYSICIAN ASSISTANT

## 2021-09-09 PROCEDURE — 83010 ASSAY OF HAPTOGLOBIN QUANT: CPT | Performed by: PHYSICIAN ASSISTANT

## 2021-09-09 PROCEDURE — 84478 ASSAY OF TRIGLYCERIDES: CPT | Performed by: PHYSICIAN ASSISTANT

## 2021-09-10 LAB
ACTIN IGG SERPL-ACNC: 10 UNITS (ref 0–19)
ANA TITR SER IF: NEGATIVE {TITER}
GLIADIN PEPTIDE IGA SER-ACNC: 8 UNITS (ref 0–19)
GLIADIN PEPTIDE IGG SER-ACNC: 2 UNITS (ref 0–19)
MITOCHONDRIA M2 IGG SER-ACNC: <20 UNITS (ref 0–20)
TTG IGA SER-ACNC: <2 U/ML (ref 0–3)
TTG IGG SER-ACNC: 3 U/ML (ref 0–5)

## 2021-09-11 LAB
CODFISH IGE QN: <0.1 KU/L
CONV CLASS DESCRIPTION: NORMAL
COW MILK IGE QN: <0.1 KU/L
EGG WHITE IGE QN: <0.1 KU/L
GLUTEN IGE QN: <0.1 KU/L
HAZELNUT IGE QN: <0.1 KU/L
PEANUT IGE QN: <0.1 KU/L
SCALLOP IGE QN: <0.1 KU/L
SESAME SEED IGE QN: <0.1 KU/L
SHRIMP IGE QN: <0.1 KU/L
SOYBEAN IGE QN: <0.1 KU/L
WALNUT IGE QN: <0.1 KU/L
WHEAT IGE QN: <0.1 KU/L

## 2021-09-13 DIAGNOSIS — M25.562 ACUTE PAIN OF LEFT KNEE: Primary | ICD-10-CM

## 2021-09-13 LAB
A2 MACROGLOB SERPL-MCNC: 126 MG/DL (ref 110–276)
ALT SERPL W P-5'-P-CCNC: 22 IU/L (ref 0–40)
APO A-I SERPL-MCNC: 163 MG/DL (ref 116–209)
AST SERPL W P-5'-P-CCNC: 23 IU/L (ref 0–40)
BILIRUB SERPL-MCNC: 0.3 MG/DL (ref 0–1.2)
CHOLEST SERPL-MCNC: 230 MG/DL (ref 100–199)
FIBROSIS SCORING:: ABNORMAL
FIBROSIS STAGE SERPL QL: ABNORMAL
GGT SERPL-CCNC: 13 IU/L (ref 0–60)
GLUCOSE SERPL-MCNC: 89 MG/DL (ref 65–99)
HAPTOGLOB SERPL-MCNC: 240 MG/DL (ref 42–296)
INTERPRETATIONS: (REFERENCE): ABNORMAL
LABORATORY COMMENT REPORT: ABNORMAL
LIVER FIBR SCORE SERPL CALC.FIBROSURE: 0.02 (ref 0–0.21)
NASH SCORING (REFERENCE): ABNORMAL
NECROINFLAMMATORY ACT GRADE SERPL QL: ABNORMAL
NECROINFLAMMATORY ACT SCORE SERPL: 0.5
SERVICE CMNT-IMP: ABNORMAL
STEATOSIS GRADE (REFERENCE): ABNORMAL
STEATOSIS GRADING (REFERENCE): ABNORMAL
STEATOSIS SCORE (REFERENCE): 0.45 (ref 0–0.3)
TRIGL SERPL-MCNC: 89 MG/DL (ref 0–149)

## 2021-09-14 ENCOUNTER — OFFICE VISIT (OUTPATIENT)
Dept: ORTHOPEDIC SURGERY | Facility: CLINIC | Age: 51
End: 2021-09-14

## 2021-09-14 VITALS
DIASTOLIC BLOOD PRESSURE: 90 MMHG | HEART RATE: 86 BPM | SYSTOLIC BLOOD PRESSURE: 126 MMHG | HEIGHT: 65 IN | OXYGEN SATURATION: 97 % | BODY MASS INDEX: 40.62 KG/M2 | WEIGHT: 243.8 LBS

## 2021-09-14 DIAGNOSIS — M25.562 CHRONIC PAIN OF LEFT KNEE: ICD-10-CM

## 2021-09-14 DIAGNOSIS — G89.29 CHRONIC PAIN OF LEFT KNEE: ICD-10-CM

## 2021-09-14 DIAGNOSIS — E66.01 MORBID OBESITY WITH BMI OF 40.0-44.9, ADULT (HCC): ICD-10-CM

## 2021-09-14 DIAGNOSIS — M23.92 INTERNAL DERANGEMENT OF LEFT KNEE: Primary | ICD-10-CM

## 2021-09-14 DIAGNOSIS — K21.9 GERD WITHOUT ESOPHAGITIS: ICD-10-CM

## 2021-09-14 PROCEDURE — 99213 OFFICE O/P EST LOW 20 MIN: CPT | Performed by: ORTHOPAEDIC SURGERY

## 2021-09-14 PROCEDURE — 20610 DRAIN/INJ JOINT/BURSA W/O US: CPT | Performed by: ORTHOPAEDIC SURGERY

## 2021-09-14 RX ADMIN — LIDOCAINE HYDROCHLORIDE 2 ML: 10 INJECTION, SOLUTION INFILTRATION; PERINEURAL at 11:20

## 2021-09-14 RX ADMIN — TRIAMCINOLONE ACETONIDE 40 MG: 40 INJECTION, SUSPENSION INTRA-ARTICULAR; INTRAMUSCULAR at 11:20

## 2021-09-14 NOTE — PROGRESS NOTES
"Letty Bach is a 51 y.o. female returns for     Chief Complaint   Patient presents with   • Left Knee - Follow-up       HISTORY OF PRESENT ILLNESS: Patient is here today for follow up of left knee. She was sent to xray upon arrival. She states that her pain is 2/10.  No new injury  Mild dull ache all the time.  Pain going up stairs  Does well walking for distance  No sharp pains with pivot and twist.       CONCURRENT MEDICAL HISTORY:    The following portions of the patient's history were reviewed and updated as appropriate: allergies, current medications, past family history, past medical history, past social history, past surgical history and problem list.     ROS  No fevers or chills.  No chest pain or shortness of air.  No GI or  disturbances.    PHYSICAL EXAMINATION:       /90   Pulse 86   Ht 165.1 cm (65\")   Wt 111 kg (243 lb 12.8 oz)   SpO2 97%   BMI 40.57 kg/m²     Physical Exam  Constitutional:       General: She is not in acute distress.     Appearance: Normal appearance.   Pulmonary:      Effort: Pulmonary effort is normal. No respiratory distress.   Neurological:      Mental Status: She is alert and oriented to person, place, and time.         GAIT:     [x]  Normal  []  Antalgic    Assistive device: [x]  None  []  Walker     []  Crutches  []  Cane     []  Wheelchair  []  Stretcher    Left Knee Exam     Muscle Strength   The patient has normal left knee strength.    Tenderness   The patient is experiencing tenderness in the medial joint line (tender pes anserine).    Range of Motion   Extension: 0   Flexion: 120     Other   Erythema: absent  Sensation: normal  Pulse: present  Swelling: none              XR Knee 1 or 2 View Left    Result Date: 9/14/2021  Narrative: Ordering Provider:  Jose Alberto Amos MD Ordering Diagnosis/Indication:  Acute pain of left knee Procedure:  XR KNEE 1 OR 2 VW LEFT Exam Date:  9/14/21 COMPARISON:  Not applicable, no relevant images available. "     Impression:  AP bilateral standing of the knees with lateral of the left knee show acceptable position alignment of both knees with no evidence of acute bony abnormality.  No fracture or dislocation is noted.  Mild varus positioning is noted in both knees.  No osteophytes present.  No significant arthritic change noted in the patellofemoral compartment. Jose Alberto Amos MD 9/14/21     XR Knee Bilateral AP Standing    Result Date: 9/14/2021  Narrative: Ordering Provider:  Jose Alberto Amos MD Ordering Diagnosis/Indication:  Acute pain of left knee Procedure:  XR KNEE BILATERAL AP STANDING Exam Date:  9/14/21 COMPARISON:  Not applicable, no relevant images available.     Impression:  AP bilateral standing of the knees with lateral of the left knee show acceptable position alignment of both knees with no evidence of acute bony abnormality.  No fracture or dislocation is noted.  Mild varus positioning is noted in both knees.  No osteophytes present.  No significant arthritic change noted in the patellofemoral compartment. Jose Alberto Amos MD 9/14/21       Narrative & Impression   MRI left knee.     HISTORY: Left knee pain.     Prior exam bilateral knee x-ray May 22, 2019.     TECHNIQUE: Multiplanar multisequence noncontrast images left  knee.     FINDINGS: Small intra-articular and supra patellar effusion.  Areas of grade IV chondromalacia  osteochondral defects patellar  articular hyaline cartilage midline and lateral articular facet.  Series 2 image 12. There is also 0.9 cm lateral patellar  tracking. Normal patellar and quadriceps tendon.     Normal medial meniscus. Normal lateral meniscus. Normal anterior  and posterior cruciate ligaments.     IMPRESSION:  CONCLUSION: Small intra-articular and supra patellar effusion.  Areas of grade IV chondromalacia , osteochondral defects patellar  articular hyaline cartilage.     0.92 cm lateral patellar tracking with respect to the distal  femur. MRI left  knee is otherwise unremarkable.     Electronically signed by:  Manolo King MD  12/9/2019 1:08 PM CST  Workstation: [id number]           ASSESSMENT:    Diagnoses and all orders for this visit:    Internal derangement of left knee  -     Large Joint Arthrocentesis: L knee    Chronic pain of left knee  -     Large Joint Arthrocentesis: L knee    Morbid obesity with BMI of 40.0-44.9, adult (CMS/HCC)  -     Large Joint Arthrocentesis: L knee    GERD without esophagitis          PLAN    The MRI and results were reviewed with the patient.  She does seem to have some cartilage defects along the medial condyle as well as the patellofemoral groove.  We discussed steroid injection today and slowly progressing activity as tolerated.  General strengthening conditioning exercises as pain allows.  No true restriction.  Proceed with a steroid injection today.  Continue general strengthening conditioning exercises.  We discussed healthy lifestyle choices and weight management.  Follow-up in 8 weeks.      Large Joint Arthrocentesis: L knee  Date/Time: 9/14/2021 11:20 AM  Consent given by: patient  Site marked: site marked  Timeout: Immediately prior to procedure a time out was called to verify the correct patient, procedure, equipment, support staff and site/side marked as required   Supporting Documentation  Indications: pain   Procedure Details  Location: knee - L knee  Preparation: Patient was prepped and draped in the usual sterile fashion  Needle size: 22 G  Medications administered: 40 mg triamcinolone acetonide 40 MG/ML; 2 mL lidocaine 1 %  Patient tolerance: patient tolerated the procedure well with no immediate complications            Return in about 6 weeks (around 10/26/2021) for recheck.    Jose Alberto Amos MD

## 2021-09-16 RX ORDER — LIDOCAINE HYDROCHLORIDE 10 MG/ML
2 INJECTION, SOLUTION INFILTRATION; PERINEURAL
Status: COMPLETED | OUTPATIENT
Start: 2021-09-14 | End: 2021-09-14

## 2021-09-16 RX ORDER — TRIAMCINOLONE ACETONIDE 40 MG/ML
40 INJECTION, SUSPENSION INTRA-ARTICULAR; INTRAMUSCULAR
Status: COMPLETED | OUTPATIENT
Start: 2021-09-14 | End: 2021-09-14

## 2021-09-17 LAB — TRYPSIN SERPL-MCNC: 568 NG/ML (ref 169–773)

## 2021-09-20 ENCOUNTER — OFFICE VISIT (OUTPATIENT)
Dept: OBSTETRICS AND GYNECOLOGY | Facility: CLINIC | Age: 51
End: 2021-09-20

## 2021-09-20 VITALS — BODY MASS INDEX: 40.98 KG/M2 | WEIGHT: 246 LBS | HEIGHT: 65 IN

## 2021-09-20 DIAGNOSIS — Z01.419 WOMEN'S ANNUAL ROUTINE GYNECOLOGICAL EXAMINATION: Primary | ICD-10-CM

## 2021-09-20 PROCEDURE — 99396 PREV VISIT EST AGE 40-64: CPT | Performed by: NURSE PRACTITIONER

## 2021-09-20 NOTE — PROGRESS NOTES
Janeth Bach is a 51 y.o. Annual gynecological exam    Mammo: BI-RADS 2  [facility], 2007 r/t pelvic pain  HRT: no    Pt presents for annual gynecological exam with no complaints.  She reports no history of abnormal mammogram or pap smears.      Gynecologic Exam  The patient's pertinent negatives include no genital itching, genital lesions, genital odor, genital rash, pelvic pain, vaginal bleeding or vaginal discharge. The patient is experiencing no pain. Pertinent negatives include no abdominal pain, chills, constipation, diarrhea, dysuria, fever, flank pain, frequency, headaches, hematuria, rash or urgency. She uses hysterectomy for contraception.       The following portions of the patient's history were reviewed and updated as appropriate: allergies, current medications, past family history, past medical history, past social history, past surgical history and problem list.    Review of Systems   Constitutional: Negative for chills, diaphoresis, fatigue, fever and unexpected weight change.   Respiratory: Negative for apnea, chest tightness and shortness of breath.    Cardiovascular: Negative for chest pain and palpitations.   Gastrointestinal: Negative for abdominal distention, abdominal pain, constipation and diarrhea.   Genitourinary: Negative for decreased urine volume, difficulty urinating, dysuria, enuresis, flank pain, frequency, genital sores, hematuria, pelvic pain, urgency, vaginal bleeding, vaginal discharge and vaginal pain.   Skin: Negative for rash.   Neurological: Negative for headaches.   Psychiatric/Behavioral: Negative for sleep disturbance and suicidal ideas.         Objective   Physical Exam  Vitals and nursing note reviewed.   Constitutional:       General: She is awake. She is not in acute distress.     Appearance: Normal appearance. She is well-developed and well-groomed. She is not ill-appearing, toxic-appearing or diaphoretic.   Neck:      Thyroid: No thyromegaly.    Cardiovascular:      Rate and Rhythm: Normal rate and regular rhythm.      Heart sounds: Normal heart sounds.   Pulmonary:      Effort: Pulmonary effort is normal.      Breath sounds: Normal breath sounds.   Chest:      Breasts: Roger Score is 5. Breasts are symmetrical.         Right: Normal. No swelling, bleeding, inverted nipple, mass, nipple discharge, skin change or tenderness.         Left: Normal. No swelling, bleeding, inverted nipple, mass, nipple discharge, skin change or tenderness.   Abdominal:      General: Bowel sounds are normal. There is no distension.      Palpations: Abdomen is soft.      Tenderness: There is no abdominal tenderness.   Genitourinary:     General: Normal vulva.      Exam position: Lithotomy position.      Roger stage (genital): 5.      Labia:         Right: No rash, tenderness, lesion or injury.         Left: No rash, tenderness, lesion or injury.       Urethra: No prolapse, urethral pain, urethral swelling or urethral lesion.      Vagina: Normal.      Comments: Uterus, cervix and adnexa absent. Well healed vaginal cuff. Pap not indicated.  Lymphadenopathy:      Upper Body:      Right upper body: No supraclavicular, axillary or pectoral adenopathy.      Left upper body: No supraclavicular, axillary or pectoral adenopathy.      Lower Body: No right inguinal adenopathy. No left inguinal adenopathy.   Skin:     General: Skin is warm and dry.   Neurological:      Mental Status: She is alert and oriented to person, place, and time.   Psychiatric:         Attention and Perception: Attention and perception normal.         Mood and Affect: Mood and affect normal.         Speech: Speech normal.         Behavior: Behavior normal. Behavior is cooperative.           Assessment/Plan   Diagnoses and all orders for this visit:    1. Women's annual routine gynecological examination (Primary)        Patient educated and encouraged to do monthly self breast exam. Mammogram due 03/2022.  Pap  smears no longer indicated.  RTC in 1 year for annual gynecological exam or sooner if needed.

## 2021-09-22 ENCOUNTER — OFFICE VISIT (OUTPATIENT)
Dept: SURGERY | Facility: CLINIC | Age: 51
End: 2021-09-22

## 2021-09-22 VITALS
WEIGHT: 245 LBS | BODY MASS INDEX: 40.82 KG/M2 | HEIGHT: 65 IN | SYSTOLIC BLOOD PRESSURE: 128 MMHG | OXYGEN SATURATION: 98 % | HEART RATE: 74 BPM | DIASTOLIC BLOOD PRESSURE: 90 MMHG

## 2021-09-22 DIAGNOSIS — E07.89 THYROID MASS OF UNCLEAR ETIOLOGY: Primary | ICD-10-CM

## 2021-09-22 PROCEDURE — 99214 OFFICE O/P EST MOD 30 MIN: CPT | Performed by: SURGERY

## 2021-09-22 RX ORDER — SODIUM CHLORIDE, SODIUM LACTATE, POTASSIUM CHLORIDE, CALCIUM CHLORIDE 600; 310; 30; 20 MG/100ML; MG/100ML; MG/100ML; MG/100ML
100 INJECTION, SOLUTION INTRAVENOUS CONTINUOUS
Status: CANCELLED | OUTPATIENT
Start: 2021-11-04

## 2021-09-22 RX ORDER — BUPIVACAINE HCL/0.9 % NACL/PF 0.1 %
2 PLASTIC BAG, INJECTION (ML) EPIDURAL ONCE
Status: CANCELLED | OUTPATIENT
Start: 2021-11-04 | End: 2021-09-22

## 2021-09-22 NOTE — PATIENT INSTRUCTIONS
"BMI for Adults  What is BMI?  Body mass index (BMI) is a number that is calculated from a person's weight and height. BMI can help estimate how much of a person's weight is composed of fat. BMI does not measure body fat directly. Rather, it is an alternative to procedures that directly measure body fat, which can be difficult and expensive.  BMI can help identify people who may be at higher risk for certain medical problems.  What are BMI measurements used for?  BMI is used as a screening tool to identify possible weight problems. It helps determine whether a person is obese, overweight, a healthy weight, or underweight.  BMI is useful for:  · Identifying a weight problem that may be related to a medical condition or may increase the risk for medical problems.  · Promoting changes, such as changes in diet and exercise, to help reach a healthy weight. BMI screening can be repeated to see if these changes are working.  How is BMI calculated?  BMI involves measuring your weight in relation to your height. Both height and weight are measured, and the BMI is calculated from those numbers. This can be done either in English ([place] or metric measurements. Note that charts and online BMI calculators are available to help you find your BMI quickly and easily without having to do these calculations yourself.  To calculate your BMI in English ([place] measurements:    1. Measure your weight in pounds (lb).  2. Multiply the number of pounds by 703.  ? For example, for a person who weighs 180 lb, multiply that number by 703, which equals 126,540.  3. Measure your height in inches. Then multiply that number by itself to get a measurement called \"inches squared.\"  ? For example, for a person who is 70 inches tall, the \"inches squared\" measurement is 70 inches x 70 inches, which equals 4,900 inches squared.  4. Divide the total from step 2 (number of lb x 703) by the total from step 3 (inches squared): 126,540 ÷ 4,900 = 25.8. This is " "your BMI.    To calculate your BMI in metric measurements:  1. Measure your weight in kilograms (kg).  2. Measure your height in meters (m). Then multiply that number by itself to get a measurement called \"meters squared.\"  ? For example, for a person who is 1.75 m tall, the \"meters squared\" measurement is 1.75 m x 1.75 m, which is equal to 3.1 meters squared.  3. Divide the number of kilograms (your weight) by the meters squared number. In this example: 70 ÷ 3.1 = 22.6. This is your BMI.  What do the results mean?  BMI charts are used to identify whether you are underweight, normal weight, overweight, or obese. The following guidelines will be used:  · Underweight: BMI less than 18.5.  · Normal weight: BMI between 18.5 and 24.9.  · Overweight: BMI between 25 and 29.9.  · Obese: BMI of 30 or above.  Keep these notes in mind:  · Weight includes both fat and muscle, so someone with a muscular build, such as an athlete, may have a BMI that is higher than 24.9. In cases like these, BMI is not an accurate measure of body fat.  · To determine if excess body fat is the cause of a BMI of 25 or higher, further assessments may need to be done by a health care provider.  · BMI is usually interpreted in the same way for men and women.  Where to find more information  For more information about BMI, including tools to quickly calculate your BMI, go to these websites:  · Centers for Disease Control and Prevention: www.cdc.gov  · American Heart Association: www.heart.org  · National Heart, Lung, and Blood [place]: www.nhlbi.nih.gov  Summary  · Body mass index (BMI) is a number that is calculated from a person's weight and height.  · BMI may help estimate how much of a person's weight is composed of fat. BMI can help identify those who may be at higher risk for certain medical problems.  · BMI can be measured using English measurements or metric measurements.  · BMI charts are used to identify whether you are underweight, normal " weight, overweight, or obese.  This information is not intended to replace advice given to you by your health care provider. Make sure you discuss any questions you have with your health care provider.  Document Revised: 09/09/2020 Document Reviewed: 07/17/2020  Elsevier Patient Education © 2021 [employer].

## 2021-09-24 NOTE — PROGRESS NOTES
Answers for HPI/ROS submitted by the patient on 9/16/2021  Please describe your symptoms.: Dr referral cause of my biopsy  Have you had these symptoms before?: No  How long have you been having these symptoms?: Greater than 2 weeks  What is the primary reason for your visit?: Other    Chief Complaint   Patient presents with   • Thyroid Problem     solid thyroid nodule        HPI  51-year-old woman evaluated for thyroid mass originally seen during carotid ultrasound.  She has had no symptomatic hypo or hyperthyroidism no difficulty with swallowing, speaking, or breathing.  Imaging demonstrates the following:    Study Result    Narrative & Impression   THYROID ULTRASOUND     CLINICAL INFORMATION:  Thyroid nodule, follow-up        TECHNIQUE:  Sagittal and axial images.        COMPARISON:  Doppler carotid ultrasound May 21st, 2021.     FINDINGS:  The right lobe is enlarged 5.37 x 1.98 x 2.43 cm.  Well-circumscribed dominant nodule upper pole. Slightly  hypoechoic heterogenous with speckled calcifications. 2.44 x 1.65  x 1.59 cm. Ultrasound-guided fine-needle aspiration biopsy should  be considered.     Left lobe is normal size 4.34 x 1.08 x 1.55 cm. There are no  nodules.     IMPRESSION:  Dominant nodule right lobe. Ultrasound guided  fine-needle aspiration biopsy should be considered.     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  Past Medical History:   Diagnosis Date   • Disease of thyroid gland    • Elevated liver enzymes    • GERD (gastroesophageal reflux disease)    • Urinary tract infection    • Varicella        Past Surgical History:   Procedure Laterality Date   • CHOLECYSTECTOMY  06/06/2019   • CHOLECYSTECTOMY WITH INTRAOPERATIVE CHOLANGIOGRAM N/A 6/6/2019    Procedure: CHOLECYSTECTOMY LAPAROSCOPIC INTRAOPERATIVE CHOLANGIOGRAM      (c-arm#2);  Surgeon: Felipe Overton MD;  Location: [facility] OR;   Service: General   • COLONOSCOPY N/A 2/12/2021    Procedure: COLONOSCOPY;  Surgeon: Joao Bruce MD;  Location: [facility] ENDOSCOPY;  Service: Gastroenterology;  Laterality: N/A;   • COLONOSCOPY     • ENDOSCOPY N/A 11/14/2019    Procedure: ESOPHAGOGASTRODUODENOSCOPY;  Surgeon: Joao Bruce MD;  Location: [facility] ENDOSCOPY;  Service: Gastroenterology   • HYSTERECTOMY  2007   • TOTAL ABDOMINAL HYSTERECTOMY WITH SALPINGO OOPHORECTOMY  2007   • UPPER GASTROINTESTINAL ENDOSCOPY  11/14/2019   • WISDOM TOOTH EXTRACTION           Current Outpatient Medications:   •  hyoscyamine (LEVSIN) 0.125 MG SL tablet, Take 1 tablet by mouth Every 4 (Four) Hours As Needed for Cramping or Diarrhea (abd pain)., Disp: 45 tablet, Rfl: 1    Allergies   Allergen Reactions   • Contrave [Naltrexone-Bupropion Hcl Er] Dizziness       Family History   Problem Relation Age of Onset   • Breast cancer Mother    • Thyroid disease Mother    • Hypertension Mother    • Hypertension Father    • Diabetes Father    • Kidney disease Father    • Heart disease Father    • No Known Problems Sister    • No Known Problems Brother    • Seizures Daughter    • Hypertension Daughter    • COPD Maternal Grandmother    • Asthma Maternal Grandmother    • No Known Problems Sister    • ADD / ADHD Daughter    • Polymyositis Daughter        Social History     Socioeconomic History   • Marital status:      Spouse name: Not on file   • Number of children: Not on file   • Years of education: Not on file   • Highest education level: Not on file   Tobacco Use   • Smoking status: Former Smoker     Packs/day: 0.50     Years: 5.00     Pack years: 2.50     Types: Cigarettes   • Smokeless tobacco: Never Used   • Tobacco comment: passive at work and home   Vaping Use   • Vaping Use: Never used   Substance and Sexual Activity   • Alcohol use: No   • Drug use: Never   • Sexual activity: Yes     Partners: Male     Birth control/protection: Surgical       Review of  Systems    Physical Exam      ASSESSMENT    Diagnoses and all orders for this visit:    1. Thyroid mass of unclear etiology (Primary)  -     Case Request; Standing  -     lactated ringers infusion  -     ceFAZolin (ANCEF) 2 g in sodium chloride 0.9 % 100 mL IVPB  -     Case Request    Other orders  -     Follow Anesthesia Guidelines / Standing Orders; Future  -     Provide Chlorhexidine Skin Prep Wipes and Instructions; Future  -     Follow Anesthesia Guidelines / Standing Orders; Standing  -     Outpatient In A Bed; Standing  -     Obtain Informed Consent; Standing  -     Place sequential compression device- to be placed on patient in Pre-op; Standing  -     Verify / Perform Chlorhexidine Skin Prep; Standing        PLAN    1.  Right probable total thyroidectomy is planned    The following were discussed with the patient/family:      What are the indications that have led your doctor to the opinion that an operation is necessary?      It is explained that thyroidectomy is an operation in which one or both lobes of the thyroid gland are removed. The most common indications for thyroidectomy include a large mass in the thyroid gland, difficulties with breathing related to a thyroid mass, difficulties with swallowing, suspected or proven cancer of the thyroid gland and hyperthyroidism (overproduction of the thyroid hormone). The need for thyroidectomy based on history, the results of a physical examination and tests.      What, if any, alternative treatments are available for your condition?    There is medical treatment available for hyperthyroidism.   Masses that obstruct the esophagus or breathing passages cannot be treated medically other than observation.   Cancers are best managed surgically.  Suspected cancers may be followed with ultrasound, although there is a risk of missing and not curing a cancer.    What will be the likely result if you don't have the operation?    Symptoms may perisit. Cancers may  progress.     What are the basic procedures involved in the operation?    Surgery is done through an incision in the neck. After removing the abnormal portion of the thyroid, it is checked post operatively for cancer. Certain cancers may require a re-operation and removal of the remainder of the thyroid.     What are the risks?    After surgery patients may have difficulty in swallowing. Occasionally, swallowing may even be a little painful.  This pain usually resolves within 24 to 72 hours. Bleeding or infection are also possible short term complications. Although rare in thyroid surgery, some patients may develop a thick scar or keloid.   Two complications specific to thyroid surgery are hypocalcemia and vocal cord weakness or paralysis. Hypocalcemia, or low blood levels of calcium, may occur after complete removal of both thyroid lobes. This condition is caused by injury or interference with the blood supply of four tiny glands called parathyroid glands, which are located within or very close to the thyroid gland. Hypocalcemia is usually temporary, but sometimes may require calcium supplements if sufficiently pronounced. Permanent hypocalcemia is fortunately rare.  This is why, serum calcium, magnesium and phosphorus levels may need to be monitored in the first 24 hours after the surgery.    Vocal cord weakness or paralysis may be caused by swelling, stretching, or injury to the recurrent laryngeal nerve which passes very close to the thyroid gland. Temporary hoarseness may result. Again, this is an uncommon, usually temporary complication. Permanent vocal cord paralysis is rare.  In very rare circumstances, airway obstruction may occur and a tracheotomy may become necessary to gain access to the airway. This is an extremely rare life saving measure and every effort would be taken to avoid it.    As with any type of surgery, the risks of anesthesia such as drug reaction, breathing difficulties and even death are  possible.     How is the operation expected to improve your health or quality of life?    The operation may cure cancer, relieve obstructive symptoms, or cure hyperthyroidism.    Is hospitalization necessary and, if so, how long can you expect to be hospitalized?    Patients who undergo partial thyroidectomy surgery are often discharged home from the hospital the same day. Those requiring total thyroidism will require overnight hospitalization.  Narcotic and non-narcotic pain medicine is usually prescribed to be taken on an as needed basis.     What can you expect during your recovery period?    In most cases, primary hyperparathyroidism is cured with surgery. This decreases the risks associated with calcium levels that have become too high.    When can you expect to resume normal activities?    Normal activity can be resumed in 2-3 weeks.    Are there likely to be residual effects from the operation?    Depending on the final histologic (microscopic examination) diagnosis of the gland removed, and on the blood tests, continuous follow-up by the endocrinologist and / or surgeon may be indicated for replacement of the thyroid hormone. Following total thyroidectomy, patients have to take replacement thyroid hormone for the rest of their life.      All questions are answered. She understands and agrees to operation.                  This document has been electronically signed by Benjamin Renteria MD on September 23, 2021 22:55 CDT

## 2021-09-30 ENCOUNTER — OFFICE VISIT (OUTPATIENT)
Dept: GASTROENTEROLOGY | Facility: CLINIC | Age: 51
End: 2021-09-30

## 2021-09-30 VITALS
BODY MASS INDEX: 40.45 KG/M2 | HEART RATE: 86 BPM | HEIGHT: 65 IN | DIASTOLIC BLOOD PRESSURE: 73 MMHG | WEIGHT: 242.8 LBS | SYSTOLIC BLOOD PRESSURE: 128 MMHG

## 2021-09-30 DIAGNOSIS — R79.89 ELEVATED LFTS: ICD-10-CM

## 2021-09-30 DIAGNOSIS — R10.12 LUQ PAIN: Primary | ICD-10-CM

## 2021-09-30 DIAGNOSIS — R74.8 ALKALINE PHOSPHATASE ELEVATION: ICD-10-CM

## 2021-09-30 DIAGNOSIS — K76.0 FATTY LIVER: ICD-10-CM

## 2021-09-30 PROCEDURE — 99213 OFFICE O/P EST LOW 20 MIN: CPT | Performed by: PHYSICIAN ASSISTANT

## 2021-10-29 ENCOUNTER — OFFICE VISIT (OUTPATIENT)
Dept: SURGERY | Facility: CLINIC | Age: 51
End: 2021-10-29

## 2021-10-29 VITALS
HEART RATE: 68 BPM | HEIGHT: 65 IN | BODY MASS INDEX: 40.52 KG/M2 | TEMPERATURE: 97.7 F | WEIGHT: 243.2 LBS | SYSTOLIC BLOOD PRESSURE: 130 MMHG | DIASTOLIC BLOOD PRESSURE: 78 MMHG

## 2021-10-29 DIAGNOSIS — E04.1 THYROID NODULE: Primary | ICD-10-CM

## 2021-10-29 PROCEDURE — 99213 OFFICE O/P EST LOW 20 MIN: CPT | Performed by: SURGERY

## 2021-10-29 NOTE — PATIENT INSTRUCTIONS
"BMI for Adults  What is BMI?  Body mass index (BMI) is a number that is calculated from a person's weight and height. BMI can help estimate how much of a person's weight is composed of fat. BMI does not measure body fat directly. Rather, it is an alternative to procedures that directly measure body fat, which can be difficult and expensive.  BMI can help identify people who may be at higher risk for certain medical problems.  What are BMI measurements used for?  BMI is used as a screening tool to identify possible weight problems. It helps determine whether a person is obese, overweight, a healthy weight, or underweight.  BMI is useful for:  · Identifying a weight problem that may be related to a medical condition or may increase the risk for medical problems.  · Promoting changes, such as changes in diet and exercise, to help reach a healthy weight. BMI screening can be repeated to see if these changes are working.  How is BMI calculated?  BMI involves measuring your weight in relation to your height. Both height and weight are measured, and the BMI is calculated from those numbers. This can be done either in English ([place] or metric measurements. Note that charts and online BMI calculators are available to help you find your BMI quickly and easily without having to do these calculations yourself.  To calculate your BMI in English ([place] measurements:    1. Measure your weight in pounds (lb).  2. Multiply the number of pounds by 703.  ? For example, for a person who weighs 180 lb, multiply that number by 703, which equals 126,540.  3. Measure your height in inches. Then multiply that number by itself to get a measurement called \"inches squared.\"  ? For example, for a person who is 70 inches tall, the \"inches squared\" measurement is 70 inches x 70 inches, which equals 4,900 inches squared.  4. Divide the total from step 2 (number of lb x 703) by the total from step 3 (inches squared): 126,540 ÷ 4,900 = 25.8. This is " "your BMI.    To calculate your BMI in metric measurements:  1. Measure your weight in kilograms (kg).  2. Measure your height in meters (m). Then multiply that number by itself to get a measurement called \"meters squared.\"  ? For example, for a person who is 1.75 m tall, the \"meters squared\" measurement is 1.75 m x 1.75 m, which is equal to 3.1 meters squared.  3. Divide the number of kilograms (your weight) by the meters squared number. In this example: 70 ÷ 3.1 = 22.6. This is your BMI.  What do the results mean?  BMI charts are used to identify whether you are underweight, normal weight, overweight, or obese. The following guidelines will be used:  · Underweight: BMI less than 18.5.  · Normal weight: BMI between 18.5 and 24.9.  · Overweight: BMI between 25 and 29.9.  · Obese: BMI of 30 or above.  Keep these notes in mind:  · Weight includes both fat and muscle, so someone with a muscular build, such as an athlete, may have a BMI that is higher than 24.9. In cases like these, BMI is not an accurate measure of body fat.  · To determine if excess body fat is the cause of a BMI of 25 or higher, further assessments may need to be done by a health care provider.  · BMI is usually interpreted in the same way for men and women.  Where to find more information  For more information about BMI, including tools to quickly calculate your BMI, go to these websites:  · Centers for Disease Control and Prevention: www.cdc.gov  · American Heart Association: www.heart.org  · National Heart, Lung, and Blood [place]: www.nhlbi.nih.gov  Summary  · Body mass index (BMI) is a number that is calculated from a person's weight and height.  · BMI may help estimate how much of a person's weight is composed of fat. BMI can help identify those who may be at higher risk for certain medical problems.  · BMI can be measured using English measurements or metric measurements.  · BMI charts are used to identify whether you are underweight, normal " weight, overweight, or obese.  This information is not intended to replace advice given to you by your health care provider. Make sure you discuss any questions you have with your health care provider.  Document Revised: 09/09/2020 Document Reviewed: 07/17/2020  Elsevier Patient Education © 2021 [employer]

## 2021-10-30 NOTE — PROGRESS NOTES
Chief Complaint   Patient presents with   • Follow-up     Discuss Surgery        HPI  This woman was evaluated month for a large right thyroid mass.  She was advised her convenience to undergo me the right thyroidectomy or possible total thyroidectomy.  She is here for scheduling of surgery.  Past Medical History:   Diagnosis Date   • Disease of thyroid gland    • Elevated liver enzymes    • GERD (gastroesophageal reflux disease)    • Urinary tract infection    • Varicella        Past Surgical History:   Procedure Laterality Date   • CHOLECYSTECTOMY  06/06/2019   • CHOLECYSTECTOMY WITH INTRAOPERATIVE CHOLANGIOGRAM N/A 6/6/2019    Procedure: CHOLECYSTECTOMY LAPAROSCOPIC INTRAOPERATIVE CHOLANGIOGRAM      (c-arm#2);  Surgeon: Felipe Overton MD;  Location: [facility] OR;  Service: General   • COLONOSCOPY N/A 2/12/2021    Procedure: COLONOSCOPY;  Surgeon: Joao Bruce MD;  Location: [facility] ENDOSCOPY;  Service: Gastroenterology;  Laterality: N/A;   • COLONOSCOPY     • ENDOSCOPY N/A 11/14/2019    Procedure: ESOPHAGOGASTRODUODENOSCOPY;  Surgeon: Joao Bruce MD;  Location: [facility] ENDOSCOPY;  Service: Gastroenterology   • HYSTERECTOMY  2007   • TOTAL ABDOMINAL HYSTERECTOMY WITH SALPINGO OOPHORECTOMY  2007   • UPPER GASTROINTESTINAL ENDOSCOPY  11/14/2019   • US GUIDED FINE NEEDLE ASPIRATION  9/1/2021   • WISDOM TOOTH EXTRACTION           Current Outpatient Medications:   •  hyoscyamine (LEVSIN) 0.125 MG SL tablet, Take 1 tablet by mouth Every 4 (Four) Hours As Needed for Cramping or Diarrhea (abd pain)., Disp: 45 tablet, Rfl: 1    Allergies   Allergen Reactions   • Contrave [Naltrexone-Bupropion Hcl Er] Dizziness       Family History   Problem Relation Age of Onset   • Breast cancer Mother    • Thyroid disease Mother    • Hypertension Mother    • Hypertension Father    • Diabetes Father    • Kidney disease Father    • Heart disease Father    • No Known Problems Sister    • No Known Problems Brother    • Seizures  Daughter    • Hypertension Daughter    • COPD Maternal Grandmother    • Asthma Maternal Grandmother    • No Known Problems Sister    • ADD / ADHD Daughter    • Polymyositis Daughter        Social History     Socioeconomic History   • Marital status:    Tobacco Use   • Smoking status: Former Smoker     Packs/day: 0.50     Years: 5.00     Pack years: 2.50     Types: Cigarettes   • Smokeless tobacco: Never Used   • Tobacco comment: passive at work and home   Vaping Use   • Vaping Use: Never used   Substance and Sexual Activity   • Alcohol use: No   • Drug use: Never   • Sexual activity: Yes     Partners: Male     Birth control/protection: Surgical           Physical Exam  Thyroid is unchanged since last visit  Heart regular rate and rhythm  Lungs clear to auscultation    ASSESSMENT    Diagnoses and all orders for this visit:    1. Thyroid nodule (Primary)        PLAN    1.  Right thyroidectomy and possible total thyroidectomy are planned    The following were discussed with the patient/family:      What are the indications that have led your doctor to the opinion that an operation is necessary?      It is explained that thyroidectomy is an operation in which one or both lobes of the thyroid gland are removed. The most common indications for thyroidectomy include a large mass in the thyroid gland, difficulties with breathing related to a thyroid mass, difficulties with swallowing, suspected or proven cancer of the thyroid gland and hyperthyroidism (overproduction of the thyroid hormone). The need for thyroidectomy based on history, the results of a physical examination and tests.      What, if any, alternative treatments are available for your condition?    There is medical treatment available for hyperthyroidism.   Masses that obstruct the esophagus or breathing passages cannot be treated medically other than observation.   Cancers are best managed surgically.  Suspected cancers may be followed with ultrasound,  although there is a risk of missing and not curing a cancer.    What will be the likely result if you don't have the operation?    Symptoms may perisit. Cancers may progress.     What are the basic procedures involved in the operation?    Surgery is done through an incision in the neck. After removing the abnormal portion of the thyroid, it is checked post operatively for cancer. Certain cancers may require a re-operation and removal of the remainder of the thyroid.     What are the risks?    After surgery patients may have difficulty in swallowing. Occasionally, swallowing may even be a little painful.  This pain usually resolves within 24 to 72 hours. Bleeding or infection are also possible short term complications. Although rare in thyroid surgery, some patients may develop a thick scar or keloid.   Two complications specific to thyroid surgery are hypocalcemia and vocal cord weakness or paralysis. Hypocalcemia, or low blood levels of calcium, may occur after complete removal of both thyroid lobes. This condition is caused by injury or interference with the blood supply of four tiny glands called parathyroid glands, which are located within or very close to the thyroid gland. Hypocalcemia is usually temporary, but sometimes may require calcium supplements if sufficiently pronounced. Permanent hypocalcemia is fortunately rare.  This is why, serum calcium, magnesium and phosphorus levels may need to be monitored in the first 24 hours after the surgery.    Vocal cord weakness or paralysis may be caused by swelling, stretching, or injury to the recurrent laryngeal nerve which passes very close to the thyroid gland. Temporary hoarseness may result. Again, this is an uncommon, usually temporary complication. Permanent vocal cord paralysis is rare.  In very rare circumstances, airway obstruction may occur and a tracheotomy may become necessary to gain access to the airway. This is an extremely rare life saving measure  and every effort would be taken to avoid it.    As with any type of surgery, the risks of anesthesia such as drug reaction, breathing difficulties and even death are possible.     How is the operation expected to improve your health or quality of life?    The operation may cure cancer, relieve obstructive symptoms, or cure hyperthyroidism.    Is hospitalization necessary and, if so, how long can you expect to be hospitalized?    Patients who undergo partial thyroidectomy surgery are often discharged home from the hospital the same day. Those requiring total thyroidism will require overnight hospitalization.  Narcotic and non-narcotic pain medicine is usually prescribed to be taken on an as needed basis.     What can you expect during your recovery period?    In most cases, primary hyperparathyroidism is cured with surgery. This decreases the risks associated with calcium levels that have become too high.    When can you expect to resume normal activities?    Normal activity can be resumed in 2-3 weeks.    Are there likely to be residual effects from the operation?    Depending on the final histologic (microscopic examination) diagnosis of the gland removed, and on the blood tests, continuous follow-up by the endocrinologist and / or surgeon may be indicated for replacement of the thyroid hormone. Following total thyroidectomy, patients have to take replacement thyroid hormone for the rest of their life.      All questions are answered. She understands and agrees to operation.                  This document has been electronically signed by Benjamin Renteria MD on October 29, 2021 21:00 CDT

## 2021-11-02 ENCOUNTER — LAB (OUTPATIENT)
Dept: LAB | Facility: HOSPITAL | Age: 51
End: 2021-11-02

## 2021-11-02 ENCOUNTER — PRE-ADMISSION TESTING (OUTPATIENT)
Dept: PREADMISSION TESTING | Facility: HOSPITAL | Age: 51
End: 2021-11-02

## 2021-11-02 VITALS
RESPIRATION RATE: 16 BRPM | BODY MASS INDEX: 41.32 KG/M2 | HEIGHT: 65 IN | DIASTOLIC BLOOD PRESSURE: 76 MMHG | WEIGHT: 248 LBS | SYSTOLIC BLOOD PRESSURE: 130 MMHG | OXYGEN SATURATION: 96 % | HEART RATE: 86 BPM

## 2021-11-02 DIAGNOSIS — Z01.818 PREOP TESTING: Primary | ICD-10-CM

## 2021-11-02 LAB — SARS-COV-2 N GENE RESP QL NAA+PROBE: NOT DETECTED

## 2021-11-02 PROCEDURE — 87635 SARS-COV-2 COVID-19 AMP PRB: CPT

## 2021-11-02 PROCEDURE — C9803 HOPD COVID-19 SPEC COLLECT: HCPCS

## 2021-11-02 NOTE — DISCHARGE INSTRUCTIONS
[facility]  Pre-op Information and Guidelines    You will be called after 2 p.m. the day before your surgery (Friday for Monday surgery) and notified of your time for arrival and approximate surgery time.  If you have not received a call by 4P.M., please contact Same Day Surgery at [phone] of if outside [facility] call [phone].    Please Follow these Important Safety Guidelines:    • The morning of your procedure, take only the medications listed below with   A sip of water:_____________________________________________       ______________________________________________    • DO NOT eat or drink anything after 12:00 midnight the night before surgery  Specific instructions concerning drinking clear liquids will be discussed during  the pre-surgery instruction call the day before your surgery.    • If you take a blood thinner (ex. Plavix, Coumadin, aspirin), ask your doctor when to stop it before surgery  STOP DATE: _________________    • Only 2 visitors are allowed in patient rooms at a time  Your visitors will be asked to wait in the lobby until the admission process is complete with the exception of a parent with a child and patients in need of special assistance.    • YOU CANNOT DRIVE YOURSELF HOME  You must be accompanied by someone who will be responsible for driving you home after surgery and for your care at home.    • DO NOT chew gum, use breath mints, hard candy, or smoke the day of surgery  • DO NOT drink alcohol for at least 24 hours before your surgery  • DO NOT wear any jewelry and remove all body piercing before coming to the hospital  • DO NOT wear make-up to the hospital  • If you are having surgery on an extremity (arm/leg/foot) remove nail polish/artificial nails on the surgical side  • Clothing, glasses, contacts, dentures, and hairpieces must be removed before surgery  • Bathe the night before or the morning of your surgery and do not use powders/lotions on  skin.

## 2021-11-03 ENCOUNTER — ANESTHESIA EVENT (OUTPATIENT)
Dept: PERIOP | Facility: HOSPITAL | Age: 51
End: 2021-11-03

## 2021-11-04 ENCOUNTER — ANESTHESIA (OUTPATIENT)
Dept: PERIOP | Facility: HOSPITAL | Age: 51
End: 2021-11-04

## 2021-11-04 ENCOUNTER — HOSPITAL ENCOUNTER (OUTPATIENT)
Facility: HOSPITAL | Age: 51
Setting detail: HOSPITAL OUTPATIENT SURGERY
Discharge: HOME | End: 2021-11-04
Attending: SURGERY | Admitting: SURGERY

## 2021-11-04 VITALS
HEIGHT: 65 IN | OXYGEN SATURATION: 94 % | WEIGHT: 241.62 LBS | HEART RATE: 102 BPM | SYSTOLIC BLOOD PRESSURE: 116 MMHG | TEMPERATURE: 97.7 F | DIASTOLIC BLOOD PRESSURE: 73 MMHG | BODY MASS INDEX: 40.26 KG/M2 | RESPIRATION RATE: 18 BRPM

## 2021-11-04 DIAGNOSIS — E07.89 THYROID MASS OF UNCLEAR ETIOLOGY: Primary | ICD-10-CM

## 2021-11-04 PROCEDURE — 25010000002 DEXAMETHASONE PER 1 MG

## 2021-11-04 PROCEDURE — 25010000002 CEFAZOLIN PER 500 MG: Performed by: SURGERY

## 2021-11-04 PROCEDURE — 25010000002 PROPOFOL 10 MG/ML EMULSION

## 2021-11-04 PROCEDURE — 25010000002 ONDANSETRON PER 1 MG

## 2021-11-04 PROCEDURE — 25010000002 FENTANYL CITRATE (PF) 50 MCG/ML SOLUTION

## 2021-11-04 PROCEDURE — 60225 PARTIAL REMOVAL OF THYROID: CPT | Performed by: SURGERY

## 2021-11-04 PROCEDURE — 25010000002 HYDROMORPHONE 1 MG/ML SOLUTION

## 2021-11-04 PROCEDURE — 25010000002 NEOSTIGMINE 10 MG/10ML SOLUTION

## 2021-11-04 PROCEDURE — 25010000002 MIDAZOLAM PER 1 MG

## 2021-11-04 PROCEDURE — C1889 IMPLANT/INSERT DEVICE, NOC: HCPCS | Performed by: SURGERY

## 2021-11-04 DEVICE — HEMOST ABS SURGICEL SNOW 1X2IN: Type: IMPLANTABLE DEVICE | Site: NECK | Status: FUNCTIONAL

## 2021-11-04 RX ORDER — MIDAZOLAM HYDROCHLORIDE 1 MG/ML
INJECTION INTRAMUSCULAR; INTRAVENOUS AS NEEDED
Status: DISCONTINUED | OUTPATIENT
Start: 2021-11-04 | End: 2021-11-04 | Stop reason: SURG

## 2021-11-04 RX ORDER — MEPERIDINE HYDROCHLORIDE 25 MG/ML
12.5 INJECTION INTRAMUSCULAR; INTRAVENOUS; SUBCUTANEOUS
Status: DISCONTINUED | OUTPATIENT
Start: 2021-11-04 | End: 2021-11-04 | Stop reason: HOSPADM

## 2021-11-04 RX ORDER — SODIUM CHLORIDE, SODIUM LACTATE, POTASSIUM CHLORIDE, CALCIUM CHLORIDE 600; 310; 30; 20 MG/100ML; MG/100ML; MG/100ML; MG/100ML
100 INJECTION, SOLUTION INTRAVENOUS CONTINUOUS
Status: DISCONTINUED | OUTPATIENT
Start: 2021-11-04 | End: 2021-11-04 | Stop reason: HOSPADM

## 2021-11-04 RX ORDER — LIDOCAINE HYDROCHLORIDE 20 MG/ML
INJECTION, SOLUTION INFILTRATION; PERINEURAL AS NEEDED
Status: DISCONTINUED | OUTPATIENT
Start: 2021-11-04 | End: 2021-11-04 | Stop reason: SURG

## 2021-11-04 RX ORDER — FENTANYL CITRATE 50 UG/ML
INJECTION, SOLUTION INTRAMUSCULAR; INTRAVENOUS AS NEEDED
Status: DISCONTINUED | OUTPATIENT
Start: 2021-11-04 | End: 2021-11-04

## 2021-11-04 RX ORDER — HYDROCODONE BITARTRATE AND ACETAMINOPHEN 7.5; 325 MG/1; MG/1
1 TABLET ORAL ONCE
Status: COMPLETED | OUTPATIENT
Start: 2021-11-04 | End: 2021-11-04

## 2021-11-04 RX ORDER — PROMETHAZINE HYDROCHLORIDE 25 MG/1
25 SUPPOSITORY RECTAL ONCE AS NEEDED
Status: DISCONTINUED | OUTPATIENT
Start: 2021-11-04 | End: 2021-11-04 | Stop reason: HOSPADM

## 2021-11-04 RX ORDER — NEOSTIGMINE METHYLSULFATE 1 MG/ML
INJECTION, SOLUTION INTRAVENOUS AS NEEDED
Status: DISCONTINUED | OUTPATIENT
Start: 2021-11-04 | End: 2021-11-04 | Stop reason: SURG

## 2021-11-04 RX ORDER — PROMETHAZINE HYDROCHLORIDE 25 MG/1
25 TABLET ORAL ONCE AS NEEDED
Status: DISCONTINUED | OUTPATIENT
Start: 2021-11-04 | End: 2021-11-04 | Stop reason: HOSPADM

## 2021-11-04 RX ORDER — FENTANYL CITRATE 50 UG/ML
INJECTION, SOLUTION INTRAMUSCULAR; INTRAVENOUS AS NEEDED
Status: DISCONTINUED | OUTPATIENT
Start: 2021-11-04 | End: 2021-11-04 | Stop reason: SURG

## 2021-11-04 RX ORDER — HYDROCODONE BITARTRATE AND ACETAMINOPHEN 7.5; 325 MG/1; MG/1
1 TABLET ORAL EVERY 4 HOURS PRN
Qty: 18 TABLET | Refills: 0 | Status: SHIPPED | OUTPATIENT
Start: 2021-11-04 | End: 2021-12-08

## 2021-11-04 RX ORDER — EPHEDRINE SULFATE 50 MG/ML
INJECTION, SOLUTION INTRAVENOUS AS NEEDED
Status: DISCONTINUED | OUTPATIENT
Start: 2021-11-04 | End: 2021-11-04 | Stop reason: SURG

## 2021-11-04 RX ORDER — SODIUM CHLORIDE, SODIUM LACTATE, POTASSIUM CHLORIDE, CALCIUM CHLORIDE 600; 310; 30; 20 MG/100ML; MG/100ML; MG/100ML; MG/100ML
INJECTION, SOLUTION INTRAVENOUS CONTINUOUS PRN
Status: DISCONTINUED | OUTPATIENT
Start: 2021-11-04 | End: 2021-11-04 | Stop reason: SURG

## 2021-11-04 RX ORDER — PROPOFOL 10 MG/ML
VIAL (ML) INTRAVENOUS AS NEEDED
Status: DISCONTINUED | OUTPATIENT
Start: 2021-11-04 | End: 2021-11-04 | Stop reason: SURG

## 2021-11-04 RX ORDER — DEXAMETHASONE SODIUM PHOSPHATE 4 MG/ML
INJECTION, SOLUTION INTRA-ARTICULAR; INTRALESIONAL; INTRAMUSCULAR; INTRAVENOUS; SOFT TISSUE AS NEEDED
Status: DISCONTINUED | OUTPATIENT
Start: 2021-11-04 | End: 2021-11-04 | Stop reason: SURG

## 2021-11-04 RX ORDER — EPHEDRINE SULFATE 50 MG/ML
5 INJECTION, SOLUTION INTRAVENOUS ONCE AS NEEDED
Status: DISCONTINUED | OUTPATIENT
Start: 2021-11-04 | End: 2021-11-04 | Stop reason: HOSPADM

## 2021-11-04 RX ORDER — DIPHENHYDRAMINE HYDROCHLORIDE 50 MG/ML
12.5 INJECTION INTRAMUSCULAR; INTRAVENOUS
Status: DISCONTINUED | OUTPATIENT
Start: 2021-11-04 | End: 2021-11-04 | Stop reason: HOSPADM

## 2021-11-04 RX ORDER — ACETAMINOPHEN 325 MG/1
650 TABLET ORAL ONCE AS NEEDED
Status: DISCONTINUED | OUTPATIENT
Start: 2021-11-04 | End: 2021-11-04 | Stop reason: HOSPADM

## 2021-11-04 RX ORDER — NALOXONE HCL 0.4 MG/ML
0.4 VIAL (ML) INJECTION AS NEEDED
Status: DISCONTINUED | OUTPATIENT
Start: 2021-11-04 | End: 2021-11-04 | Stop reason: HOSPADM

## 2021-11-04 RX ORDER — BUPIVACAINE HCL/0.9 % NACL/PF 0.1 %
2 PLASTIC BAG, INJECTION (ML) EPIDURAL ONCE
Status: COMPLETED | OUTPATIENT
Start: 2021-11-04 | End: 2021-11-04

## 2021-11-04 RX ORDER — ROCURONIUM BROMIDE 10 MG/ML
INJECTION, SOLUTION INTRAVENOUS AS NEEDED
Status: DISCONTINUED | OUTPATIENT
Start: 2021-11-04 | End: 2021-11-04 | Stop reason: SURG

## 2021-11-04 RX ORDER — SODIUM CHLORIDE, SODIUM GLUCONATE, SODIUM ACETATE, POTASSIUM CHLORIDE AND MAGNESIUM CHLORIDE 526; 502; 368; 37; 30 MG/100ML; MG/100ML; MG/100ML; MG/100ML; MG/100ML
INJECTION, SOLUTION INTRAVENOUS CONTINUOUS PRN
Status: DISCONTINUED | OUTPATIENT
Start: 2021-11-04 | End: 2021-11-04 | Stop reason: SURG

## 2021-11-04 RX ORDER — ACETAMINOPHEN 650 MG/1
650 SUPPOSITORY RECTAL ONCE AS NEEDED
Status: DISCONTINUED | OUTPATIENT
Start: 2021-11-04 | End: 2021-11-04 | Stop reason: HOSPADM

## 2021-11-04 RX ORDER — FLUMAZENIL 0.1 MG/ML
0.2 INJECTION INTRAVENOUS AS NEEDED
Status: DISCONTINUED | OUTPATIENT
Start: 2021-11-04 | End: 2021-11-04 | Stop reason: HOSPADM

## 2021-11-04 RX ORDER — ONDANSETRON 2 MG/ML
4 INJECTION INTRAMUSCULAR; INTRAVENOUS ONCE AS NEEDED
Status: COMPLETED | OUTPATIENT
Start: 2021-11-04 | End: 2021-11-04

## 2021-11-04 RX ORDER — ONDANSETRON 2 MG/ML
INJECTION INTRAMUSCULAR; INTRAVENOUS AS NEEDED
Status: DISCONTINUED | OUTPATIENT
Start: 2021-11-04 | End: 2021-11-04 | Stop reason: SURG

## 2021-11-04 RX ADMIN — HYDROMORPHONE HYDROCHLORIDE 0.5 MG: 1 INJECTION, SOLUTION INTRAMUSCULAR; INTRAVENOUS; SUBCUTANEOUS at 11:57

## 2021-11-04 RX ADMIN — HYDROMORPHONE HYDROCHLORIDE 0.5 MG: 1 INJECTION, SOLUTION INTRAMUSCULAR; INTRAVENOUS; SUBCUTANEOUS at 12:11

## 2021-11-04 RX ADMIN — FENTANYL CITRATE 100 MCG: 50 INJECTION INTRAMUSCULAR; INTRAVENOUS at 08:41

## 2021-11-04 RX ADMIN — LIDOCAINE HYDROCHLORIDE 100 MG: 20 INJECTION, SOLUTION INFILTRATION; PERINEURAL at 08:47

## 2021-11-04 RX ADMIN — ONDANSETRON 4 MG: 2 INJECTION INTRAMUSCULAR; INTRAVENOUS at 13:51

## 2021-11-04 RX ADMIN — SODIUM CHLORIDE, POTASSIUM CHLORIDE, SODIUM LACTATE AND CALCIUM CHLORIDE: 600; 310; 30; 20 INJECTION, SOLUTION INTRAVENOUS at 08:24

## 2021-11-04 RX ADMIN — NEOSTIGMINE METHYLSULFATE 3 MG: 0.5 INJECTION INTRAVENOUS at 10:59

## 2021-11-04 RX ADMIN — EPHEDRINE SULFATE 10 MG: 50 INJECTION INTRAVENOUS at 09:54

## 2021-11-04 RX ADMIN — MIDAZOLAM HYDROCHLORIDE 2 MG: 1 INJECTION, SOLUTION INTRAMUSCULAR; INTRAVENOUS at 08:38

## 2021-11-04 RX ADMIN — DEXAMETHASONE SODIUM PHOSPHATE 4 MG: 4 INJECTION, SOLUTION INTRAMUSCULAR; INTRAVENOUS at 09:08

## 2021-11-04 RX ADMIN — SODIUM CHLORIDE, SODIUM GLUCONATE, SODIUM ACETATE, POTASSIUM CHLORIDE AND MAGNESIUM CHLORIDE: 526; 502; 368; 37; 30 INJECTION, SOLUTION INTRAVENOUS at 10:58

## 2021-11-04 RX ADMIN — ROCURONIUM BROMIDE 50 MG: 10 INJECTION INTRAVENOUS at 08:47

## 2021-11-04 RX ADMIN — FENTANYL CITRATE 100 MCG: 50 INJECTION INTRAMUSCULAR; INTRAVENOUS at 09:41

## 2021-11-04 RX ADMIN — SODIUM CHLORIDE, POTASSIUM CHLORIDE, SODIUM LACTATE AND CALCIUM CHLORIDE 100 ML/HR: 600; 310; 30; 20 INJECTION, SOLUTION INTRAVENOUS at 07:23

## 2021-11-04 RX ADMIN — PROPOFOL 100 MG: 10 INJECTION, EMULSION INTRAVENOUS at 08:47

## 2021-11-04 RX ADMIN — ONDANSETRON 4 MG: 2 INJECTION INTRAMUSCULAR; INTRAVENOUS at 11:02

## 2021-11-04 RX ADMIN — HYDROCODONE BITARTRATE AND ACETAMINOPHEN 1 TABLET: 7.5; 325 TABLET ORAL at 12:59

## 2021-11-04 RX ADMIN — GLYCOPYRROLATE 0.6 MG: 0.2 INJECTION, SOLUTION INTRAMUSCULAR; INTRAVITREAL at 10:59

## 2021-11-04 RX ADMIN — Medication 2 G: at 08:51

## 2021-11-04 NOTE — ANESTHESIA PROCEDURE NOTES
Airway  Urgency: elective    Date/Time: 11/4/2021 8:52 AM  Airway not difficult    General Information and Staff    Patient location during procedure: OR    Indications and Patient Condition  Indications for airway management: airway protection    Preoxygenated: yes  MILS maintained throughout  Mask difficulty assessment: 2 - vent by mask + OA or adjuvant +/- NMBA    Final Airway Details  Final airway type: endotracheal airway      Successful airway: ETT  Cuffed: yes   Successful intubation technique: video laryngoscopy  Facilitating devices/methods: intubating stylet  Endotracheal tube insertion site: oral  Blade: Zamarripa  Blade size: 3  ETT size (mm): 7.5  Cormack-Lehane Classification: grade I - full view of glottis  Placement verified by: chest auscultation and capnometry   Measured from: lips  Number of attempts at approach: 2  Assessment: lips, teeth, and gum same as pre-op and atraumatic intubation    Additional Comments  Initial attempt by RODRIGUEZ Multani. Second successful attempt by YOLANDE Santillan.     Redundant tissue surrounding airway

## 2021-11-04 NOTE — ANESTHESIA PREPROCEDURE EVALUATION
Anesthesia Evaluation     Patient summary reviewed   no history of anesthetic complications:  NPO Solid Status: > 8 hours  NPO Liquid Status: > 8 hours           Airway   Mallampati: II  TM distance: <3 FB  Neck ROM: full  Possible difficult intubation and Anterior  Dental    (+) poor dentition    Pulmonary - normal exam    breath sounds clear to auscultation  (+) a smoker Former,   (-) asthma, sleep apnea, rhonchi, decreased breath sounds, wheezes  Cardiovascular - normal exam  Exercise tolerance: good (4-7 METS)    NYHA Classification: II  ECG reviewed  Rhythm: regular  Rate: normal    (+) dysrhythmias Bradycardia, hyperlipidemia,   (-) hypertension, valvular problems/murmurs, past MI, angina, murmur, cardiac stents, CABG, DVT    ROS comment: EKG 4/30/2021:  Sinus bradycardia  Otherwise normal ECG  No previous ECGs available    Neuro/Psych  (+) psychiatric history Anxiety and Depression,     (-) seizures, TIA, CVA  GI/Hepatic/Renal/Endo    (+) obesity, morbid obesity, GERD well controlled,  liver disease fatty liver disease, thyroid problem thyroid nodules    Musculoskeletal     Abdominal   (+) obese,    Substance History      OB/GYN negative ob/gyn ROS   (-)  Pregnant        Other   arthritis,      ROS/Med Hx Other: large right thyroid mass:  She has had no symptomatic hypo or hyperthyroidism no difficulty with swallowing, speaking, or breathing.      BMI=40.21    Hgb=13.7    TSH=1.15, T4=1.22                    Anesthesia Plan    ASA 2     general   (Have ephedrine drawn up due to hx of bradycardia)  intravenous induction     Anesthetic plan, all risks, benefits, and alternatives have been provided, discussed and informed consent has been obtained with: patient.    Plan discussed with CRNA.

## 2021-11-04 NOTE — ANESTHESIA POSTPROCEDURE EVALUATION
Patient: Letty Bach    Procedure Summary     Date: 11/04/21 Room / Location: [facility] OR 03 / [facility] OR    Anesthesia Start: 0841 Anesthesia Stop: 1144    Procedure: RIGHT LOBE THYROIDECTOMY and ISTHMUSECTOMY (Right Neck) Diagnosis:       Thyroid mass of unclear etiology      (Thyroid mass of unclear etiology [E07.89])    Surgeons: Benjamin Renteria MD Provider: Radha Gonzales DO    Anesthesia Type: general ASA Status: 2          Anesthesia Type: general    Vitals  No vitals data found for the desired time range.          Post Anesthesia Care and Evaluation    Patient location during evaluation: PACU  Patient participation: complete - patient cannot participate  Level of consciousness: sleepy but conscious  Pain score: 0  Pain management: adequate  Airway patency: patent  Anesthetic complications: No anesthetic complications  PONV Status: none  Cardiovascular status: acceptable  Respiratory status: acceptable  Hydration status: acceptable    Comments: Vital Signs:    HR: 83  BP: 128/77  SpO2: 100% on RA  RR: 10  Temp: 97.1  No anesthesia care post op

## 2021-11-04 NOTE — DISCHARGE INSTRUCTIONS
May take Norco again at 5:00pm today     THYROID EXCISION  POST OP INSTRUCTIONS  Dr. Benjamin Renteria  [address] [place] [address]  Phone: [phone] (office)  [phone] (hospital)    ACTIVITIES:  1. Keep your head elevated at 30 degrees or more as much as possible for the first 24 hours after surgery.  This will help reduce your postoperative swelling and thus decrease your pain.  2. Expect to rest most of the first two days after surgery, but do get up several times daily to reduce the risk of getting a clot in your legs.  3. No strenuous activity or lifting over 10 lbs. for one week.  Add 5 lbs. a week to that restriction until 6 weeks out from surgery.  Try to avoid squatting and deep bends for about a week.  4. Do not drive while on pain medicine.  You must be off pain meds 24 hours before driving.  5. You can climb stairs, but minimize this and initially do one step at a time (both feet on one step rather than going up with each step.)  SYMPTOMS:  1. Some minor discomfort with swallowing can be expected.  Report any extensive difficulty to the office immediately.  Most symptoms resolve within a few days.   2. You may have numbness around the mouth or cramping after surgery. This may be a sign of a low calcium level or a rapid drop in calcium level. You may take calcitriol if prescribed as directed for your symptoms to help with calcium absorption.  3. Some neck swelling is expected and will resolve over the next few weeks.  Rapid neck swelling should prompt a call to the office or 911.  4. Constipation is common when taking pain medication for surgery.  Over the counter laxatives, such as Miralax, can be used temporarily.   5. Fatigue and decreased stamina is not unusual for about a week or so after surgery due to anesthesia.  Try to take walks and some mild activity between resting.  WOUND SITE:  1. Dressings for this surgery are minimal and consist of a steristrip over the incision with  sutures internally.  This will begin to peel off after the first couple of weeks.  2. Showering may occur while the “film” is in place the day after surgery.   MEALS:  1. A soft diet is recommended the first 1-2 days after surgery and this can be expanded to a regular diet as tolerated.  2. Do NOT take pain pills on an empty stomach.  WORK:  1. In general if you have a sedentary job, you can return to work in 7-10 days.  If lifting or exertion is required it may be 2-3 weeks.    2. Use discretion and remember that your stamina will be decreased post operatively.  3. Return to work notes can be provided at the time of your post-operative appointment.  FOLLOW UP:  1. If no appointment is given, please call and make a post-operative appointment for approximately 7-10 days after the procedure.      MEDICATIONS:  1. You are being discharged with pain meds only.  2. We will see you in office in 1 week.

## 2021-11-04 NOTE — INTERVAL H&P NOTE
H&P reviewed. The patient was examined and there are no changes to the H&P.      Temp:  [97.8 °F (36.6 °C)] 97.8 °F (36.6 °C)  Heart Rate:  [61] 61  Resp:  [18] 18  BP: (121)/(64) 121/64

## 2021-11-05 NOTE — INTERVAL H&P NOTE
H&P reviewed. The patient was examined and there are no changes to the H&P.      Temp:  [96.7 °F (35.9 °C)-97.7 °F (36.5 °C)] 97.7 °F (36.5 °C)  Heart Rate:  [] 102  Resp:  [18] 18  BP: (110-128)/(60-77) 116/73  Review of Systems: No evidence of heart, lung, kidney, GI, neurologic, or other endocrine abnormalities

## 2021-11-07 NOTE — OP NOTE
THYROIDECTOMY  Procedure Note    Letty Bach  11/4/2021    Pre-op Diagnosis:   Thyroid mass of unclear etiology [E07.89]    Post-op Diagnosis:     Post-Op Diagnosis Codes:     * Thyroid mass of unclear etiology [E07.89]    Procedure:  RIGHT LOBE THYROIDECTOMY and ISTHMUSECTOMY    Surgeon(s):  Benjamin Renteria MD    Anesthesia: General    Staff:   Circulator: Corina Crane RN  Scrub Person: Junito Lutz  Assistant: Kate Banuelos    Assistant: Kate Banuelos was responsible for performing the following activities: Retraction, Suction and Placing Dressing and their skilled assistance was necessary for the success of this case.     Estimated Blood Loss: minimal    Specimens:                ID Type Source Tests Collected by Time   A : right thyroid lobe and isthmus Tissue Thyroid TISSUE PATHOLOGY EXAM Benjamin Renteria MD 11/4/2021 0954         Drains:   [REMOVED] Closed/Suction Drain 1 Right Abdomen Bulb 19 Fr. (Removed)       Findings: None    Complications: None    Indications: Open ileostomy- previous right colon resection and protecting ileostomy.    Description of procedure: The patient is brought to the operating room and placed supine on the operating table.  Appropriate monitoring devices and Farias catheter were placed by the nursing and anesthesia service.  The abdominal area was prepped and draped in a sterile manner.  Briefing and timeout were performed and all parties were in agreement.    The ileostomy was grasped with a Bacock clamp. A circular incision was made around the ileostomy and carried to the fascia. It was carefully dissected free of the fascia and underlying adhesions wiith cautery. Primary closure would have narrowed the anastomosis. Therefore, the proximal and distal intestines around the stoma were divided with fires of a STANLEY stapler, and the mesentery taken down with clamps and ties of 2-0 vicryl.    A stapled side to side anastomosis was created by reecting the antimesenteric  portion of each staple line and firing a STANLEY stapler down each lumen. The resulting entero-enterotomy was closed transversely with a single fire of a TA-60 stapler with 3.5 mm height staples. All apertures were closed with interrupted 3-0 vicryl sutures to prevent internal herniation.     The fascia was closed with short running segments of #1 PDS suture. The skin was packed with a single betadine soaked Kerlix.    Procedure was terminated, she tolerated it well- transferred to PACU in satisfactory condition.              This document has been electronically signed by Benjamin Renteria MD on November 7, 2021 12:28 CST      Date: 11/7/2021  Time: 12:28 CST

## 2021-11-07 NOTE — OP NOTE
THYROIDECTOMY  Procedure Note    Letty Bach  11/4/2021    Pre-op Diagnosis:   Thyroid mass of unclear etiology [E07.89]    Post-op Diagnosis:     Post-Op Diagnosis Codes:     * Thyroid mass of unclear etiology [E07.89]    Procedure::  RIGHT LOBE THYROIDECTOMY and ISTHMUSECTOMY    Surgeon(s):  Benjamin Renteria MD    Anesthesia: General    Staff:   Circulator: Corina Crane RN  Scrub Person: Junito Lutz  Assistant: Kate Banuelos    Assistant: Kate Banuelos was responsible for performing the following activities: Retraction, Suction, Closing and Placing Dressing and their skilled assistance was necessary for the success of this case.     Estimated Blood Loss: minimal    Specimens:                ID Type Source Tests Collected by Time   A : right thyroid lobe and isthmus Tissue Thyroid TISSUE PATHOLOGY EXAM Benjamin Renteria MD 11/4/2021 0954         Drains:   [REMOVED] Closed/Suction Drain 1 Right Abdomen Bulb 19 Fr. (Removed)       Findings: 1.5 cm contained papillary canccer    Complications: None    Indications: [place] category 5 mass of the right thyroid    Procedure: The patient was brought to the operating room and placed supine on the operating table.  The neck was prepped and draped in a sterile manner and extended.  Briefing and timeout were performed and all parties were in agreement.      A transverse, low collar skin incision is made and carried through the platysma.  The skin is retracted and subplatysmal flaps were created superiorly and inferiorly.  The straps were  and the anterior surface of the thyroid came into view. Separation of the sternothyroid muscles bilaterally from the anterior surface of the thyroid allowed palpation bilaterally; An isolated mass was noted in the right lobe of the thyroid.     The right side of the thyroid was then approached. The loose areolar tissue between the thyroid and the sternothyroid muscle was pushed away with a Kitner.  The thyroid was  ultimately rotated out of its bed and held medially.  The upper and lower pole vessels were encircled with large blue loops.  The recurrent nerve was clearly identified after careful dissection; it was uninjured through its entire course.  Both parathyroid glands were identified on the left side and preserved, along with their blood supply.  The upper pole was taken down with harmonic scalpel.  The smaller vessels were taken down with bipolar cautery. The thyroid was dissected free of the trachea until the junction of the right lobe and the isthmus was achieved.  The isthmus was divided in the midline with harmonic scalpel.     All areas were checked for hemostasis.  Patient was given several deep breaths to distend the veins; no bleeding was noted in this process.  The wound was irrigated with saline and, again, no bleeding was noted.    As hemostasis was assured, closure was undertaken.  The strap muscles were closed with interrupted 3-0 Vicryl sutures.  The platysma was brought together with interrupted 3-0 Vicryl suture.  The skin was brought together with a subcutaneous layer of interrupted 3-0 Vicryl suture, and a running 4-0 subcuticular Vicryl suture.      The procedure was terminated. The patient tolerated it well. Sponge and needle counts were correct.  The patient was returned to recovery in satisfactory condition.        This document has been electronically signed by Benjamin Renteria MD on November 7, 2021 12:45 CST      Date: 11/7/2021  Time: 12:45 CST

## 2021-11-10 LAB
LAB AP CASE REPORT: NORMAL
PATH REPORT.FINAL DX SPEC: NORMAL

## 2021-11-17 ENCOUNTER — OFFICE VISIT (OUTPATIENT)
Dept: SURGERY | Facility: CLINIC | Age: 51
End: 2021-11-17

## 2021-11-17 VITALS
SYSTOLIC BLOOD PRESSURE: 90 MMHG | HEART RATE: 71 BPM | WEIGHT: 242.6 LBS | OXYGEN SATURATION: 98 % | TEMPERATURE: 97.1 F | BODY MASS INDEX: 40.42 KG/M2 | HEIGHT: 65 IN | DIASTOLIC BLOOD PRESSURE: 60 MMHG

## 2021-11-17 DIAGNOSIS — E04.1 THYROID NODULE: Primary | ICD-10-CM

## 2021-11-17 PROCEDURE — 99024 POSTOP FOLLOW-UP VISIT: CPT | Performed by: NURSE PRACTITIONER

## 2021-11-17 NOTE — PROGRESS NOTES
CHIEF COMPLAINT:   Chief Complaint   Patient presents with   • Post-op     11/4 right thyroid lobectomy and isthmusectomy       HPI: This patient presents for a post-operative visit after undergoing right lobectomy and isthmusectomy by Dr. Renteria. Doing well- no cellulitis, wound separation, or significant pain. She denies any difficulty swallowing and voice has returned to normal. Denies fever or chills.     PATHOLOGY:           Physical Exam  Vitals reviewed.   Constitutional:       General: She is not in acute distress.     Appearance: Normal appearance. She is not ill-appearing, toxic-appearing or diaphoretic.   HENT:      Head:     Neurological:      Mental Status: She is alert.   Psychiatric:         Mood and Affect: Mood normal.         Behavior: Behavior normal.         Thought Content: Thought content normal.         Judgment: Judgment normal.         ASSESSMENT:    Diagnoses and all orders for this visit:    1. Thyroid nodule (Primary)        PLAN:  1. The patient will follow-up in 3 weeks to schedule left thyroidectomy.  May be seen sooner if concerns arise.    2. May shower.   3. No heavy lifting at this time.                      This document has been electronically signed by MEL Phillips on November 17, 2021 09:37 CST

## 2021-11-17 NOTE — PATIENT INSTRUCTIONS
"BMI for Adults  What is BMI?  Body mass index (BMI) is a number that is calculated from a person's weight and height. BMI can help estimate how much of a person's weight is composed of fat. BMI does not measure body fat directly. Rather, it is an alternative to procedures that directly measure body fat, which can be difficult and expensive.  BMI can help identify people who may be at higher risk for certain medical problems.  What are BMI measurements used for?  BMI is used as a screening tool to identify possible weight problems. It helps determine whether a person is obese, overweight, a healthy weight, or underweight.  BMI is useful for:  · Identifying a weight problem that may be related to a medical condition or may increase the risk for medical problems.  · Promoting changes, such as changes in diet and exercise, to help reach a healthy weight. BMI screening can be repeated to see if these changes are working.  How is BMI calculated?  BMI involves measuring your weight in relation to your height. Both height and weight are measured, and the BMI is calculated from those numbers. This can be done either in English ([place] or metric measurements. Note that charts and online BMI calculators are available to help you find your BMI quickly and easily without having to do these calculations yourself.  To calculate your BMI in English ([place] measurements:    1. Measure your weight in pounds (lb).  2. Multiply the number of pounds by 703.  ? For example, for a person who weighs 180 lb, multiply that number by 703, which equals 126,540.  3. Measure your height in inches. Then multiply that number by itself to get a measurement called \"inches squared.\"  ? For example, for a person who is 70 inches tall, the \"inches squared\" measurement is 70 inches x 70 inches, which equals 4,900 inches squared.  4. Divide the total from step 2 (number of lb x 703) by the total from step 3 (inches squared): 126,540 ÷ 4,900 = 25.8. This is " "your BMI.    To calculate your BMI in metric measurements:  1. Measure your weight in kilograms (kg).  2. Measure your height in meters (m). Then multiply that number by itself to get a measurement called \"meters squared.\"  ? For example, for a person who is 1.75 m tall, the \"meters squared\" measurement is 1.75 m x 1.75 m, which is equal to 3.1 meters squared.  3. Divide the number of kilograms (your weight) by the meters squared number. In this example: 70 ÷ 3.1 = 22.6. This is your BMI.  What do the results mean?  BMI charts are used to identify whether you are underweight, normal weight, overweight, or obese. The following guidelines will be used:  · Underweight: BMI less than 18.5.  · Normal weight: BMI between 18.5 and 24.9.  · Overweight: BMI between 25 and 29.9.  · Obese: BMI of 30 or above.  Keep these notes in mind:  · Weight includes both fat and muscle, so someone with a muscular build, such as an athlete, may have a BMI that is higher than 24.9. In cases like these, BMI is not an accurate measure of body fat.  · To determine if excess body fat is the cause of a BMI of 25 or higher, further assessments may need to be done by a health care provider.  · BMI is usually interpreted in the same way for men and women.  Where to find more information  For more information about BMI, including tools to quickly calculate your BMI, go to these websites:  · Centers for Disease Control and Prevention: www.cdc.gov  · American Heart Association: www.heart.org  · National Heart, Lung, and Blood [place]: www.nhlbi.nih.gov  Summary  · Body mass index (BMI) is a number that is calculated from a person's weight and height.  · BMI may help estimate how much of a person's weight is composed of fat. BMI can help identify those who may be at higher risk for certain medical problems.  · BMI can be measured using English measurements or metric measurements.  · BMI charts are used to identify whether you are underweight, normal " weight, overweight, or obese.  This information is not intended to replace advice given to you by your health care provider. Make sure you discuss any questions you have with your health care provider.  Document Revised: 09/09/2020 Document Reviewed: 07/17/2020  Elsevier Patient Education © 2021 [employer]

## 2021-12-08 ENCOUNTER — OFFICE VISIT (OUTPATIENT)
Dept: SURGERY | Facility: CLINIC | Age: 51
End: 2021-12-08

## 2021-12-08 VITALS
DIASTOLIC BLOOD PRESSURE: 80 MMHG | TEMPERATURE: 98.3 F | WEIGHT: 242.4 LBS | HEIGHT: 65 IN | HEART RATE: 70 BPM | BODY MASS INDEX: 40.39 KG/M2 | SYSTOLIC BLOOD PRESSURE: 126 MMHG

## 2021-12-08 DIAGNOSIS — E04.1 THYROID NODULE: Primary | ICD-10-CM

## 2021-12-08 PROCEDURE — 99024 POSTOP FOLLOW-UP VISIT: CPT | Performed by: NURSE PRACTITIONER

## 2021-12-08 NOTE — PATIENT INSTRUCTIONS
"BMI for Adults  What is BMI?  Body mass index (BMI) is a number that is calculated from a person's weight and height. BMI can help estimate how much of a person's weight is composed of fat. BMI does not measure body fat directly. Rather, it is an alternative to procedures that directly measure body fat, which can be difficult and expensive.  BMI can help identify people who may be at higher risk for certain medical problems.  What are BMI measurements used for?  BMI is used as a screening tool to identify possible weight problems. It helps determine whether a person is obese, overweight, a healthy weight, or underweight.  BMI is useful for:  · Identifying a weight problem that may be related to a medical condition or may increase the risk for medical problems.  · Promoting changes, such as changes in diet and exercise, to help reach a healthy weight. BMI screening can be repeated to see if these changes are working.  How is BMI calculated?  BMI involves measuring your weight in relation to your height. Both height and weight are measured, and the BMI is calculated from those numbers. This can be done either in English ([place] or metric measurements. Note that charts and online BMI calculators are available to help you find your BMI quickly and easily without having to do these calculations yourself.  To calculate your BMI in English ([place] measurements:    1. Measure your weight in pounds (lb).  2. Multiply the number of pounds by 703.  ? For example, for a person who weighs 180 lb, multiply that number by 703, which equals 126,540.  3. Measure your height in inches. Then multiply that number by itself to get a measurement called \"inches squared.\"  ? For example, for a person who is 70 inches tall, the \"inches squared\" measurement is 70 inches x 70 inches, which equals 4,900 inches squared.  4. Divide the total from step 2 (number of lb x 703) by the total from step 3 (inches squared): 126,540 ÷ 4,900 = 25.8. This is " "your BMI.    To calculate your BMI in metric measurements:  1. Measure your weight in kilograms (kg).  2. Measure your height in meters (m). Then multiply that number by itself to get a measurement called \"meters squared.\"  ? For example, for a person who is 1.75 m tall, the \"meters squared\" measurement is 1.75 m x 1.75 m, which is equal to 3.1 meters squared.  3. Divide the number of kilograms (your weight) by the meters squared number. In this example: 70 ÷ 3.1 = 22.6. This is your BMI.  What do the results mean?  BMI charts are used to identify whether you are underweight, normal weight, overweight, or obese. The following guidelines will be used:  · Underweight: BMI less than 18.5.  · Normal weight: BMI between 18.5 and 24.9.  · Overweight: BMI between 25 and 29.9.  · Obese: BMI of 30 or above.  Keep these notes in mind:  · Weight includes both fat and muscle, so someone with a muscular build, such as an athlete, may have a BMI that is higher than 24.9. In cases like these, BMI is not an accurate measure of body fat.  · To determine if excess body fat is the cause of a BMI of 25 or higher, further assessments may need to be done by a health care provider.  · BMI is usually interpreted in the same way for men and women.  Where to find more information  For more information about BMI, including tools to quickly calculate your BMI, go to these websites:  · Centers for Disease Control and Prevention: www.cdc.gov  · American Heart Association: www.heart.org  · National Heart, Lung, and Blood [place]: www.nhlbi.nih.gov  Summary  · Body mass index (BMI) is a number that is calculated from a person's weight and height.  · BMI may help estimate how much of a person's weight is composed of fat. BMI can help identify those who may be at higher risk for certain medical problems.  · BMI can be measured using English measurements or metric measurements.  · BMI charts are used to identify whether you are underweight, normal " weight, overweight, or obese.  This information is not intended to replace advice given to you by your health care provider. Make sure you discuss any questions you have with your health care provider.  Document Revised: 09/09/2020 Document Reviewed: 07/17/2020  Elsevier Patient Education © 2021 [employer]

## 2021-12-08 NOTE — PROGRESS NOTES
CHIEF COMPLAINT:   Chief Complaint   Patient presents with   • Follow-up     3 Week follow-up       HPI: This patient presents for a post-operative visit after undergoing right lobectomy and isthmusectomy by Dr. Renteria. Doing well- no cellulitis, wound separation, or significant pain. She denies any difficulty swallowing and voice has returned to normal. Denies fever or chills. She denies any numbness, tingling or muscle spasms.      PATHOLOGY:         Physical Exam  Vitals reviewed.   Constitutional:       General: She is not in acute distress.     Appearance: Normal appearance. She is not ill-appearing, toxic-appearing or diaphoretic.   HENT:      Head:     Neurological:      Mental Status: She is alert.   Psychiatric:         Mood and Affect: Mood normal.         Behavior: Behavior normal.         Thought Content: Thought content normal.         Judgment: Judgment normal.         ASSESSMENT:    Diagnoses and all orders for this visit:    1. Thyroid nodule (Primary)        PLAN:  1. The patient will need total thyroidectomy but she states she would like to wait until the beginning of the year. Will follow up in 3 weeks to schedule procedure with Dr. Renteria.   2. May return to normal activity without restrictions.                    This document has been electronically signed by MEL Phillips on December 8, 2021 09:53 CST

## 2021-12-16 ENCOUNTER — OFFICE VISIT (OUTPATIENT)
Dept: ORTHOPEDIC SURGERY | Facility: CLINIC | Age: 51
End: 2021-12-16

## 2021-12-16 VITALS — WEIGHT: 239 LBS | BODY MASS INDEX: 39.82 KG/M2 | HEIGHT: 65 IN

## 2021-12-16 DIAGNOSIS — M23.92 INTERNAL DERANGEMENT OF LEFT KNEE: Primary | ICD-10-CM

## 2021-12-16 DIAGNOSIS — G89.29 CHRONIC PAIN OF LEFT KNEE: ICD-10-CM

## 2021-12-16 DIAGNOSIS — M25.562 CHRONIC PAIN OF LEFT KNEE: ICD-10-CM

## 2021-12-16 PROCEDURE — 20610 DRAIN/INJ JOINT/BURSA W/O US: CPT | Performed by: ORTHOPAEDIC SURGERY

## 2021-12-16 RX ADMIN — LIDOCAINE HYDROCHLORIDE 2 ML: 10 INJECTION, SOLUTION INFILTRATION; PERINEURAL at 09:32

## 2021-12-16 RX ADMIN — TRIAMCINOLONE ACETONIDE 40 MG: 40 INJECTION, SUSPENSION INTRA-ARTICULAR; INTRAMUSCULAR at 09:32

## 2021-12-16 NOTE — PROGRESS NOTES
"Letty Bach is a 51 y.o. female returns for     Chief Complaint   Patient presents with   • Left Knee - Follow-up       HISTORY OF PRESENT ILLNESS:  Follow up left knee pain.  Patient was given a steroid injection on 9/14/21.  She reports not much improvement.          CONCURRENT MEDICAL HISTORY:    The following portions of the patient's history were reviewed and updated as appropriate: allergies, current medications, past family history, past medical history, past social history, past surgical history and problem list.     ROS  No fevers or chills.  No chest pain or shortness of air.  No GI or  disturbances.    PHYSICAL EXAMINATION:       Ht 165.1 cm (65\")   Wt 108 kg (239 lb)   BMI 39.77 kg/m²                 ASSESSMENT:    Diagnoses and all orders for this visit:    Internal derangement of left knee  -     Ambulatory Referral to Physical Therapy Evaluate and treat  -     Large Joint Arthrocentesis: L knee    Chronic pain of left knee  -     Ambulatory Referral to Physical Therapy Evaluate and treat  -     Large Joint Arthrocentesis: L knee          PLAN    We discussed repeat steroid injection into the left knee.  Slowly progress activity as tolerated.  No restrictions.    PT:   2-3 visits for quad strengthening and teach HEP   Modalities as needed   Strap/brace for patellar stability as needed.    Large Joint Arthrocentesis: L knee  Date/Time: 12/16/2021 9:32 AM  Consent given by: patient  Site marked: site marked  Timeout: Immediately prior to procedure a time out was called to verify the correct patient, procedure, equipment, support staff and site/side marked as required   Supporting Documentation  Indications: pain   Procedure Details  Location: knee - L knee  Preparation: Patient was prepped and draped in the usual sterile fashion  Needle size: 22 G  Approach: anteromedial  Medications administered: 40 mg triamcinolone acetonide 40 MG/ML; 2 mL lidocaine 1 %  Patient tolerance: patient tolerated the " procedure well with no immediate complications                Return if symptoms worsen or fail to improve, for recheck.    Jose Alberto Amos MD

## 2021-12-19 RX ORDER — TRIAMCINOLONE ACETONIDE 40 MG/ML
40 INJECTION, SUSPENSION INTRA-ARTICULAR; INTRAMUSCULAR
Status: COMPLETED | OUTPATIENT
Start: 2021-12-16 | End: 2021-12-16

## 2021-12-19 RX ORDER — LIDOCAINE HYDROCHLORIDE 10 MG/ML
2 INJECTION, SOLUTION INFILTRATION; PERINEURAL
Status: COMPLETED | OUTPATIENT
Start: 2021-12-16 | End: 2021-12-16

## 2021-12-22 ENCOUNTER — HOSPITAL ENCOUNTER (OUTPATIENT)
Dept: PHYSICAL THERAPY | Facility: HOSPITAL | Age: 51
Setting detail: THERAPIES SERIES
Discharge: HOME | End: 2021-12-22

## 2021-12-22 DIAGNOSIS — G89.29 CHRONIC PAIN OF LEFT KNEE: Primary | ICD-10-CM

## 2021-12-22 DIAGNOSIS — M23.92 INTERNAL DERANGEMENT OF LEFT KNEE: ICD-10-CM

## 2021-12-22 DIAGNOSIS — M25.562 CHRONIC PAIN OF LEFT KNEE: Primary | ICD-10-CM

## 2021-12-22 PROCEDURE — 97110 THERAPEUTIC EXERCISES: CPT | Performed by: PHYSICAL THERAPIST

## 2021-12-22 PROCEDURE — G0283 ELEC STIM OTHER THAN WOUND: HCPCS | Performed by: PHYSICAL THERAPIST

## 2021-12-22 PROCEDURE — 97162 PT EVAL MOD COMPLEX 30 MIN: CPT | Performed by: PHYSICAL THERAPIST

## 2021-12-22 NOTE — THERAPY EVALUATION
Outpatient Physical Therapy Ortho Initial Evaluation  [facility]     Patient Name: Letty Bach  : 1970  MRN: [id number]  Today's Date: 2021      Visit Date: 2021  Visit   Return to MD: FINA  Re-cert date: 22  Patient Active Problem List   Diagnosis   • Calculus of gallbladder with chronic cholecystitis without obstruction   • Chronic pain of both knees   • Costochondritis   • Elevated LDL cholesterol level   • LUQ pain   • Morbid obesity with BMI of 45.0-49.9, adult (HCC)   • Anxiety   • Internal derangement of left knee   • Gastroesophageal reflux disease without esophagitis   • Encounter for screening for malignant neoplasm of colon        Past Medical History:   Diagnosis Date   • Disease of thyroid gland     nodule, mass   • Elevated liver enzymes    • Fatty liver    • GERD (gastroesophageal reflux disease)    • Urinary tract infection    • Varicella         Past Surgical History:   Procedure Laterality Date   • CHOLECYSTECTOMY WITH INTRAOPERATIVE CHOLANGIOGRAM N/A 2019    Procedure: CHOLECYSTECTOMY LAPAROSCOPIC INTRAOPERATIVE CHOLANGIOGRAM      (c-arm#2);  Surgeon: Felipe Overton MD;  Location: [facility] OR;  Service: General   • COLONOSCOPY N/A 2021    Procedure: COLONOSCOPY;  Surgeon: Joao Bruce MD;  Location: [facility] ENDOSCOPY;  Service: Gastroenterology;  Laterality: N/A;   • COLONOSCOPY     • ENDOSCOPY N/A 2019    Procedure: ESOPHAGOGASTRODUODENOSCOPY;  Surgeon: Joao Bruce MD;  Location: [facility] ENDOSCOPY;  Service: Gastroenterology   • HYSTERECTOMY     • THYROIDECTOMY Right 2021    Procedure: RIGHT LOBE THYROIDECTOMY and ISTHMUSECTOMY;  Surgeon: Benjamin Renteria MD;  Location: [facility] OR;  Service: General;  Laterality: Right;   • TOTAL ABDOMINAL HYSTERECTOMY WITH SALPINGO OOPHORECTOMY     • UPPER GASTROINTESTINAL ENDOSCOPY  2019   • US GUIDED FINE NEEDLE ASPIRATION  2021   • WISDOM TOOTH EXTRACTION         Visit Dx:      ICD-10-CM ICD-9-CM   1. Chronic pain of left knee  M25.562 719.46    G89.29 338.29   2. Internal derangement of left knee  M23.92 717.9     Medications (Admitted on 12/22/2021)       hyoscyamine (LEVSIN) 0.125 MG SL tablet    Allergies        Contrave [Naltrexone-bupropion Hcl Er]Dizziness       Patient History     Row Name 12/21/21 1614             History    Chief Complaint Pain (P)    -patient      Type of Pain Knee pain (P)    -patient      Brief Description of Current Complaint Knee hurts (P)    -patient      Patient/Caregiver Goals Relieve pain; Improve mobility; Improve strength (P)    -patient      Hand Dominance right-handed (P)    -patient      Occupation/sports/leisure activities  (P)    -patient      What clinical tests have you had for this problem? X-ray; MRI (P)    -patient      Are you or can you be pregnant No (P)    -patient              Fall Risk Assessment    Any falls in the past year: No (P)    -patient              Services    Prior Rehab/Home Health Experiences No (P)    -patient      Are you currently receiving Home Health services No (P)    -patient      Do you plan to receive Home Health services in the near future No (P)    -patient              Daily Activities    Primary Language English (P)    -patient      Are you able to read Yes (P)    -patient      Are you able to write Yes (P)    -patient      How does patient learn best? Listening; Reading; Demonstration; Pictures/Video (P)    -patient              Safety    Are you being hurt, hit, or frightened by anyone at home or in your life? No (P)    -patient      Are you being neglected by a caregiver No (P)    -patient      Have you had any of the following issues with N/A (P)    -patient            User Key  (r) = Recorded By, (t) = Taken By, (c) = Cosigned By    Initials Name Provider Type    patient Letty Bach --                 PT Ortho     Row Name 12/22/21 1017       Subjective Comments    Subjective Comments 51  yo female with chronic L knee pain for the past 3 years. Steadily worsening L knee pain. Had a steroid injection with Dr. Amos last week. No benefit as of now with pain managment.Grade IV chondromalacia of the patella per MRI in 2019. Aggravating factors: sitting cross legged, stair climibing: up>down, sitting with knee flexed in the car, walking. Easing factors: ice. Occupation:  for [employer]. Hobbies: playing with grandkids.  -BS       Precautions and Contraindications    Precautions/Limitations no known precautions/limitations  -BS       Subjective Pain    Able to rate subjective pain? yes  -BS    Pre-Treatment Pain Level 5  -BS    Post-Treatment Pain Level --  numb  -BS       Posture/Observations    Posture/Observations Comments TTP along L medial femoral condyle and epicondyle  -BS       Special Tests/Palpation    Special Tests/Palpation Knee  -BS       Knee Special Tests    Anterior drawer (ACL lesion) Left:; Negative  -BS    Posterior drawer (PCL lesion) Left:; Negative  -BS    Valgus stress (MCL lesion) Left:; Negative  -BS    Varus stress (LCL lesion) Left:; Negative  -BS    Apley’s compression test (meniscal lesion vs OA) Left:; Negative  -BS    Brodie’s test (meniscal lesion) Left:; Positive  -BS       General ROM    GENERAL ROM COMMENTS AROM: L knee 0-99 R knee 0-119  -BS       MMT (Manual Muscle Testing)    General MMT Comments MMT: R LE 5/5 L LE 5/5 except L knee flex 4+/5 L hip abd 4+/5  -BS       Sensation    Light Touch No apparent deficits  -BS    Additional Comments no LE ataxia bilaterally  -BS       Flexibility    Flexibility Tested? Lower Extremity  -BS       Lower Extremity Flexibility    Hamstrings Right:; WNL; Left:; Moderately limited  R 74 L 50 w/ 90/90 SLR  -BS    Hip Flexors Right:; Mildly limited; Left:; WNL  -BS    Quadriceps Left:; Moderately limited; Right:; Mildly limited  -BS    ITB Bilateral:; WNL  -BS       Balance Skills Training    SLS 2 sec on each LE  -BS          User  Key  (r) = Recorded By, (t) = Taken By, (c) = Cosigned By    Initials Name Provider Type    Jonathan Sweeney, PT Physical Therapist                                   PT OP Goals     Row Name 12/22/21 1000          PT Short Term Goals    STG Date to Achieve 01/12/22  -BS     STG 1 Patient independent with HEP for the L knee  -BS     STG 1 Progress New  -BS     STG 2 Improve L knee flex AROM to 118°  -BS     STG 2 Progress New  -BS     STG 3 Improve L knee flex and L hip abduction MMT to 5/5  -BS     STG 3 Progress New  -BS     STG 4 Improve L SLS to >/= 10 sec consistently  -BS     STG 4 Progress New  -BS     STG 5 Able to climb stairs with minimal to no L knee pain  -BS     STG 5 Progress New  -BS     STG 6 Improve LEFS score to 61 or higher  -BS     STG 6 Progress New  -BS     STG 7 Improve L quadriceps and L hamstrings flexibility to WFL  -BS     STG 7 Progress New  -BS            Time Calculation    PT Goal Re-Cert Due Date 01/12/22  -BS           User Key  (r) = Recorded By, (t) = Taken By, (c) = Cosigned By    Initials Name Provider Type    Jonathan Sweeney, PT Physical Therapist                 PT Assessment/Plan     Row Name 12/22/21 1015          PT Assessment    Functional Limitations Performance in work activities; Impaired gait; Performance in leisure activities  -BS     Impairments Balance; Gait; Muscle strength; Pain; Range of motion; Impaired flexibility  -BS     Assessment Comments 52 yo female with L knee pain due to internal derangement. Presents with L knee ROM loss, L hamstrings and L hip abductor weakness, impaired standing balance on the LLE as well as loss in function (per LEFS score).  -BS     Please refer to paper survey for additional self-reported information Yes  -BS     Rehab Potential Good  -BS     Patient/caregiver participated in establishment of treatment plan and goals Yes  -BS     Patient would benefit from skilled therapy intervention Yes  -BS            PT Plan    PT Frequency  2x/week  -BS     Predicted Duration of Therapy Intervention (PT) 3 weeks then TBD  -BS     Planned CPT's? PT EVAL MOD COMPLELITY: 94618; PT RE-EVAL: 44142; PT THER PROC EA 15 MIN: 81778; PT THER ACT EA 15 MIN: 21636; PT MANUAL THERAPY EA 15 MIN: 25589; PT NEUROMUSC RE-EDUCATION EA 15 MIN: 23296; PT ELECTRICAL STIM UNATTEND: ; PT ULTRASOUND EA 15 MIN: 03343; PT HOT/COLD PACK WC NONMCARE: 20288; PT THER SUPP EA 15 MIN  -BS     Physical Therapy Interventions (Optional Details) aquatics exercise; balance training; home exercise program; joint mobilization; manual therapy techniques; modalities; neuromuscular re-education; patient/family education; ROM (Range of Motion); strengthening; stretching; stair training; transfer training  -BS     PT Plan Comments Incorporate standing balance ex's-Romberg and Sharpened Romberg on [employer]; step ups, 553's. Reinforce quad and HS flexibility.Strengthen hamstrings and hip abductors.  -BS           User Key  (r) = Recorded By, (t) = Taken By, (c) = Cosigned By    Initials Name Provider Type    Jonathan Sweeney, PT Physical Therapist                 Modalities     Row Name 12/22/21 1017             Ice    Ice Applied Yes  10 mins  -BS      Location L knee  -BS      Ice S/P Rx Yes  -BS              ELECTRICAL STIMULATION    Attended/Unattended Unattended  -BS      Stimulation Type IFC  -BS      Location/Electrode Placement/Other L knee  -BS            User Key  (r) = Recorded By, (t) = Taken By, (c) = Cosigned By    Initials Name Provider Type    Jonathan Sweeney, PT Physical Therapist               OP Exercises     Row Name 12/22/21 1017             Subjective Comments    Subjective Comments 52 yo female with chronic L knee pain for the past 3 years. Steadily worsening L knee pain. Had a steroid injection with Dr. Amos last week. No benefit as of now with pain managment.Grade IV chondromalacia of the patella per MRI in 2019. Aggravating factors: sitting cross legged, stair  "climibing: up>down, sitting with knee flexed in the car, walking. Easing factors: ice. Occupation:  for [employer]. Hobbies: playing with grandkids.  -BS              Subjective Pain    Able to rate subjective pain? yes  -BS      Pre-Treatment Pain Level 5  -BS      Post-Treatment Pain Level --  numb  -BS              Exercise 1    Exercise Name 1 SL quad S w/ towel  -BS      Sets 1 1  -BS      Reps 1 1  -BS      Time 1 30\" hold  -BS      Additional Comments bilat  -BS              Exercise 2    Exercise Name 2 seated L HS S  -BS      Sets 2 1  -BS      Reps 2 2  -BS      Time 2 30\" hold  -BS              Exercise 3    Exercise Name 3 seated L heel slides  -BS      Sets 3 1  -BS      Reps 3 10  -BS              Exercise 4    Exercise Name 4 see modalities  -BS            User Key  (r) = Recorded By, (t) = Taken By, (c) = Cosigned By    Initials Name Provider Type    Jonathan Sweeney, PT Physical Therapist                              Outcome Measure Options: Lower Extremity Functional Scale (LEFS)  Lower Extremity Functional Index  Any of your usual work, housework or school activities: A little bit of difficulty  Your usual hobbies, recreational or sporting activities: Moderate difficulty  Getting into or out of the bath: No difficulty  Walking between rooms: No difficulty  Putting on your shoes or socks: A little bit of difficulty  Squatting: Quite a bit of difficulty  Lifting an object, like a bag of groceries from the floor: No difficulty  Performing light activities around your home: No difficulty  Performing heavy activities around your home: A little bit of difficulty  Getting into or out of a car: A little bit of difficulty  Walking 2 blocks: No difficulty  Walking a mile: No difficulty  Going up or down 10 stairs (about 1 flight of stairs): Moderate difficulty  Standing for 1 hour: No difficulty  Sitting for 1 hour: A little bit of difficulty  Running on even ground: Extreme difficulty or unable to " perform activity  Running on uneven ground: Extreme difficulty or unable to perform activity  Making sharp turns while running fast: Extreme difficulty or unable to perform activity  Hopping: Extreme difficulty or unable to perform activity  Rolling over in bed: No difficulty  Total: 52      Time Calculation:     Start Time: 1017  Stop Time: 1124  Time Calculation (min): 67 min  PT Non-Billable Time (min): 15 min  Total Timed Code Minutes- PT: 52 minute(s)  Untimed Charges  PT E-Stim Unattended Minutes: 15  Total Minutes  Untimed Charges Total Minutes: 15   Total Minutes: 15     Therapy Charges for Today     Code Description Service Date Service Provider Modifiers Qty    01860345811 HC PT EVAL MOD COMPLEXITY 3 12/22/2021 Jonathan Mccracken, PT GP 1    14050506538 HC PT THER PROC EA 15 MIN 12/22/2021 Jonathan Mccracken, PT GP 1    [id number]  PT ELECTRICAL STIM UNATTENDED 12/22/2021 Jonathan Mccracken, PT  1          PT G-Codes  Outcome Measure Options: Lower Extremity Functional Scale (LEFS)  Total: 52         Jonathan Mccracken, PT  12/22/2021

## 2021-12-29 ENCOUNTER — LAB (OUTPATIENT)
Dept: LAB | Facility: HOSPITAL | Age: 51
End: 2021-12-29

## 2021-12-29 ENCOUNTER — HOSPITAL ENCOUNTER (OUTPATIENT)
Dept: PHYSICAL THERAPY | Facility: HOSPITAL | Age: 51
Setting detail: THERAPIES SERIES
Discharge: HOME | End: 2021-12-29

## 2021-12-29 ENCOUNTER — OFFICE VISIT (OUTPATIENT)
Dept: SURGERY | Facility: CLINIC | Age: 51
End: 2021-12-29

## 2021-12-29 VITALS
SYSTOLIC BLOOD PRESSURE: 120 MMHG | DIASTOLIC BLOOD PRESSURE: 74 MMHG | HEART RATE: 75 BPM | WEIGHT: 244.6 LBS | BODY MASS INDEX: 40.75 KG/M2 | TEMPERATURE: 97.2 F | HEIGHT: 65 IN

## 2021-12-29 DIAGNOSIS — K76.0 FATTY LIVER: ICD-10-CM

## 2021-12-29 DIAGNOSIS — C73 THYROID CANCER (HCC): Primary | ICD-10-CM

## 2021-12-29 DIAGNOSIS — M25.562 CHRONIC PAIN OF LEFT KNEE: Primary | ICD-10-CM

## 2021-12-29 DIAGNOSIS — R10.12 LUQ PAIN: ICD-10-CM

## 2021-12-29 DIAGNOSIS — M23.92 INTERNAL DERANGEMENT OF LEFT KNEE: ICD-10-CM

## 2021-12-29 DIAGNOSIS — R74.8 ALKALINE PHOSPHATASE ELEVATION: ICD-10-CM

## 2021-12-29 DIAGNOSIS — G89.29 CHRONIC PAIN OF LEFT KNEE: Primary | ICD-10-CM

## 2021-12-29 DIAGNOSIS — R79.89 ELEVATED LFTS: ICD-10-CM

## 2021-12-29 LAB
INR PPP: 0.97 (ref 0.8–1.2)
PROTHROMBIN TIME: 12.8 SECONDS (ref 11.1–15.3)

## 2021-12-29 PROCEDURE — 97110 THERAPEUTIC EXERCISES: CPT

## 2021-12-29 PROCEDURE — 36415 COLL VENOUS BLD VENIPUNCTURE: CPT

## 2021-12-29 PROCEDURE — 80053 COMPREHEN METABOLIC PANEL: CPT

## 2021-12-29 PROCEDURE — 85610 PROTHROMBIN TIME: CPT

## 2021-12-29 PROCEDURE — 99024 POSTOP FOLLOW-UP VISIT: CPT | Performed by: SURGERY

## 2021-12-29 RX ORDER — BUPIVACAINE HCL/0.9 % NACL/PF 0.1 %
2 PLASTIC BAG, INJECTION (ML) EPIDURAL ONCE
Status: CANCELLED | OUTPATIENT
Start: 2022-02-24 | End: 2021-12-29

## 2021-12-29 NOTE — PATIENT INSTRUCTIONS
"BMI for Adults  What is BMI?  Body mass index (BMI) is a number that is calculated from a person's weight and height. BMI can help estimate how much of a person's weight is composed of fat. BMI does not measure body fat directly. Rather, it is an alternative to procedures that directly measure body fat, which can be difficult and expensive.  BMI can help identify people who may be at higher risk for certain medical problems.  What are BMI measurements used for?  BMI is used as a screening tool to identify possible weight problems. It helps determine whether a person is obese, overweight, a healthy weight, or underweight.  BMI is useful for:  · Identifying a weight problem that may be related to a medical condition or may increase the risk for medical problems.  · Promoting changes, such as changes in diet and exercise, to help reach a healthy weight. BMI screening can be repeated to see if these changes are working.  How is BMI calculated?  BMI involves measuring your weight in relation to your height. Both height and weight are measured, and the BMI is calculated from those numbers. This can be done either in English ([place] or metric measurements. Note that charts and online BMI calculators are available to help you find your BMI quickly and easily without having to do these calculations yourself.  To calculate your BMI in English ([place] measurements:    1. Measure your weight in pounds (lb).  2. Multiply the number of pounds by 703.  ? For example, for a person who weighs 180 lb, multiply that number by 703, which equals 126,540.  3. Measure your height in inches. Then multiply that number by itself to get a measurement called \"inches squared.\"  ? For example, for a person who is 70 inches tall, the \"inches squared\" measurement is 70 inches x 70 inches, which equals 4,900 inches squared.  4. Divide the total from step 2 (number of lb x 703) by the total from step 3 (inches squared): 126,540 ÷ 4,900 = 25.8. This is " "your BMI.    To calculate your BMI in metric measurements:  1. Measure your weight in kilograms (kg).  2. Measure your height in meters (m). Then multiply that number by itself to get a measurement called \"meters squared.\"  ? For example, for a person who is 1.75 m tall, the \"meters squared\" measurement is 1.75 m x 1.75 m, which is equal to 3.1 meters squared.  3. Divide the number of kilograms (your weight) by the meters squared number. In this example: 70 ÷ 3.1 = 22.6. This is your BMI.  What do the results mean?  BMI charts are used to identify whether you are underweight, normal weight, overweight, or obese. The following guidelines will be used:  · Underweight: BMI less than 18.5.  · Normal weight: BMI between 18.5 and 24.9.  · Overweight: BMI between 25 and 29.9.  · Obese: BMI of 30 or above.  Keep these notes in mind:  · Weight includes both fat and muscle, so someone with a muscular build, such as an athlete, may have a BMI that is higher than 24.9. In cases like these, BMI is not an accurate measure of body fat.  · To determine if excess body fat is the cause of a BMI of 25 or higher, further assessments may need to be done by a health care provider.  · BMI is usually interpreted in the same way for men and women.  Where to find more information  For more information about BMI, including tools to quickly calculate your BMI, go to these websites:  · Centers for Disease Control and Prevention: www.cdc.gov  · American Heart Association: www.heart.org  · National Heart, Lung, and Blood [place]: www.nhlbi.nih.gov  Summary  · Body mass index (BMI) is a number that is calculated from a person's weight and height.  · BMI may help estimate how much of a person's weight is composed of fat. BMI can help identify those who may be at higher risk for certain medical problems.  · BMI can be measured using English measurements or metric measurements.  · BMI charts are used to identify whether you are underweight, normal " weight, overweight, or obese.  This information is not intended to replace advice given to you by your health care provider. Make sure you discuss any questions you have with your health care provider.  Document Revised: 09/09/2020 Document Reviewed: 07/17/2020  [employer]vier Patient Education © 2021 [employer]

## 2021-12-29 NOTE — PROGRESS NOTES
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  Previous right thyroid lobectomy and isthmusectomy and November 2021.  She has had no sequela.  She is 51 years old  Past Medical History:   Diagnosis Date   • Disease of thyroid gland     nodule, mass   • Elevated liver enzymes    • Fatty liver    • GERD (gastroesophageal reflux disease)    • Urinary tract infection    • Varicella        Past Surgical History:   Procedure Laterality Date   • CHOLECYSTECTOMY WITH INTRAOPERATIVE CHOLANGIOGRAM N/A 6/6/2019    Procedure: CHOLECYSTECTOMY LAPAROSCOPIC INTRAOPERATIVE CHOLANGIOGRAM      (c-arm#2);  Surgeon: Felipe Overton MD;  Location: [facility] OR;  Service: General   • COLONOSCOPY N/A 2/12/2021    Procedure: COLONOSCOPY;  Surgeon: Joao Bruce MD;  Location: [facility] ENDOSCOPY;  Service: Gastroenterology;  Laterality: N/A;   • COLONOSCOPY     • ENDOSCOPY N/A 11/14/2019    Procedure: ESOPHAGOGASTRODUODENOSCOPY;  Surgeon: Joao Bruce MD;  Location: [facility] ENDOSCOPY;  Service: Gastroenterology   • HYSTERECTOMY  2007   • THYROIDECTOMY Right 11/4/2021    Procedure: RIGHT LOBE THYROIDECTOMY and ISTHMUSECTOMY;  Surgeon: Benjamin Renteria MD;  Location: [facility] OR;  Service: General;  Laterality: Right;   • TOTAL ABDOMINAL HYSTERECTOMY WITH SALPINGO OOPHORECTOMY  2007   • UPPER GASTROINTESTINAL ENDOSCOPY  11/14/2019   • US GUIDED FINE NEEDLE ASPIRATION  9/1/2021   • WISDOM TOOTH EXTRACTION           Current Outpatient Medications:   •  hyoscyamine (LEVSIN) 0.125 MG SL tablet, Take 1 tablet by mouth Every 4 (Four) Hours As Needed for Cramping or Diarrhea (abd pain)., Disp: 45 tablet, Rfl: 1    Allergies   Allergen Reactions   • Contrave [Naltrexone-Bupropion Hcl Er] Dizziness       Family History   Problem Relation Age of Onset   • Breast cancer Mother    •  Thyroid disease Mother    • Hypertension Mother    • Hypertension Father    • Diabetes Father    • Kidney disease Father    • Heart disease Father    • No Known Problems Sister    • No Known Problems Brother    • Seizures Daughter    • Hypertension Daughter    • COPD Maternal Grandmother    • Asthma Maternal Grandmother    • No Known Problems Sister    • ADD / ADHD Daughter    • Polymyositis Daughter        Social History     Socioeconomic History   • Marital status:    Tobacco Use   • Smoking status: Former Smoker     Packs/day: 0.50     Years: 5.00     Pack years: 2.50     Types: Cigarettes     Quit date:      Years since quittin.0   • Smokeless tobacco: Never Used   • Tobacco comment: passive at work   Vaping Use   • Vaping Use: Never used   Substance and Sexual Activity   • Alcohol use: Not Currently     Comment: none in 2 years   • Drug use: Never   • Sexual activity: Defer     Partners: Male     Birth control/protection: Surgical       Review of Systems   Constitutional: Negative for appetite change, chills, fever and unexpected weight change.   HENT: Negative for hearing loss, nosebleeds and trouble swallowing.    Eyes: Negative for visual disturbance.   Respiratory: Negative for apnea, cough, choking, chest tightness, shortness of breath, wheezing and stridor.    Cardiovascular: Negative for chest pain, palpitations and leg swelling.   Gastrointestinal: Negative for abdominal distention, abdominal pain, blood in stool, constipation, diarrhea, nausea and vomiting.   Endocrine: Negative for cold intolerance, heat intolerance, polydipsia, polyphagia and polyuria.   Genitourinary: Negative for difficulty urinating, dysuria, frequency, hematuria and urgency.   Musculoskeletal: Negative for arthralgias, back pain, myalgias and neck pain.   Skin: Negative for color change, pallor and rash.   Allergic/Immunologic: Negative for immunocompromised state.   Neurological: Negative for dizziness, seizures,  syncope, light-headedness, numbness and headaches.   Hematological: Negative for adenopathy.   Psychiatric/Behavioral: Negative for suicidal ideas. The patient is not nervous/anxious.        Physical Exam  Vitals reviewed.   Constitutional:       Appearance: Normal appearance.   Neck:      Thyroid: No thyroid mass or thyromegaly.     Cardiovascular:      Rate and Rhythm: Normal rate and regular rhythm.   Pulmonary:      Effort: Pulmonary effort is normal. No respiratory distress.   Musculoskeletal:         General: Normal range of motion.      Cervical back: Normal range of motion and neck supple.   Lymphadenopathy:      Cervical:      Right cervical: No superficial or deep cervical adenopathy.     Left cervical: No superficial or deep cervical adenopathy.   Skin:     General: Skin is warm and dry.   Neurological:      General: No focal deficit present.      Mental Status: She is alert and oriented to person, place, and time.   Psychiatric:         Mood and Affect: Mood normal.         Behavior: Behavior normal.         Thought Content: Thought content normal.           ASSESSMENT    Diagnoses and all orders for this visit:    1. Thyroid cancer (HCC) (Primary)  -     Case Request; Standing  -     Case Request    Other orders  -     Follow Anesthesia Guidelines / Standing Orders; Future  -     Provide Chlorhexidine Skin Prep Wipes and Instructions; Future        PLAN    1.  Completion thyroidectomy with left thyroid lobectomy as planned    The following were discussed with the patient/family:      What are the indications that have led your doctor to the opinion that an operation is necessary?      It is explained that thyroidectomy is an operation in which one or both lobes of the thyroid gland are removed. The most common indications for thyroidectomy include a large mass in the thyroid gland, difficulties with breathing related to a thyroid mass, difficulties with swallowing, suspected or proven cancer of the  thyroid gland and hyperthyroidism (overproduction of the thyroid hormone). The need for thyroidectomy based on history, the results of a physical examination and tests.      What, if any, alternative treatments are available for your condition?      Cancers are best managed surgically.  Suspected cancers may be followed with ultrasound, although there is a risk of missing and not curing a cancer.    What will be the likely result if you don't have the operation?    Symptoms may perisit. Cancers may progress.     What are the basic procedures involved in the operation?    Surgery is done through an incision in the neck. After removing the abnormal portion of the thyroid, it is checked post operatively for cancer. Certain cancers may require a re-operation and removal of the remainder of the thyroid.     What are the risks?    After surgery patients may have difficulty in swallowing. Occasionally, swallowing may even be a little painful.  This pain usually resolves within 24 to 72 hours. Bleeding or infection are also possible short term complications. Although rare in thyroid surgery, some patients may develop a thick scar or keloid.   Two complications specific to thyroid surgery are hypocalcemia and vocal cord weakness or paralysis. Hypocalcemia, or low blood levels of calcium, may occur after complete removal of both thyroid lobes. This condition is caused by injury or interference with the blood supply of four tiny glands called parathyroid glands, which are located within or very close to the thyroid gland. Hypocalcemia is usually temporary, but sometimes may require calcium supplements if sufficiently pronounced. Permanent hypocalcemia is fortunately rare.  This is why, serum calcium, magnesium and phosphorus levels may need to be monitored in the first 24 hours after the surgery.    Vocal cord weakness or paralysis may be caused by swelling, stretching, or injury to the recurrent laryngeal nerve which passes  very close to the thyroid gland. Temporary hoarseness may result. Again, this is an uncommon, usually temporary complication. Permanent vocal cord paralysis is rare.  In very rare circumstances, airway obstruction may occur and a tracheotomy may become necessary to gain access to the airway. This is an extremely rare life saving measure and every effort would be taken to avoid it.    As with any type of surgery, the risks of anesthesia such as drug reaction, breathing difficulties and even death are possible.     How is the operation expected to improve your health or quality of life?    The operation may cure cancer, relieve obstructive symptoms, or cure hyperthyroidism.    Is hospitalization necessary and, if so, how long can you expect to be hospitalized?    Patients who undergo partial thyroidectomy surgery are often discharged home from the hospital the same day. Those requiring total thyroidism will require overnight hospitalization.  Narcotic and non-narcotic pain medicine is usually prescribed to be taken on an as needed basis.     What can you expect during your recovery period?    In most cases, primary hyperparathyroidism is cured with surgery. This decreases the risks associated with calcium levels that have become too high.    When can you expect to resume normal activities?    Normal activity can be resumed in 2-3 weeks.    Are there likely to be residual effects from the operation?    Depending on the final histologic (microscopic examination) diagnosis of the gland removed, and on the blood tests, continuous follow-up by the endocrinologist and / or surgeon may be indicated for replacement of the thyroid hormone. Following total thyroidectomy, patients have to take replacement thyroid hormone for the rest of their life.      All questions are answered. She understands and agrees to operation.                  This document has been electronically signed by Benjamin Renteria MD on December 29, 2021 09:14  CST    Answers for HPI/ROS submitted by the patient on 12/27/2021  Please describe your symptoms.: Fellow up  Have you had these symptoms before?: No  How long have you been having these symptoms?: 1-4 days  What is the primary reason for your visit?: Other

## 2021-12-29 NOTE — THERAPY TREATMENT NOTE
Outpatient Physical Therapy Ortho Treatment Note  [facility]     Patient Name: Letty Bach  : 1970  MRN: [id number]  Today's Date: 2021      Visit Date: 2021  Attendance: 2  Subjective improvement: n/a  Recert: 22  MD Appointment: TBD    Visit Dx:    ICD-10-CM ICD-9-CM   1. Chronic pain of left knee  M25.562 719.46    G89.29 338.29   2. Internal derangement of left knee  M23.92 717.9       Patient Active Problem List   Diagnosis   • Calculus of gallbladder with chronic cholecystitis without obstruction   • Chronic pain of both knees   • Costochondritis   • Elevated LDL cholesterol level   • LUQ pain   • Morbid obesity with BMI of 45.0-49.9, adult (HCC)   • Anxiety   • Internal derangement of left knee   • Gastroesophageal reflux disease without esophagitis   • Encounter for screening for malignant neoplasm of colon   • Thyroid cancer (HCC)        Past Medical History:   Diagnosis Date   • Disease of thyroid gland     nodule, mass   • Elevated liver enzymes    • Fatty liver    • GERD (gastroesophageal reflux disease)    • Urinary tract infection    • Varicella         Past Surgical History:   Procedure Laterality Date   • CHOLECYSTECTOMY WITH INTRAOPERATIVE CHOLANGIOGRAM N/A 2019    Procedure: CHOLECYSTECTOMY LAPAROSCOPIC INTRAOPERATIVE CHOLANGIOGRAM      (c-arm#2);  Surgeon: Felipe Overton MD;  Location: [facility] OR;  Service: General   • COLONOSCOPY N/A 2021    Procedure: COLONOSCOPY;  Surgeon: Joao Bruce MD;  Location: [facility] ENDOSCOPY;  Service: Gastroenterology;  Laterality: N/A;   • COLONOSCOPY     • ENDOSCOPY N/A 2019    Procedure: ESOPHAGOGASTRODUODENOSCOPY;  Surgeon: Joao Bruce MD;  Location: [facility] ENDOSCOPY;  Service: Gastroenterology   • HYSTERECTOMY  2007   • THYROIDECTOMY Right 2021    Procedure: RIGHT LOBE THYROIDECTOMY and ISTHMUSECTOMY;  Surgeon: Benjamin Renteria MD;  Location: [facility] OR;  Service: General;  Laterality: Right;    • TOTAL ABDOMINAL HYSTERECTOMY WITH SALPINGO OOPHORECTOMY  2007   • UPPER GASTROINTESTINAL ENDOSCOPY  11/14/2019   • US GUIDED FINE NEEDLE ASPIRATION  9/1/2021   • WISDOM TOOTH EXTRACTION          PT Ortho     Row Name 12/29/21 1100       Precautions and Contraindications    Contraindications Active Thyroid Cancer   -EM          User Key  (r) = Recorded By, (t) = Taken By, (c) = Cosigned By    Initials Name Provider Type    Pee Lord PTA Physical Therapy Assistant                             PT Assessment/Plan     Row Name 12/29/21 1200          PT Assessment    Assessment Comments Recurrent thyroid cancer noted today in chart-primary PT was made aware. Surgery expected on 2/10. Pt vahe todays therex well with tx focused on OCK activities for now.  -EM            PT Plan    PT Frequency 2x/week  -EM     Predicted Duration of Therapy Intervention (PT) 3 weeks then further TBD  -EM     PT Plan Comments Cont current POC for hip/knee strengthening and balance. Gently progress to [facility] therex as tolerated and indicted.  -EM           User Key  (r) = Recorded By, (t) = Taken By, (c) = Cosigned By    Initials Name Provider Type    Pee Lord PTA Physical Therapy Assistant                 Modalities     Row Name 12/29/21 1100             Ice    Ice Applied Yes  10'  -EM      Location L knee  -EM      Ice S/P Rx Yes  -EM            User Key  (r) = Recorded By, (t) = Taken By, (c) = Cosigned By    Initials Name Provider Type    Pee Lord PTA Physical Therapy Assistant               OP Exercises     Row Name 12/29/21 1100             Subjective Comments    Subjective Comments Pt having sx on 2/10 for sx regarding thyroid cancer.   -EM              Subjective Pain    Able to rate subjective pain? yes  -EM      Pre-Treatment Pain Level 3  -EM      Post-Treatment Pain Level 3  -EM              Exercise 1    Exercise Name 1 PRO II-Seat 9-4.0  -EM      Time 1 10'  -EM              Exercise 2    Exercise  "Name 2 Incline Calf S  -EM      Sets 2 3  -EM      Time 2 30\"  -EM              Exercise 3    Exercise Name 3 St Ham S  -EM      Sets 3 3  -EM      Time 3 30\"  -EM              Exercise 4    Exercise Name 4 LAQ  -EM      Sets 4 1  -EM      Reps 4 20  -EM              Exercise 5    Exercise Name 5 Seated Marches  -EM      Sets 5 1  -EM      Reps 5 20  -EM              Exercise 6    Exercise Name 6 Sup SLR  -EM      Sets 6 2  -EM      Reps 6 10  -EM              Exercise 7    Exercise Name 7 Sup VMO SLR  -EM      Sets 7 2  -EM      Reps 7 10  -EM              Exercise 8    Exercise Name 8 ice/elev  -EM      Time 8 10'  -EM            User Key  (r) = Recorded By, (t) = Taken By, (c) = Cosigned By    Initials Name Provider Type    EM Pee Baires, PTA Physical Therapy Assistant                              PT OP Goals     Row Name 12/29/21 1200          PT Short Term Goals    STG Date to Achieve 01/12/22  -EM     STG 1 Patient independent with HEP for the L knee  -EM     STG 1 Progress Not Met  -EM     STG 2 Improve L knee flex AROM to 118°  -EM     STG 2 Progress Not Met  -EM     STG 3 Improve L knee flex and L hip abduction MMT to 5/5  -EM     STG 3 Progress Not Met  -EM     STG 4 Improve L SLS to >/= 10 sec consistently  -EM     STG 4 Progress Not Met  -EM     STG 5 Able to climb stairs with minimal to no L knee pain  -EM     STG 5 Progress Not Met  -EM     STG 6 Improve LEFS score to 61 or higher  -EM     STG 6 Progress Not Met  -EM     STG 7 Improve L quadriceps and L hamstrings flexibility to WFL  -EM     STG 7 Progress Not Met  -EM            Time Calculation    PT Goal Re-Cert Due Date 01/12/22  -EM           User Key  (r) = Recorded By, (t) = Taken By, (c) = Cosigned By    Initials Name Provider Type    EM Pee Baires, PTA Physical Therapy Assistant                               Time Calculation:   Start Time: 1143  Stop Time: 1230  Time Calculation (min): 47 min  PT Non-Billable Time (min): 10 min  Total " Timed Code Minutes- PT: 37 minute(s)  Therapy Charges for Today     Code Description Service Date Service Provider Modifiers Qty    04431135857 HC PT THER PROC EA 15 MIN 12/29/2021 Pee Baires, PTA GP 2    36865620599 HC PT THER SUPP EA 15 MIN 12/29/2021 Pee Baires, PTA GP 1                    Pee Baires, PTA  12/29/2021

## 2021-12-30 LAB
ALBUMIN SERPL-MCNC: 4 G/DL (ref 3.5–5.2)
ALBUMIN/GLOB SERPL: 1.4 G/DL
ALP SERPL-CCNC: 113 U/L (ref 39–117)
ALT SERPL W P-5'-P-CCNC: 18 U/L (ref 1–33)
ANION GAP SERPL CALCULATED.3IONS-SCNC: 8.3 MMOL/L (ref 5–15)
AST SERPL-CCNC: 17 U/L (ref 1–32)
BILIRUB SERPL-MCNC: 0.4 MG/DL (ref 0–1.2)
BUN SERPL-MCNC: 11 MG/DL (ref 6–20)
BUN/CREAT SERPL: 13.6 (ref 7–25)
CALCIUM SPEC-SCNC: 9.2 MG/DL (ref 8.6–10.5)
CHLORIDE SERPL-SCNC: 104 MMOL/L (ref 98–107)
CO2 SERPL-SCNC: 30.7 MMOL/L (ref 22–29)
CREAT SERPL-MCNC: 0.81 MG/DL (ref 0.57–1)
GFR SERPL CREATININE-BSD FRML MDRD: 75 ML/MIN/1.73
GLOBULIN UR ELPH-MCNC: 2.8 GM/DL
GLUCOSE SERPL-MCNC: 83 MG/DL (ref 65–99)
POTASSIUM SERPL-SCNC: 4.4 MMOL/L (ref 3.5–5.2)
PROT SERPL-MCNC: 6.8 G/DL (ref 6–8.5)
SODIUM SERPL-SCNC: 143 MMOL/L (ref 136–145)

## 2022-01-03 ENCOUNTER — APPOINTMENT (OUTPATIENT)
Dept: PHYSICAL THERAPY | Facility: HOSPITAL | Age: 52
End: 2022-01-03

## 2022-01-05 ENCOUNTER — APPOINTMENT (OUTPATIENT)
Dept: PHYSICAL THERAPY | Facility: HOSPITAL | Age: 52
End: 2022-01-05

## 2022-01-07 ENCOUNTER — APPOINTMENT (OUTPATIENT)
Dept: PHYSICAL THERAPY | Facility: HOSPITAL | Age: 52
End: 2022-01-07

## 2022-01-19 ENCOUNTER — OFFICE VISIT (OUTPATIENT)
Dept: SURGERY | Facility: CLINIC | Age: 52
End: 2022-01-19

## 2022-01-19 VITALS
SYSTOLIC BLOOD PRESSURE: 110 MMHG | HEIGHT: 65 IN | BODY MASS INDEX: 40.05 KG/M2 | OXYGEN SATURATION: 97 % | DIASTOLIC BLOOD PRESSURE: 70 MMHG | TEMPERATURE: 96.8 F | WEIGHT: 240.4 LBS | HEART RATE: 72 BPM

## 2022-01-19 DIAGNOSIS — C73 THYROID CANCER: Primary | ICD-10-CM

## 2022-01-19 PROCEDURE — 99024 POSTOP FOLLOW-UP VISIT: CPT | Performed by: SURGERY

## 2022-01-19 NOTE — PROGRESS NOTES
Chief Complaint   Patient presents with   • Follow-up     H&P for surgery         HPI  For completion thyroidectomy. On for Feb 10. No sx.        Past Medical History:   Diagnosis Date   • Disease of thyroid gland     nodule, mass   • Elevated liver enzymes    • Fatty liver    • GERD (gastroesophageal reflux disease)    • Urinary tract infection    • Varicella        Past Surgical History:   Procedure Laterality Date   • CHOLECYSTECTOMY WITH INTRAOPERATIVE CHOLANGIOGRAM N/A 6/6/2019    Procedure: CHOLECYSTECTOMY LAPAROSCOPIC INTRAOPERATIVE CHOLANGIOGRAM      (c-arm#2);  Surgeon: Felipe Overton MD;  Location: [facility] OR;  Service: General   • COLONOSCOPY N/A 2/12/2021    Procedure: COLONOSCOPY;  Surgeon: Joao Bruce MD;  Location: [facility] ENDOSCOPY;  Service: Gastroenterology;  Laterality: N/A;   • COLONOSCOPY     • ENDOSCOPY N/A 11/14/2019    Procedure: ESOPHAGOGASTRODUODENOSCOPY;  Surgeon: Joao Bruce MD;  Location: [facility] ENDOSCOPY;  Service: Gastroenterology   • HYSTERECTOMY  2007   • THYROIDECTOMY Right 11/4/2021    Procedure: RIGHT LOBE THYROIDECTOMY and ISTHMUSECTOMY;  Surgeon: Benjamin Renteria MD;  Location: [facility] OR;  Service: General;  Laterality: Right;   • TOTAL ABDOMINAL HYSTERECTOMY WITH SALPINGO OOPHORECTOMY  2007   • UPPER GASTROINTESTINAL ENDOSCOPY  11/14/2019   • US GUIDED FINE NEEDLE ASPIRATION  9/1/2021   • WISDOM TOOTH EXTRACTION           Current Outpatient Medications:   •  hyoscyamine (LEVSIN) 0.125 MG SL tablet, Take 1 tablet by mouth Every 4 (Four) Hours As Needed for Cramping or Diarrhea (abd pain)., Disp: 45 tablet, Rfl: 1    Allergies   Allergen Reactions   • Contrave [Naltrexone-Bupropion Hcl Er] Dizziness       Family History   Problem Relation Age of Onset   • Breast cancer Mother    • Thyroid disease Mother    • Hypertension Mother    • Hypertension Father    • Diabetes Father    • Kidney disease Father    • Heart disease Father    • No Known Problems Sister    • No  Known Problems Brother    • Seizures Daughter    • Hypertension Daughter    • COPD Maternal Grandmother    • Asthma Maternal Grandmother    • No Known Problems Sister    • ADD / ADHD Daughter    • Polymyositis Daughter        Social History     Socioeconomic History   • Marital status:    Tobacco Use   • Smoking status: Former Smoker     Packs/day: 0.50     Years: 5.00     Pack years: 2.50     Types: Cigarettes     Quit date:      Years since quittin.0   • Smokeless tobacco: Never Used   • Tobacco comment: passive at work   Vaping Use   • Vaping Use: Never used   Substance and Sexual Activity   • Alcohol use: Not Currently     Comment: none in 2 years   • Drug use: Never   • Sexual activity: Defer     Birth control/protection: Surgical       Review of Systems   Constitutional: Negative for appetite change, chills, fever and unexpected weight change.   HENT: Negative for hearing loss, nosebleeds and trouble swallowing.    Eyes: Negative for visual disturbance.   Respiratory: Negative for apnea, cough, choking, chest tightness, shortness of breath, wheezing and stridor.    Cardiovascular: Negative for chest pain, palpitations and leg swelling.   Gastrointestinal: Negative for abdominal distention, abdominal pain, blood in stool, constipation, diarrhea, nausea and vomiting.   Endocrine: Negative for cold intolerance, heat intolerance, polydipsia, polyphagia and polyuria.   Genitourinary: Negative for difficulty urinating, dysuria, frequency, hematuria and urgency.   Musculoskeletal: Negative for arthralgias, back pain, myalgias and neck pain.   Skin: Negative for color change, pallor and rash.   Allergic/Immunologic: Negative for immunocompromised state.   Neurological: Negative for dizziness, seizures, syncope, light-headedness, numbness and headaches.   Hematological: Negative for adenopathy.   Psychiatric/Behavioral: Negative for suicidal ideas. The patient is not nervous/anxious.        Physical  Exam  Neck:     Cardiovascular:      Rate and Rhythm: Normal rate.      Pulses: Normal pulses.   Pulmonary:      Effort: Pulmonary effort is normal. No respiratory distress.   Musculoskeletal:      Cervical back: Normal range of motion and neck supple.   Lymphadenopathy:      Cervical: No cervical adenopathy.      Right cervical: No superficial, deep or posterior cervical adenopathy.     Left cervical: No superficial, deep or posterior cervical adenopathy.           ASSESSMENT    Diagnoses and all orders for this visit:    1. Thyroid cancer (HCC) (Primary)        PLAN    1. Completion thyroidectomy    The following were discussed with the patient/family:      What are the indications that have led your doctor to the opinion that an operation is necessary?      It is explained that thyroidectomy is an operation in which one or both lobes of the thyroid gland are removed. The most common indications for thyroidectomy include a large mass in the thyroid gland, difficulties with breathing related to a thyroid mass, difficulties with swallowing, suspected or proven cancer of the thyroid gland and hyperthyroidism (overproduction of the thyroid hormone). The need for thyroidectomy based on history, the results of a physical examination and tests.      What, if any, alternative treatments are available for your condition?    There is medical treatment available for hyperthyroidism.   Masses that obstruct the esophagus or breathing passages cannot be treated medically other than observation.   Cancers are best managed surgically.  Suspected cancers may be followed with ultrasound, although there is a risk of missing and not curing a cancer.    What will be the likely result if you don't have the operation?    Symptoms may perisit. Cancers may progress.     What are the basic procedures involved in the operation?    Surgery is done through an incision in the neck. After removing the abnormal portion of the thyroid, it is  checked post operatively for cancer. Certain cancers may require a re-operation and removal of the remainder of the thyroid.     What are the risks?    After surgery patients may have difficulty in swallowing. Occasionally, swallowing may even be a little painful.  This pain usually resolves within 24 to 72 hours. Bleeding or infection are also possible short term complications. Although rare in thyroid surgery, some patients may develop a thick scar or keloid.   Two complications specific to thyroid surgery are hypocalcemia and vocal cord weakness or paralysis. Hypocalcemia, or low blood levels of calcium, may occur after complete removal of both thyroid lobes. This condition is caused by injury or interference with the blood supply of four tiny glands called parathyroid glands, which are located within or very close to the thyroid gland. Hypocalcemia is usually temporary, but sometimes may require calcium supplements if sufficiently pronounced. Permanent hypocalcemia is fortunately rare.  This is why, serum calcium, magnesium and phosphorus levels may need to be monitored in the first 24 hours after the surgery.    Vocal cord weakness or paralysis may be caused by swelling, stretching, or injury to the recurrent laryngeal nerve which passes very close to the thyroid gland. Temporary hoarseness may result. Again, this is an uncommon, usually temporary complication. Permanent vocal cord paralysis is rare.  In very rare circumstances, airway obstruction may occur and a tracheotomy may become necessary to gain access to the airway. This is an extremely rare life saving measure and every effort would be taken to avoid it.    As with any type of surgery, the risks of anesthesia such as drug reaction, breathing difficulties and even death are possible.     How is the operation expected to improve your health or quality of life?    The operation may cure cancer, relieve obstructive symptoms, or cure hyperthyroidism.    Is  hospitalization necessary and, if so, how long can you expect to be hospitalized?    Patients who undergo partial thyroidectomy surgery are often discharged home from the hospital the same day. Those requiring total thyroidism will require overnight hospitalization.  Narcotic and non-narcotic pain medicine is usually prescribed to be taken on an as needed basis.     What can you expect during your recovery period?    In most cases, primary hyperparathyroidism is cured with surgery. This decreases the risks associated with calcium levels that have become too high.    When can you expect to resume normal activities?    Normal activity can be resumed in 2-3 weeks.    Are there likely to be residual effects from the operation?    Depending on the final histologic (microscopic examination) diagnosis of the gland removed, and on the blood tests, continuous follow-up by the endocrinologist and / or surgeon may be indicated for replacement of the thyroid hormone. Following total thyroidectomy, patients have to take replacement thyroid hormone for the rest of their life.      All questions are answered. She understands and agrees to operation.                  This document has been electronically signed by Benjamin Renteria MD on January 19, 2022 09:17 CST

## 2022-01-31 ENCOUNTER — OFFICE VISIT (OUTPATIENT)
Dept: GASTROENTEROLOGY | Facility: CLINIC | Age: 52
End: 2022-01-31

## 2022-01-31 VITALS
BODY MASS INDEX: 40.15 KG/M2 | HEIGHT: 65 IN | DIASTOLIC BLOOD PRESSURE: 91 MMHG | HEART RATE: 79 BPM | SYSTOLIC BLOOD PRESSURE: 128 MMHG | WEIGHT: 241 LBS

## 2022-01-31 DIAGNOSIS — K76.0 FATTY LIVER: ICD-10-CM

## 2022-01-31 DIAGNOSIS — R10.12 LUQ PAIN: Primary | ICD-10-CM

## 2022-01-31 DIAGNOSIS — R79.89 ELEVATED LFTS: ICD-10-CM

## 2022-01-31 PROCEDURE — 99213 OFFICE O/P EST LOW 20 MIN: CPT | Performed by: PHYSICIAN ASSISTANT

## 2022-01-31 RX ORDER — DULOXETIN HYDROCHLORIDE 20 MG/1
20 CAPSULE, DELAYED RELEASE ORAL DAILY
Qty: 30 CAPSULE | Refills: 3 | Status: SHIPPED | OUTPATIENT
Start: 2022-01-31 | End: 2022-05-23 | Stop reason: SDDI

## 2022-02-08 ENCOUNTER — PRE-ADMISSION TESTING (OUTPATIENT)
Dept: PREADMISSION TESTING | Facility: HOSPITAL | Age: 52
End: 2022-02-08

## 2022-02-08 ENCOUNTER — LAB (OUTPATIENT)
Dept: LAB | Facility: HOSPITAL | Age: 52
End: 2022-02-08

## 2022-02-08 VITALS
BODY MASS INDEX: 40.15 KG/M2 | RESPIRATION RATE: 16 BRPM | HEART RATE: 64 BPM | HEIGHT: 65 IN | DIASTOLIC BLOOD PRESSURE: 78 MMHG | WEIGHT: 241 LBS | SYSTOLIC BLOOD PRESSURE: 118 MMHG | OXYGEN SATURATION: 98 %

## 2022-02-08 DIAGNOSIS — Z01.818 PREOP TESTING: Primary | ICD-10-CM

## 2022-02-08 LAB — SARS-COV-2 N GENE RESP QL NAA+PROBE: DETECTED

## 2022-02-08 PROCEDURE — 87635 SARS-COV-2 COVID-19 AMP PRB: CPT

## 2022-02-08 PROCEDURE — C9803 HOPD COVID-19 SPEC COLLECT: HCPCS

## 2022-02-08 RX ORDER — SODIUM CHLORIDE, SODIUM GLUCONATE, SODIUM ACETATE, POTASSIUM CHLORIDE AND MAGNESIUM CHLORIDE 526; 502; 368; 37; 30 MG/100ML; MG/100ML; MG/100ML; MG/100ML; MG/100ML
1000 INJECTION, SOLUTION INTRAVENOUS CONTINUOUS PRN
Status: CANCELLED | OUTPATIENT
Start: 2022-02-24

## 2022-02-24 ENCOUNTER — ANESTHESIA (OUTPATIENT)
Dept: PERIOP | Facility: HOSPITAL | Age: 52
End: 2022-02-24

## 2022-02-24 ENCOUNTER — HOSPITAL ENCOUNTER (OUTPATIENT)
Facility: HOSPITAL | Age: 52
Setting detail: HOSPITAL OUTPATIENT SURGERY
Discharge: HOME | End: 2022-02-24
Attending: SURGERY | Admitting: SURGERY

## 2022-02-24 ENCOUNTER — ANESTHESIA EVENT (OUTPATIENT)
Dept: PERIOP | Facility: HOSPITAL | Age: 52
End: 2022-02-24

## 2022-02-24 VITALS
TEMPERATURE: 97.3 F | DIASTOLIC BLOOD PRESSURE: 66 MMHG | OXYGEN SATURATION: 97 % | SYSTOLIC BLOOD PRESSURE: 125 MMHG | RESPIRATION RATE: 18 BRPM | BODY MASS INDEX: 40.29 KG/M2 | HEIGHT: 65 IN | WEIGHT: 241.84 LBS | HEART RATE: 59 BPM

## 2022-02-24 DIAGNOSIS — C73 THYROID CANCER: Primary | ICD-10-CM

## 2022-02-24 PROCEDURE — 25010000002 ONDANSETRON PER 1 MG

## 2022-02-24 PROCEDURE — 25010000002 HYDROMORPHONE PER 4 MG

## 2022-02-24 PROCEDURE — 60220 PARTIAL REMOVAL OF THYROID: CPT | Performed by: SURGERY

## 2022-02-24 PROCEDURE — 25010000002 PROPOFOL 10 MG/ML EMULSION

## 2022-02-24 PROCEDURE — 25010000002 HYDROMORPHONE 1 MG/ML SOLUTION

## 2022-02-24 PROCEDURE — C1889 IMPLANT/INSERT DEVICE, NOC: HCPCS | Performed by: SURGERY

## 2022-02-24 PROCEDURE — S0260 H&P FOR SURGERY: HCPCS | Performed by: SURGERY

## 2022-02-24 PROCEDURE — 25010000002 CEFAZOLIN PER 500 MG

## 2022-02-24 PROCEDURE — 25010000002 FENTANYL CITRATE (PF) 50 MCG/ML SOLUTION

## 2022-02-24 PROCEDURE — 25010000002 MIDAZOLAM PER 1 MG

## 2022-02-24 PROCEDURE — 25010000002 SUCCINYLCHOLINE PER 20 MG

## 2022-02-24 PROCEDURE — 25010000002 DEXAMETHASONE PER 1 MG

## 2022-02-24 PROCEDURE — 25010000002 PHENYLEPHRINE 10 MG/ML SOLUTION

## 2022-02-24 PROCEDURE — 25010000002 NEOSTIGMINE 10 MG/10ML SOLUTION

## 2022-02-24 DEVICE — HEMOST ABS SURGICEL SNOW 1X2IN: Type: IMPLANTABLE DEVICE | Site: NECK | Status: FUNCTIONAL

## 2022-02-24 RX ORDER — HYDROCODONE BITARTRATE AND ACETAMINOPHEN 7.5; 325 MG/1; MG/1
1 TABLET ORAL EVERY 4 HOURS PRN
Qty: 18 TABLET | Refills: 0 | Status: SHIPPED | OUTPATIENT
Start: 2022-02-24 | End: 2022-03-17

## 2022-02-24 RX ORDER — DEXAMETHASONE SODIUM PHOSPHATE 4 MG/ML
INJECTION, SOLUTION INTRA-ARTICULAR; INTRALESIONAL; INTRAMUSCULAR; INTRAVENOUS; SOFT TISSUE AS NEEDED
Status: DISCONTINUED | OUTPATIENT
Start: 2022-02-24 | End: 2022-02-24 | Stop reason: SURG

## 2022-02-24 RX ORDER — NALOXONE HCL 0.4 MG/ML
0.4 VIAL (ML) INJECTION AS NEEDED
Status: DISCONTINUED | OUTPATIENT
Start: 2022-02-24 | End: 2022-02-24 | Stop reason: HOSPADM

## 2022-02-24 RX ORDER — SODIUM CHLORIDE, SODIUM GLUCONATE, SODIUM ACETATE, POTASSIUM CHLORIDE AND MAGNESIUM CHLORIDE 526; 502; 368; 37; 30 MG/100ML; MG/100ML; MG/100ML; MG/100ML; MG/100ML
1000 INJECTION, SOLUTION INTRAVENOUS CONTINUOUS PRN
Status: DISCONTINUED | OUTPATIENT
Start: 2022-02-24 | End: 2022-02-24 | Stop reason: HOSPADM

## 2022-02-24 RX ORDER — PROPOFOL 10 MG/ML
VIAL (ML) INTRAVENOUS AS NEEDED
Status: DISCONTINUED | OUTPATIENT
Start: 2022-02-24 | End: 2022-02-24 | Stop reason: SURG

## 2022-02-24 RX ORDER — PROMETHAZINE HYDROCHLORIDE 25 MG/1
25 SUPPOSITORY RECTAL ONCE AS NEEDED
Status: DISCONTINUED | OUTPATIENT
Start: 2022-02-24 | End: 2022-02-24 | Stop reason: HOSPADM

## 2022-02-24 RX ORDER — LIDOCAINE HYDROCHLORIDE 20 MG/ML
INJECTION, SOLUTION INFILTRATION; PERINEURAL AS NEEDED
Status: DISCONTINUED | OUTPATIENT
Start: 2022-02-24 | End: 2022-02-24 | Stop reason: SURG

## 2022-02-24 RX ORDER — PHENYLEPHRINE HYDROCHLORIDE 10 MG/ML
INJECTION INTRAVENOUS AS NEEDED
Status: DISCONTINUED | OUTPATIENT
Start: 2022-02-24 | End: 2022-02-24 | Stop reason: SURG

## 2022-02-24 RX ORDER — MEPERIDINE HYDROCHLORIDE 25 MG/ML
12.5 INJECTION INTRAMUSCULAR; INTRAVENOUS; SUBCUTANEOUS
Status: DISCONTINUED | OUTPATIENT
Start: 2022-02-24 | End: 2022-02-24 | Stop reason: HOSPADM

## 2022-02-24 RX ORDER — ONDANSETRON 2 MG/ML
4 INJECTION INTRAMUSCULAR; INTRAVENOUS ONCE AS NEEDED
Status: DISCONTINUED | OUTPATIENT
Start: 2022-02-24 | End: 2022-02-24 | Stop reason: HOSPADM

## 2022-02-24 RX ORDER — EPHEDRINE SULFATE 50 MG/ML
5 INJECTION, SOLUTION INTRAVENOUS ONCE AS NEEDED
Status: DISCONTINUED | OUTPATIENT
Start: 2022-02-24 | End: 2022-02-24 | Stop reason: HOSPADM

## 2022-02-24 RX ORDER — HYDROCODONE BITARTRATE AND ACETAMINOPHEN 7.5; 325 MG/1; MG/1
1 TABLET ORAL EVERY 4 HOURS PRN
Status: DISCONTINUED | OUTPATIENT
Start: 2022-02-24 | End: 2022-02-24 | Stop reason: HOSPADM

## 2022-02-24 RX ORDER — EPHEDRINE SULFATE 50 MG/ML
INJECTION INTRAVENOUS AS NEEDED
Status: DISCONTINUED | OUTPATIENT
Start: 2022-02-24 | End: 2022-02-24 | Stop reason: SURG

## 2022-02-24 RX ORDER — NEOSTIGMINE METHYLSULFATE 1 MG/ML
INJECTION, SOLUTION INTRAVENOUS AS NEEDED
Status: DISCONTINUED | OUTPATIENT
Start: 2022-02-24 | End: 2022-02-24 | Stop reason: SURG

## 2022-02-24 RX ORDER — ACETAMINOPHEN 650 MG/1
650 SUPPOSITORY RECTAL ONCE AS NEEDED
Status: DISCONTINUED | OUTPATIENT
Start: 2022-02-24 | End: 2022-02-24 | Stop reason: HOSPADM

## 2022-02-24 RX ORDER — FLUMAZENIL 0.1 MG/ML
0.2 INJECTION INTRAVENOUS AS NEEDED
Status: DISCONTINUED | OUTPATIENT
Start: 2022-02-24 | End: 2022-02-24 | Stop reason: HOSPADM

## 2022-02-24 RX ORDER — FENTANYL CITRATE 50 UG/ML
INJECTION, SOLUTION INTRAMUSCULAR; INTRAVENOUS AS NEEDED
Status: DISCONTINUED | OUTPATIENT
Start: 2022-02-24 | End: 2022-02-24 | Stop reason: SURG

## 2022-02-24 RX ORDER — SUCCINYLCHOLINE CHLORIDE 20 MG/ML
INJECTION INTRAMUSCULAR; INTRAVENOUS AS NEEDED
Status: DISCONTINUED | OUTPATIENT
Start: 2022-02-24 | End: 2022-02-24 | Stop reason: SURG

## 2022-02-24 RX ORDER — PROMETHAZINE HYDROCHLORIDE 25 MG/1
25 TABLET ORAL ONCE AS NEEDED
Status: DISCONTINUED | OUTPATIENT
Start: 2022-02-24 | End: 2022-02-24 | Stop reason: HOSPADM

## 2022-02-24 RX ORDER — HYDROMORPHONE HCL 110MG/55ML
PATIENT CONTROLLED ANALGESIA SYRINGE INTRAVENOUS AS NEEDED
Status: DISCONTINUED | OUTPATIENT
Start: 2022-02-24 | End: 2022-02-24 | Stop reason: SURG

## 2022-02-24 RX ORDER — ACETAMINOPHEN 325 MG/1
650 TABLET ORAL ONCE AS NEEDED
Status: DISCONTINUED | OUTPATIENT
Start: 2022-02-24 | End: 2022-02-24 | Stop reason: HOSPADM

## 2022-02-24 RX ORDER — BUPIVACAINE HCL/0.9 % NACL/PF 0.1 %
2 PLASTIC BAG, INJECTION (ML) EPIDURAL ONCE
Status: COMPLETED | OUTPATIENT
Start: 2022-02-24 | End: 2022-02-24

## 2022-02-24 RX ORDER — ROCURONIUM BROMIDE 10 MG/ML
INJECTION, SOLUTION INTRAVENOUS AS NEEDED
Status: DISCONTINUED | OUTPATIENT
Start: 2022-02-24 | End: 2022-02-24 | Stop reason: SURG

## 2022-02-24 RX ORDER — DIPHENHYDRAMINE HYDROCHLORIDE 50 MG/ML
12.5 INJECTION INTRAMUSCULAR; INTRAVENOUS
Status: DISCONTINUED | OUTPATIENT
Start: 2022-02-24 | End: 2022-02-24 | Stop reason: HOSPADM

## 2022-02-24 RX ORDER — ONDANSETRON 2 MG/ML
INJECTION INTRAMUSCULAR; INTRAVENOUS AS NEEDED
Status: DISCONTINUED | OUTPATIENT
Start: 2022-02-24 | End: 2022-02-24 | Stop reason: SURG

## 2022-02-24 RX ORDER — MIDAZOLAM HYDROCHLORIDE 1 MG/ML
INJECTION INTRAMUSCULAR; INTRAVENOUS AS NEEDED
Status: DISCONTINUED | OUTPATIENT
Start: 2022-02-24 | End: 2022-02-24 | Stop reason: SURG

## 2022-02-24 RX ADMIN — Medication 3 G: at 10:20

## 2022-02-24 RX ADMIN — EPHEDRINE SULFATE 10 MG: 50 INJECTION INTRAVENOUS at 10:34

## 2022-02-24 RX ADMIN — ROCURONIUM BROMIDE 20 MG: 10 INJECTION INTRAVENOUS at 11:15

## 2022-02-24 RX ADMIN — MIDAZOLAM HYDROCHLORIDE 2 MG: 1 INJECTION, SOLUTION INTRAMUSCULAR; INTRAVENOUS at 10:09

## 2022-02-24 RX ADMIN — FENTANYL CITRATE 50 MCG: 50 INJECTION INTRAMUSCULAR; INTRAVENOUS at 10:14

## 2022-02-24 RX ADMIN — SODIUM CHLORIDE, SODIUM GLUCONATE, SODIUM ACETATE, POTASSIUM CHLORIDE AND MAGNESIUM CHLORIDE 1000 ML: 526; 502; 368; 37; 30 INJECTION, SOLUTION INTRAVENOUS at 08:13

## 2022-02-24 RX ADMIN — PROPOFOL 150 MG: 10 INJECTION, EMULSION INTRAVENOUS at 10:14

## 2022-02-24 RX ADMIN — ONDANSETRON 4 MG: 2 INJECTION INTRAMUSCULAR; INTRAVENOUS at 11:55

## 2022-02-24 RX ADMIN — SUCCINYLCHOLINE CHLORIDE 140 MG: 20 INJECTION, SOLUTION INTRAMUSCULAR; INTRAVENOUS at 10:14

## 2022-02-24 RX ADMIN — HYDROMORPHONE HYDROCHLORIDE 0.5 MG: 1 INJECTION, SOLUTION INTRAMUSCULAR; INTRAVENOUS; SUBCUTANEOUS at 13:05

## 2022-02-24 RX ADMIN — FENTANYL CITRATE 50 MCG: 50 INJECTION INTRAMUSCULAR; INTRAVENOUS at 10:26

## 2022-02-24 RX ADMIN — NEOSTIGMINE METHYLSULFATE 3 MG: 0.5 INJECTION INTRAVENOUS at 12:03

## 2022-02-24 RX ADMIN — LIDOCAINE HYDROCHLORIDE 80 MG: 20 INJECTION, SOLUTION INFILTRATION; PERINEURAL at 10:14

## 2022-02-24 RX ADMIN — HYDROMORPHONE HYDROCHLORIDE 0.5 MG: 2 INJECTION, SOLUTION INTRAMUSCULAR; INTRAVENOUS; SUBCUTANEOUS at 10:45

## 2022-02-24 RX ADMIN — PHENYLEPHRINE HYDROCHLORIDE 100 MCG: 10 INJECTION, SOLUTION INTRAVENOUS at 11:31

## 2022-02-24 RX ADMIN — SODIUM CHLORIDE, SODIUM GLUCONATE, SODIUM ACETATE, POTASSIUM CHLORIDE AND MAGNESIUM CHLORIDE: 526; 502; 368; 37; 30 INJECTION, SOLUTION INTRAVENOUS at 11:30

## 2022-02-24 RX ADMIN — GLYCOPYRROLATE 0.4 MG: 0.2 INJECTION, SOLUTION INTRAMUSCULAR; INTRAVITREAL at 12:03

## 2022-02-24 RX ADMIN — ROCURONIUM BROMIDE 30 MG: 10 INJECTION INTRAVENOUS at 10:39

## 2022-02-24 RX ADMIN — HYDROCODONE BITARTRATE AND ACETAMINOPHEN 1 TABLET: 7.5; 325 TABLET ORAL at 14:18

## 2022-02-24 RX ADMIN — DEXAMETHASONE SODIUM PHOSPHATE 4 MG: 4 INJECTION, SOLUTION INTRAMUSCULAR; INTRAVENOUS at 10:30

## 2022-02-24 RX ADMIN — HYDROMORPHONE HYDROCHLORIDE 0.5 MG: 1 INJECTION, SOLUTION INTRAMUSCULAR; INTRAVENOUS; SUBCUTANEOUS at 12:45

## 2022-02-24 RX ADMIN — HYDROMORPHONE HYDROCHLORIDE 0.5 MG: 2 INJECTION, SOLUTION INTRAMUSCULAR; INTRAVENOUS; SUBCUTANEOUS at 11:05

## 2022-02-24 NOTE — ANESTHESIA POSTPROCEDURE EVALUATION
Patient: Letty Bach    Procedure Summary     Date: 02/24/22 Room / Location: [facility] OR 03 / [facility] OR    Anesthesia Start: 1010 Anesthesia Stop: 1223    Procedure: LEFT THYROIDECTOMY (Left Neck) Diagnosis:       Thyroid cancer (HCC)      (Thyroid cancer (HCC) [C73])    Surgeons: Benjamin Renteria MD Provider: Jj Bertrand MD    Anesthesia Type: general ASA Status: 3          Anesthesia Type: general    Vitals  Vitals Value Taken Time   /79 02/24/22 1215   Temp 97 °F (36.1 °C) 02/24/22 1215   Pulse 71 02/24/22 1215   Resp 20 02/24/22 1215   SpO2 94 % 02/24/22 1215           Post Anesthesia Care and Evaluation    Patient location during evaluation: PACU  Patient participation: complete - patient cannot participate  Level of consciousness: awake  Pain score: 0  Pain management: adequate  Airway patency: patent  Anesthetic complications: No anesthetic complications  PONV Status: none  Cardiovascular status: acceptable  Respiratory status: acceptable and room air  Hydration status: acceptable    Comments: Vital signs:    HR: 80  BP: 109/79  SpO2: 94  RR: 16  Temp: 97  No anesthesia care post op

## 2022-02-24 NOTE — ANESTHESIA PREPROCEDURE EVALUATION
Anesthesia Evaluation     Patient summary reviewed   no history of anesthetic complications:  NPO Solid Status: > 8 hours  NPO Liquid Status: > 2 hours           Airway   Mallampati: II  TM distance: <3 FB  Neck ROM: full  Possible difficult intubation and Anterior  Comment: Date/Time: 11/4/2021 8:52 AM  Airway not difficult     General Information and Staff    Patient location during procedure: OR     Indications and Patient Condition  Indications for airway management: airway protection    Preoxygenated: yes  MILS maintained throughout  Mask difficulty assessment: 2 - vent by mask + OA or adjuvant +/- NMBA     Final Airway Details  Final airway type: endotracheal airway        Successful airway: ETT  Cuffed: yes   Successful intubation technique: video laryngoscopy  Facilitating devices/methods: intubating stylet  Endotracheal tube insertion site: oral  Blade: Zamarripa  Blade size: 3  ETT size (mm): 7.5  Cormack-Lehane Classification: grade I - full view of glottis  Placement verified by: chest auscultation and capnometry   Measured from: lips  Number of attempts at approach: 2  Assessment: lips, teeth, and gum same as pre-op and atraumatic intubation     Additional Comments  Initial attempt by RODRIGUEZ Multani. Second successful attempt by YOLANDE Santillan.      Redundant tissue surrounding airway   Dental    (+) poor dentition    Pulmonary - normal exam    breath sounds clear to auscultation  (+) a smoker Former,   (-) asthma, sleep apnea, rhonchi, decreased breath sounds, wheezes  Cardiovascular - normal exam  Exercise tolerance: good (4-7 METS)    NYHA Classification: II  ECG reviewed  Rhythm: regular  Rate: normal    (+) dysrhythmias Bradycardia, hyperlipidemia,   (-) hypertension, valvular problems/murmurs, past MI, angina, murmur, cardiac stents, CABG, DVT    ROS comment: EKG 4/30/2021:  Sinus bradycardia  Otherwise normal ECG  No previous ECGs available    Neuro/Psych  (+) psychiatric history Anxiety and  Depression,    (-) seizures, TIA, CVA  GI/Hepatic/Renal/Endo    (+) morbid obesity, GERD well controlled,  liver disease fatty liver disease, thyroid problem thyroid cancer and thyroid nodules    Musculoskeletal     Abdominal   (+) obese,    Substance History      OB/GYN negative ob/gyn ROS   (-)  Pregnant        Other   arthritis,    history of cancer (Thyroid)                      Anesthesia Plan    ASA 3     general     intravenous induction     Anesthetic plan, all risks, benefits, and alternatives have been provided, discussed and informed consent has been obtained with: patient.

## 2022-02-24 NOTE — ANESTHESIA PROCEDURE NOTES
Airway  Urgency: elective    Date/Time: 2/24/2022 10:14 AM  Airway not difficult    General Information and Staff    Patient location during procedure: OR  Anesthesiologist: Jj Bertrand MD  CRNA: Fidel Bentley SRNA    Indications and Patient Condition  Indications for airway management: airway protection    Preoxygenated: yes  Mask difficulty assessment: 0 - not attempted    Final Airway Details  Final airway type: endotracheal airway      Successful airway: ETT  Cuffed: yes   Successful intubation technique: video laryngoscopy  Facilitating devices/methods: intubating stylet  Endotracheal tube insertion site: oral  Blade: Zamarripa  Blade size: 3  ETT size (mm): 7.0  Cormack-Lehane Classification: grade I - full view of glottis  Placement verified by: chest auscultation, capnometry and palpation of cuff   Measured from: teeth  ETT/EBT  to teeth (cm): 21  Number of attempts at approach: 1  Assessment: lips, teeth, and gum same as pre-op and atraumatic intubation

## 2022-02-28 ENCOUNTER — TELEPHONE (OUTPATIENT)
Dept: SURGERY | Facility: CLINIC | Age: 52
End: 2022-02-28

## 2022-02-28 NOTE — TELEPHONE ENCOUNTER
Spoke to Ms. Bach concerning her BP. Informed her that if her blood pressure is consistently this high she will need to go to ED for further evaluation. She voices understanding.          ----- Message from Kaelyn Smith CSA sent at 2/28/2022  9:59 AM CST -----  Regarding: Blood pressure   For you    ----- Message -----  From: Letty Montero Isreal  Sent: 2/28/2022   6:36 AM CST  To: [facility] Gen Surgery [facility] Clinical Pool  Subject: Blood pressure                                   My blood pressure has been running high since sat really tried 147/104  on Sunday been higher. Have appt on Thursday

## 2022-03-01 LAB
LAB AP CASE REPORT: NORMAL
PATH REPORT.FINAL DX SPEC: NORMAL

## 2022-03-03 ENCOUNTER — OFFICE VISIT (OUTPATIENT)
Dept: SURGERY | Facility: CLINIC | Age: 52
End: 2022-03-03

## 2022-03-03 ENCOUNTER — LAB (OUTPATIENT)
Dept: LAB | Facility: HOSPITAL | Age: 52
End: 2022-03-03

## 2022-03-03 VITALS
TEMPERATURE: 97.3 F | HEART RATE: 79 BPM | OXYGEN SATURATION: 97 % | SYSTOLIC BLOOD PRESSURE: 110 MMHG | HEIGHT: 65 IN | BODY MASS INDEX: 40.42 KG/M2 | DIASTOLIC BLOOD PRESSURE: 70 MMHG | WEIGHT: 242.6 LBS

## 2022-03-03 DIAGNOSIS — C73 THYROID CANCER: ICD-10-CM

## 2022-03-03 DIAGNOSIS — C73 THYROID CANCER: Primary | ICD-10-CM

## 2022-03-03 LAB
CALCIUM SPEC-SCNC: 9.6 MG/DL (ref 8.6–10.5)
T-UPTAKE NFR SERPL: 1.25 TBI (ref 0.8–1.3)
T4 SERPL-MCNC: 4.94 MCG/DL (ref 4.5–11.7)
TSH SERPL DL<=0.05 MIU/L-ACNC: 17.3 UIU/ML (ref 0.27–4.2)

## 2022-03-03 PROCEDURE — 82310 ASSAY OF CALCIUM: CPT

## 2022-03-03 PROCEDURE — 84479 ASSAY OF THYROID (T3 OR T4): CPT

## 2022-03-03 PROCEDURE — 84443 ASSAY THYROID STIM HORMONE: CPT

## 2022-03-03 PROCEDURE — 84436 ASSAY OF TOTAL THYROXINE: CPT

## 2022-03-03 PROCEDURE — 99024 POSTOP FOLLOW-UP VISIT: CPT | Performed by: NURSE PRACTITIONER

## 2022-03-03 PROCEDURE — 36415 COLL VENOUS BLD VENIPUNCTURE: CPT

## 2022-03-03 NOTE — PATIENT INSTRUCTIONS
"BMI for Adults  What is BMI?  Body mass index (BMI) is a number that is calculated from a person's weight and height. BMI can help estimate how much of a person's weight is composed of fat. BMI does not measure body fat directly. Rather, it is an alternative to procedures that directly measure body fat, which can be difficult and expensive.  BMI can help identify people who may be at higher risk for certain medical problems.  What are BMI measurements used for?  BMI is used as a screening tool to identify possible weight problems. It helps determine whether a person is obese, overweight, a healthy weight, or underweight.  BMI is useful for:  · Identifying a weight problem that may be related to a medical condition or may increase the risk for medical problems.  · Promoting changes, such as changes in diet and exercise, to help reach a healthy weight. BMI screening can be repeated to see if these changes are working.  How is BMI calculated?  BMI involves measuring your weight in relation to your height. Both height and weight are measured, and the BMI is calculated from those numbers. This can be done either in English (U[place] or metric measurements. Note that charts and online BMI calculators are available to help you find your BMI quickly and easily without having to do these calculations yourself.  To calculate your BMI in English (U.S[place] measurements:    1. Measure your weight in pounds (lb).  2. Multiply the number of pounds by 703.  ? For example, for a person who weighs 180 lb, multiply that number by 703, which equals 126,540.  3. Measure your height in inches. Then multiply that number by itself to get a measurement called \"inches squared.\"  ? For example, for a person who is 70 inches tall, the \"inches squared\" measurement is 70 inches x 70 inches, which equals 4,900 inches squared.  4. Divide the total from step 2 (number of lb x 703) by the total from step 3 (inches squared): 126,540 ÷ 4,900 = 25.8. This is " "your BMI.    To calculate your BMI in metric measurements:  1. Measure your weight in kilograms (kg).  2. Measure your height in meters (m). Then multiply that number by itself to get a measurement called \"meters squared.\"  ? For example, for a person who is 1.75 m tall, the \"meters squared\" measurement is 1.75 m x 1.75 m, which is equal to 3.1 meters squared.  3. Divide the number of kilograms (your weight) by the meters squared number. In this example: 70 ÷ 3.1 = 22.6. This is your BMI.  What do the results mean?  BMI charts are used to identify whether you are underweight, normal weight, overweight, or obese. The following guidelines will be used:  · Underweight: BMI less than 18.5.  · Normal weight: BMI between 18.5 and 24.9.  · Overweight: BMI between 25 and 29.9.  · Obese: BMI of 30 or above.  Keep these notes in mind:  · Weight includes both fat and muscle, so someone with a muscular build, such as an athlete, may have a BMI that is higher than 24.9. In cases like these, BMI is not an accurate measure of body fat.  · To determine if excess body fat is the cause of a BMI of 25 or higher, further assessments may need to be done by a health care provider.  · BMI is usually interpreted in the same way for men and women.  Where to find more information  For more information about BMI, including tools to quickly calculate your BMI, go to these websites:  · Centers for Disease Control and Prevention: www.cdc.gov  · American Heart Association: www.heart.org  · National Heart, Lung, and Blood [place]: www.nhlbi.nih.gov  Summary  · Body mass index (BMI) is a number that is calculated from a person's weight and height.  · BMI may help estimate how much of a person's weight is composed of fat. BMI can help identify those who may be at higher risk for certain medical problems.  · BMI can be measured using English measurements or metric measurements.  · BMI charts are used to identify whether you are underweight, normal " weight, overweight, or obese.  This information is not intended to replace advice given to you by your health care provider. Make sure you discuss any questions you have with your health care provider.  Document Revised: 09/09/2020 Document Reviewed: 07/17/2020  Elsevier Patient Education © 2021 [employer]

## 2022-03-03 NOTE — PROGRESS NOTES
CHIEF COMPLAINT:   Chief Complaint   Patient presents with   • Post-op     2/24 thyroidectomy        HPI: This patient presents for a post-operative visit after undergoing completion thyroidectomy by Dr. Renteria. Doing well- no cellulitis, wound separation, or significant pain. Denies any numbness or tingling of extremities.  Occasional dysphagia with solid foods but tolerating soft food without difficulty. Voice has returned to normal.      PATHOLOGY:       Physical Exam  Neck:           ASSESSMENT:    Diagnoses and all orders for this visit:    1. Thyroid cancer (HCC) (Primary)  -     Thyroid Panel With TSH; Future  -     Calcium; Future        PLAN:  1. The patient will follow-up in 2 weeks or sooner if concerns arise.   2. Follow up with endocrinology as scheduled.    3. Obtain labs as ordered and will notify of results when available.                  This document has been electronically signed by MEL Phillips on March 3, 2022 11:54 CST

## 2022-03-04 ENCOUNTER — LAB (OUTPATIENT)
Dept: LAB | Facility: HOSPITAL | Age: 52
End: 2022-03-04

## 2022-03-04 ENCOUNTER — OFFICE VISIT (OUTPATIENT)
Dept: ENDOCRINOLOGY | Facility: CLINIC | Age: 52
End: 2022-03-04

## 2022-03-04 ENCOUNTER — TELEPHONE (OUTPATIENT)
Dept: SURGERY | Facility: CLINIC | Age: 52
End: 2022-03-04

## 2022-03-04 VITALS
DIASTOLIC BLOOD PRESSURE: 70 MMHG | HEIGHT: 65 IN | OXYGEN SATURATION: 99 % | HEART RATE: 82 BPM | WEIGHT: 241 LBS | SYSTOLIC BLOOD PRESSURE: 110 MMHG | BODY MASS INDEX: 40.15 KG/M2

## 2022-03-04 DIAGNOSIS — C73 THYROID CANCER: ICD-10-CM

## 2022-03-04 DIAGNOSIS — E89.0 POSTSURGICAL HYPOTHYROIDISM: Primary | ICD-10-CM

## 2022-03-04 DIAGNOSIS — Z00.00 ANNUAL PHYSICAL EXAM: ICD-10-CM

## 2022-03-04 DIAGNOSIS — E78.00 ELEVATED LDL CHOLESTEROL LEVEL: ICD-10-CM

## 2022-03-04 DIAGNOSIS — E89.0 POSTSURGICAL HYPOTHYROIDISM: ICD-10-CM

## 2022-03-04 LAB
ALBUMIN SERPL-MCNC: 4 G/DL (ref 3.5–5.2)
ANION GAP SERPL CALCULATED.3IONS-SCNC: 9 MMOL/L (ref 5–15)
BUN SERPL-MCNC: 14 MG/DL (ref 6–20)
BUN/CREAT SERPL: 12.3 (ref 7–25)
CALCIUM SPEC-SCNC: 8.9 MG/DL (ref 8.6–10.5)
CHLORIDE SERPL-SCNC: 103 MMOL/L (ref 98–107)
CO2 SERPL-SCNC: 27 MMOL/L (ref 22–29)
CREAT SERPL-MCNC: 1.14 MG/DL (ref 0.57–1)
EGFRCR SERPLBLD CKD-EPI 2021: 58.4 ML/MIN/1.73
GLUCOSE SERPL-MCNC: 81 MG/DL (ref 65–99)
PHOSPHATE SERPL-MCNC: 2.9 MG/DL (ref 2.5–4.5)
POTASSIUM SERPL-SCNC: 4 MMOL/L (ref 3.5–5.2)
SODIUM SERPL-SCNC: 139 MMOL/L (ref 136–145)

## 2022-03-04 PROCEDURE — 84432 ASSAY OF THYROGLOBULIN: CPT | Performed by: NURSE PRACTITIONER

## 2022-03-04 PROCEDURE — 86800 THYROGLOBULIN ANTIBODY: CPT | Performed by: NURSE PRACTITIONER

## 2022-03-04 PROCEDURE — 80069 RENAL FUNCTION PANEL: CPT

## 2022-03-04 PROCEDURE — 80061 LIPID PANEL: CPT

## 2022-03-04 PROCEDURE — 99214 OFFICE O/P EST MOD 30 MIN: CPT | Performed by: NURSE PRACTITIONER

## 2022-03-04 PROCEDURE — 36415 COLL VENOUS BLD VENIPUNCTURE: CPT | Performed by: NURSE PRACTITIONER

## 2022-03-04 RX ORDER — LEVOTHYROXINE SODIUM 0.07 MG/1
75 TABLET ORAL DAILY
Qty: 30 TABLET | Refills: 11 | Status: SHIPPED | OUTPATIENT
Start: 2022-03-04 | End: 2022-03-04

## 2022-03-04 RX ORDER — LEVOTHYROXINE SODIUM 0.1 MG/1
100 TABLET ORAL DAILY
Qty: 30 TABLET | Refills: 11 | Status: SHIPPED | OUTPATIENT
Start: 2022-03-04 | End: 2022-05-23

## 2022-03-04 NOTE — PROGRESS NOTES
"Chief Complaint  Thyroid Problem    Subjective          Letty Bach presents to [facility] ENDOCRINOLOGY  History of Present Illness         In office visit        52-year-old female presents for follow-up    Reason total thyroidectomy now postoperative hypothyroidism    Patient's first surgery was in November 2021 right thyroid lobe was removed pathology revealed cancer with invasion she had second surgery February 24, 2022 to remove the left lobe      She is currently not on thyroid hormone    She is taking tablets    Complaints of fatigue today                              Objective   Vital Signs:   /70   Pulse 82   Ht 165.1 cm (65\")   Wt 109 kg (241 lb)   SpO2 99%   BMI 40.10 kg/m²     Physical Exam  Neck:      Comments: Low anterior cervical scar   Neurological:      Mental Status: She is alert.        Result Review :   The following data was reviewed by: MEL Brown on 03/04/2022:  Common labs    Common Labsle 9/9/21 9/9/21 12/29/21 3/3/22    0947 0947     Glucose   83    BUN   11    Creatinine   0.81    eGFR Non African Am   75    Sodium   143    Potassium   4.4    Chloride   104    Calcium   9.2 9.6   Albumin 4.30  4.00    Total Bilirubin 0.4  0.4    Alkaline Phosphatase 119 (A)  113    AST (SGOT) 21  17    ALT (SGPT) 20  18    Triglycerides  89     (A) Abnormal value                                     Assessment and Plan    Diagnoses and all orders for this visit:    1. Postsurgical hypothyroidism (Primary)  -     TSH; Future  -     Thyroglobulin With Anti-TG; Future  -     Thyroglobulin Antibody and Thyroglobulin, KATHRYN or LC/MS-MS; Future  -     Thyrogloblin by LCMS; Future  -     Renal Function Panel; Future    2. Thyroid cancer (HCC)  -     TSH; Future  -     Thyroglobulin With Anti-TG; Future  -     Thyroglobulin Antibody and Thyroglobulin, KATHRYN or LC/MS-MS; Future  -     Thyrogloblin by LCMS; Future  -     Renal Function Panel; Future  -     " Thyroglobulin With Anti-TG  -     Thyrogloblin by LCMS  -     Thyroglobulin Antibody and Thyroglobulin, KATHRYN or LC/MS-MS    Other orders  -     Discontinue: levothyroxine (Synthroid) 75 MCG tablet; Take 1 tablet by mouth Daily.  Dispense: 30 tablet; Refill: 11  -     levothyroxine (Synthroid) 100 MCG tablet; Take 1 tablet by mouth Daily.  Dispense: 30 tablet; Refill: 11               Total thyroidectomy   Thyroid cancer   Postoperative hypothyroidism       Cytology above     Considered low risk based on FLORENTIN guidelines  Obtain Tg and TG ab today and I will call with results     Target tsh 0.5 to 2             Lab Results   Component Value Date    TSH 17.300 (H) 03/03/2022     Started levothyroxine 100 mcg daily       Medication must me taken on an empty stomach at least one hour before food, drink and other medications. Only take with water. If taking vitamins or antiacids needs to be 4 hours before or after.     calcium is normal     She can continue tums     Lab Results   Component Value Date    CALCIUM 9.6 03/03/2022             Follow Up   Return in about 4 weeks (around 4/1/2022) for Recheck.  Patient was given instructions and counseling regarding her condition or for health maintenance advice. Please see specific information pulled into the AVS if appropriate.         This document has been electronically signed by MEL Brown on March 4, 2022 13:48 CST.

## 2022-03-04 NOTE — TELEPHONE ENCOUNTER
Notified patient of thyroid lab results via telephone. She recently saw Rajendra Baires, with endocrinology this morning and she has initiated her on Synthroid. Patient denies any questions or concerns.

## 2022-03-07 ENCOUNTER — OFFICE VISIT (OUTPATIENT)
Dept: FAMILY MEDICINE CLINIC | Facility: CLINIC | Age: 52
End: 2022-03-07

## 2022-03-07 VITALS
OXYGEN SATURATION: 99 % | DIASTOLIC BLOOD PRESSURE: 90 MMHG | RESPIRATION RATE: 20 BRPM | TEMPERATURE: 97.3 F | WEIGHT: 240.2 LBS | SYSTOLIC BLOOD PRESSURE: 120 MMHG | HEART RATE: 67 BPM | BODY MASS INDEX: 40.02 KG/M2 | HEIGHT: 65 IN

## 2022-03-07 DIAGNOSIS — F41.9 ANXIETY: ICD-10-CM

## 2022-03-07 DIAGNOSIS — I10 PRIMARY HYPERTENSION: ICD-10-CM

## 2022-03-07 DIAGNOSIS — E78.00 ELEVATED LDL CHOLESTEROL LEVEL: ICD-10-CM

## 2022-03-07 DIAGNOSIS — E89.0 POSTSURGICAL HYPOTHYROIDISM: ICD-10-CM

## 2022-03-07 DIAGNOSIS — Z00.00 ANNUAL PHYSICAL EXAM: Primary | ICD-10-CM

## 2022-03-07 DIAGNOSIS — C73 THYROID CANCER: ICD-10-CM

## 2022-03-07 LAB
CHOLEST SERPL-MCNC: 217 MG/DL (ref 0–200)
HDLC SERPL-MCNC: 55 MG/DL (ref 40–60)
LDLC SERPL CALC-MCNC: 140 MG/DL (ref 0–100)
LDLC/HDLC SERPL: 2.5 {RATIO}
THYROGLOB AB SERPL-ACNC: <1 IU/ML (ref 0–0.9)
THYROGLOB AB SERPL-ACNC: <1 IU/ML (ref 0–0.9)
THYROGLOB SERPL-MCNC: 13.7 NG/ML (ref 1.5–38.5)
THYROGLOB SERPL-MCNC: 13.7 NG/ML (ref 1.5–38.5)
TRIGL SERPL-MCNC: 123 MG/DL (ref 0–150)
VLDLC SERPL-MCNC: 22 MG/DL (ref 5–40)

## 2022-03-07 PROCEDURE — 99214 OFFICE O/P EST MOD 30 MIN: CPT | Performed by: NURSE PRACTITIONER

## 2022-03-07 NOTE — PROGRESS NOTES
Subjective   Letty Bach is a 52 y.o. female.     CC: Annual physical-elevated LDL, thyroid cancer, postsurgical hypothyroidism, anxiety    Hyperlipidemia  This is a chronic problem. The current episode started more than 1 year ago. Recent lipid tests were reviewed and are variable. Exacerbating diseases include hypothyroidism and obesity. Factors aggravating her hyperlipidemia include fatty foods. Pertinent negatives include no chest pain, focal sensory loss, focal weakness, leg pain, myalgias or shortness of breath. Current antihyperlipidemic treatment includes diet change and exercise. The current treatment provides moderate improvement of lipids. Compliance problems include adherence to exercise and adherence to diet.  Risk factors for coronary artery disease include family history, dyslipidemia, obesity, a sedentary lifestyle and stress.   Anxiety  Presents for follow-up visit. Patient reports no chest pain, dizziness, nausea, nervous/anxious behavior, palpitations, panic, restlessness or shortness of breath. Symptoms occur occasionally. The severity of symptoms is mild. The quality of sleep is good. Nighttime awakenings: occasional.     Compliance with medications is %.   Hypothyroidism  This is a new problem. The current episode started more than 1 month ago. The problem occurs constantly. The problem has been gradually improving. Associated symptoms include fatigue. Pertinent negatives include no abdominal pain, arthralgias, chest pain, chills, congestion, coughing, diaphoresis, fever, myalgias, nausea, rash, sore throat, vomiting or weakness. Nothing aggravates the symptoms. Treatments tried: levothyroxine. The treatment provided significant relief.        The following portions of the patient's history were reviewed and updated as appropriate: allergies, current medications, past family history, past medical history, past social history, past surgical history and problem list.    Review of  Systems   Constitutional: Positive for fatigue. Negative for activity change, appetite change, chills, diaphoresis, fever, unexpected weight gain and unexpected weight loss.   HENT: Negative for congestion, sore throat, trouble swallowing and voice change.    Eyes: Negative for blurred vision, double vision, photophobia, pain and visual disturbance.   Respiratory: Negative for cough, chest tightness, shortness of breath and wheezing.    Cardiovascular: Negative for chest pain, palpitations and leg swelling.   Gastrointestinal: Negative for abdominal distention, abdominal pain, anal bleeding, blood in stool, constipation, diarrhea, nausea, vomiting, GERD and indigestion.   Endocrine: Negative for cold intolerance, heat intolerance, polydipsia, polyphagia and polyuria.   Genitourinary: Negative for dysuria, frequency, hematuria and urgency.   Musculoskeletal: Negative for arthralgias and myalgias.   Skin: Negative for rash.   Allergic/Immunologic: Negative.    Neurological: Negative for dizziness, focal weakness, syncope, weakness, light-headedness and headache.   Hematological: Negative.    Psychiatric/Behavioral: The patient is not nervous/anxious.        Objective   Physical Exam  Vitals and nursing note reviewed.   Constitutional:       General: She is not in acute distress.     Appearance: Normal appearance. She is well-developed. She is obese. She is not ill-appearing, toxic-appearing or diaphoretic.   HENT:      Head: Normocephalic and atraumatic.   Eyes:      Conjunctiva/sclera: Conjunctivae normal.   Neck:     Cardiovascular:      Rate and Rhythm: Normal rate and regular rhythm.      Heart sounds: Normal heart sounds.   Pulmonary:      Effort: Pulmonary effort is normal. No respiratory distress.      Breath sounds: Normal breath sounds. No stridor. No wheezing, rhonchi or rales.   Musculoskeletal:         General: No tenderness. Normal range of motion.      Cervical back: Normal range of motion.   Skin:      General: Skin is warm and dry.      Coloration: Skin is not pale.      Findings: No erythema or rash.   Neurological:      Mental Status: She is alert and oriented to person, place, and time.   Psychiatric:         Mood and Affect: Mood normal.         Behavior: Behavior normal.         Thought Content: Thought content normal.         Judgment: Judgment normal.           Assessment/Plan   Diagnoses and all orders for this visit:    1. Annual physical exam (Primary)  -     CBC & Differential; Future  -     Comprehensive Metabolic Panel; Future  -     Hemoglobin A1c; Future  -     Lipid Panel; Future, will call with results.    2. Elevated LDL cholesterol level  -     Lipid Panel; Future, will call with results.  Continue low-fat diet.    3. Postsurgical hypothyroidism   -Patient recently started levothyroxine.  Reports slow decrease in fatigue symptoms.  Continue follow-up with endocrinology as scheduled.  Continue levothyroxine as prescribed.  We will continue to monitor.    4. Thyroid cancer (HCC)   -Continue follow-up with general surgery and endocrinology as scheduled.    5. Anxiety   -Controlled with no suicidal homicidal ideations.  Continue Cymbalta as prescribed.  We will continue to monitor.    6. Primary hypertension   -Intermittent episodes of hypertension at home.  Currently well controlled today.  Has been well controlled at recent visits with other providers.  Instructed patient to continue to monitor.  Will reevaluate in 1 month.    7.  Follow-up in 1 month or sooner for any acute needs.            This document has been electronically signed by MEL Houser on March 7, 2022 12:58 CST

## 2022-03-08 DIAGNOSIS — E78.00 ELEVATED LDL CHOLESTEROL LEVEL: Primary | ICD-10-CM

## 2022-03-08 RX ORDER — PRAVASTATIN SODIUM 10 MG
10 TABLET ORAL NIGHTLY
Qty: 30 TABLET | Refills: 5 | Status: SHIPPED | OUTPATIENT
Start: 2022-03-08 | End: 2022-08-25

## 2022-03-08 NOTE — PROGRESS NOTES
LDL continues to elevate.  I Allyson start her on pravastatin 10 mg nightly.  Low-fat diet.  Follow-up as scheduled.

## 2022-03-10 LAB — THYROGLOB SERPL-MCNC: 12.6 NG/ML (ref 1.5–38.5)

## 2022-03-17 ENCOUNTER — OFFICE VISIT (OUTPATIENT)
Dept: ORTHOPEDIC SURGERY | Facility: CLINIC | Age: 52
End: 2022-03-17

## 2022-03-17 VITALS — WEIGHT: 247 LBS | BODY MASS INDEX: 41.15 KG/M2 | HEIGHT: 65 IN

## 2022-03-17 DIAGNOSIS — G89.29 CHRONIC PAIN OF LEFT KNEE: ICD-10-CM

## 2022-03-17 DIAGNOSIS — E66.01 MORBID OBESITY WITH BMI OF 40.0-44.9, ADULT: ICD-10-CM

## 2022-03-17 DIAGNOSIS — M25.562 CHRONIC PAIN OF LEFT KNEE: ICD-10-CM

## 2022-03-17 DIAGNOSIS — M23.92 INTERNAL DERANGEMENT OF LEFT KNEE: Primary | ICD-10-CM

## 2022-03-17 PROCEDURE — 20610 DRAIN/INJ JOINT/BURSA W/O US: CPT | Performed by: ORTHOPAEDIC SURGERY

## 2022-03-17 RX ADMIN — TRIAMCINOLONE ACETONIDE 40 MG: 40 INJECTION, SUSPENSION INTRA-ARTICULAR; INTRAMUSCULAR at 09:39

## 2022-03-17 RX ADMIN — LIDOCAINE HYDROCHLORIDE 2 ML: 10 INJECTION, SOLUTION INFILTRATION; PERINEURAL at 09:39

## 2022-03-17 NOTE — PROGRESS NOTES
"Letty Bach is a 52 y.o. female returns for     Chief Complaint   Patient presents with   • Left Knee - Follow-up       HISTORY OF PRESENT ILLNESS: Patient being seen for left knee follow up. Injection given 12/16/2021.   No new complaints         CONCURRENT MEDICAL HISTORY:    The following portions of the patient's history were reviewed and updated as appropriate: allergies, current medications, past family history, past medical history, past social history, past surgical history and problem list.     ROS  No fevers or chills.  No chest pain or shortness of air.  No GI or  disturbances.    PHYSICAL EXAMINATION:       Ht 165.1 cm (65\")   Wt 112 kg (247 lb)   BMI 41.10 kg/m²     Physical Exam  Constitutional:       General: She is not in acute distress.     Appearance: Normal appearance.   Pulmonary:      Effort: Pulmonary effort is normal. No respiratory distress.   Neurological:      Mental Status: She is alert and oriented to person, place, and time.               Procedure:  XR KNEE 1 OR 2 VW LEFT  Exam Date:  9/14/21     COMPARISON:  Not applicable, no relevant images available.     IMPRESSION: AP bilateral standing of the knees with lateral of the left knee show acceptable position alignment of both knees with no evidence of acute bony abnormality.  No fracture or dislocation is noted.  Mild varus positioning is noted in both knees.  No osteophytes present.  No significant arthritic change noted in the patellofemoral compartment.        Jose Alberto Amos MD  9/14/21       Procedure:  XR KNEE BILATERAL AP STANDING  Exam Date:  9/14/21     COMPARISON:  Not applicable, no relevant images available.     IMPRESSION: AP bilateral standing of the knees with lateral of the left knee show acceptable position alignment of both knees with no evidence of acute bony abnormality.  No fracture or dislocation is noted.  Mild varus positioning is noted in both knees.  No osteophytes present.  No significant " arthritic change noted in the patellofemoral compartment.        Jose Alberto Amos MD  9/14/21        ASSESSMENT:    Diagnoses and all orders for this visit:    Internal derangement of left knee  -     Large Joint Arthrocentesis: L knee    Chronic pain of left knee  -     Large Joint Arthrocentesis: L knee    Morbid obesity with BMI of 40.0-44.9, adult (HCC)          PLAN    Repeat steroid injection into left knee.  Discussed repeat xrays left knee when she returns and possible repeat MRI depending on symptoms and treatment goals.    Large Joint Arthrocentesis: L knee  Date/Time: 3/17/2022 9:39 AM  Consent given by: patient  Site marked: site marked  Timeout: Immediately prior to procedure a time out was called to verify the correct patient, procedure, equipment, support staff and site/side marked as required   Supporting Documentation  Indications: pain   Procedure Details  Location: knee - L knee  Preparation: Patient was prepped and draped in the usual sterile fashion  Needle size: 22 G  Approach: anteromedial  Medications administered: 40 mg triamcinolone acetonide 40 MG/ML; 2 mL lidocaine 1 %  Patient tolerance: patient tolerated the procedure well with no immediate complications            Return if symptoms worsen or fail to improve, for recheck.    Jose Alberto Amos MD

## 2022-03-18 ENCOUNTER — OFFICE VISIT (OUTPATIENT)
Dept: SURGERY | Facility: CLINIC | Age: 52
End: 2022-03-18

## 2022-03-18 VITALS
SYSTOLIC BLOOD PRESSURE: 130 MMHG | BODY MASS INDEX: 40.98 KG/M2 | TEMPERATURE: 97.8 F | WEIGHT: 246 LBS | OXYGEN SATURATION: 97 % | DIASTOLIC BLOOD PRESSURE: 80 MMHG | HEIGHT: 65 IN | HEART RATE: 95 BPM

## 2022-03-18 DIAGNOSIS — C73 THYROID CANCER: Primary | ICD-10-CM

## 2022-03-18 PROCEDURE — 99024 POSTOP FOLLOW-UP VISIT: CPT | Performed by: NURSE PRACTITIONER

## 2022-03-18 NOTE — PROGRESS NOTES
CHIEF COMPLAINT:   Chief Complaint   Patient presents with   • Post-op     Thyroidectomy 2/24       HPI: This patient presents for a post-operative visit after undergoing completion thyroidectomy by Dr. Renteria. Doing well- no cellulitis, wound separation, or significant pain. Denies any numbness or tingling of extremities.  Dysphagia has resolved. Voice has returned to normal. She has been evaluated by endocrinology and initiated on Synthroid.      PATHOLOGY:     Discussed pathology with Dr. Renteria/Dr. Giron and no further adjuvant treatment recommended.      Physical Exam  Vitals reviewed.   Constitutional:       Appearance: Normal appearance. She is obese.   Neck:     Neurological:      Mental Status: She is alert.   Psychiatric:         Mood and Affect: Mood normal.         Behavior: Behavior normal.         Thought Content: Thought content normal.         Judgment: Judgment normal.         ASSESSMENT:    Diagnoses and all orders for this visit:    1. Thyroid cancer (HCC) (Primary)        PLAN:  1. The patient will follow-up as needed  2. May return to normal activity without restrictions.  3. Continue follow up with endocrinology as scheduled.                  This document has been electronically signed by MEL Phillips on March 18, 2022 10:14 CDT

## 2022-03-19 RX ORDER — TRIAMCINOLONE ACETONIDE 40 MG/ML
40 INJECTION, SUSPENSION INTRA-ARTICULAR; INTRAMUSCULAR
Status: COMPLETED | OUTPATIENT
Start: 2022-03-17 | End: 2022-03-17

## 2022-03-19 RX ORDER — LIDOCAINE HYDROCHLORIDE 10 MG/ML
2 INJECTION, SOLUTION INFILTRATION; PERINEURAL
Status: COMPLETED | OUTPATIENT
Start: 2022-03-17 | End: 2022-03-17

## 2022-03-31 ENCOUNTER — TELEPHONE (OUTPATIENT)
Dept: FAMILY MEDICINE CLINIC | Facility: CLINIC | Age: 52
End: 2022-03-31

## 2022-03-31 ENCOUNTER — OFFICE VISIT (OUTPATIENT)
Dept: GASTROENTEROLOGY | Facility: CLINIC | Age: 52
End: 2022-03-31

## 2022-03-31 ENCOUNTER — LAB (OUTPATIENT)
Dept: LAB | Facility: HOSPITAL | Age: 52
End: 2022-03-31

## 2022-03-31 VITALS
BODY MASS INDEX: 41.15 KG/M2 | SYSTOLIC BLOOD PRESSURE: 131 MMHG | WEIGHT: 247 LBS | HEIGHT: 65 IN | HEART RATE: 63 BPM | DIASTOLIC BLOOD PRESSURE: 77 MMHG

## 2022-03-31 DIAGNOSIS — K21.00 GASTROESOPHAGEAL REFLUX DISEASE WITH ESOPHAGITIS WITHOUT HEMORRHAGE: ICD-10-CM

## 2022-03-31 DIAGNOSIS — R10.12 LEFT UPPER QUADRANT ABDOMINAL PAIN: ICD-10-CM

## 2022-03-31 DIAGNOSIS — K76.0 FATTY LIVER: ICD-10-CM

## 2022-03-31 DIAGNOSIS — Z00.00 ANNUAL PHYSICAL EXAM: ICD-10-CM

## 2022-03-31 DIAGNOSIS — E89.0 POSTSURGICAL HYPOTHYROIDISM: ICD-10-CM

## 2022-03-31 DIAGNOSIS — R79.89 ELEVATED LFTS: Primary | ICD-10-CM

## 2022-03-31 DIAGNOSIS — C73 THYROID CANCER: ICD-10-CM

## 2022-03-31 LAB
ALBUMIN SERPL-MCNC: 4.1 G/DL (ref 3.5–5.2)
ALBUMIN/GLOB SERPL: 1.5 G/DL
ALP SERPL-CCNC: 109 U/L (ref 39–117)
ALT SERPL W P-5'-P-CCNC: 17 U/L (ref 1–33)
ANION GAP SERPL CALCULATED.3IONS-SCNC: 6.7 MMOL/L (ref 5–15)
AST SERPL-CCNC: 17 U/L (ref 1–32)
BASOPHILS # BLD AUTO: 0.03 10*3/MM3 (ref 0–0.2)
BASOPHILS NFR BLD AUTO: 0.5 % (ref 0–1.5)
BILIRUB SERPL-MCNC: 0.3 MG/DL (ref 0–1.2)
BUN SERPL-MCNC: 15 MG/DL (ref 6–20)
BUN/CREAT SERPL: 16.9 (ref 7–25)
CALCIUM SPEC-SCNC: 8.9 MG/DL (ref 8.6–10.5)
CHLORIDE SERPL-SCNC: 105 MMOL/L (ref 98–107)
CO2 SERPL-SCNC: 27.3 MMOL/L (ref 22–29)
CREAT SERPL-MCNC: 0.89 MG/DL (ref 0.57–1)
DEPRECATED RDW RBC AUTO: 42.3 FL (ref 37–54)
EGFRCR SERPLBLD CKD-EPI 2021: 78.6 ML/MIN/1.73
EOSINOPHIL # BLD AUTO: 0.08 10*3/MM3 (ref 0–0.4)
EOSINOPHIL NFR BLD AUTO: 1.4 % (ref 0.3–6.2)
ERYTHROCYTE [DISTWIDTH] IN BLOOD BY AUTOMATED COUNT: 12.3 % (ref 12.3–15.4)
GLOBULIN UR ELPH-MCNC: 2.7 GM/DL
GLUCOSE SERPL-MCNC: 89 MG/DL (ref 65–99)
HBA1C MFR BLD: 5.2 % (ref 4.8–5.6)
HCT VFR BLD AUTO: 39.9 % (ref 34–46.6)
HGB BLD-MCNC: 13.1 G/DL (ref 12–15.9)
IMM GRANULOCYTES # BLD AUTO: 0.02 10*3/MM3 (ref 0–0.05)
IMM GRANULOCYTES NFR BLD AUTO: 0.3 % (ref 0–0.5)
LYMPHOCYTES # BLD AUTO: 1.66 10*3/MM3 (ref 0.7–3.1)
LYMPHOCYTES NFR BLD AUTO: 28 % (ref 19.6–45.3)
MCH RBC QN AUTO: 31 PG (ref 26.6–33)
MCHC RBC AUTO-ENTMCNC: 32.8 G/DL (ref 31.5–35.7)
MCV RBC AUTO: 94.3 FL (ref 79–97)
MONOCYTES # BLD AUTO: 0.35 10*3/MM3 (ref 0.1–0.9)
MONOCYTES NFR BLD AUTO: 5.9 % (ref 5–12)
NEUTROPHILS NFR BLD AUTO: 3.78 10*3/MM3 (ref 1.7–7)
NEUTROPHILS NFR BLD AUTO: 63.9 % (ref 42.7–76)
NRBC BLD AUTO-RTO: 0 /100 WBC (ref 0–0.2)
PLATELET # BLD AUTO: 281 10*3/MM3 (ref 140–450)
PMV BLD AUTO: 10.5 FL (ref 6–12)
POTASSIUM SERPL-SCNC: 4.2 MMOL/L (ref 3.5–5.2)
PROT SERPL-MCNC: 6.8 G/DL (ref 6–8.5)
RBC # BLD AUTO: 4.23 10*6/MM3 (ref 3.77–5.28)
SODIUM SERPL-SCNC: 139 MMOL/L (ref 136–145)
TSH SERPL DL<=0.05 MIU/L-ACNC: 4.23 UIU/ML (ref 0.27–4.2)
WBC NRBC COR # BLD: 5.92 10*3/MM3 (ref 3.4–10.8)

## 2022-03-31 PROCEDURE — 84443 ASSAY THYROID STIM HORMONE: CPT

## 2022-03-31 PROCEDURE — 85025 COMPLETE CBC W/AUTO DIFF WBC: CPT

## 2022-03-31 PROCEDURE — 86800 THYROGLOBULIN ANTIBODY: CPT

## 2022-03-31 PROCEDURE — 84432 ASSAY OF THYROGLOBULIN: CPT

## 2022-03-31 PROCEDURE — 83036 HEMOGLOBIN GLYCOSYLATED A1C: CPT

## 2022-03-31 PROCEDURE — 80053 COMPREHEN METABOLIC PANEL: CPT

## 2022-03-31 PROCEDURE — 99214 OFFICE O/P EST MOD 30 MIN: CPT | Performed by: PHYSICIAN ASSISTANT

## 2022-03-31 PROCEDURE — 36415 COLL VENOUS BLD VENIPUNCTURE: CPT

## 2022-03-31 RX ORDER — FAMOTIDINE 40 MG/1
40 TABLET, FILM COATED ORAL 2 TIMES DAILY PRN
Qty: 60 TABLET | Refills: 1 | Status: SHIPPED | OUTPATIENT
Start: 2022-03-31 | End: 2022-08-19 | Stop reason: SDUPTHER

## 2022-03-31 RX ORDER — CHLORDIAZEPOXIDE HYDROCHLORIDE AND CLIDINIUM BROMIDE 5; 2.5 MG/1; MG/1
1 CAPSULE ORAL
Qty: 90 CAPSULE | Refills: 2 | Status: SHIPPED | OUTPATIENT
Start: 2022-03-31 | End: 2022-07-10

## 2022-04-01 ENCOUNTER — TELEMEDICINE (OUTPATIENT)
Dept: ENDOCRINOLOGY | Facility: CLINIC | Age: 52
End: 2022-04-01

## 2022-04-01 DIAGNOSIS — C73 THYROID CANCER: Primary | ICD-10-CM

## 2022-04-01 DIAGNOSIS — E89.0 POSTSURGICAL HYPOTHYROIDISM: ICD-10-CM

## 2022-04-01 LAB
THYROGLOB AB SERPL-ACNC: <1 IU/ML (ref 0–0.9)
THYROGLOB AB SERPL-ACNC: <1 IU/ML (ref 0–0.9)
THYROGLOB SERPL-MCNC: 5.4 NG/ML (ref 1.5–38.5)
THYROGLOB SERPL-MCNC: 5.7 NG/ML (ref 1.5–38.5)

## 2022-04-01 PROCEDURE — 99214 OFFICE O/P EST MOD 30 MIN: CPT | Performed by: NURSE PRACTITIONER

## 2022-04-01 NOTE — PROGRESS NOTES
Chief Complaint  Thyroid Problem    Subjective          Letty Bach presents to [facility] ENDOCRINOLOGY  History of Present Illness       You have chosen to receive care through a telehealth visit.  Do you consent to use a video/audio connection for your medical care today? 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    Physical Exam  Neurological:      General: No focal deficit present.      Mental Status: She is alert.   Psychiatric:         Mood and Affect: Mood normal.         Thought Content: Thought content normal.         Judgment: Judgment normal.        Result Review :   The following data was reviewed by: MEL Brown on 04/01/2022:  Common labs    Common Labsle 3/3/22 3/4/22 3/4/22 3/31/22 3/31/22 3/31/22     1605 1605 0921 0921 0921   Glucose  81  89     BUN  14  15     Creatinine  1.14 (A)  0.89     Sodium  139  139     Potassium  4.0  4.2     Chloride  103  105     Calcium 9.6 8.9  8.9     Albumin  4.00  4.10     Total Bilirubin    0.3     Alkaline Phosphatase    109     AST (SGOT)    17     ALT (SGPT)    17     WBC     5.92    Hemoglobin     13.1    Hematocrit     39.9    Platelets     281    Total Cholesterol   217 (A)      Triglycerides   123      HDL Cholesterol   55      LDL Cholesterol    140 (A)      Hemoglobin A1C       5.20   (A) Abnormal value                                                Assessment []Collapse by Default          Assessment and Plan    Diagnoses and all orders for this visit:    1. Thyroid cancer (HCC) (Primary)  -     TSH; Future    2. Postsurgical hypothyroidism  -     TSH; Future           Total thyroidectomy   Thyroid cancer   Postoperative hypothyroidism         Cytology above      Considered low risk based on [facility] guidelines       Tg and TG ab today and I will call with results      Target tsh 0.5 to 2         Lab Results   Component Value Date    TSH 4.230 (H) 03/31/2022          levothyroxine 100 mcg daily ---increase to 112 mcg daily     Repeat in one month         Medication must me taken on an empty stomach at least one hour before food, drink and other medications. Only take with water. If taking vitamins or antiacids needs to be 4 hours before or after.      Baseline with TSH of 17     Component      Latest Ref Rng & Units 3/4/2022 3/4/2022           2:15 PM  2:15 PM   Thyroglobulin Ab      0.0 - 0.9 IU/mL <1.0 <1.0   Thyroglobulin      1.5 - 38.5 ng/mL 12.6    THYROGLOBULIN BY KATHRYN      1.5 - 38.5 ng/mL 13.7 13.7               calcium is normal      She can continue tums      Lab Results   Component Value Date    CALCIUM 8.9 03/31/2022    PHOS 2.9 03/04/2022        impaired fasting    Lab Results   Component Value Date    HGBA1C 5.20 03/31/2022                   Follow Up   No follow-ups on file.  Patient was given instructions and counseling regarding her condition or for health maintenance advice. Please see specific information pulled into the AVS if appropriate.         This document has been electronically signed by MEL Brown on April 1, 2022 15:45 CDT.

## 2022-04-04 ENCOUNTER — OFFICE VISIT (OUTPATIENT)
Dept: SURGERY | Facility: CLINIC | Age: 52
End: 2022-04-04

## 2022-04-04 VITALS
TEMPERATURE: 97.6 F | BODY MASS INDEX: 41.19 KG/M2 | HEIGHT: 65 IN | WEIGHT: 247.2 LBS | SYSTOLIC BLOOD PRESSURE: 124 MMHG | OXYGEN SATURATION: 98 % | DIASTOLIC BLOOD PRESSURE: 88 MMHG | HEART RATE: 84 BPM

## 2022-04-04 DIAGNOSIS — C73 THYROID CANCER: Primary | ICD-10-CM

## 2022-04-04 PROCEDURE — 99024 POSTOP FOLLOW-UP VISIT: CPT | Performed by: NURSE PRACTITIONER

## 2022-04-04 NOTE — PROGRESS NOTES
CHIEF COMPLAINT:   Chief Complaint   Patient presents with   • Post-op     Suture protruding from incision       HPI: This patient presents for a post-operative visit after undergoing completion thyroidectomy by Dr. Renteria. Doing well- no cellulitis, wound separation, or significant pain. Denies any numbness or tingling of extremities.  States she noticed 3 days ago a surfacing suture again at end of her incision site. She has been evaluated by endocrinology, which is currently dosing/monitoring Levothyroxine.        PATHOLOGY:           Physical Exam  Vitals reviewed.   Constitutional:       General: She is not in acute distress.     Appearance: Normal appearance. She is obese. She is not ill-appearing, toxic-appearing or diaphoretic.   HENT:      Head: Normocephalic.   Neck:     Neurological:      Mental Status: She is alert.   Psychiatric:         Mood and Affect: Mood normal.         Behavior: Behavior normal.         Thought Content: Thought content normal.         Judgment: Judgment normal.         ASSESSMENT:  Diagnoses and all orders for this visit:    1. Thyroid cancer (HCC) (Primary)        PLAN:    1. The patient will follow-up as needed  2. Continue follow up with endocrinology as scheduled.                      This document has been electronically signed by MEL Phillips on April 4, 2022 14:37 CDT

## 2022-04-06 LAB — THYROGLOB SERPL-MCNC: 4.6 NG/ML (ref 1.5–38.5)

## 2022-04-11 ENCOUNTER — OFFICE VISIT (OUTPATIENT)
Dept: FAMILY MEDICINE CLINIC | Facility: CLINIC | Age: 52
End: 2022-04-11

## 2022-04-11 VITALS
BODY MASS INDEX: 40.94 KG/M2 | HEART RATE: 79 BPM | SYSTOLIC BLOOD PRESSURE: 118 MMHG | TEMPERATURE: 97.3 F | DIASTOLIC BLOOD PRESSURE: 80 MMHG | RESPIRATION RATE: 18 BRPM | WEIGHT: 245.7 LBS | OXYGEN SATURATION: 99 % | HEIGHT: 65 IN

## 2022-04-11 DIAGNOSIS — R00.2 PALPITATIONS: ICD-10-CM

## 2022-04-11 DIAGNOSIS — Z23 NEED FOR PNEUMOCOCCAL VACCINATION: ICD-10-CM

## 2022-04-11 DIAGNOSIS — Z23 NEED FOR TDAP VACCINATION: ICD-10-CM

## 2022-04-11 DIAGNOSIS — I10 PRIMARY HYPERTENSION: Primary | ICD-10-CM

## 2022-04-11 PROCEDURE — 90715 TDAP VACCINE 7 YRS/> IM: CPT | Performed by: NURSE PRACTITIONER

## 2022-04-11 PROCEDURE — 90472 IMMUNIZATION ADMIN EACH ADD: CPT | Performed by: NURSE PRACTITIONER

## 2022-04-11 PROCEDURE — 90732 PPSV23 VACC 2 YRS+ SUBQ/IM: CPT | Performed by: NURSE PRACTITIONER

## 2022-04-11 PROCEDURE — 90471 IMMUNIZATION ADMIN: CPT | Performed by: NURSE PRACTITIONER

## 2022-04-11 PROCEDURE — 99214 OFFICE O/P EST MOD 30 MIN: CPT | Performed by: NURSE PRACTITIONER

## 2022-04-11 RX ORDER — LEVOTHYROXINE SODIUM 0.05 MG/1
50 TABLET ORAL WEEKLY
COMMUNITY
End: 2022-05-23

## 2022-04-11 NOTE — PROGRESS NOTES
Subjective   Letty Bach is a 52 y.o. female.     CC: Hypertension, palpitations    Hypertension  This is a chronic problem. The current episode started more than 1 year ago. The problem has been waxing and waning since onset. Associated symptoms include palpitations (reports intermittent palpitations with no cp, soa or edema). Pertinent negatives include no blurred vision, chest pain or shortness of breath. Agents associated with hypertension include thyroid hormones. Risk factors for coronary artery disease include family history, sedentary lifestyle and obesity. Current antihypertension treatment includes lifestyle changes. The current treatment provides significant improvement. Compliance problems include exercise and diet.  There is no history of angina, kidney disease or CAD/MI. Identifiable causes of hypertension include a thyroid problem.   Palpitations   This is a chronic problem. The current episode started more than 1 year ago. The problem occurs intermittently. The problem has been waxing and waning. Nothing aggravates the symptoms. Pertinent negatives include no chest pain, coughing, fever, nausea, shortness of breath or vomiting. She has tried nothing for the symptoms. The treatment provided mild relief. Risk factors include dyslipidemia, obesity and sedentary lifestyle.        The following portions of the patient's history were reviewed and updated as appropriate: allergies, current medications, past family history, past medical history, past social history, past surgical history and problem list.    Review of Systems   Constitutional: Negative for activity change, appetite change, chills, fatigue, fever, unexpected weight gain and unexpected weight loss.   HENT: Negative for congestion, sore throat, trouble swallowing and voice change.    Eyes: Negative.  Negative for blurred vision, double vision, photophobia, pain, discharge, redness, itching and visual disturbance.   Respiratory: Negative for  cough, chest tightness, shortness of breath and wheezing.    Cardiovascular: Positive for palpitations (reports intermittent palpitations with no cp, soa or edema). Negative for chest pain and leg swelling.   Gastrointestinal: Negative for abdominal pain, constipation, diarrhea, nausea and vomiting.   Endocrine: Negative.  Negative for cold intolerance, heat intolerance, polydipsia, polyphagia and polyuria.   Genitourinary: Negative for dysuria.   Musculoskeletal: Negative for arthralgias and myalgias.   Skin: Negative for rash.   Allergic/Immunologic: Negative.    Neurological: Negative.    Hematological: Negative.    Psychiatric/Behavioral: Negative.        Objective   Physical Exam  Vitals and nursing note reviewed.   Constitutional:       General: She is not in acute distress.     Appearance: Normal appearance. She is well-developed. She is obese. She is not ill-appearing, toxic-appearing or diaphoretic.   HENT:      Head: Normocephalic and atraumatic.   Eyes:      Conjunctiva/sclera: Conjunctivae normal.   Cardiovascular:      Rate and Rhythm: Normal rate and regular rhythm.      Pulses: Normal pulses.      Heart sounds: Normal heart sounds. No murmur heard.    No friction rub. No gallop.   Pulmonary:      Effort: Pulmonary effort is normal. No respiratory distress.      Breath sounds: Normal breath sounds. No stridor. No wheezing, rhonchi or rales.   Musculoskeletal:         General: No tenderness. Normal range of motion.      Cervical back: Normal range of motion.   Skin:     General: Skin is warm and dry.      Coloration: Skin is not pale.      Findings: No erythema or rash.   Neurological:      Mental Status: She is alert and oriented to person, place, and time.   Psychiatric:         Mood and Affect: Mood normal.         Behavior: Behavior normal.         Thought Content: Thought content normal.         Judgment: Judgment normal.           Assessment/Plan   Diagnoses and all orders for this visit:    1.  Primary hypertension (Primary)   -Patient continues to report intermittent episodes of mildly elevated blood pressure above 140/90 and palpitations.  Denies chest pain, shortness of breath or edema.  Blood pressure in office continues to be well controlled.  Instruct patient to continue to follow-up with endocrinology for further titration of her thyroid medication.  We will continue to monitor blood pressure.  Will order Holter monitor for further evaluation of reported palpitations.  Cardiac assessment today unremarkable.    2. Palpitations  -     Holter Monitor - 72 Hour Up To 15 Days; Future, will call with results.    3. Need for pneumococcal vaccination  -     Pneumococcal Polysaccharide Vaccine 23-Valent (PPSV23) Greater Than or Equal To 1yo Subcutaneous / IM, tolerated well with no adverse reaction.    4. Need for Tdap vaccination  -     Tdap Vaccine Greater Than or Equal To 6yo IM, tolerated well no adverse reaction.    5.  Follow-up in 3 months or sooner for any acute needs.            This document has been electronically signed by MEL Houser on April 11, 2022 11:51 CDT

## 2022-04-20 ENCOUNTER — TELEPHONE (OUTPATIENT)
Dept: GASTROENTEROLOGY | Facility: CLINIC | Age: 52
End: 2022-04-20

## 2022-04-20 NOTE — TELEPHONE ENCOUNTER
04/20/2022, 1044 - Patient telephoned per this staff member [phone] with request for message clarification.  Patient stated Librax 2.5 - 5 MG Capsules was prescribed 03/31/2022 per Quinten Landry.  Patient stated she was unable to collect prescription medication from [facility] at that time due to cost.  Patient stated she now possess the monetary means to collect prescription medication, contacted the pharmacy for prescription medication fill, however, pharmacy stated patient is requesting fill to soon as prescription medication was collected from pharmacy on 04/03/2022.  Patient made aware this staff member will contact pharmacy and return call.  Patient verbalized understanding.    04/20/2022, 1050 - [facility] telephoned per this staff member [phone].  Spoke with PharmacistMohamud.  Pharmacist made aware of patient statements.  Pharmacist stated prescription medication will be filled in approximately 30 minutes.    04/20/2022, 1054 - Patient telephoned per this staff member [phone] with notification this staff member has spoken with Mohamud, Pharmacist with [facility].  Per Pharmacist, Librax 2.5 - 5 MG Capsules will be filled in approximately 30 minutes. Patient verbalized understanding.

## 2022-04-20 NOTE — TELEPHONE ENCOUNTER
----- Message from Letty Bach sent at 4/20/2022  8:56 AM CDT -----  Regarding: Meds  I have not started the meds was going to pick them Tuesday the 19th but they said I already picked up and to early for refill and I did not pick on the 4/3 cause of the cost of them

## 2022-04-22 ENCOUNTER — LAB (OUTPATIENT)
Dept: LAB | Facility: HOSPITAL | Age: 52
End: 2022-04-22

## 2022-04-22 DIAGNOSIS — E89.0 POSTSURGICAL HYPOTHYROIDISM: ICD-10-CM

## 2022-04-22 DIAGNOSIS — C73 THYROID CANCER: ICD-10-CM

## 2022-04-22 PROCEDURE — 82941 ASSAY OF GASTRIN: CPT | Performed by: PHYSICIAN ASSISTANT

## 2022-04-22 PROCEDURE — 83690 ASSAY OF LIPASE: CPT | Performed by: PHYSICIAN ASSISTANT

## 2022-04-22 PROCEDURE — 83519 RIA NONANTIBODY: CPT | Performed by: PHYSICIAN ASSISTANT

## 2022-04-22 PROCEDURE — 84443 ASSAY THYROID STIM HORMONE: CPT

## 2022-04-22 PROCEDURE — 82150 ASSAY OF AMYLASE: CPT | Performed by: PHYSICIAN ASSISTANT

## 2022-04-22 PROCEDURE — 36415 COLL VENOUS BLD VENIPUNCTURE: CPT | Performed by: PHYSICIAN ASSISTANT

## 2022-04-23 LAB
AMYLASE SERPL-CCNC: 42 U/L (ref 28–100)
LIPASE SERPL-CCNC: 44 U/L (ref 13–60)
TSH SERPL DL<=0.05 MIU/L-ACNC: 1.69 UIU/ML (ref 0.27–4.2)

## 2022-04-25 ENCOUNTER — TELEPHONE (OUTPATIENT)
Dept: ENDOCRINOLOGY | Facility: CLINIC | Age: 52
End: 2022-04-25

## 2022-04-25 DIAGNOSIS — C73 THYROID CANCER: Primary | ICD-10-CM

## 2022-04-25 RX ORDER — LEVOTHYROXINE SODIUM 112 UG/1
112 TABLET ORAL DAILY
Qty: 30 TABLET | Refills: 11 | Status: SHIPPED | OUTPATIENT
Start: 2022-04-25 | End: 2023-04-25

## 2022-04-26 LAB
GASTRIN SERPL-MCNC: 20 PG/ML (ref 0–115)
TRYPSIN SERPL-MCNC: NORMAL NG/ML

## 2022-05-09 ENCOUNTER — LAB (OUTPATIENT)
Dept: LAB | Facility: HOSPITAL | Age: 52
End: 2022-05-09

## 2022-05-09 DIAGNOSIS — R10.12 LEFT UPPER QUADRANT ABDOMINAL PAIN: ICD-10-CM

## 2022-05-09 DIAGNOSIS — R79.89 ELEVATED LFTS: ICD-10-CM

## 2022-05-09 DIAGNOSIS — K21.00 GASTROESOPHAGEAL REFLUX DISEASE WITH ESOPHAGITIS WITHOUT HEMORRHAGE: ICD-10-CM

## 2022-05-09 DIAGNOSIS — K76.0 FATTY LIVER: ICD-10-CM

## 2022-05-09 PROCEDURE — 36415 COLL VENOUS BLD VENIPUNCTURE: CPT

## 2022-05-09 PROCEDURE — 83519 RIA NONANTIBODY: CPT

## 2022-05-13 ENCOUNTER — TELEPHONE (OUTPATIENT)
Dept: NEUROLOGY | Facility: TELEHEALTH | Age: 52
End: 2022-05-13

## 2022-05-13 LAB — TRYPSIN SERPL-MCNC: 559 NG/ML (ref 169–773)

## 2022-05-13 NOTE — TELEPHONE ENCOUNTER
----- Message from MEL Houser sent at 5/13/2022  8:24 AM CDT -----  Per cardiology report, relatively benign study.  Rare PACs and PVCs.  Max heart rate 126 with a short run of SVT for only 5 beats.  Patient should follow-up should symptoms continue.  Present to the ER for chest pain shortness of breath or continued   sustained palpitations.

## 2022-05-23 ENCOUNTER — OFFICE VISIT (OUTPATIENT)
Dept: GASTROENTEROLOGY | Facility: CLINIC | Age: 52
End: 2022-05-23

## 2022-05-23 VITALS
SYSTOLIC BLOOD PRESSURE: 134 MMHG | HEIGHT: 65 IN | DIASTOLIC BLOOD PRESSURE: 90 MMHG | HEART RATE: 71 BPM | WEIGHT: 248.4 LBS | BODY MASS INDEX: 41.38 KG/M2

## 2022-05-23 DIAGNOSIS — R10.33 PERIUMBILICAL ABDOMINAL PAIN: ICD-10-CM

## 2022-05-23 DIAGNOSIS — K76.0 FATTY LIVER: Primary | ICD-10-CM

## 2022-05-23 DIAGNOSIS — K21.00 GASTROESOPHAGEAL REFLUX DISEASE WITH ESOPHAGITIS WITHOUT HEMORRHAGE: ICD-10-CM

## 2022-05-23 PROCEDURE — 99213 OFFICE O/P EST LOW 20 MIN: CPT | Performed by: PHYSICIAN ASSISTANT

## 2022-07-01 DIAGNOSIS — M23.92 INTERNAL DERANGEMENT OF LEFT KNEE: Primary | ICD-10-CM

## 2022-07-06 ENCOUNTER — OFFICE VISIT (OUTPATIENT)
Dept: GASTROENTEROLOGY | Facility: CLINIC | Age: 52
End: 2022-07-06

## 2022-07-06 VITALS
HEIGHT: 65 IN | HEART RATE: 68 BPM | DIASTOLIC BLOOD PRESSURE: 76 MMHG | WEIGHT: 249.4 LBS | SYSTOLIC BLOOD PRESSURE: 110 MMHG | BODY MASS INDEX: 41.55 KG/M2

## 2022-07-06 DIAGNOSIS — K76.0 FATTY LIVER: Primary | ICD-10-CM

## 2022-07-06 DIAGNOSIS — R79.89 ELEVATED LFTS: ICD-10-CM

## 2022-07-06 DIAGNOSIS — K21.00 GASTROESOPHAGEAL REFLUX DISEASE WITH ESOPHAGITIS WITHOUT HEMORRHAGE: ICD-10-CM

## 2022-07-06 DIAGNOSIS — K58.2 IRRITABLE BOWEL SYNDROME WITH BOTH CONSTIPATION AND DIARRHEA: ICD-10-CM

## 2022-07-06 DIAGNOSIS — R10.12 LEFT UPPER QUADRANT ABDOMINAL PAIN: ICD-10-CM

## 2022-07-06 PROCEDURE — 99213 OFFICE O/P EST LOW 20 MIN: CPT | Performed by: PHYSICIAN ASSISTANT

## 2022-07-06 NOTE — PROGRESS NOTES
Chief Complaint   Patient presents with   • Fatty Liver   • Heartburn       ENDO PROCEDURE ORDERED:    Subjective    Letty Bach is a 52 y.o. female. she is here today for follow-up.    History of Present Illness    Patient seen on a recheck of her GERD, fatty liver, abdominal pain and IBS, F0/S1/N1. Last seen 05/23/2022. She is still having a lot of left upper quadrant pain. She was taking the Librax regularly, but it seemed to be causing her to have muscle spasms. She was also getting some bloating and gassiness with it. She still has somewhat regular bowel movements, sometimes loose, sometimes more formed. She primarily has left upper quadrant pain. We have previously tried Levsin, Cymbalta, and Librax without clear improvement. She is on the Pepcid for heartburn. Weight is up 1 pound since last visit. Last colonoscopy showed hyperplastic polyp and diverticulosis 02/12/2021. Last EGD showed gastritis 11/14/2019.     ASSESSMENT/PLAN: Patient with chronic GERD and IBS. The other medications we have tried have not clearly benefited her. I suggested we try low dose Linzess 72 mcg, we did have some samples for her to try. If this helps her I have asked her to contact our office and we will send in a prescription. Alternatively, could try Bentyl but I am concerned this would cause more constipation. Would also consider a trial on Xifaxan. Will plan follow-up in 1 month, further pending clinical course and the results of the above.       The following portions of the patient's history were reviewed and updated as appropriate:   Past Medical History:   Diagnosis Date   • Cancer (HCC)    • Disease of thyroid gland     nodule, mass   • Elevated liver enzymes    • Fatty liver    • GERD (gastroesophageal reflux disease)    • History of medical problems 11/4/2021    Thyroid cancer   • Urinary tract infection    • Varicella      Past Surgical History:   Procedure Laterality Date   • CHOLECYSTECTOMY  06/06/2019   •  CHOLECYSTECTOMY WITH INTRAOPERATIVE CHOLANGIOGRAM N/A 06/06/2019    Procedure: CHOLECYSTECTOMY LAPAROSCOPIC INTRAOPERATIVE CHOLANGIOGRAM      (c-arm#2);  Surgeon: Felipe Overton MD;  Location: [facility] OR;  Service: General   • COLONOSCOPY N/A 02/12/2021    Procedure: COLONOSCOPY;  Surgeon: Joao Bruce MD;  Location: [facility] ENDOSCOPY;  Service: Gastroenterology;  Laterality: N/A;   • COLONOSCOPY     • ENDOSCOPY N/A 11/14/2019    Procedure: ESOPHAGOGASTRODUODENOSCOPY;  Surgeon: Joao Bruce MD;  Location: [facility] ENDOSCOPY;  Service: Gastroenterology   • HYSTERECTOMY  2007   • THYROIDECTOMY Right 11/04/2021    Procedure: RIGHT LOBE THYROIDECTOMY and ISTHMUSECTOMY;  Surgeon: Benjamin Renteria MD;  Location: [facility] OR;  Service: General;  Laterality: Right;   • THYROIDECTOMY Left 02/24/2022    Procedure: LEFT THYROIDECTOMY;  Surgeon: Benjamin Renteria MD;  Location: [facility] OR;  Service: General;  Laterality: Left;   • TOTAL ABDOMINAL HYSTERECTOMY WITH SALPINGO OOPHORECTOMY  2007   • UPPER GASTROINTESTINAL ENDOSCOPY  11/14/2019   • US GUIDED FINE NEEDLE ASPIRATION  09/01/2021   • WISDOM TOOTH EXTRACTION       Family History   Problem Relation Age of Onset   • Breast cancer Mother    • Thyroid disease Mother    • Hypertension Mother    • Cancer Mother         Breast cancer   • Hypertension Father    • Diabetes Father    • Kidney disease Father    • Heart disease Father    • No Known Problems Sister    • No Known Problems Brother    • Seizures Daughter    • Hypertension Daughter    • COPD Maternal Grandmother    • Asthma Maternal Grandmother    • No Known Problems Sister    • ADD / ADHD Daughter    • Polymyositis Daughter      OB History    No obstetric history on file.       Allergies   Allergen Reactions   • Contrave [Naltrexone-Bupropion Hcl Er] Dizziness     Social History     Socioeconomic History   • Marital status:    Tobacco Use   • Smoking status: Former Smoker     Packs/day: 0.50     Years: 5.00      "Pack years: 2.50     Types: Cigarettes     Quit date: 1995     Years since quittin.5   • Smokeless tobacco: Never Used   • Tobacco comment: passive at work   Vaping Use   • Vaping Use: Never used   • Passive vaping exposure: Yes   Substance and Sexual Activity   • Alcohol use: Not Currently     Comment: none in 2 years   • Drug use: Never   • Sexual activity: Defer     Current Medications:  Prior to Admission medications    Medication Sig Start Date End Date Taking? Authorizing Provider   clidinium-chlordiazePOXIDE (Librax) 5-2.5 MG per capsule Take 1 capsule by mouth 3 (Three) Times a Day With Meals. 3/31/22  Yes Quinten Landry PA-C   famotidine (Pepcid) 40 MG tablet Take 1 tablet by mouth 2 (Two) Times a Day As Needed for Indigestion or Heartburn. 3/31/22  Yes Quinten Landry PA-C   levothyroxine (Synthroid) 112 MCG tablet Take 1 tablet by mouth Daily. 22 Yes Rajendra Baires APRN   pravastatin (PRAVACHOL) 10 MG tablet Take 1 tablet by mouth Every Night. 3/8/22  Yes Aldair Esteves APRN     Review of Systems  Review of Systems       Objective    /76   Pulse 68   Ht 165.1 cm (65\")   Wt 113 kg (249 lb 6.4 oz)   BMI 41.50 kg/m²   Physical Exam  Vitals and nursing note reviewed.   Constitutional:       General: She is not in acute distress.     Appearance: She is well-developed.   HENT:      Head: Normocephalic and atraumatic.   Eyes:      Pupils: Pupils are equal, round, and reactive to light.   Cardiovascular:      Rate and Rhythm: Normal rate and regular rhythm.      Heart sounds: Normal heart sounds.   Pulmonary:      Effort: Pulmonary effort is normal.      Breath sounds: Normal breath sounds.   Abdominal:      General: Bowel sounds are normal. There is distension. There is no abdominal bruit.      Palpations: Abdomen is soft. Abdomen is not rigid. There is no shifting dullness or mass.      Tenderness: There is abdominal tenderness. There is no guarding or rebound. "      Hernia: No hernia is present. There is no hernia in the ventral area.   Musculoskeletal:         General: Normal range of motion.      Cervical back: Normal range of motion.   Skin:     General: Skin is warm and dry.   Neurological:      Mental Status: She is alert and oriented to person, place, and time.   Psychiatric:         Behavior: Behavior normal.         Thought Content: Thought content normal.         Judgment: Judgment normal.       Assessment & Plan      1. Fatty liver    2. Gastroesophageal reflux disease with esophagitis without hemorrhage    3. Elevated LFTs    4. Left upper quadrant abdominal pain    5. Irritable bowel syndrome with both constipation and diarrhea    .   Diagnoses and all orders for this visit:    1. Fatty liver (Primary)    2. Gastroesophageal reflux disease with esophagitis without hemorrhage    3. Elevated LFTs    4. Left upper quadrant abdominal pain    5. Irritable bowel syndrome with both constipation and diarrhea        Orders placed during this encounter include:  No orders of the defined types were placed in this encounter.      Medications prescribed:  No orders of the defined types were placed in this encounter.      Requested Prescriptions      No prescriptions requested or ordered in this encounter       Review and/or summary of lab tests, radiology, procedures, medications. Review and summary of old records and obtaining of history. The risks and benefits of my recommendations, as well as other treatment options were discussed with the patient today. Questions were answered.    Follow-up: Return in about 1 month (around 8/6/2022), or if symptoms worsen or fail to improve.     * Surgery not found *      This document has been electronically signed by Quinten Landry PA-C on July 6, 2022 20:02 CDT      Results for orders placed or performed in visit on 05/09/22   Trypsin    Specimen: Blood   Result Value Ref Range    Trypsin 445 169 - 773 ng/mL   Results for orders  placed or performed in visit on 04/25/22   Holter Monitor - 72 Hour Up To 15 Days   Result Value Ref Range    Target HR (85%) 144 bpm    Max. Pred. HR (100%) 169 bpm   Results for orders placed or performed in visit on 04/22/22   TSH    Specimen: Blood   Result Value Ref Range    TSH 1.690 0.270 - 4.200 uIU/mL   Results for orders placed or performed in visit on 03/31/22   Trypsin    Specimen: Blood   Result Value Ref Range    Trypsin     Lipase    Specimen: Blood   Result Value Ref Range    Lipase 44 13 - 60 U/L   Gastrin    Specimen: Blood   Result Value Ref Range    Gastrin 20 0 - 115 pg/mL   Amylase    Specimen: Blood   Result Value Ref Range    Amylase 42 28 - 100 U/L   Results for orders placed or performed in visit on 03/31/22   Thyroglobulin By KATHRYN    Specimen: Blood   Result Value Ref Range    THYROGLOBULIN BY KATHRYN 5.7 1.5 - 38.5 ng/mL   Thyroglobulin By KATHRYN    Specimen: Blood   Result Value Ref Range    THYROGLOBULIN BY KATHRYN 5.4 1.5 - 38.5 ng/mL   Thyroglobulin Antibody and Thyroglobulin, KATHRYN or LC/MS-MS    Specimen: Blood   Result Value Ref Range    Thyroglobulin Ab <1.0 0.0 - 0.9 IU/mL   Thyrogloblin by LCMS    Specimen: Blood   Result Value Ref Range    Thyroglobulin 4.6 1.5 - 38.5 ng/mL   Thyroglobulin With Anti-TG    Specimen: Blood   Result Value Ref Range    Thyroglobulin Ab <1.0 0.0 - 0.9 IU/mL   CBC Auto Differential    Specimen: Blood   Result Value Ref Range    WBC 5.92 3.40 - 10.80 10*3/mm3    RBC 4.23 3.77 - 5.28 10*6/mm3    Hemoglobin 13.1 12.0 - 15.9 g/dL    Hematocrit 39.9 34.0 - 46.6 %    MCV 94.3 79.0 - 97.0 fL    MCH 31.0 26.6 - 33.0 pg    MCHC 32.8 31.5 - 35.7 g/dL    RDW 12.3 12.3 - 15.4 %    RDW-SD 42.3 37.0 - 54.0 fl    MPV 10.5 6.0 - 12.0 fL    Platelets 281 140 - 450 10*3/mm3    Neutrophil % 63.9 42.7 - 76.0 %    Lymphocyte % 28.0 19.6 - 45.3 %    Monocyte % 5.9 5.0 - 12.0 %    Eosinophil % 1.4 0.3 - 6.2 %    Basophil % 0.5 0.0 - 1.5 %    Immature Grans % 0.3 0.0 - 0.5 %     Neutrophils, Absolute 3.78 1.70 - 7.00 10*3/mm3    Lymphocytes, Absolute 1.66 0.70 - 3.10 10*3/mm3    Monocytes, Absolute 0.35 0.10 - 0.90 10*3/mm3    Eosinophils, Absolute 0.08 0.00 - 0.40 10*3/mm3    Basophils, Absolute 0.03 0.00 - 0.20 10*3/mm3    Immature Grans, Absolute 0.02 0.00 - 0.05 10*3/mm3    nRBC 0.0 0.0 - 0.2 /100 WBC   TSH    Specimen: Blood   Result Value Ref Range    TSH 4.230 (H) 0.270 - 4.200 uIU/mL   Hemoglobin A1c    Specimen: Blood   Result Value Ref Range    Hemoglobin A1C 5.20 4.80 - 5.60 %   Comprehensive Metabolic Panel    Specimen: Blood   Result Value Ref Range    Glucose 89 65 - 99 mg/dL    BUN 15 6 - 20 mg/dL    Creatinine 0.89 0.57 - 1.00 mg/dL    Sodium 139 136 - 145 mmol/L    Potassium 4.2 3.5 - 5.2 mmol/L    Chloride 105 98 - 107 mmol/L    CO2 27.3 22.0 - 29.0 mmol/L    Calcium 8.9 8.6 - 10.5 mg/dL    Total Protein 6.8 6.0 - 8.5 g/dL    Albumin 4.10 3.50 - 5.20 g/dL    ALT (SGPT) 17 1 - 33 U/L    AST (SGOT) 17 1 - 32 U/L    Alkaline Phosphatase 109 39 - 117 U/L    Total Bilirubin 0.3 0.0 - 1.2 mg/dL    Globulin 2.7 gm/dL    A/G Ratio 1.5 g/dL    BUN/Creatinine Ratio 16.9 7.0 - 25.0    Anion Gap 6.7 5.0 - 15.0 mmol/L    eGFR 78.6 >60.0 mL/min/1.73   Results for orders placed or performed in visit on 03/04/22   Renal Function Panel    Specimen: Blood   Result Value Ref Range    Glucose 81 65 - 99 mg/dL    BUN 14 6 - 20 mg/dL    Creatinine 1.14 (H) 0.57 - 1.00 mg/dL    Sodium 139 136 - 145 mmol/L    Potassium 4.0 3.5 - 5.2 mmol/L    Chloride 103 98 - 107 mmol/L    CO2 27.0 22.0 - 29.0 mmol/L    Calcium 8.9 8.6 - 10.5 mg/dL    Albumin 4.00 3.50 - 5.20 g/dL    Phosphorus 2.9 2.5 - 4.5 mg/dL    Anion Gap 9.0 5.0 - 15.0 mmol/L    BUN/Creatinine Ratio 12.3 7.0 - 25.0    eGFR 58.4 (L) >60.0 mL/min/1.73   Lipid Panel    Specimen: Blood   Result Value Ref Range    Total Cholesterol 217 (H) 0 - 200 mg/dL    Triglycerides 123 0 - 150 mg/dL    HDL Cholesterol 55 40 - 60 mg/dL    LDL Cholesterol   140 (H) 0 - 100 mg/dL    VLDL Cholesterol 22 5 - 40 mg/dL    LDL/HDL Ratio 2.50    Results for orders placed or performed in visit on 03/04/22   Thyroglobulin By KATHRYN    Specimen: Blood   Result Value Ref Range    THYROGLOBULIN BY KATHRYN 13.7 1.5 - 38.5 ng/mL   Thyroglobulin By KATHRYN    Specimen: Blood   Result Value Ref Range    THYROGLOBULIN BY KATHRYN 13.7 1.5 - 38.5 ng/mL   Thyroglobulin Antibody and Thyroglobulin, KATHRYN or LC/MS-MS    Specimen: Blood   Result Value Ref Range    Thyroglobulin Ab <1.0 0.0 - 0.9 IU/mL   Thyrogloblin by LCMS    Specimen: Blood   Result Value Ref Range    Thyroglobulin 12.6 1.5 - 38.5 ng/mL   Thyroglobulin With Anti-TG    Specimen: Blood   Result Value Ref Range    Thyroglobulin Ab <1.0 0.0 - 0.9 IU/mL   Results for orders placed or performed in visit on 03/03/22   Thyroid Panel With TSH    Specimen: Blood   Result Value Ref Range    TSH 17.300 (H) 0.270 - 4.200 uIU/mL    T Uptake 1.25 0.80 - 1.30 TBI    T4, Total 4.94 4.50 - 11.70 mcg/dL   Calcium    Specimen: Blood   Result Value Ref Range    Calcium 9.6 8.6 - 10.5 mg/dL   Results for orders placed or performed during the hospital encounter of 02/24/22   Tissue Pathology Exam    Specimen: Thyroid; Tissue   Result Value Ref Range    Case Report       Surgical Pathology Report                         Case: [id number]                                  Authorizing Provider:  Benjamin Renteria MD           Collected:           02/24/2022 11:47 AM          Ordering Location:     [facility]   Received:            02/24/2022 01:31 PM                                 [place]                                                              Pathologist:           Artur Faustin MD                                                           Specimen:    Thyroid, Left thyroid lobe                                                                 Final Diagnosis       See Scanned Report       Results for orders placed or performed in visit on  02/08/22   COVID-19, [facility] IN-HOUSE, NP SWAB IN TRANSPORT MEDIA 8-10 HR TAT - Swab, Nasopharynx    Specimen: Nasopharynx; Swab   Result Value Ref Range    COVID19 Detected (C) Not Detected - Ref. Range   Results for orders placed or performed in visit on 12/29/21   Protime-INR    Specimen: Blood   Result Value Ref Range    Protime 12.8 11.1 - 15.3 Seconds    INR 0.97 0.80 - 1.20   Comprehensive Metabolic Panel    Specimen: Blood   Result Value Ref Range    Glucose 83 65 - 99 mg/dL    BUN 11 6 - 20 mg/dL    Creatinine 0.81 0.57 - 1.00 mg/dL    Sodium 143 136 - 145 mmol/L    Potassium 4.4 3.5 - 5.2 mmol/L    Chloride 104 98 - 107 mmol/L    CO2 30.7 (H) 22.0 - 29.0 mmol/L    Calcium 9.2 8.6 - 10.5 mg/dL    Total Protein 6.8 6.0 - 8.5 g/dL    Albumin 4.00 3.50 - 5.20 g/dL    ALT (SGPT) 18 1 - 33 U/L    AST (SGOT) 17 1 - 32 U/L    Alkaline Phosphatase 113 39 - 117 U/L    Total Bilirubin 0.4 0.0 - 1.2 mg/dL    eGFR Non African Amer 75 >60 mL/min/1.73    Globulin 2.8 gm/dL    A/G Ratio 1.4 g/dL    BUN/Creatinine Ratio 13.6 7.0 - 25.0    Anion Gap 8.3 5.0 - 15.0 mmol/L     *Note: Due to a large number of results and/or encounters for the requested time period, some results have not been displayed. A complete set of results can be found in Results Review.

## 2022-07-08 ENCOUNTER — OFFICE VISIT (OUTPATIENT)
Dept: ORTHOPEDIC SURGERY | Facility: CLINIC | Age: 52
End: 2022-07-08

## 2022-07-08 VITALS — WEIGHT: 251.7 LBS | BODY MASS INDEX: 41.94 KG/M2 | HEIGHT: 65 IN

## 2022-07-08 DIAGNOSIS — E66.01 MORBID OBESITY WITH BMI OF 40.0-44.9, ADULT: ICD-10-CM

## 2022-07-08 DIAGNOSIS — M23.92 INTERNAL DERANGEMENT OF LEFT KNEE: Primary | ICD-10-CM

## 2022-07-08 DIAGNOSIS — K21.9 GERD WITHOUT ESOPHAGITIS: ICD-10-CM

## 2022-07-08 PROCEDURE — 99213 OFFICE O/P EST LOW 20 MIN: CPT | Performed by: ORTHOPAEDIC SURGERY

## 2022-07-08 NOTE — PROGRESS NOTES
"Letty Bach is a 52 y.o. female returns for     Chief Complaint   Patient presents with   • Left Knee - Follow-up       HISTORY OF PRESENT ILLNESS:  Patient in for follow-up on left knee pain  Xray completed upon arrival   Injection in left knee did not help.  Pain with pivot and twist  Pain with deep knee bends  Cannot do a squat motion.  Severe sharp stabbing pains along the medial joint line.    CONCURRENT MEDICAL HISTORY:    The following portions of the patient's history were reviewed and updated as appropriate: allergies, current medications, past family history, past medical history, past social history, past surgical history and problem list.     ROS  No fevers or chills.  No chest pain or shortness of air.  No GI or  disturbances.    PHYSICAL EXAMINATION:       Ht 165.1 cm (65\")   Wt 114 kg (251 lb 11.2 oz)   BMI 41.89 kg/m²     Physical Exam  Constitutional:       General: She is not in acute distress.     Appearance: Normal appearance.   Pulmonary:      Effort: Pulmonary effort is normal. No respiratory distress.   Musculoskeletal:      Left knee:      Instability Tests: Medial Brodie test positive.   Neurological:      Mental Status: She is alert and oriented to person, place, and time.         GAIT:     []  Normal  [x]  Antalgic    Assistive device: [x]  None  []  Walker     []  Crutches  []  Cane     []  Wheelchair  []  Stretcher    Left Knee Exam     Muscle Strength   The patient has normal left knee strength.    Tenderness   The patient is experiencing tenderness in the medial joint line and medial retinaculum (severe medial joint line tenderness).    Tests   Brodie:  Medial - positive   Varus: negative Valgus: negative    Other   Erythema: absent  Sensation: normal  Pulse: present  Swelling: mild                XR Knee 1 or 2 View Left    Result Date: 7/8/2022  Narrative: Ordering Provider:  Jose Alberto Amos MD Ordering Diagnosis/Indication:  Internal derangement of left knee " Procedure:  XR KNEE 1 OR 2 VW LEFT Exam Date:  7/8/22 COMPARISON:  Todays X-rays were compared to previous images dated September 14, 2021.     Impression:  AP bilateral standing of the knees with lateral of the left knee show acceptable position and alignment of both knees with no evidence of acute bony abnormality.  Mild medial joint space narrowing and small marginal osteophyte noted on the medial aspect of the left knee.  Minimal arthritic changes noted on the lateral view of the left knee.  No acute findings.  No significant interval change noted in comparison to prior x-ray. Jose Alberto Amos MD 7/8/22     XR Knee Bilateral AP Standing    Result Date: 7/8/2022  Narrative: Ordering Provider:  Jose Alberto Amos MD Ordering Diagnosis/Indication:  Internal derangement of left knee Procedure:  XR KNEE BILATERAL AP STANDING Exam Date:  7/8/22 COMPARISON:  Todays X-rays were compared to previous images dated September 14, 2021.     Impression:  AP bilateral standing of the knees with lateral of the left knee show acceptable position and alignment of both knees with no evidence of acute bony abnormality.  Mild medial joint space narrowing and small marginal osteophyte noted on the medial aspect of the left knee.  Minimal arthritic changes noted on the lateral view of the left knee.  No acute findings.  No significant interval change noted in comparison to prior x-ray. Jose Alberto Amos MD 7/8/22           ASSESSMENT:    Diagnoses and all orders for this visit:    Internal derangement of left knee  -     MRI Knee Left Without Contrast; Future    Morbid obesity with BMI of 40.0-44.9, adult (HCC)    GERD without esophagitis          PLAN    She continues to have pain all the time and intermittently has severe pain.  She has pain with pivoting and twisting.  She has pain with deep knee bends.  She has not improved with conservative management.  Plan is to proceed with MRI of the left knee to assess for  internal derangement and help direct further treatment options.    Return for recheck for MRI results.    Jose Alberto Amos MD

## 2022-07-11 ENCOUNTER — OFFICE VISIT (OUTPATIENT)
Dept: FAMILY MEDICINE CLINIC | Facility: CLINIC | Age: 52
End: 2022-07-11

## 2022-07-11 VITALS
BODY MASS INDEX: 41.69 KG/M2 | WEIGHT: 250.2 LBS | HEIGHT: 65 IN | DIASTOLIC BLOOD PRESSURE: 78 MMHG | HEART RATE: 70 BPM | SYSTOLIC BLOOD PRESSURE: 130 MMHG | RESPIRATION RATE: 18 BRPM | OXYGEN SATURATION: 98 %

## 2022-07-11 DIAGNOSIS — R00.2 PALPITATIONS: ICD-10-CM

## 2022-07-11 DIAGNOSIS — M79.672 PAIN OF LEFT HEEL: ICD-10-CM

## 2022-07-11 DIAGNOSIS — I10 PRIMARY HYPERTENSION: Primary | ICD-10-CM

## 2022-07-11 PROCEDURE — 99214 OFFICE O/P EST MOD 30 MIN: CPT | Performed by: NURSE PRACTITIONER

## 2022-07-11 RX ORDER — NAPROXEN 500 MG/1
500 TABLET ORAL 2 TIMES DAILY PRN
Qty: 60 TABLET | Refills: 1 | Status: SHIPPED | OUTPATIENT
Start: 2022-07-11 | End: 2022-07-25

## 2022-07-11 NOTE — PROGRESS NOTES
Subjective   Letty Bach is a 52 y.o. female.     CC: Hypertension, palpitations, left heel pain    Hypertension  This is a chronic problem. The current episode started more than 1 year ago. The problem is controlled. Associated symptoms include palpitations (stable, no improvement). Pertinent negatives include no chest pain or shortness of breath. Risk factors for coronary artery disease include family history, dyslipidemia, obesity, sedentary lifestyle and stress. Current antihypertension treatment includes lifestyle changes. The current treatment provides significant improvement. Compliance problems include exercise and diet.  There is no history of angina, kidney disease or CAD/MI.   Palpitations   This is a chronic problem. The current episode started more than 1 month ago. The problem occurs intermittently. The problem has been waxing and waning. Nothing aggravates the symptoms. Pertinent negatives include no anxiety, chest fullness, chest pain, coughing, fever, nausea, shortness of breath or vomiting. She has tried nothing for the symptoms. The treatment provided no relief.   Leg Pain   The incident occurred more than 1 week ago. There was no injury mechanism. The pain is present in the left heel. The quality of the pain is described as aching. The pain has been intermittent since onset. The symptoms are aggravated by movement, palpation and weight bearing. She has tried acetaminophen and rest for the symptoms. The treatment provided moderate relief.        The following portions of the patient's history were reviewed and updated as appropriate: allergies, current medications, past family history, past medical history, past social history, past surgical history and problem list.    Review of Systems   Constitutional: Negative for activity change, appetite change, chills, fatigue, fever, unexpected weight gain and unexpected weight loss.   HENT: Negative for congestion, sore throat, trouble swallowing and  voice change.    Eyes: Negative.    Respiratory: Negative for cough, chest tightness, shortness of breath and wheezing.    Cardiovascular: Positive for palpitations (stable, no improvement). Negative for chest pain and leg swelling.   Gastrointestinal: Negative for abdominal pain, constipation, diarrhea, nausea and vomiting.   Endocrine: Negative.  Negative for cold intolerance, heat intolerance, polydipsia, polyphagia and polyuria.   Genitourinary: Negative for dysuria.   Musculoskeletal: Negative for arthralgias (left posterior heel, worse with activity, improves with rest. no injury reported. ) and myalgias.   Skin: Negative for rash.   Allergic/Immunologic: Negative.    Neurological: Negative.    Hematological: Negative.    Psychiatric/Behavioral: Negative.  The patient is not nervous/anxious.        Objective   Physical Exam  Vitals and nursing note reviewed.   Constitutional:       General: She is not in acute distress.     Appearance: Normal appearance. She is well-developed. She is obese. She is not ill-appearing, toxic-appearing or diaphoretic.   HENT:      Head: Normocephalic and atraumatic.   Eyes:      Conjunctiva/sclera: Conjunctivae normal.   Cardiovascular:      Rate and Rhythm: Normal rate and regular rhythm.      Heart sounds: Normal heart sounds.   Pulmonary:      Effort: Pulmonary effort is normal. No respiratory distress.      Breath sounds: Normal breath sounds. No stridor. No wheezing, rhonchi or rales.   Musculoskeletal:         General: No tenderness. Normal range of motion.      Cervical back: Normal range of motion.        Feet:    Skin:     General: Skin is warm and dry.      Coloration: Skin is not pale.      Findings: No erythema or rash.   Neurological:      Mental Status: She is alert and oriented to person, place, and time.   Psychiatric:         Mood and Affect: Mood normal.         Behavior: Behavior normal.         Thought Content: Thought content normal.         Judgment: Judgment  normal.           Assessment & Plan   Diagnoses and all orders for this visit:    1. Primary hypertension (Primary)   -Controlled.  We will continue to monitor.    2. Palpitations  -     Ambulatory Referral to Cardiology, will call with results.  Symptoms stable but continue.  Will refer to cardiology for further evaluation.    3. Pain of left heel  -     XR Foot 3+ View Left; Future  -     naproxen (Naprosyn) 500 MG tablet; Take 1 tablet by mouth 2 (Two) Times a Day As Needed for Mild Pain .  Dispense: 60 tablet; Refill: 1   -We will call with x-ray results.  Location of pain is abating factors and consistent with insertional Achilles tendinitis.  Recommended patient wear good supportive footwear, rest, ice, elevate.  We will also add naproxen as needed.  We will continue to monitor.    4.  Follow-up in 6 months or sooner for any acute needs.            This document has been electronically signed by MEL Houser on July 11, 2022 11:39 CDT

## 2022-07-14 ENCOUNTER — TELEMEDICINE (OUTPATIENT)
Dept: ENDOCRINOLOGY | Facility: CLINIC | Age: 52
End: 2022-07-14

## 2022-07-14 DIAGNOSIS — E89.0 POSTSURGICAL HYPOTHYROIDISM: Primary | ICD-10-CM

## 2022-07-14 DIAGNOSIS — C73 THYROID CANCER: ICD-10-CM

## 2022-07-14 DIAGNOSIS — E55.9 VITAMIN D DEFICIENCY: ICD-10-CM

## 2022-07-14 DIAGNOSIS — R73.01 IMPAIRED FASTING GLUCOSE: ICD-10-CM

## 2022-07-14 PROCEDURE — 99214 OFFICE O/P EST MOD 30 MIN: CPT | Performed by: NURSE PRACTITIONER

## 2022-07-14 NOTE — PROGRESS NOTES
Chief Complaint  No chief complaint on file.    Subjective          Letty Bach presents to [facility] ENDOCRINOLOGY  History of Present Illness         You have chosen to receive care through a telehealth visit.  Do you consent to use a video/audio connection for your medical care today? 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    Physical Exam  Neurological:      General: No focal deficit present.      Mental Status: She is alert.   Psychiatric:         Mood and Affect: Mood normal.         Thought Content: Thought content normal.         Judgment: Judgment normal.        Result Review :   The following data was reviewed by: MEL Brown on 04/01/2022:  Common labs    Common Labsle 3/3/22 3/4/22 3/4/22 3/31/22 3/31/22 3/31/22     1605 1605 0921 0921 0921   Glucose  81  89     BUN  14  15     Creatinine  1.14 (A)  0.89     Sodium  139  139     Potassium  4.0  4.2     Chloride  103  105     Calcium 9.6 8.9  8.9     Albumin  4.00  4.10     Total Bilirubin    0.3     Alkaline Phosphatase    109     AST (SGOT)    17     ALT (SGPT)    17     WBC     5.92    Hemoglobin     13.1    Hematocrit     39.9    Platelets     281    Total Cholesterol   217 (A)      Triglycerides   123      HDL  Cholesterol   55      LDL Cholesterol    140 (A)      Hemoglobin A1C      5.20   (A) Abnormal value                                                  Assessment []Collapse by Default          Assessment and Plan    Diagnoses and all orders for this visit:    1. Postsurgical hypothyroidism (Primary)  -     CBC & Differential; Future  -     Comprehensive Metabolic Panel; Future  -     TSH; Future  -     Vitamin D 25 Hydroxy; Future  -     Thyroglobulin With Anti-TG; Future    2. Thyroid cancer (HCC)  -     CBC & Differential; Future  -     Comprehensive Metabolic Panel; Future  -     TSH; Future  -     Vitamin D 25 Hydroxy; Future  -     Thyroglobulin With Anti-TG; Future    3. Vitamin D deficiency  -     CBC & Differential; Future  -     Comprehensive Metabolic Panel; Future  -     TSH; Future  -     Vitamin D 25 Hydroxy; Future  -     Thyroglobulin With Anti-TG; Future    4. Impaired fasting glucose  -     Hemoglobin A1c; Future           Total thyroidectomy   Thyroid cancer   Postoperative hypothyroidism         Cytology above      Considered low risk based on [facility] guidelines       Tg and TG ab today and I will call with results      Target tsh 0.5 to 2         Lab Results   Component Value Date    TSH 1.690 04/22/2022          levothyroxine 112 mcg         Medication must me taken on an empty stomach at least one hour before food, drink and other medications. Only take with water. If taking vitamins or antiacids needs to be 4 hours before or after.      Baseline with TSH of 17     Component      Latest Ref Rng & Units 3/4/2022 3/4/2022           2:15 PM  2:15 PM   Thyroglobulin Ab      0.0 - 0.9 IU/mL <1.0 <1.0   Thyroglobulin      1.5 - 38.5 ng/mL 12.6    THYROGLOBULIN BY KATHRYN      1.5 - 38.5 ng/mL 13.7 13.7               calcium is normal      She can continue tums      Lab Results   Component Value Date    CALCIUM 8.9 03/31/2022    PHOS 2.9 03/04/2022     Vitamin d def    Taking supplement            impaired  fasting    Lab Results   Component Value Date    HGBA1C 5.20 03/31/2022                   Follow Up   No follow-ups on file.  Patient was given instructions and counseling regarding her condition or for health maintenance advice. Please see specific information pulled into the AVS if appropriate.         This document has been electronically signed by MEL Brown on July 14, 2022 08:23 CDT.

## 2022-07-18 ENCOUNTER — LAB (OUTPATIENT)
Dept: LAB | Facility: HOSPITAL | Age: 52
End: 2022-07-18

## 2022-07-18 ENCOUNTER — HOSPITAL ENCOUNTER (OUTPATIENT)
Dept: MRI IMAGING | Facility: HOSPITAL | Age: 52
Discharge: HOME | End: 2022-07-18

## 2022-07-18 DIAGNOSIS — C73 THYROID CANCER: ICD-10-CM

## 2022-07-18 DIAGNOSIS — E89.0 POSTSURGICAL HYPOTHYROIDISM: ICD-10-CM

## 2022-07-18 DIAGNOSIS — R73.01 IMPAIRED FASTING GLUCOSE: ICD-10-CM

## 2022-07-18 DIAGNOSIS — E55.9 VITAMIN D DEFICIENCY: ICD-10-CM

## 2022-07-18 DIAGNOSIS — M23.92 INTERNAL DERANGEMENT OF LEFT KNEE: ICD-10-CM

## 2022-07-18 LAB
25(OH)D3 SERPL-MCNC: 34.6 NG/ML (ref 30–100)
ALBUMIN SERPL-MCNC: 3.7 G/DL (ref 3.5–5.2)
ALBUMIN/GLOB SERPL: 1.2 G/DL
ALP SERPL-CCNC: 114 U/L (ref 39–117)
ALT SERPL W P-5'-P-CCNC: 13 U/L (ref 1–33)
ANION GAP SERPL CALCULATED.3IONS-SCNC: 6 MMOL/L (ref 5–15)
AST SERPL-CCNC: 15 U/L (ref 1–32)
BASOPHILS # BLD AUTO: 0.04 10*3/MM3 (ref 0–0.2)
BASOPHILS NFR BLD AUTO: 0.7 % (ref 0–1.5)
BILIRUB SERPL-MCNC: 0.2 MG/DL (ref 0–1.2)
BUN SERPL-MCNC: 11 MG/DL (ref 6–20)
BUN/CREAT SERPL: 12.2 (ref 7–25)
CALCIUM SPEC-SCNC: 8.8 MG/DL (ref 8.6–10.5)
CHLORIDE SERPL-SCNC: 106 MMOL/L (ref 98–107)
CO2 SERPL-SCNC: 30 MMOL/L (ref 22–29)
CREAT SERPL-MCNC: 0.9 MG/DL (ref 0.57–1)
DEPRECATED RDW RBC AUTO: 43.1 FL (ref 37–54)
EGFRCR SERPLBLD CKD-EPI 2021: 77.6 ML/MIN/1.73
EOSINOPHIL # BLD AUTO: 0.09 10*3/MM3 (ref 0–0.4)
EOSINOPHIL NFR BLD AUTO: 1.6 % (ref 0.3–6.2)
ERYTHROCYTE [DISTWIDTH] IN BLOOD BY AUTOMATED COUNT: 12.4 % (ref 12.3–15.4)
GLOBULIN UR ELPH-MCNC: 3.1 GM/DL
GLUCOSE SERPL-MCNC: 92 MG/DL (ref 65–99)
HCT VFR BLD AUTO: 39.4 % (ref 34–46.6)
HGB BLD-MCNC: 12.7 G/DL (ref 12–15.9)
IMM GRANULOCYTES # BLD AUTO: 0.02 10*3/MM3 (ref 0–0.05)
IMM GRANULOCYTES NFR BLD AUTO: 0.3 % (ref 0–0.5)
LYMPHOCYTES # BLD AUTO: 1.53 10*3/MM3 (ref 0.7–3.1)
LYMPHOCYTES NFR BLD AUTO: 26.6 % (ref 19.6–45.3)
MCH RBC QN AUTO: 30.4 PG (ref 26.6–33)
MCHC RBC AUTO-ENTMCNC: 32.2 G/DL (ref 31.5–35.7)
MCV RBC AUTO: 94.3 FL (ref 79–97)
MONOCYTES # BLD AUTO: 0.37 10*3/MM3 (ref 0.1–0.9)
MONOCYTES NFR BLD AUTO: 6.4 % (ref 5–12)
NEUTROPHILS NFR BLD AUTO: 3.7 10*3/MM3 (ref 1.7–7)
NEUTROPHILS NFR BLD AUTO: 64.4 % (ref 42.7–76)
NRBC BLD AUTO-RTO: 0 /100 WBC (ref 0–0.2)
PLATELET # BLD AUTO: 283 10*3/MM3 (ref 140–450)
PMV BLD AUTO: 10.5 FL (ref 6–12)
POTASSIUM SERPL-SCNC: 4 MMOL/L (ref 3.5–5.2)
PROT SERPL-MCNC: 6.8 G/DL (ref 6–8.5)
RBC # BLD AUTO: 4.18 10*6/MM3 (ref 3.77–5.28)
SODIUM SERPL-SCNC: 142 MMOL/L (ref 136–145)
TSH SERPL DL<=0.05 MIU/L-ACNC: 1.7 UIU/ML (ref 0.27–4.2)
WBC NRBC COR # BLD: 5.75 10*3/MM3 (ref 3.4–10.8)

## 2022-07-18 PROCEDURE — 85025 COMPLETE CBC W/AUTO DIFF WBC: CPT

## 2022-07-18 PROCEDURE — 84432 ASSAY OF THYROGLOBULIN: CPT

## 2022-07-18 PROCEDURE — 82306 VITAMIN D 25 HYDROXY: CPT

## 2022-07-18 PROCEDURE — 80053 COMPREHEN METABOLIC PANEL: CPT

## 2022-07-18 PROCEDURE — 36415 COLL VENOUS BLD VENIPUNCTURE: CPT

## 2022-07-18 PROCEDURE — 84443 ASSAY THYROID STIM HORMONE: CPT

## 2022-07-18 PROCEDURE — 86800 THYROGLOBULIN ANTIBODY: CPT

## 2022-07-18 PROCEDURE — 73721 MRI JNT OF LWR EXTRE W/O DYE: CPT

## 2022-07-18 PROCEDURE — 83036 HEMOGLOBIN GLYCOSYLATED A1C: CPT

## 2022-07-19 LAB
HBA1C MFR BLD: 5.2 % (ref 4.8–5.6)
THYROGLOB AB SERPL-ACNC: <1 IU/ML (ref 0–0.9)
THYROGLOB SERPL-MCNC: 1 NG/ML (ref 1.5–38.5)

## 2022-07-25 ENCOUNTER — OFFICE VISIT (OUTPATIENT)
Dept: ORTHOPEDIC SURGERY | Facility: CLINIC | Age: 52
End: 2022-07-25

## 2022-07-25 VITALS — BODY MASS INDEX: 41.79 KG/M2 | WEIGHT: 250.8 LBS | HEIGHT: 65 IN

## 2022-07-25 DIAGNOSIS — S83.242A ACUTE MEDIAL MENISCUS TEAR OF LEFT KNEE, INITIAL ENCOUNTER: ICD-10-CM

## 2022-07-25 DIAGNOSIS — M25.562 CHRONIC PAIN OF LEFT KNEE: ICD-10-CM

## 2022-07-25 DIAGNOSIS — M23.92 INTERNAL DERANGEMENT OF LEFT KNEE: Primary | ICD-10-CM

## 2022-07-25 DIAGNOSIS — T14.8XXA BONE BRUISE: ICD-10-CM

## 2022-07-25 DIAGNOSIS — G89.29 CHRONIC PAIN OF LEFT KNEE: ICD-10-CM

## 2022-07-25 PROCEDURE — 99214 OFFICE O/P EST MOD 30 MIN: CPT | Performed by: NURSE PRACTITIONER

## 2022-07-25 RX ORDER — MELOXICAM 7.5 MG/1
7.5 TABLET ORAL DAILY
Qty: 30 TABLET | Refills: 0 | Status: SHIPPED | OUTPATIENT
Start: 2022-07-25 | End: 2022-08-24

## 2022-07-25 NOTE — PROGRESS NOTES
"Letty Bach is a 52 y.o. female returns for     Chief Complaint   Patient presents with   • Left Knee - Follow-up, Pain       HISTORY OF PRESENT ILLNESS: This 52-year-old female patient presents today to discuss results of her MRI of the left knee.  This patient saw Dr. Amos 7/8/2022 with complaints of left knee pain that is worse with pivoting, twisting and deep knee bends.  Patient reports her pain has improved a little but not much.  The patient reports she still is unable to push an object laterally with her leg due to pain in her knee.       CONCURRENT MEDICAL HISTORY:    The following portions of the patient's history were reviewed and updated as appropriate: allergies, current medications, past family history, past medical history, past social history, past surgical history and problem list.     ROS  No fevers or chills.  No chest pain or shortness of air.  No GI or  disturbances.    PHYSICAL EXAMINATION:       Ht 165.1 cm (65\")   Wt 114 kg (250 lb 12.8 oz)   BMI 41.74 kg/m²     Physical Exam    GAIT:     []  Normal  [x]  Antalgic    Assistive device: [x]  None  []  Walker     []  Crutches  []  Cane     []  Wheelchair  []  Stretcher    Ortho Exam      XR Knee 1 or 2 View Left    Result Date: 7/8/2022  Narrative: Ordering Provider:  Jose Alberto Amos MD Ordering Diagnosis/Indication:  Internal derangement of left knee Procedure:  XR KNEE 1 OR 2 VW LEFT Exam Date:  7/8/22 COMPARISON:  Todays X-rays were compared to previous images dated September 14, 2021.     Impression:  AP bilateral standing of the knees with lateral of the left knee show acceptable position and alignment of both knees with no evidence of acute bony abnormality.  Mild medial joint space narrowing and small marginal osteophyte noted on the medial aspect of the left knee.  Minimal arthritic changes noted on the lateral view of the left knee.  No acute findings.  No significant interval change noted in comparison to prior x-ray. " Jose Alberto Amos MD 7/8/22     XR Knee Bilateral AP Standing    Result Date: 7/8/2022  Narrative: Ordering Provider:  Jose Alberto Amos MD Ordering Diagnosis/Indication:  Internal derangement of left knee Procedure:  XR KNEE BILATERAL AP STANDING Exam Date:  7/8/22 COMPARISON:  Todays X-rays were compared to previous images dated September 14, 2021.     Impression:  AP bilateral standing of the knees with lateral of the left knee show acceptable position and alignment of both knees with no evidence of acute bony abnormality.  Mild medial joint space narrowing and small marginal osteophyte noted on the medial aspect of the left knee.  Minimal arthritic changes noted on the lateral view of the left knee.  No acute findings.  No significant interval change noted in comparison to prior x-ray. Jose Alberto Amos MD 7/8/22         MRI Knee Left Without Contrast    Result Date: 7/18/2022  Narrative: PROCEDURE: MR KNEE WITHOUT IV CONTRAST INDICATION: Left knee pain COMPARISON: Plain films FINDINGS: Multiplanar, multisequence MR images of left knee are provided. No contrast is given. Horizontal tear at posterior horn of the medial meniscus is seen. Bone bruises are also present at medial femoral condyle and tibial plateau at medial compartment of the knee. Tiny suprapatellar joint effusion is present with mild chondromalacia. The ACL, PCL, collateral ligaments and extensor mechanism are intact. Degenerative intrasubstance tear at lateral meniscus is also seen. Surrounding soft tissues and bone marrow signal intensities are otherwise unremarkable. No displaced fracture or dislocation is seen.    Impression: Decrease joint space with horizontal tear at left medial meniscus posterior horn and associated bone bruises at medial compartment of the left knee especially at medial femoral epicondyle and lesser degree at tibial plateau. Electronically signed by:  Floyd Whitman  7/18/2022 1:20 PM CDT Workstation:  [id number]            ASSESSMENT:    Diagnoses and all orders for this visit:    Internal derangement of left knee  -     Miscellaneous DME  -     meloxicam (Mobic) 7.5 MG tablet; Take 1 tablet by mouth Daily for 30 days.    Chronic pain of left knee  -     Miscellaneous DME  -     meloxicam (Mobic) 7.5 MG tablet; Take 1 tablet by mouth Daily for 30 days.    Acute medial meniscus tear of left knee, initial encounter  -     Miscellaneous DME  -     meloxicam (Mobic) 7.5 MG tablet; Take 1 tablet by mouth Daily for 30 days.    Bone bruise  -     Miscellaneous DME  -     meloxicam (Mobic) 7.5 MG tablet; Take 1 tablet by mouth Daily for 30 days.          PLAN  Reviewed MRI with patient.   Recommended RICE therapy.   Reduce WBA.   Use cane, walker or crutches to modify WBA.   Naproxen stopped she was taking for heel pain, not effective.   Started Meloxicam.   Recommend starting a prescription oral NSAID for improved control of pain/inflammation. Meloxicam is prescribed today. Patient is instructed to take the medication daily. Patient is instructed to take the medication with food to minimize any potential GI upset. Patient is instructed not to take any additional NSAIDs, such as Ibuprofen or Aleve, while taking Meloxicam. Patient can also take Tylenol as needed for additional pain control.   Follow up in 4 weeks. Schedule appointment on a day Dr. Amos is here.     All questions and concerns are addressed with understanding noted. They are aware and are in agreement to this plan.    Return in about 4 weeks (around 8/22/2022) for Recheck.    MEL Park     This document has been electronically signed by MEL Park on July 25, 2022 09:10 CDT      EMR Dragon/Zhui Xin Disclaimer: Some of this note may be an electronic transcription/translation of spoken language to printed text using the Dragon Dictation System.     Time spent of a minimum of 30 minutes including the face to face evaluation,  reviewing of medical history and prior medial records, reviewing of diagnostic studies, documentation, patient education and coordination of care.

## 2022-08-15 ENCOUNTER — OFFICE VISIT (OUTPATIENT)
Dept: GASTROENTEROLOGY | Facility: CLINIC | Age: 52
End: 2022-08-15

## 2022-08-15 VITALS
BODY MASS INDEX: 42.69 KG/M2 | DIASTOLIC BLOOD PRESSURE: 67 MMHG | WEIGHT: 256.2 LBS | HEIGHT: 65 IN | HEART RATE: 68 BPM | SYSTOLIC BLOOD PRESSURE: 129 MMHG | TEMPERATURE: 97.4 F

## 2022-08-15 DIAGNOSIS — R79.89 ELEVATED LFTS: ICD-10-CM

## 2022-08-15 DIAGNOSIS — K21.00 GASTROESOPHAGEAL REFLUX DISEASE WITH ESOPHAGITIS WITHOUT HEMORRHAGE: ICD-10-CM

## 2022-08-15 DIAGNOSIS — K58.2 IRRITABLE BOWEL SYNDROME WITH BOTH CONSTIPATION AND DIARRHEA: ICD-10-CM

## 2022-08-15 DIAGNOSIS — K76.0 FATTY LIVER: Primary | ICD-10-CM

## 2022-08-15 PROCEDURE — 99213 OFFICE O/P EST LOW 20 MIN: CPT | Performed by: PHYSICIAN ASSISTANT

## 2022-08-15 RX ORDER — HYOSCYAMINE SULFATE 0.125 MG
0.12 TABLET ORAL EVERY 4 HOURS PRN
Qty: 120 TABLET | Refills: 1 | Status: SHIPPED | OUTPATIENT
Start: 2022-08-15 | End: 2022-08-25

## 2022-08-15 NOTE — PROGRESS NOTES
Chief Complaint   Patient presents with   • Heartburn   • fatty liver     1 month follow up        ENDO PROCEDURE ORDERED:    Subjective    Letty Bach is a 52 y.o. female. she is here today for follow-up.    History of Present Illness    Patient seen on a recheck of her GERD, fatty liver, hepatic fibrosis, F0/S1/N1. Last seen 07/06/2022, was given a trial on Linzess 72 mcg. She states this caused her to have too much bowel movement. She did take the Xifaxan, it seemed to cause her muscle cramps in her back so she quit taking it. Bowel movements are still somewhat variable. Weight is down 6.5 pounds since last visit. GERD is doing fairly well on the Pepcid. Last colonoscopy showed hyperplastic polyp, diverticulosis, 02/12/2021; prior EGD showed gastritis 11/14/2019.     Patient's recent laboratories 07/18/2022: A1c 5.2%, normal vitamin D, TSH, CBC, CMP showed CO2 of 30, otherwise normal. She had a negative CT abdomen and pelvis 02/26/2021. Her symptoms have not changed significantly.     ASSESSMENT/PLAN: Patient with probable IBS, GERD, fatty liver. Last LFT's are normal. Encouraged to continue dietary modification and significant weight loss. Will give her a trial on Levsin 0.125 mg q.4-6. h. p.r.n. for the abdominal discomfort. She was encouraged high fiber diet. She has known diverticular disease. Will plan followup in 6 weeks, sooner if needed, further pending clinical course and the results of the above.      The following portions of the patient's history were reviewed and updated as appropriate:   Past Medical History:   Diagnosis Date   • Cancer (HCC)    • Disease of thyroid gland     nodule, mass   • Elevated liver enzymes    • Fatty liver    • GERD (gastroesophageal reflux disease)    • History of medical problems 11/4/2021    Thyroid cancer   • Urinary tract infection    • Varicella      Past Surgical History:   Procedure Laterality Date   • CHOLECYSTECTOMY  06/06/2019   • CHOLECYSTECTOMY WITH  INTRAOPERATIVE CHOLANGIOGRAM N/A 06/06/2019    Procedure: CHOLECYSTECTOMY LAPAROSCOPIC INTRAOPERATIVE CHOLANGIOGRAM      (c-arm#2);  Surgeon: Felipe Overton MD;  Location: [facility] OR;  Service: General   • COLONOSCOPY N/A 02/12/2021    Procedure: COLONOSCOPY;  Surgeon: Joao Bruce MD;  Location: [facility] ENDOSCOPY;  Service: Gastroenterology;  Laterality: N/A;   • COLONOSCOPY     • ENDOSCOPY N/A 11/14/2019    Procedure: ESOPHAGOGASTRODUODENOSCOPY;  Surgeon: Joao Bruce MD;  Location: [facility] ENDOSCOPY;  Service: Gastroenterology   • HYSTERECTOMY  2007   • THYROIDECTOMY Right 11/04/2021    Procedure: RIGHT LOBE THYROIDECTOMY and ISTHMUSECTOMY;  Surgeon: Benjamin Renteria MD;  Location: [facility] OR;  Service: General;  Laterality: Right;   • THYROIDECTOMY Left 02/24/2022    Procedure: LEFT THYROIDECTOMY;  Surgeon: Benjamin Renteria MD;  Location: [facility] OR;  Service: General;  Laterality: Left;   • TOTAL ABDOMINAL HYSTERECTOMY WITH SALPINGO OOPHORECTOMY  2007   • UPPER GASTROINTESTINAL ENDOSCOPY  11/14/2019   • US GUIDED FINE NEEDLE ASPIRATION  09/01/2021   • WISDOM TOOTH EXTRACTION       Family History   Problem Relation Age of Onset   • Breast cancer Mother    • Thyroid disease Mother    • Hypertension Mother    • Cancer Mother         Breast cancer   • Hypertension Father    • Diabetes Father    • Kidney disease Father    • Heart disease Father    • No Known Problems Sister    • No Known Problems Brother    • Seizures Daughter    • Hypertension Daughter    • COPD Maternal Grandmother    • Asthma Maternal Grandmother    • No Known Problems Sister    • ADD / ADHD Daughter    • Polymyositis Daughter      OB History    No obstetric history on file.       Allergies   Allergen Reactions   • Contrave [Naltrexone-Bupropion Hcl Er] Dizziness     Social History     Socioeconomic History   • Marital status:    Tobacco Use   • Smoking status: Former Smoker     Packs/day: 0.50     Years: 5.00     Pack years: 2.50      "Types: Cigarettes     Quit date: 1995     Years since quittin.6   • Smokeless tobacco: Never Used   • Tobacco comment: passive at work   Vaping Use   • Vaping Use: Never used   • Passive vaping exposure: Yes   Substance and Sexual Activity   • Alcohol use: Not Currently     Comment: none in 2 years   • Drug use: Never   • Sexual activity: Defer     Current Medications:  Prior to Admission medications    Medication Sig Start Date End Date Taking? Authorizing Provider   famotidine (Pepcid) 40 MG tablet Take 1 tablet by mouth 2 (Two) Times a Day As Needed for Indigestion or Heartburn. 3/31/22  Yes Quinten Landry PA-C   levothyroxine (Synthroid) 112 MCG tablet Take 1 tablet by mouth Daily. 22 Yes Rajendra Baires APRN   riFAXIMin (XIFAXAN) 550 MG tablet Take 1 tablet by mouth 3 (Three) Times a Day. 22  Yes Quinten Landry PA-C   hyoscyamine (LEVSIN) 0.125 MG SL tablet  22   Provider, MD Darryn   meloxicam (Mobic) 7.5 MG tablet Take 1 tablet by mouth Daily for 30 days. 22  Krissy Barlow APRN   pravastatin (PRAVACHOL) 10 MG tablet Take 1 tablet by mouth Every Night. 3/8/22   Aldair Esteves APRN     Review of Systems  Review of Systems       Objective    /67   Pulse 68   Temp 97.4 °F (36.3 °C) (Temporal)   Ht 165.1 cm (65\")   Wt 116 kg (256 lb 3.2 oz)   BMI 42.63 kg/m²   Physical Exam  Vitals and nursing note reviewed.   Constitutional:       General: She is not in acute distress.     Appearance: She is well-developed. She is obese.   HENT:      Head: Normocephalic and atraumatic.   Eyes:      Pupils: Pupils are equal, round, and reactive to light.   Cardiovascular:      Rate and Rhythm: Normal rate and regular rhythm.      Heart sounds: Normal heart sounds.   Pulmonary:      Effort: Pulmonary effort is normal.      Breath sounds: Normal breath sounds.   Abdominal:      General: Bowel sounds are normal. There is no distension or abdominal " bruit.      Palpations: Abdomen is soft. Abdomen is not rigid. There is no shifting dullness or mass.      Tenderness: There is abdominal tenderness. There is no guarding or rebound.      Hernia: No hernia is present. There is no hernia in the ventral area.   Musculoskeletal:         General: Normal range of motion.      Cervical back: Normal range of motion.   Skin:     General: Skin is warm and dry.   Neurological:      Mental Status: She is alert and oriented to person, place, and time.   Psychiatric:         Behavior: Behavior normal.         Thought Content: Thought content normal.         Judgment: Judgment normal.       Assessment & Plan      1. Fatty liver    2. Gastroesophageal reflux disease with esophagitis without hemorrhage    3. Elevated LFTs    4. Irritable bowel syndrome with both constipation and diarrhea    .   Diagnoses and all orders for this visit:    1. Fatty liver (Primary)    2. Gastroesophageal reflux disease with esophagitis without hemorrhage    3. Elevated LFTs    4. Irritable bowel syndrome with both constipation and diarrhea    Other orders  -     Discontinue: hyoscyamine (ANASPAZ,LEVSIN) 0.125 MG tablet; Take 1 tablet by mouth Every 4 (Four) Hours As Needed for Cramping (abd pain).  Dispense: 120 tablet; Refill: 1        Orders placed during this encounter include:  No orders of the defined types were placed in this encounter.      Medications prescribed:  No orders of the defined types were placed in this encounter.      Requested Prescriptions      No prescriptions requested or ordered in this encounter       Review and/or summary of lab tests, radiology, procedures, medications. Review and summary of old records and obtaining of history. The risks and benefits of my recommendations, as well as other treatment options were discussed with the patient today. Questions were answered.    Follow-up: Return in about 6 weeks (around 9/26/2022), or if symptoms worsen or fail to improve.      * Surgery not found *      This document has been electronically signed by Quinten Landry PA-C on August 26, 2022 13:29 CDT      Results for orders placed or performed in visit on 07/18/22   Thyroglobulin By KATHRYN    Specimen: Blood   Result Value Ref Range    THYROGLOBULIN BY KATHRYN 1.0 (L) 1.5 - 38.5 ng/mL   Thyroglobulin With Anti-TG    Specimen: Blood   Result Value Ref Range    Thyroglobulin Ab <1.0 0.0 - 0.9 IU/mL   CBC Auto Differential    Specimen: Blood   Result Value Ref Range    WBC 5.75 3.40 - 10.80 10*3/mm3    RBC 4.18 3.77 - 5.28 10*6/mm3    Hemoglobin 12.7 12.0 - 15.9 g/dL    Hematocrit 39.4 34.0 - 46.6 %    MCV 94.3 79.0 - 97.0 fL    MCH 30.4 26.6 - 33.0 pg    MCHC 32.2 31.5 - 35.7 g/dL    RDW 12.4 12.3 - 15.4 %    RDW-SD 43.1 37.0 - 54.0 fl    MPV 10.5 6.0 - 12.0 fL    Platelets 283 140 - 450 10*3/mm3    Neutrophil % 64.4 42.7 - 76.0 %    Lymphocyte % 26.6 19.6 - 45.3 %    Monocyte % 6.4 5.0 - 12.0 %    Eosinophil % 1.6 0.3 - 6.2 %    Basophil % 0.7 0.0 - 1.5 %    Immature Grans % 0.3 0.0 - 0.5 %    Neutrophils, Absolute 3.70 1.70 - 7.00 10*3/mm3    Lymphocytes, Absolute 1.53 0.70 - 3.10 10*3/mm3    Monocytes, Absolute 0.37 0.10 - 0.90 10*3/mm3    Eosinophils, Absolute 0.09 0.00 - 0.40 10*3/mm3    Basophils, Absolute 0.04 0.00 - 0.20 10*3/mm3    Immature Grans, Absolute 0.02 0.00 - 0.05 10*3/mm3    nRBC 0.0 0.0 - 0.2 /100 WBC   Vitamin D 25 Hydroxy    Specimen: Blood   Result Value Ref Range    25 Hydroxy, Vitamin D 34.6 30.0 - 100.0 ng/ml   TSH    Specimen: Blood   Result Value Ref Range    TSH 1.700 0.270 - 4.200 uIU/mL   Hemoglobin A1c    Specimen: Blood   Result Value Ref Range    Hemoglobin A1C 5.20 4.80 - 5.60 %   Comprehensive Metabolic Panel    Specimen: Blood   Result Value Ref Range    Glucose 92 65 - 99 mg/dL    BUN 11 6 - 20 mg/dL    Creatinine 0.90 0.57 - 1.00 mg/dL    Sodium 142 136 - 145 mmol/L    Potassium 4.0 3.5 - 5.2 mmol/L    Chloride 106 98 - 107 mmol/L    CO2 30.0 (H) 22.0 -  29.0 mmol/L    Calcium 8.8 8.6 - 10.5 mg/dL    Total Protein 6.8 6.0 - 8.5 g/dL    Albumin 3.70 3.50 - 5.20 g/dL    ALT (SGPT) 13 1 - 33 U/L    AST (SGOT) 15 1 - 32 U/L    Alkaline Phosphatase 114 39 - 117 U/L    Total Bilirubin 0.2 0.0 - 1.2 mg/dL    Globulin 3.1 gm/dL    A/G Ratio 1.2 g/dL    BUN/Creatinine Ratio 12.2 7.0 - 25.0    Anion Gap 6.0 5.0 - 15.0 mmol/L    eGFR 77.6 >60.0 mL/min/1.73   Results for orders placed or performed in visit on 05/09/22   Trypsin    Specimen: Blood   Result Value Ref Range    Trypsin 559 169 - 773 ng/mL   Results for orders placed or performed in visit on 04/25/22   Holter Monitor - 72 Hour Up To 15 Days   Result Value Ref Range    Target HR (85%) 144 bpm    Max. Pred. HR (100%) 169 bpm   Results for orders placed or performed in visit on 04/22/22   TSH    Specimen: Blood   Result Value Ref Range    TSH 1.690 0.270 - 4.200 uIU/mL   Results for orders placed or performed in visit on 03/31/22   Trypsin    Specimen: Blood   Result Value Ref Range    Trypsin     Lipase    Specimen: Blood   Result Value Ref Range    Lipase 44 13 - 60 U/L   Gastrin    Specimen: Blood   Result Value Ref Range    Gastrin 20 0 - 115 pg/mL   Amylase    Specimen: Blood   Result Value Ref Range    Amylase 42 28 - 100 U/L   Results for orders placed or performed in visit on 03/31/22   Thyroglobulin By KATHRYN    Specimen: Blood   Result Value Ref Range    THYROGLOBULIN BY KATHRYN 5.7 1.5 - 38.5 ng/mL   Thyroglobulin By KATHRYN    Specimen: Blood   Result Value Ref Range    THYROGLOBULIN BY KATHRYN 5.4 1.5 - 38.5 ng/mL   Thyroglobulin Antibody and Thyroglobulin, KATHRYN or LC/MS-MS    Specimen: Blood   Result Value Ref Range    Thyroglobulin Ab <1.0 0.0 - 0.9 IU/mL   Thyrogloblin by LCMS    Specimen: Blood   Result Value Ref Range    Thyroglobulin 4.6 1.5 - 38.5 ng/mL   Thyroglobulin With Anti-TG    Specimen: Blood   Result Value Ref Range    Thyroglobulin Ab <1.0 0.0 - 0.9 IU/mL   CBC Auto Differential    Specimen: Blood    Result Value Ref Range    WBC 5.92 3.40 - 10.80 10*3/mm3    RBC 4.23 3.77 - 5.28 10*6/mm3    Hemoglobin 13.1 12.0 - 15.9 g/dL    Hematocrit 39.9 34.0 - 46.6 %    MCV 94.3 79.0 - 97.0 fL    MCH 31.0 26.6 - 33.0 pg    MCHC 32.8 31.5 - 35.7 g/dL    RDW 12.3 12.3 - 15.4 %    RDW-SD 42.3 37.0 - 54.0 fl    MPV 10.5 6.0 - 12.0 fL    Platelets 281 140 - 450 10*3/mm3    Neutrophil % 63.9 42.7 - 76.0 %    Lymphocyte % 28.0 19.6 - 45.3 %    Monocyte % 5.9 5.0 - 12.0 %    Eosinophil % 1.4 0.3 - 6.2 %    Basophil % 0.5 0.0 - 1.5 %    Immature Grans % 0.3 0.0 - 0.5 %    Neutrophils, Absolute 3.78 1.70 - 7.00 10*3/mm3    Lymphocytes, Absolute 1.66 0.70 - 3.10 10*3/mm3    Monocytes, Absolute 0.35 0.10 - 0.90 10*3/mm3    Eosinophils, Absolute 0.08 0.00 - 0.40 10*3/mm3    Basophils, Absolute 0.03 0.00 - 0.20 10*3/mm3    Immature Grans, Absolute 0.02 0.00 - 0.05 10*3/mm3    nRBC 0.0 0.0 - 0.2 /100 WBC   TSH    Specimen: Blood   Result Value Ref Range    TSH 4.230 (H) 0.270 - 4.200 uIU/mL   Hemoglobin A1c    Specimen: Blood   Result Value Ref Range    Hemoglobin A1C 5.20 4.80 - 5.60 %   Comprehensive Metabolic Panel    Specimen: Blood   Result Value Ref Range    Glucose 89 65 - 99 mg/dL    BUN 15 6 - 20 mg/dL    Creatinine 0.89 0.57 - 1.00 mg/dL    Sodium 139 136 - 145 mmol/L    Potassium 4.2 3.5 - 5.2 mmol/L    Chloride 105 98 - 107 mmol/L    CO2 27.3 22.0 - 29.0 mmol/L    Calcium 8.9 8.6 - 10.5 mg/dL    Total Protein 6.8 6.0 - 8.5 g/dL    Albumin 4.10 3.50 - 5.20 g/dL    ALT (SGPT) 17 1 - 33 U/L    AST (SGOT) 17 1 - 32 U/L    Alkaline Phosphatase 109 39 - 117 U/L    Total Bilirubin 0.3 0.0 - 1.2 mg/dL    Globulin 2.7 gm/dL    A/G Ratio 1.5 g/dL    BUN/Creatinine Ratio 16.9 7.0 - 25.0    Anion Gap 6.7 5.0 - 15.0 mmol/L    eGFR 78.6 >60.0 mL/min/1.73     *Note: Due to a large number of results and/or encounters for the requested time period, some results have not been displayed. A complete set of results can be found in Results  Review.

## 2022-08-19 RX ORDER — FAMOTIDINE 40 MG/1
40 TABLET, FILM COATED ORAL 2 TIMES DAILY PRN
Qty: 60 TABLET | Refills: 1 | Status: SHIPPED | OUTPATIENT
Start: 2022-08-19 | End: 2022-12-02

## 2022-08-25 ENCOUNTER — OFFICE VISIT (OUTPATIENT)
Dept: ORTHOPEDIC SURGERY | Facility: CLINIC | Age: 52
End: 2022-08-25

## 2022-08-25 VITALS — WEIGHT: 253 LBS | BODY MASS INDEX: 42.15 KG/M2 | HEIGHT: 65 IN

## 2022-08-25 DIAGNOSIS — M23.92 INTERNAL DERANGEMENT OF LEFT KNEE: Primary | ICD-10-CM

## 2022-08-25 DIAGNOSIS — E66.01 MORBID OBESITY WITH BMI OF 40.0-44.9, ADULT: ICD-10-CM

## 2022-08-25 DIAGNOSIS — G89.29 CHRONIC PAIN OF LEFT KNEE: ICD-10-CM

## 2022-08-25 DIAGNOSIS — M25.562 CHRONIC PAIN OF LEFT KNEE: ICD-10-CM

## 2022-08-25 DIAGNOSIS — S83.242A ACUTE MEDIAL MENISCUS TEAR OF LEFT KNEE, INITIAL ENCOUNTER: ICD-10-CM

## 2022-08-25 PROCEDURE — 20610 DRAIN/INJ JOINT/BURSA W/O US: CPT | Performed by: NURSE PRACTITIONER

## 2022-08-25 PROCEDURE — 99212 OFFICE O/P EST SF 10 MIN: CPT | Performed by: NURSE PRACTITIONER

## 2022-08-25 RX ORDER — BUPIVACAINE HYDROCHLORIDE 5 MG/ML
2 INJECTION, SOLUTION PERINEURAL
Status: COMPLETED | OUTPATIENT
Start: 2022-08-25 | End: 2022-08-25

## 2022-08-25 RX ORDER — TRIAMCINOLONE ACETONIDE 40 MG/ML
40 INJECTION, SUSPENSION INTRA-ARTICULAR; INTRAMUSCULAR
Status: COMPLETED | OUTPATIENT
Start: 2022-08-25 | End: 2022-08-25

## 2022-08-25 RX ADMIN — TRIAMCINOLONE ACETONIDE 40 MG: 40 INJECTION, SUSPENSION INTRA-ARTICULAR; INTRAMUSCULAR at 08:24

## 2022-08-25 RX ADMIN — BUPIVACAINE HYDROCHLORIDE 2 ML: 5 INJECTION, SOLUTION PERINEURAL at 08:24

## 2022-08-25 NOTE — PROGRESS NOTES
"Letty Bach is a 52 y.o. female returns for     Chief Complaint   Patient presents with   • Left Knee - Follow-up       HISTORY OF PRESENT ILLNESS: This 52-year-old female patient presents today for a follow-up for left knee pain.  This patient has seen myself and Dr. Amos both in the past.  The patient recently had an MRI of her left knee the MRI showed decreased joint space with a horizontal tear at the left medial meniscus posterior horn as well as bone bruising at the medial compartment of the left knee.  The patient was recommended to go to [facility] for a knee brace but reported her insurance did not cover it so she never got the brace.  The patient reports her pain has improved.  The patient reports her pain is 2 out of 10 this visit.  Patient reports her pain is worse with certain activities such as turning or pivoting a certain way.  Patient denies numbness or tingling.  Patient denies fever or chills.  Patient denies repeat injury or new complaint.       CONCURRENT MEDICAL HISTORY:    The following portions of the patient's history were reviewed and updated as appropriate: allergies, current medications, past family history, past medical history, past social history, past surgical history and problem list.     ROS  No fevers or chills.  No chest pain or shortness of air.  No GI or  disturbances.    PHYSICAL EXAMINATION:       Ht 165.1 cm (65\")   Wt 115 kg (253 lb)   BMI 42.10 kg/m²     Physical Exam    GAIT:     []  Normal  [x]  Antalgic    Assistive device: [x]  None  []  Walker     []  Crutches  []  Cane     []  Wheelchair  []  Stretcher    Ortho Exam      PROCEDURE: MR KNEE WITHOUT IV CONTRAST     INDICATION: Left knee pain     COMPARISON: Plain films     FINDINGS:  Multiplanar, multisequence MR images of left knee are provided.   No contrast is given.  Horizontal tear at posterior horn of the medial meniscus is seen.  Bone bruises are also present at medial femoral condyle " and  tibial plateau at medial compartment of the knee. Tiny  suprapatellar joint effusion is present with mild chondromalacia.  The ACL, PCL, collateral ligaments and extensor mechanism are  intact. Degenerative intrasubstance tear at lateral meniscus is  also seen.  Surrounding soft tissues and bone marrow signal intensities are  otherwise unremarkable. No displaced fracture or dislocation is  seen.  IMPRESSION:  Decrease joint space with horizontal tear at left medial meniscus  posterior horn and associated bone bruises at medial compartment  of the left knee especially at medial femoral epicondyle and  lesser degree at tibial plateau.     Electronically signed by:  Floyd Whitman  7/18/2022 1:20 PM CDT  Workstation: [id number]      ASSESSMENT:    Diagnoses and all orders for this visit:    Internal derangement of left knee    Chronic pain of left knee  -     Large Joint Arthrocentesis: L knee    Acute medial meniscus tear of left knee, initial encounter  -     Large Joint Arthrocentesis: L knee    Morbid obesity with BMI of 40.0-44.9, adult (HCC)          PLAN  Large Joint Arthrocentesis: L knee  Date/Time: 8/25/2022 8:24 AM  Consent given by: patient  Site marked: site marked  Timeout: Immediately prior to procedure a time out was called to verify the correct patient, procedure, equipment, support staff and site/side marked as required   Supporting Documentation  Indications: pain   Procedure Details  Location: knee - L knee  Preparation: Patient was prepped and draped in the usual sterile fashion  Needle size: 22 G  Approach: anteromedial  Medications administered: 40 mg triamcinolone acetonide 40 MG/ML; 2 mL bupivacaine 0.5 %  Patient tolerance: patient tolerated the procedure well with no immediate complications        Discussed with patient that she can find a knee brace in commercial stores, or other websites.  The patient reports she was unable to get the knee brace recommended at her last visit and has not  been wearing it.  Discussed with patient the knee brace would help decrease her range of motion and rest her knee as well as provide support for her knee.  Provided patient with the name of the knee brace and recommended to search for one on the Internet or at one of the local stores such as [employer] or "[employer].  Encourage patient to continue to modify her weightbearing activity and RICE therapy.  Encouraged to continue some home exercises to improve strength and range of motion.  Discussed with patient if her pain continues, she may follow-up for a series of intra-articular injections or make a follow-up appointment with one of the orthopedic surgeons to discuss further options.  Also discussed that healthier lifestyle choices for weight loss would help reduce the weight on her knee joint which would help improve her pain.  Patient is agreeable and wishes to proceed with an intra-articular injection this visit.  Administered a left IA injection, patient tolerated well with no immediate reaction.    Recommended to follow-up in 4 weeks or sooner as needed if symptoms change or worsen.    All questions and concerns are addressed with understanding noted. They are aware and are in agreement to this plan.    Return in about 4 weeks (around 9/22/2022), or if symptoms worsen or fail to improve.    MEL Park     This document has been electronically signed by MEL Park on August 25, 2022 09:11 CDT      EMR Dragon/Transciption Disclaimer: Some of this note may be an electronic transcription/translation of spoken language to printed text using the Dragon Dictation System.     Time spent of a minimum of 10 minutes including the face to face evaluation, reviewing of medical history and prior medial records, reviewing of diagnostic studies, ordering additional tests, documentation, patient education and coordination of care.

## 2022-09-09 ENCOUNTER — OFFICE VISIT (OUTPATIENT)
Dept: CARDIOLOGY | Facility: CLINIC | Age: 52
End: 2022-09-09

## 2022-09-09 VITALS
DIASTOLIC BLOOD PRESSURE: 82 MMHG | SYSTOLIC BLOOD PRESSURE: 140 MMHG | HEART RATE: 78 BPM | OXYGEN SATURATION: 95 % | BODY MASS INDEX: 43.32 KG/M2 | HEIGHT: 65 IN | WEIGHT: 260 LBS

## 2022-09-09 DIAGNOSIS — R00.2 PALPITATIONS: Primary | ICD-10-CM

## 2022-09-09 DIAGNOSIS — E66.01 CLASS 3 SEVERE OBESITY DUE TO EXCESS CALORIES WITH SERIOUS COMORBIDITY AND BODY MASS INDEX (BMI) OF 40.0 TO 44.9 IN ADULT: ICD-10-CM

## 2022-09-09 DIAGNOSIS — E78.00 ELEVATED LDL CHOLESTEROL LEVEL: ICD-10-CM

## 2022-09-09 DIAGNOSIS — R06.02 SHORTNESS OF BREATH: ICD-10-CM

## 2022-09-09 DIAGNOSIS — R03.0 ELEVATED BLOOD PRESSURE READING WITHOUT DIAGNOSIS OF HYPERTENSION: ICD-10-CM

## 2022-09-09 PROCEDURE — 93000 ELECTROCARDIOGRAM COMPLETE: CPT | Performed by: INTERNAL MEDICINE

## 2022-09-09 PROCEDURE — 99214 OFFICE O/P EST MOD 30 MIN: CPT | Performed by: NURSE PRACTITIONER

## 2022-09-09 RX ORDER — HYOSCYAMINE SULFATE 0.125 MG
TABLET ORAL
COMMUNITY
Start: 2022-09-04 | End: 2022-09-26

## 2022-09-09 NOTE — PROGRESS NOTES
Rapid Heart Rate      History of Present Illness    Ms. Letty Bach is a 52-year-old  female with medical history of chronic left upper quadrant pain, fatty liver, thyroid cancer status post thyroidectomy, postsurgical hypothyroidism (chronic, managed with levothyroxine), GERD (chronic, managed with Pepcid) who presents today at the request of MEL Chapman for evaluation of palpitations.  Patient reports intermittent palpitations since May 2021 described as fluttering, lasting few minutes at a time and self resolving, associated symptoms of shortness of breath.  Patient reports she will stop what she is doing and feeling her heart flopping.  Denies any clear worsening or relieving factors. She denies prior cardiac history.     EKG today in office normal sinus rhythm no acute ST-T wave changes.  Holter monitor was completed in April 2022 by PCP showing relatively benign study.  Good average heart rate.  Patient did not trigger any events.  There were 3 episodes of SVT, peak heart rate 126 bpm, longest run was 5 beats, Very short in duration.       The 10-year ASCVD risk score (Lisandra IRENE Jr., et al., 2013) is: 1.8%    Values used to calculate the score:      Age: 52 years      Sex: Female      Is Non- : No      Diabetic: No      Tobacco smoker: No      Systolic Blood Pressure: 140 mmHg      Is BP treated: No      HDL Cholesterol: 55 mg/dL      Total Cholesterol: 217 mg/dL        Past Medical History:   Diagnosis Date   • Cancer (HCC)    • Disease of thyroid gland     nodule, mass   • Elevated liver enzymes    • Fatty liver    • GERD (gastroesophageal reflux disease)    • History of medical problems 11/4/2021    Thyroid cancer   • Urinary tract infection    • Varicella      Past Surgical History:   Procedure Laterality Date   • CHOLECYSTECTOMY  06/06/2019   • CHOLECYSTECTOMY WITH INTRAOPERATIVE CHOLANGIOGRAM N/A 06/06/2019    Procedure: CHOLECYSTECTOMY LAPAROSCOPIC INTRAOPERATIVE  CHOLANGIOGRAM      (c-arm#2);  Surgeon: Felipe Overton MD;  Location: [facility] OR;  Service: General   • COLONOSCOPY N/A 2021    Procedure: COLONOSCOPY;  Surgeon: Joao Bruce MD;  Location: [facility] ENDOSCOPY;  Service: Gastroenterology;  Laterality: N/A;   • COLONOSCOPY     • ENDOSCOPY N/A 2019    Procedure: ESOPHAGOGASTRODUODENOSCOPY;  Surgeon: Joao Bruce MD;  Location: [facility] ENDOSCOPY;  Service: Gastroenterology   • HYSTERECTOMY  2007   • THYROIDECTOMY Right 2021    Procedure: RIGHT LOBE THYROIDECTOMY and ISTHMUSECTOMY;  Surgeon: Benjamin Renteria MD;  Location: [facility] OR;  Service: General;  Laterality: Right;   • THYROIDECTOMY Left 2022    Procedure: LEFT THYROIDECTOMY;  Surgeon: Benjamin Renteria MD;  Location: [facility] OR;  Service: General;  Laterality: Left;   • TOTAL ABDOMINAL HYSTERECTOMY WITH SALPINGO OOPHORECTOMY     • UPPER GASTROINTESTINAL ENDOSCOPY  2019   • US GUIDED FINE NEEDLE ASPIRATION  2021   • WISDOM TOOTH EXTRACTION       Social History     Socioeconomic History   • Marital status:    Tobacco Use   • Smoking status: Former Smoker     Packs/day: 0.50     Years: 5.00     Pack years: 2.50     Types: Cigarettes     Quit date: 1995     Years since quittin.7   • Smokeless tobacco: Never Used   • Tobacco comment: passive at work   Vaping Use   • Vaping Use: Never used   • Passive vaping exposure: Yes   Substance and Sexual Activity   • Alcohol use: Not Currently     Comment: none in 2 years   • Drug use: Never   • Sexual activity: Defer     Family History   Problem Relation Age of Onset   • Breast cancer Mother    • Thyroid disease Mother    • Hypertension Mother    • Cancer Mother         Breast cancer   • Hypertension Father    • Diabetes Father    • Kidney disease Father    • Heart disease Father    • No Known Problems Sister    • No Known Problems Brother    • Seizures Daughter    • Hypertension Daughter    • COPD Maternal Grandmother     • Asthma Maternal Grandmother    • No Known Problems Sister    • ADD / ADHD Daughter    • Polymyositis Daughter        ALLERGIES:  Allergies   Allergen Reactions   • Contrave [Naltrexone-Bupropion Hcl Er] Dizziness         Review of Systems   Constitutional: Negative for chills, fever, malaise/fatigue and weight gain.   HENT: Negative for nosebleeds and tinnitus.    Eyes: Negative for blurred vision and double vision.   Cardiovascular: Positive for leg swelling and palpitations. Negative for chest pain, irregular heartbeat and syncope.   Respiratory: Positive for shortness of breath. Negative for cough, sleep disturbances due to breathing and snoring.    Endocrine: Negative for polydipsia, polyphagia and polyuria.   Hematologic/Lymphatic: Negative for bleeding problem. Does not bruise/bleed easily.   Skin: Negative for color change and suspicious lesions.   Musculoskeletal: Negative for falls and myalgias.   Gastrointestinal: Negative for bloating, heartburn and hematochezia.   Genitourinary: Negative for dysuria and hematuria.   Neurological: Negative for dizziness, headaches, seizures, vertigo and weakness.   Psychiatric/Behavioral: Negative for altered mental status and depression. The patient does not have insomnia and is not nervous/anxious.    Allergic/Immunologic: Negative for environmental allergies and persistent infections.       Current Outpatient Medications   Medication Sig Dispense Refill   • famotidine (Pepcid) 40 MG tablet Take 1 tablet by mouth 2 (Two) Times a Day As Needed for Indigestion or Heartburn. 60 tablet 1   • hyoscyamine (ANASPAZ,LEVSIN) 0.125 MG tablet      • levothyroxine (Synthroid) 112 MCG tablet Take 1 tablet by mouth Daily. 30 tablet 11     No current facility-administered medications for this visit.       OBJECTIVE:    Physical Exam:   Vitals reviewed.   Constitutional:       General: Not in acute distress.     Appearance: Normal appearance. Obese. Not ill-appearing,  "toxic-appearing or diaphoretic.   Eyes:      General: Lids are normal.      Conjunctiva/sclera: Conjunctivae normal.   HENT:      Head: Normocephalic and atraumatic.      Right Ear: External ear normal.      Left Ear: External ear normal.   Neck:      Vascular: No JVD.   Pulmonary:      Effort: Pulmonary effort is normal. No respiratory distress.      Breath sounds: Normal breath sounds. No decreased breath sounds. No wheezing. No rales.   Chest:      Chest wall: Not tender to palpatation.   Cardiovascular:      PMI at left midclavicular line. Normal rate. Regular rhythm. Normal S1 with normal intensity. Normal S2.      Murmurs: There is no murmur.      No gallop. No S3 and S4 gallop. No click. No rub.   Pulses:     Intact distal pulses. No decreased pulses.   Edema:     Peripheral edema present.  Abdominal:      General: Bowel sounds are normal. There is no distension.      Palpations: Abdomen is soft.      Tenderness: There is no abdominal tenderness.   Musculoskeletal:      Cervical back: Normal range of motion and neck supple. Skin:     General: Skin is warm and dry.      Coloration: Skin is not pale.      Findings: No erythema or rash.   Neurological:      Mental Status: Alert and oriented to person, place, and time.      Gait: Gait normal.   Psychiatric:         Behavior: Behavior normal.         Thought Content: Thought content normal.         Judgment: Judgment normal.       Vitals:    09/09/22 1314   BP: 140/82   BP Location: Left arm   Patient Position: Sitting   Cuff Size: Adult   Pulse: 78   SpO2: 95%   Weight: 118 kg (260 lb)   Height: 165.1 cm (65\")       DATA REVIEWED:       No radiology results for the last 30 days.     Labs: BMP, CBC, LIPID, TSH  Lab Results   Component Value Date    GLUCOSE 92 07/18/2022    CALCIUM 8.8 07/18/2022     07/18/2022    K 4.0 07/18/2022    CO2 30.0 (H) 07/18/2022     07/18/2022    BUN 11 07/18/2022    CREATININE 0.90 07/18/2022    EGFRIFNONA 75 12/29/2021    " BCR 12.2 07/18/2022    ANIONGAP 6.0 07/18/2022     Lab Results   Component Value Date    WBC 5.75 07/18/2022    HGB 12.7 07/18/2022    HCT 39.4 07/18/2022    MCV 94.3 07/18/2022     07/18/2022     Lab Results   Component Value Date    CHOL 217 (H) 03/04/2022    CHOL 204 (H) 08/18/2021    CHOL 186 05/13/2021     Lab Results   Component Value Date    TRIG 123 03/04/2022    TRIG 89 09/09/2021    TRIG 87 08/18/2021     Lab Results   Component Value Date    HDL 55 03/04/2022    HDL 61 (H) 08/18/2021    HDL 58 05/13/2021     No components found for: LDLCALC  Lab Results   Component Value Date     (H) 03/04/2022     (H) 08/18/2021     (H) 05/13/2021     No results found for: HDLLDLRATIO  No components found for: CHOLHDL  Lab Results   Component Value Date    TSH 1.700 07/18/2022     No results found for: PROBNP  EKG:         HOLTER:'  Study date: 4/25/22       Interpretation Summary    · The predominant rhythm noted during the testing period was sinus rhythm. Rare PACs and rare PVCs were noted.  · There were three episodes of supraventricular tachycardia. The peak heart rate was 126 beats per minute. Longest run of SVT was 5 beats long. No symptoms were reported during these episodes.  · Sinus tachycardia was noted during two patient triggered events with no reported symptoms.          The following portions of the patient's history were reviewed and updated as appropriate: allergies, current medications, past family history, past medical history, past social history, past surgical history and problem list.  Old records reviewed and pertinent information is included in the above objective data.     ASSESSMENT/PLAN:       Diagnosis Plan   1. Palpitations  ECG 12 Lead   2. Shortness of breath  Adult Transthoracic Echo Complete w/ Color, Spectral and Contrast if Necessary Per Protocol   3. Elevated blood pressure reading without diagnosis of hypertension     4. Elevated LDL cholesterol level     5.  Class 3 severe obesity due to excess calories with serious comorbidity and body mass index (BMI) of 40.0 to 44.9 in adult ([facility])         #1 and #2.   Possible paroxysmal supraventricular tachycardia. Benign holter monitor previously. Discussed keeping a diary to log symptoms.   Discussed the benign nature of the majority of causes of palpitations.   Discussed lifestyle modifications including reducing caffeine intake, reducing stress, and increasing exercise tolerance.  Reassurance given to patient.    TTE to evaluate for structural heart disease   Holter monitor and labs reviewed today in office.    #3.  Elevated blood pressure in office today without the diagnosis of hypertension: 140/82 today.  Patient mentions that PCP is keeping an eye on this    #4.  Elevated LDL: Patient admits to not taking her cholesterol pill    Class 3 Severe Obesity (BMI >=40). Obesity-related health conditions include the following: dyslipidemias and GERD. Obesity is unchanged. BMI is is above average; BMI management plan is completed. We discussed portion control and increasing exercise.      Follow up: 1 month after echo        I spent 26 minutes caring for Letty on this date of service. This time includes time spent by me in the following activities: preparing for the visit, reviewing tests, obtaining and/or reviewing a separately obtained history, performing a medically appropriate examination and/or evaluation, counseling and educating the patient/family/caregiver, ordering medications, tests, or procedures, documenting information in the medical record and independently interpreting results and communicating that information with the patient/family/caregiver            This document has been electronically signed by MEL John on September 9, 2022 14:00 CDT

## 2022-09-12 DIAGNOSIS — E78.00 ELEVATED LDL CHOLESTEROL LEVEL: ICD-10-CM

## 2022-09-12 RX ORDER — PRAVASTATIN SODIUM 10 MG
TABLET ORAL
Qty: 90 TABLET | Refills: 2 | Status: SHIPPED | OUTPATIENT
Start: 2022-09-12

## 2022-09-15 DIAGNOSIS — Z78.0 ENCOUNTER FOR OSTEOPOROSIS SCREENING IN ASYMPTOMATIC POSTMENOPAUSAL PATIENT: Primary | ICD-10-CM

## 2022-09-15 DIAGNOSIS — Z13.820 OSTEOPOROSIS SCREENING: ICD-10-CM

## 2022-09-15 DIAGNOSIS — Z13.820 ENCOUNTER FOR OSTEOPOROSIS SCREENING IN ASYMPTOMATIC POSTMENOPAUSAL PATIENT: Primary | ICD-10-CM

## 2022-09-17 LAB
QT INTERVAL: 384 MS
QTC INTERVAL: 437 MS

## 2022-09-26 RX ORDER — HYOSCYAMINE SULFATE 0.125 MG
TABLET ORAL
Qty: 120 TABLET | Refills: 0 | Status: SHIPPED | OUTPATIENT
Start: 2022-09-26 | End: 2022-12-02

## 2022-09-29 ENCOUNTER — OFFICE VISIT (OUTPATIENT)
Dept: GASTROENTEROLOGY | Facility: CLINIC | Age: 52
End: 2022-09-29

## 2022-09-29 VITALS
HEIGHT: 65 IN | BODY MASS INDEX: 43.78 KG/M2 | DIASTOLIC BLOOD PRESSURE: 68 MMHG | WEIGHT: 262.8 LBS | HEART RATE: 62 BPM | SYSTOLIC BLOOD PRESSURE: 122 MMHG | OXYGEN SATURATION: 98 %

## 2022-09-29 DIAGNOSIS — R79.89 ELEVATED LFTS: ICD-10-CM

## 2022-09-29 DIAGNOSIS — K58.2 IRRITABLE BOWEL SYNDROME WITH BOTH CONSTIPATION AND DIARRHEA: ICD-10-CM

## 2022-09-29 DIAGNOSIS — K21.00 GASTROESOPHAGEAL REFLUX DISEASE WITH ESOPHAGITIS WITHOUT HEMORRHAGE: ICD-10-CM

## 2022-09-29 DIAGNOSIS — E04.1 THYROID NODULE: ICD-10-CM

## 2022-09-29 DIAGNOSIS — K76.0 FATTY LIVER: Primary | ICD-10-CM

## 2022-09-29 PROCEDURE — 99214 OFFICE O/P EST MOD 30 MIN: CPT | Performed by: PHYSICIAN ASSISTANT

## 2022-09-29 RX ORDER — MIRTAZAPINE 15 MG/1
15 TABLET, FILM COATED ORAL NIGHTLY
Qty: 30 TABLET | Refills: 2 | Status: SHIPPED | OUTPATIENT
Start: 2022-09-29 | End: 2022-11-02 | Stop reason: SINTOL

## 2022-09-30 ENCOUNTER — TELEPHONE (OUTPATIENT)
Dept: ORTHOPEDIC SURGERY | Facility: CLINIC | Age: 52
End: 2022-09-30

## 2022-09-30 NOTE — TELEPHONE ENCOUNTER
----- Message from MEL Park sent at 9/30/2022 10:12 AM CDT -----  Please notify her bone density if normal and she will need to follow up in 2 years for a repeat DEXA scan. Also, I would recommend she take a OTC caltrate with vitamin D per fracture label directions.  If she has any questions, she may schedule a follow-up appointment with bone health to discuss further.    Marily Garsia  ----- Message -----  From: [facility] [place] [facility]  Sent: 9/29/2022  10:57 PM CDT  To: MEL Park

## 2022-10-18 DIAGNOSIS — R73.01 IMPAIRED FASTING GLUCOSE: ICD-10-CM

## 2022-10-18 DIAGNOSIS — E89.0 POSTSURGICAL HYPOTHYROIDISM: ICD-10-CM

## 2022-10-18 DIAGNOSIS — C73 THYROID CANCER: Primary | ICD-10-CM

## 2022-10-21 ENCOUNTER — HOSPITAL ENCOUNTER (OUTPATIENT)
Dept: ULTRASOUND IMAGING | Facility: HOSPITAL | Age: 52
Discharge: HOME | End: 2022-10-21

## 2022-10-21 ENCOUNTER — LAB (OUTPATIENT)
Dept: LAB | Facility: HOSPITAL | Age: 52
End: 2022-10-21

## 2022-10-21 LAB
25(OH)D3 SERPL-MCNC: 33.1 NG/ML (ref 30–100)
ALBUMIN SERPL-MCNC: 4.1 G/DL (ref 3.5–5.2)
ALBUMIN/GLOB SERPL: 1.3 G/DL
ALP SERPL-CCNC: 124 U/L (ref 39–117)
ALT SERPL W P-5'-P-CCNC: 15 U/L (ref 1–33)
ANION GAP SERPL CALCULATED.3IONS-SCNC: 13 MMOL/L (ref 5–15)
AST SERPL-CCNC: 18 U/L (ref 1–32)
BASOPHILS # BLD AUTO: 0.04 10*3/MM3 (ref 0–0.2)
BASOPHILS NFR BLD AUTO: 0.6 % (ref 0–1.5)
BILIRUB SERPL-MCNC: 0.4 MG/DL (ref 0–1.2)
BUN SERPL-MCNC: 11 MG/DL (ref 6–20)
BUN/CREAT SERPL: 11.7 (ref 7–25)
CALCIUM SPEC-SCNC: 9.5 MG/DL (ref 8.6–10.5)
CHLORIDE SERPL-SCNC: 102 MMOL/L (ref 98–107)
CO2 SERPL-SCNC: 25 MMOL/L (ref 22–29)
CREAT SERPL-MCNC: 0.94 MG/DL (ref 0.57–1)
DEPRECATED RDW RBC AUTO: 41 FL (ref 37–54)
EGFRCR SERPLBLD CKD-EPI 2021: 73.6 ML/MIN/1.73
EOSINOPHIL # BLD AUTO: 0.09 10*3/MM3 (ref 0–0.4)
EOSINOPHIL NFR BLD AUTO: 1.4 % (ref 0.3–6.2)
ERYTHROCYTE [DISTWIDTH] IN BLOOD BY AUTOMATED COUNT: 12.3 % (ref 12.3–15.4)
GLOBULIN UR ELPH-MCNC: 3.1 GM/DL
GLUCOSE SERPL-MCNC: 89 MG/DL (ref 65–99)
HBA1C MFR BLD: 5.5 % (ref 4.8–5.6)
HCT VFR BLD AUTO: 40.9 % (ref 34–46.6)
HGB BLD-MCNC: 13.1 G/DL (ref 12–15.9)
IMM GRANULOCYTES # BLD AUTO: 0.02 10*3/MM3 (ref 0–0.05)
IMM GRANULOCYTES NFR BLD AUTO: 0.3 % (ref 0–0.5)
INR PPP: 0.96 (ref 0.8–1.2)
LYMPHOCYTES # BLD AUTO: 1.69 10*3/MM3 (ref 0.7–3.1)
LYMPHOCYTES NFR BLD AUTO: 25.4 % (ref 19.6–45.3)
MCH RBC QN AUTO: 29.3 PG (ref 26.6–33)
MCHC RBC AUTO-ENTMCNC: 32 G/DL (ref 31.5–35.7)
MCV RBC AUTO: 91.5 FL (ref 79–97)
MONOCYTES # BLD AUTO: 0.41 10*3/MM3 (ref 0.1–0.9)
MONOCYTES NFR BLD AUTO: 6.2 % (ref 5–12)
NEUTROPHILS NFR BLD AUTO: 4.41 10*3/MM3 (ref 1.7–7)
NEUTROPHILS NFR BLD AUTO: 66.1 % (ref 42.7–76)
NRBC BLD AUTO-RTO: 0 /100 WBC (ref 0–0.2)
PLATELET # BLD AUTO: 310 10*3/MM3 (ref 140–450)
PMV BLD AUTO: 10.2 FL (ref 6–12)
POTASSIUM SERPL-SCNC: 4.3 MMOL/L (ref 3.5–5.2)
PROT SERPL-MCNC: 7.2 G/DL (ref 6–8.5)
PROTHROMBIN TIME: 12.7 SECONDS (ref 11.1–15.3)
RBC # BLD AUTO: 4.47 10*6/MM3 (ref 3.77–5.28)
SODIUM SERPL-SCNC: 140 MMOL/L (ref 136–145)
T3FREE SERPL-MCNC: 2.97 PG/ML (ref 2–4.4)
T4 FREE SERPL-MCNC: 2.02 NG/DL (ref 0.93–1.7)
TSH SERPL DL<=0.05 MIU/L-ACNC: 0.34 UIU/ML (ref 0.27–4.2)
WBC NRBC COR # BLD: 6.66 10*3/MM3 (ref 3.4–10.8)

## 2022-10-21 PROCEDURE — 84432 ASSAY OF THYROGLOBULIN: CPT | Performed by: NURSE PRACTITIONER

## 2022-10-21 PROCEDURE — 82306 VITAMIN D 25 HYDROXY: CPT | Performed by: NURSE PRACTITIONER

## 2022-10-21 PROCEDURE — 86800 THYROGLOBULIN ANTIBODY: CPT | Performed by: NURSE PRACTITIONER

## 2022-10-21 PROCEDURE — 85610 PROTHROMBIN TIME: CPT | Performed by: PHYSICIAN ASSISTANT

## 2022-10-21 PROCEDURE — 83036 HEMOGLOBIN GLYCOSYLATED A1C: CPT | Performed by: NURSE PRACTITIONER

## 2022-10-21 PROCEDURE — 82465 ASSAY BLD/SERUM CHOLESTEROL: CPT | Performed by: NURSE PRACTITIONER

## 2022-10-21 PROCEDURE — 82977 ASSAY OF GGT: CPT | Performed by: NURSE PRACTITIONER

## 2022-10-21 PROCEDURE — 80053 COMPREHEN METABOLIC PANEL: CPT | Performed by: NURSE PRACTITIONER

## 2022-10-21 PROCEDURE — 84443 ASSAY THYROID STIM HORMONE: CPT | Performed by: PHYSICIAN ASSISTANT

## 2022-10-21 PROCEDURE — 84481 FREE ASSAY (FT-3): CPT | Performed by: PHYSICIAN ASSISTANT

## 2022-10-21 PROCEDURE — 82172 ASSAY OF APOLIPOPROTEIN: CPT | Performed by: NURSE PRACTITIONER

## 2022-10-21 PROCEDURE — 83010 ASSAY OF HAPTOGLOBIN QUANT: CPT | Performed by: NURSE PRACTITIONER

## 2022-10-21 PROCEDURE — 76536 US EXAM OF HEAD AND NECK: CPT

## 2022-10-21 PROCEDURE — 84439 ASSAY OF FREE THYROXINE: CPT | Performed by: PHYSICIAN ASSISTANT

## 2022-10-21 PROCEDURE — 36415 COLL VENOUS BLD VENIPUNCTURE: CPT | Performed by: NURSE PRACTITIONER

## 2022-10-21 PROCEDURE — 85025 COMPLETE CBC W/AUTO DIFF WBC: CPT | Performed by: NURSE PRACTITIONER

## 2022-10-21 PROCEDURE — 83883 ASSAY NEPHELOMETRY NOT SPEC: CPT | Performed by: NURSE PRACTITIONER

## 2022-10-21 PROCEDURE — 84478 ASSAY OF TRIGLYCERIDES: CPT | Performed by: NURSE PRACTITIONER

## 2022-10-24 ENCOUNTER — TELEMEDICINE (OUTPATIENT)
Dept: ENDOCRINOLOGY | Facility: CLINIC | Age: 52
End: 2022-10-24

## 2022-10-24 DIAGNOSIS — E89.0 POSTSURGICAL HYPOTHYROIDISM: Primary | ICD-10-CM

## 2022-10-24 DIAGNOSIS — C73 THYROID CANCER: ICD-10-CM

## 2022-10-24 DIAGNOSIS — E55.9 VITAMIN D DEFICIENCY: ICD-10-CM

## 2022-10-24 DIAGNOSIS — R73.01 IMPAIRED FASTING GLUCOSE: ICD-10-CM

## 2022-10-24 LAB
THYROGLOB AB SERPL-ACNC: <1 IU/ML (ref 0–0.9)
THYROGLOB SERPL-MCNC: 1.1 NG/ML (ref 1.5–38.5)

## 2022-10-24 PROCEDURE — 99214 OFFICE O/P EST MOD 30 MIN: CPT | Performed by: NURSE PRACTITIONER

## 2022-10-25 ENCOUNTER — TELEPHONE (OUTPATIENT)
Dept: ENDOCRINOLOGY | Facility: CLINIC | Age: 52
End: 2022-10-25

## 2022-10-25 LAB
A2 MACROGLOB SERPL-MCNC: 124 MG/DL (ref 110–276)
ALT SERPL W P-5'-P-CCNC: 18 IU/L (ref 0–40)
APO A-I SERPL-MCNC: 157 MG/DL (ref 116–209)
AST SERPL W P-5'-P-CCNC: 19 IU/L (ref 0–40)
BILIRUB SERPL-MCNC: 0.3 MG/DL (ref 0–1.2)
CHOLEST SERPL-MCNC: 215 MG/DL (ref 100–199)
FIBROSIS SCORING:: ABNORMAL
FIBROSIS STAGE SERPL QL: ABNORMAL
GGT SERPL-CCNC: 8 IU/L (ref 0–60)
GLUCOSE SERPL-MCNC: 90 MG/DL (ref 70–99)
HAPTOGLOB SERPL-MCNC: 239 MG/DL (ref 33–346)
INTERPRETATIONS: (REFERENCE): ABNORMAL
LABORATORY COMMENT REPORT: ABNORMAL
LIVER FIBR SCORE SERPL CALC.FIBROSURE: 0.02 (ref 0–0.21)
NASH SCORING (REFERENCE): ABNORMAL
NECROINFLAMMATORY ACT GRADE SERPL QL: ABNORMAL
NECROINFLAMMATORY ACT SCORE SERPL: 0.5
SERVICE CMNT-IMP: ABNORMAL
STEATOSIS GRADE (REFERENCE): ABNORMAL
STEATOSIS GRADING (REFERENCE): ABNORMAL
STEATOSIS SCORE (REFERENCE): 0.39 (ref 0–0.3)
TRIGL SERPL-MCNC: 89 MG/DL (ref 0–149)

## 2022-10-25 NOTE — TELEPHONE ENCOUNTER
----- Message from MEL Brown sent at 10/25/2022  8:04 AM CDT -----  Let her know the tissue marker was 1.1 we want it to be 3 or less so no change in thyroid dosage

## 2022-11-02 RX ORDER — DULOXETIN HYDROCHLORIDE 20 MG/1
20 CAPSULE, DELAYED RELEASE ORAL DAILY
Qty: 30 CAPSULE | Refills: 1 | Status: SHIPPED | OUTPATIENT
Start: 2022-11-02 | End: 2022-12-02

## 2022-11-18 ENCOUNTER — OFFICE VISIT (OUTPATIENT)
Dept: CARDIOLOGY | Facility: CLINIC | Age: 52
End: 2022-11-18

## 2022-11-18 VITALS
HEIGHT: 65 IN | WEIGHT: 261.4 LBS | SYSTOLIC BLOOD PRESSURE: 118 MMHG | HEART RATE: 64 BPM | BODY MASS INDEX: 43.55 KG/M2 | OXYGEN SATURATION: 98 % | DIASTOLIC BLOOD PRESSURE: 82 MMHG

## 2022-11-18 DIAGNOSIS — R00.2 PALPITATIONS: ICD-10-CM

## 2022-11-18 DIAGNOSIS — R60.0 EDEMA LEG: ICD-10-CM

## 2022-11-18 DIAGNOSIS — E78.00 ELEVATED LDL CHOLESTEROL LEVEL: ICD-10-CM

## 2022-11-18 DIAGNOSIS — I47.1 PAROXYSMAL SVT (SUPRAVENTRICULAR TACHYCARDIA): Primary | ICD-10-CM

## 2022-11-18 DIAGNOSIS — E66.01 CLASS 3 SEVERE OBESITY DUE TO EXCESS CALORIES WITH SERIOUS COMORBIDITY AND BODY MASS INDEX (BMI) OF 40.0 TO 44.9 IN ADULT: ICD-10-CM

## 2022-11-18 PROCEDURE — 99213 OFFICE O/P EST LOW 20 MIN: CPT | Performed by: NURSE PRACTITIONER

## 2022-11-18 RX ORDER — PROPRANOLOL HYDROCHLORIDE 20 MG/1
20 TABLET ORAL DAILY PRN
Qty: 90 TABLET | Refills: 0 | Status: SHIPPED | OUTPATIENT
Start: 2022-11-18 | End: 2023-02-14

## 2022-11-18 NOTE — PROGRESS NOTES
Palpitations      Palpitations   Associated symptoms include shortness of breath. Pertinent negatives include no anxiety, chest pain, coughing, dizziness, fever, irregular heartbeat, malaise/fatigue, syncope or weakness.       Ms. Letty Bach is a 52-year-old  female with medical history of chronic left upper quadrant pain, fatty liver, thyroid cancer status post thyroidectomy, postsurgical hypothyroidism (chronic, managed with levothyroxine), GERD (chronic, managed with Pepcid) who presented for evaluation of palpitations. Patient reports intermittent palpitations since May 2021 described as fluttering, lasting few minutes at a time and self resolving, associated symptoms of shortness of breath.  Patient reports she will stop what she is doing and feeling her heart flopping.  Denies any clear worsening or relieving factors.     EKG normal sinus rhythm no acute ST-T wave changes.  Holter monitor was completed in April 2022 by PCP showing relatively benign study.  Good average heart rate.  Patient did not trigger any events.  There were 3 episodes of SVT, peak heart rate 126 bpm, longest run was 5 beats, Very short in duration. Copied text in this note has been reviewed and is accurate as of today 11/18/22.  Today patient presents for follow-up of above complaints.  She reports a few episodes of palpitations described as heart racing which lasted a couple minutes.  1 episode on November 6 she noted a heart rate of 142 bpm while at work she had to sit down and relax.  Symptoms lasted for several minutes.  She did have an echocardiogram which showed unremarkable study, normal LVEF.       The 10-year ASCVD risk score (Morris NUNEZ, et al., 2019) is: 1.7%    Values used to calculate the score:      Age: 52 years      Sex: Female      Is Non- : No      Diabetic: No      Tobacco smoker: No      Systolic Blood Pressure: 118 mmHg      Is BP treated: Yes      HDL Cholesterol: 55 mg/dL      Total  Cholesterol: 217 mg/dL        Past Medical History:   Diagnosis Date   • Cancer (HCC)    • Disease of thyroid gland     nodule, mass   • Elevated liver enzymes    • Fatty liver    • GERD (gastroesophageal reflux disease)    • History of medical problems 2021    Thyroid cancer   • Urinary tract infection    • Varicella      Past Surgical History:   Procedure Laterality Date   • CHOLECYSTECTOMY  2019   • CHOLECYSTECTOMY WITH INTRAOPERATIVE CHOLANGIOGRAM N/A 2019    Procedure: CHOLECYSTECTOMY LAPAROSCOPIC INTRAOPERATIVE CHOLANGIOGRAM      (c-arm#2);  Surgeon: Felipe Overton MD;  Location: [facility] OR;  Service: General   • COLONOSCOPY N/A 2021    Procedure: COLONOSCOPY;  Surgeon: Joao Bruce MD;  Location: [facility] ENDOSCOPY;  Service: Gastroenterology;  Laterality: N/A;   • COLONOSCOPY     • ENDOSCOPY N/A 2019    Procedure: ESOPHAGOGASTRODUODENOSCOPY;  Surgeon: Joao Bruce MD;  Location: [facility] ENDOSCOPY;  Service: Gastroenterology   • HYSTERECTOMY     • THYROIDECTOMY Right 2021    Procedure: RIGHT LOBE THYROIDECTOMY and ISTHMUSECTOMY;  Surgeon: Benjamin Renteria MD;  Location: [facility] OR;  Service: General;  Laterality: Right;   • THYROIDECTOMY Left 2022    Procedure: LEFT THYROIDECTOMY;  Surgeon: Benjamin Renteria MD;  Location: [facility] OR;  Service: General;  Laterality: Left;   • TOTAL ABDOMINAL HYSTERECTOMY WITH SALPINGO OOPHORECTOMY     • UPPER GASTROINTESTINAL ENDOSCOPY  2019   • US GUIDED FINE NEEDLE ASPIRATION  2021   • WISDOM TOOTH EXTRACTION       Social History     Socioeconomic History   • Marital status:    Tobacco Use   • Smoking status: Former     Packs/day: 0.50     Years: 5.00     Pack years: 2.50     Types: Cigarettes     Quit date: 1995     Years since quittin.8   • Smokeless tobacco: Never   • Tobacco comments:     passive at work   Vaping Use   • Vaping Use: Never used   • Passive vaping exposure: Yes   Substance and  Sexual Activity   • Alcohol use: Not Currently     Comment: none in 2 years   • Drug use: Never   • Sexual activity: Defer     Family History   Problem Relation Age of Onset   • Breast cancer Mother    • Thyroid disease Mother    • Hypertension Mother    • Cancer Mother         Breast cancer   • Hypertension Father    • Diabetes Father    • Kidney disease Father    • Heart disease Father    • No Known Problems Sister    • No Known Problems Brother    • Seizures Daughter    • Hypertension Daughter    • COPD Maternal Grandmother    • Asthma Maternal Grandmother    • No Known Problems Sister    • ADD / ADHD Daughter    • Polymyositis Daughter        ALLERGIES:  Allergies   Allergen Reactions   • Contrave [Naltrexone-Bupropion Hcl Er] Dizziness         Review of Systems   Constitutional: Negative for chills, fever, malaise/fatigue and weight gain.   HENT: Negative for nosebleeds and tinnitus.    Eyes: Negative for blurred vision and double vision.   Cardiovascular: Positive for leg swelling and palpitations. Negative for chest pain, irregular heartbeat and syncope.   Respiratory: Positive for shortness of breath. Negative for cough, sleep disturbances due to breathing and snoring.    Endocrine: Negative for polydipsia, polyphagia and polyuria.   Hematologic/Lymphatic: Negative for bleeding problem. Does not bruise/bleed easily.   Skin: Negative for color change and suspicious lesions.   Musculoskeletal: Negative for falls and myalgias.   Gastrointestinal: Negative for bloating, heartburn and hematochezia.   Genitourinary: Negative for dysuria and hematuria.   Neurological: Negative for dizziness, headaches, seizures, vertigo and weakness.   Psychiatric/Behavioral: Negative for altered mental status and depression. The patient does not have insomnia and is not nervous/anxious.    Allergic/Immunologic: Negative for environmental allergies and persistent infections.       Current Outpatient Medications   Medication Sig  Dispense Refill   • levothyroxine (Synthroid) 112 MCG tablet Take 1 tablet by mouth Daily. 30 tablet 11   • pravastatin (PRAVACHOL) 10 MG tablet TAKE ONE TABLET BY MOUTH ONCE NIGHTLY 90 tablet 2   • DULoxetine (CYMBALTA) 20 MG capsule Take 1 capsule by mouth Daily. 30 capsule 1   • famotidine (Pepcid) 40 MG tablet Take 1 tablet by mouth 2 (Two) Times a Day As Needed for Indigestion or Heartburn. 60 tablet 1   • hyoscyamine (ANASPAZ,LEVSIN) 0.125 MG tablet TAKE ONE TABLET BY MOUTH EVERY 4 HOURS AS NEEDED FOR CRAMPING 120 tablet 0   • propranolol (INDERAL) 20 MG tablet Take 1 tablet by mouth Daily As Needed (elevated heart rate). 90 tablet 0     No current facility-administered medications for this visit.       OBJECTIVE:    Physical Exam:   Vitals reviewed.   Constitutional:       General: Not in acute distress.     Appearance: Normal appearance. Obese. Not ill-appearing, toxic-appearing or diaphoretic.   Eyes:      General: Lids are normal.      Conjunctiva/sclera: Conjunctivae normal.   HENT:      Head: Normocephalic and atraumatic.      Right Ear: External ear normal.      Left Ear: External ear normal.   Neck:      Vascular: No JVD.   Pulmonary:      Effort: Pulmonary effort is normal. No respiratory distress.      Breath sounds: Normal breath sounds. No decreased breath sounds. No wheezing. No rales.   Chest:      Chest wall: Not tender to palpatation.   Cardiovascular:      PMI at left midclavicular line. Normal rate. Regular rhythm. Normal S1 with normal intensity. Normal S2.      Murmurs: There is no murmur.      No gallop. No S3 and S4 gallop. No click. No rub.   Pulses:     Intact distal pulses. No decreased pulses.   Edema:     Peripheral edema present.     Pretibial: 1+ edema of the left pretibial area.     Ankle: 1+ edema of the left ankle.     Feet: 1+ edema of the left foot.  Abdominal:      General: Bowel sounds are normal. There is no distension.      Palpations: Abdomen is soft.      Tenderness:  "There is no abdominal tenderness.   Musculoskeletal:      Cervical back: Normal range of motion and neck supple. Skin:     General: Skin is warm and dry.      Coloration: Skin is not pale.      Findings: No erythema or rash.   Neurological:      Mental Status: Alert and oriented to person, place, and time.      Gait: Gait normal.   Psychiatric:         Behavior: Behavior normal.         Thought Content: Thought content normal.         Judgment: Judgment normal.       Vitals:    11/18/22 0953   BP: 118/82   BP Location: Left arm   Patient Position: Sitting   Cuff Size: Adult   Pulse: 64   SpO2: 98%   Weight: 119 kg (261 lb 6.4 oz)   Height: 165.1 cm (65\")       DATA REVIEWED:   Results for orders placed in visit on 11/10/22    Adult Transthoracic Echo Complete w/ Color, Spectral and Contrast if Necessary Per Protocol    Interpretation Summary  •  Left ventricular systolic function is normal. Left ventricular ejection fraction appears to be 61 - 65%.  •  Left ventricular diastolic function was normal.  •  Estimated right ventricular systolic pressure from tricuspid regurgitation is normal (<35 mmHg).      US thyroid    Result Date: 10/24/2022  As above. Electronically signed by:  Manolo King MD  10/24/2022 3:07 PM CDT Workstation: [id number]       Labs: BMP, CBC, LIPID, TSH  Lab Results   Component Value Date    GLUCOSE 89 10/21/2022    CALCIUM 9.5 10/21/2022     10/21/2022    K 4.3 10/21/2022    CO2 25.0 10/21/2022     10/21/2022    BUN 11 10/21/2022    CREATININE 0.94 10/21/2022    EGFRIFNONA 75 12/29/2021    BCR 11.7 10/21/2022    ANIONGAP 13.0 10/21/2022     Lab Results   Component Value Date    WBC 6.66 10/21/2022    HGB 13.1 10/21/2022    HCT 40.9 10/21/2022    MCV 91.5 10/21/2022     10/21/2022     Lab Results   Component Value Date    CHOL 217 (H) 03/04/2022    CHOL 204 (H) 08/18/2021    CHOL 186 05/13/2021     Lab Results   Component Value Date    TRIG 89 10/21/2022    TRIG 123 03/04/2022    " TRIG 89 09/09/2021     Lab Results   Component Value Date    HDL 55 03/04/2022    HDL 61 (H) 08/18/2021    HDL 58 05/13/2021     No components found for: LDLCALC  Lab Results   Component Value Date     (H) 03/04/2022     (H) 08/18/2021     (H) 05/13/2021     No results found for: HDLLDLRATIO  No components found for: CHOLHDL  Lab Results   Component Value Date    TSH 0.342 10/21/2022     No results found for: PROBNP  EKG:         HOLTER:'  Study date: 4/25/22       Interpretation Summary    · The predominant rhythm noted during the testing period was sinus rhythm. Rare PACs and rare PVCs were noted.  · There were three episodes of supraventricular tachycardia. The peak heart rate was 126 beats per minute. Longest run of SVT was 5 beats long. No symptoms were reported during these episodes.  · Sinus tachycardia was noted during two patient triggered events with no reported symptoms.          The following portions of the patient's history were reviewed and updated as appropriate: allergies, current medications, past family history, past medical history, past social history, past surgical history and problem list.  Old records reviewed and pertinent information is included in the above objective data.     ASSESSMENT/PLAN:       Diagnosis Plan   1. Paroxysmal SVT (supraventricular tachycardia) (HCC)  propranolol (INDERAL) 20 MG tablet      2. Palpitations        3. Elevated LDL cholesterol level        4. Class 3 severe obesity due to excess calories with serious comorbidity and body mass index (BMI) of 40.0 to 44.9 in adult (HCC)        5. Edema leg  Duplex Venous Lower Extremity - Bilateral CAR          #1 and #2.   Possible paroxysmal supraventricular tachycardia. Benign holter monitor previously. Symptoms fit for SVT, Holter monitor did show several benign short runs.  Due to patient's resting heart rate in the 60s and blood pressure being normal today we can try beta-blocker in the pocket  therapy.  We discussed taking propranolol as needed for when these symptoms occur.     Discussed the benign nature of the majority of causes of palpitations.   Discussed lifestyle modifications including reducing caffeine intake, reducing stress, and increasing exercise tolerance.  Reassurance given to patient.    TTE normal    #3.  Elevated LDL: We discussed making sure she takes her cholesterol,    #4.  Edema: venous duplex lower extremities ordered. Worse on left than right. No evidence of CHF upon exam or echo. We will r/o DVT as she did have an injury to leg/ could be a provoked clot?. Or from venous insufficiency.     Class 3 Severe Obesity (BMI >=40). Obesity-related health conditions include the following: dyslipidemias and GERD. Obesity is unchanged. BMI is is above average; BMI management plan is completed. We discussed portion control and increasing exercise.      Follow up: 3 months    I spent 23 minutes caring for Letty on this date of service. This time includes time spent by me in the following activities: preparing for the visit, reviewing tests, obtaining and/or reviewing a separately obtained history, performing a medically appropriate examination and/or evaluation, counseling and educating the patient/family/caregiver, ordering medications, tests, or procedures, documenting information in the medical record and independently interpreting results and communicating that information with the patient/family/caregiver            This document has been electronically signed by MEL John on November 18, 2022 10:27 CST

## 2022-11-30 DIAGNOSIS — R60.0 EDEMA LEG: Primary | ICD-10-CM

## 2022-11-30 RX ORDER — POTASSIUM CHLORIDE 750 MG/1
10 TABLET, FILM COATED, EXTENDED RELEASE ORAL DAILY
Qty: 30 TABLET | Refills: 3 | Status: SHIPPED | OUTPATIENT
Start: 2022-11-30 | End: 2023-02-24 | Stop reason: ALTCHOICE

## 2022-11-30 RX ORDER — FUROSEMIDE 20 MG/1
20 TABLET ORAL DAILY
Qty: 30 TABLET | Refills: 3 | Status: SHIPPED | OUTPATIENT
Start: 2022-11-30 | End: 2023-02-24 | Stop reason: ALTCHOICE

## 2022-12-01 ENCOUNTER — TELEPHONE (OUTPATIENT)
Dept: CARDIOLOGY | Facility: CLINIC | Age: 52
End: 2022-12-01

## 2022-12-01 NOTE — TELEPHONE ENCOUNTER
Contacted patient per Hazel Amador about results. Patient voiced understanding.    ----- Message from MEL John sent at 11/30/2022  4:34 PM CST -----  Ultrasound of legs negative for blood clot. There is pulsatile venous flow in left lower leg consistent with fluid retention. I would like her to get a proBNP blood level drawn to check to see if retaining fluid from a CHF/heart failure standpoint.   -Start Lasix 20mg daily as needed (I will send in potassium replacement as well), she should take potassium when taking water pill.

## 2022-12-02 ENCOUNTER — LAB (OUTPATIENT)
Dept: LAB | Facility: HOSPITAL | Age: 52
End: 2022-12-02

## 2022-12-02 ENCOUNTER — OFFICE VISIT (OUTPATIENT)
Dept: FAMILY MEDICINE CLINIC | Facility: CLINIC | Age: 52
End: 2022-12-02

## 2022-12-02 DIAGNOSIS — R60.0 EDEMA LEG: Primary | ICD-10-CM

## 2022-12-02 DIAGNOSIS — Z23 NEED FOR INFLUENZA VACCINATION: ICD-10-CM

## 2022-12-02 DIAGNOSIS — R60.0 EDEMA LEG: ICD-10-CM

## 2022-12-02 LAB — NT-PROBNP SERPL-MCNC: 192 PG/ML (ref 0–900)

## 2022-12-02 PROCEDURE — 36415 COLL VENOUS BLD VENIPUNCTURE: CPT

## 2022-12-02 PROCEDURE — 90471 IMMUNIZATION ADMIN: CPT | Performed by: NURSE PRACTITIONER

## 2022-12-02 PROCEDURE — 99213 OFFICE O/P EST LOW 20 MIN: CPT | Performed by: NURSE PRACTITIONER

## 2022-12-02 PROCEDURE — 90686 IIV4 VACC NO PRSV 0.5 ML IM: CPT | Performed by: NURSE PRACTITIONER

## 2022-12-02 PROCEDURE — 83880 ASSAY OF NATRIURETIC PEPTIDE: CPT

## 2022-12-02 NOTE — PROGRESS NOTES
Janeth Bach is a 52 y.o. female.     History of Present Illness  CC: Edema bilateral lower extremities, left leg worse than right  Joint Swelling  This is a chronic problem. The current episode started more than 1 month ago. The problem occurs constantly. The problem has been waxing and waning. Associated symptoms include arthralgias (left leg/knee pain). Pertinent negatives include no chest pain, chills, congestion, coughing, fatigue, fever, joint swelling, myalgias, nausea, rash, sore throat or vomiting. Abdominal pain: ble pain, US negative for dvt. The symptoms are aggravated by walking. She has tried rest for the symptoms. The treatment provided no relief.        The following portions of the patient's history were reviewed and updated as appropriate: allergies, current medications, past family history, past medical history, past social history, past surgical history and problem list.    Review of Systems   Constitutional: Negative for activity change, appetite change, chills, fatigue, fever, unexpected weight gain and unexpected weight loss.   HENT: Negative for congestion, sore throat, trouble swallowing and voice change.    Eyes: Negative.    Respiratory: Negative for cough, chest tightness, shortness of breath and wheezing.    Cardiovascular: Positive for leg swelling. Negative for chest pain and palpitations.   Gastrointestinal: Negative for constipation, diarrhea, nausea and vomiting. Abdominal pain: ble pain, US negative for dvt.   Endocrine: Negative.  Negative for cold intolerance, heat intolerance, polydipsia, polyphagia and polyuria.   Genitourinary: Negative for dysuria.   Musculoskeletal: Positive for arthralgias (left leg/knee pain). Negative for joint swelling and myalgias.   Skin: Negative for rash.   Allergic/Immunologic: Negative.    Neurological: Negative.    Hematological: Negative.    Psychiatric/Behavioral: Negative.        Objective   Physical Exam  Vitals and nursing  note reviewed.   Constitutional:       General: She is not in acute distress.     Appearance: Normal appearance. She is well-developed. She is obese. She is not ill-appearing, toxic-appearing or diaphoretic.   HENT:      Head: Normocephalic and atraumatic.   Eyes:      Conjunctiva/sclera: Conjunctivae normal.   Cardiovascular:      Rate and Rhythm: Normal rate and regular rhythm.      Heart sounds: Normal heart sounds.   Pulmonary:      Effort: Pulmonary effort is normal. No respiratory distress.      Breath sounds: Normal breath sounds. No stridor. No wheezing, rhonchi or rales.   Musculoskeletal:         General: Normal range of motion.      Cervical back: Normal range of motion.      Left knee: Tenderness present.      Right lower le+ Pitting Edema present.      Left lower le+ Pitting Edema present.   Skin:     General: Skin is warm and dry.      Coloration: Skin is not pale.      Findings: No erythema or rash.   Neurological:      Mental Status: She is alert and oriented to person, place, and time.   Psychiatric:         Mood and Affect: Mood normal.         Behavior: Behavior normal.         Thought Content: Thought content normal.         Judgment: Judgment normal.           Assessment & Plan   Diagnoses and all orders for this visit:    1. Edema leg (Primary)  Comments:  BLE   -Ultrasound results reviewed and negative for DVT per radiology report.  Patient has been prescribed Lasix by her cardiologist but is yet to .  Encourage patient to pick this up and start medication.  We will continue to monitor.    2. Need for influenza vaccination  -     FluLaval/Fluzone >6 mos ([id number]), tolerated well with no adverse reaction.    3.  Follow-up as scheduled or sooner for any acute needs.            This document has been electronically signed by MEL Houser on 2022 16:48 CST

## 2022-12-05 ENCOUNTER — OFFICE VISIT (OUTPATIENT)
Dept: ORTHOPEDIC SURGERY | Facility: CLINIC | Age: 52
End: 2022-12-05

## 2022-12-05 VITALS — BODY MASS INDEX: 43.45 KG/M2 | WEIGHT: 260.8 LBS | HEIGHT: 65 IN

## 2022-12-05 DIAGNOSIS — M94.0 COSTOCHONDRITIS: ICD-10-CM

## 2022-12-05 DIAGNOSIS — M25.562 CHRONIC PAIN OF LEFT KNEE: ICD-10-CM

## 2022-12-05 DIAGNOSIS — E66.01 MORBID OBESITY WITH BMI OF 40.0-44.9, ADULT: ICD-10-CM

## 2022-12-05 DIAGNOSIS — M25.462 PAIN AND SWELLING OF KNEE, LEFT: Primary | ICD-10-CM

## 2022-12-05 DIAGNOSIS — G89.29 CHRONIC PAIN OF LEFT KNEE: ICD-10-CM

## 2022-12-05 DIAGNOSIS — M25.562 PAIN AND SWELLING OF KNEE, LEFT: Primary | ICD-10-CM

## 2022-12-05 DIAGNOSIS — M23.92 INTERNAL DERANGEMENT OF LEFT KNEE: ICD-10-CM

## 2022-12-05 PROCEDURE — 99213 OFFICE O/P EST LOW 20 MIN: CPT | Performed by: NURSE PRACTITIONER

## 2022-12-05 NOTE — PROGRESS NOTES
"Letty Bach is a 52 y.o. female returns for     Chief Complaint   Patient presents with   • Left Knee - Follow-up       HISTORY OF PRESENT ILLNESS: This 52-year-old female patient presents today with complaints of left knee pain.  The patient states she is here today because her primary care provider, MEL Chapman recommended she follow-up due to the swelling in her leg.  The patient states \"Elliott thought maybe there is a blockage in my knee that may be causing the swelling in my leg\".  Patient recently saw Steve Esteves for bilateral lower leg extremity.  She was referred to cardiology for an evaluation and treatment.  The patient was sent for an ultrasound of her left lower extremity.  The ultrasound was negative for DVT, there was pulsatile venous flow related to fluid retention.  The patient denies new injury.  The patient reports she has had persistent left knee pain over the last 2 to 3 years.  The patient was previously seen by myself and Dr. Amos.  She has tried all conservative measures with minimal relief.  The patient has received corticosteroid injections, physical therapy, knee brace, use of a cane, RICE therapy, heat and modified activity.  The patient reports her pain does continue to persist but she just deals with it.  Patient denies new injury.  Patient had an MRI on her left knee likely related to 2022 that indicated decreased base with a horizontal tear at the left medial meniscus posterior horn also with associated bone bruising at the medial compartment of the knee at the medial femoral condyle and the tibial plateau.  The patient was treated conservatively with modified weightbearing activity, IA corticosteroid injection, physical therapy and use of a cane.  Denies numbness and tingling.  Denies fever and chills.           CONCURRENT MEDICAL HISTORY:    The following portions of the patient's history were reviewed and updated as appropriate: allergies, current medications, past family " "history, past medical history, past social history, past surgical history and problem list.     ROS  No fevers or chills.  No chest pain or shortness of air.  No GI or  disturbances.    PHYSICAL EXAMINATION:       Ht 165.1 cm (65\")   Wt 118 kg (260 lb 12.8 oz)   BMI 43.40 kg/m²     Physical Exam    GAIT:     [x]  Normal  []  Antalgic    Assistive device: [x]  None  []  Walker     []  Crutches  []  Cane     []  Wheelchair  []  Stretcher    Left Knee Exam     Tenderness   The patient is experiencing tenderness in the medial joint line.    Other   Erythema: absent  Scars: absent  Sensation: normal  Pulse: present  Swelling: mild              Adult Transthoracic Echo Complete w/ Color, Spectral and Contrast if Necessary Per Protocol    Result Date: 11/10/2022  Narrative: •  Left ventricular systolic function is normal. Left ventricular ejection fraction appears to be 61 - 65%. •  Left ventricular diastolic function was normal. •  Estimated right ventricular systolic pressure from tricuspid regurgitation is normal (<35 mmHg).     Duplex Venous Lower Extremity - Bilateral CAR    Result Date: 11/30/2022  Narrative: •  Appears negative for DVT of the lower extremities •  Appears negative for reflux of the lower extremities •  Pulsatile venous flow left lower extremity             ASSESSMENT:    Diagnoses and all orders for this visit:    Pain and swelling of knee, left    Internal derangement of left knee    Chronic pain of left knee    Costochondritis    Morbid obesity with BMI of 40.0-44.9, adult (HCC)          PLAN  Discussed with patient that the swelling in her left lower leg could be related to the joint effusion in her knee.  We discussed that this is most likely dependent edema as the fluid follows the path of least resistance and if there is swelling at her knee and her leg is hanging down it will flow to her lower leg, ankle and foot.  Reviewed patient's most recent ultrasound of the left lower extremity.  " Discussed with patient to keep her follow-up appointments with cardiology and her PCP.  We also discussed regarding her left knee, I am happy to continue to treat her conservatively.  Recommended she continue RICE therapy, modify activity, continue with weight loss efforts, use AD and offered to inject with a corticosteroid however, advised patient that these are still only conservative measures and they may or may not help with her pain and swelling however, she may follow-up with one of the orthopedic surgeons if she would prefer.  Patient states she would like to follow-up with one of the orthopedic surgeons, Dr. Amos to discuss further options.  Advised patient if she changes her mind, I am happy to see her and she may call and schedule appointment at any time.  Patient verbalized understanding and has no questions at this visit.  May follow-up with me as needed.    All questions and concerns are addressed with understanding noted. They are aware and are in agreement to this plan.    No follow-ups on file.    MEL Park     This document has been electronically signed by MEL Park on December 5, 2022 09:15 CST      EMR Dragon/Transciption Disclaimer: Some of this note may be an electronic transcription/translation of spoken language to printed text using the Dragon Dictation System.     Time spent of a minimum of 20 minutes including the face to face evaluation, reviewing of medical history and prior medial records, reviewing of diagnostic studies, documentation, patient education and coordination of care.     Body mass index is 43.4 kg/m².

## 2022-12-27 RX ORDER — RIFAXIMIN 550 MG/1
TABLET ORAL
Qty: 42 TABLET | Refills: 1 | OUTPATIENT
Start: 2022-12-27

## 2022-12-30 ENCOUNTER — LAB (OUTPATIENT)
Dept: LAB | Facility: HOSPITAL | Age: 52
End: 2022-12-30
Payer: COMMERCIAL

## 2022-12-30 DIAGNOSIS — E89.0 POSTSURGICAL HYPOTHYROIDISM: ICD-10-CM

## 2022-12-30 DIAGNOSIS — E55.9 VITAMIN D DEFICIENCY: ICD-10-CM

## 2022-12-30 DIAGNOSIS — C73 THYROID CANCER: ICD-10-CM

## 2022-12-30 DIAGNOSIS — R73.01 IMPAIRED FASTING GLUCOSE: ICD-10-CM

## 2022-12-30 LAB
25(OH)D3 SERPL-MCNC: 23.8 NG/ML (ref 30–100)
ALBUMIN SERPL-MCNC: 4.1 G/DL (ref 3.5–5.2)
ALBUMIN/GLOB SERPL: 1.2 G/DL
ALP SERPL-CCNC: 127 U/L (ref 39–117)
ALT SERPL W P-5'-P-CCNC: 15 U/L (ref 1–33)
ANION GAP SERPL CALCULATED.3IONS-SCNC: 9 MMOL/L (ref 5–15)
AST SERPL-CCNC: 20 U/L (ref 1–32)
BASOPHILS # BLD AUTO: 0.05 10*3/MM3 (ref 0–0.2)
BASOPHILS NFR BLD AUTO: 0.7 % (ref 0–1.5)
BILIRUB SERPL-MCNC: 0.4 MG/DL (ref 0–1.2)
BUN SERPL-MCNC: 17 MG/DL (ref 6–20)
BUN/CREAT SERPL: 16.8 (ref 7–25)
CALCIUM SPEC-SCNC: 9.2 MG/DL (ref 8.6–10.5)
CHLORIDE SERPL-SCNC: 101 MMOL/L (ref 98–107)
CO2 SERPL-SCNC: 29 MMOL/L (ref 22–29)
CREAT SERPL-MCNC: 1.01 MG/DL (ref 0.57–1)
DEPRECATED RDW RBC AUTO: 41.6 FL (ref 37–54)
EGFRCR SERPLBLD CKD-EPI 2021: 67.5 ML/MIN/1.73
EOSINOPHIL # BLD AUTO: 0.06 10*3/MM3 (ref 0–0.4)
EOSINOPHIL NFR BLD AUTO: 0.8 % (ref 0.3–6.2)
ERYTHROCYTE [DISTWIDTH] IN BLOOD BY AUTOMATED COUNT: 12.3 % (ref 12.3–15.4)
GLOBULIN UR ELPH-MCNC: 3.4 GM/DL
GLUCOSE SERPL-MCNC: 94 MG/DL (ref 65–99)
HBA1C MFR BLD: 5.5 % (ref 4.8–5.6)
HCT VFR BLD AUTO: 41.3 % (ref 34–46.6)
HGB BLD-MCNC: 13.5 G/DL (ref 12–15.9)
IMM GRANULOCYTES # BLD AUTO: 0.02 10*3/MM3 (ref 0–0.05)
IMM GRANULOCYTES NFR BLD AUTO: 0.3 % (ref 0–0.5)
LYMPHOCYTES # BLD AUTO: 1.46 10*3/MM3 (ref 0.7–3.1)
LYMPHOCYTES NFR BLD AUTO: 20.4 % (ref 19.6–45.3)
MCH RBC QN AUTO: 30.2 PG (ref 26.6–33)
MCHC RBC AUTO-ENTMCNC: 32.7 G/DL (ref 31.5–35.7)
MCV RBC AUTO: 92.4 FL (ref 79–97)
MONOCYTES # BLD AUTO: 0.4 10*3/MM3 (ref 0.1–0.9)
MONOCYTES NFR BLD AUTO: 5.6 % (ref 5–12)
NEUTROPHILS NFR BLD AUTO: 5.18 10*3/MM3 (ref 1.7–7)
NEUTROPHILS NFR BLD AUTO: 72.2 % (ref 42.7–76)
NRBC BLD AUTO-RTO: 0 /100 WBC (ref 0–0.2)
PLATELET # BLD AUTO: 300 10*3/MM3 (ref 140–450)
PMV BLD AUTO: 10.2 FL (ref 6–12)
POTASSIUM SERPL-SCNC: 4 MMOL/L (ref 3.5–5.2)
PROT SERPL-MCNC: 7.5 G/DL (ref 6–8.5)
RBC # BLD AUTO: 4.47 10*6/MM3 (ref 3.77–5.28)
SODIUM SERPL-SCNC: 139 MMOL/L (ref 136–145)
TSH SERPL DL<=0.05 MIU/L-ACNC: 0.58 UIU/ML (ref 0.27–4.2)
WBC NRBC COR # BLD: 7.17 10*3/MM3 (ref 3.4–10.8)

## 2022-12-30 PROCEDURE — 84443 ASSAY THYROID STIM HORMONE: CPT

## 2022-12-30 PROCEDURE — 84432 ASSAY OF THYROGLOBULIN: CPT

## 2022-12-30 PROCEDURE — 85025 COMPLETE CBC W/AUTO DIFF WBC: CPT

## 2022-12-30 PROCEDURE — 83036 HEMOGLOBIN GLYCOSYLATED A1C: CPT

## 2022-12-30 PROCEDURE — 82306 VITAMIN D 25 HYDROXY: CPT

## 2022-12-30 PROCEDURE — 36415 COLL VENOUS BLD VENIPUNCTURE: CPT

## 2022-12-30 PROCEDURE — 86800 THYROGLOBULIN ANTIBODY: CPT

## 2022-12-30 PROCEDURE — 80053 COMPREHEN METABOLIC PANEL: CPT

## 2023-01-02 LAB
THYROGLOB AB SERPL-ACNC: <1 IU/ML (ref 0–0.9)
THYROGLOB SERPL-MCNC: 1.1 NG/ML (ref 1.5–38.5)

## 2023-01-11 ENCOUNTER — LAB (OUTPATIENT)
Dept: LAB | Facility: HOSPITAL | Age: 53
End: 2023-01-11
Payer: COMMERCIAL

## 2023-01-11 ENCOUNTER — OFFICE VISIT (OUTPATIENT)
Dept: FAMILY MEDICINE CLINIC | Facility: CLINIC | Age: 53
End: 2023-01-11
Payer: COMMERCIAL

## 2023-01-11 VITALS
RESPIRATION RATE: 18 BRPM | BODY MASS INDEX: 43.78 KG/M2 | DIASTOLIC BLOOD PRESSURE: 72 MMHG | HEIGHT: 65 IN | SYSTOLIC BLOOD PRESSURE: 104 MMHG | OXYGEN SATURATION: 97 % | HEART RATE: 88 BPM | TEMPERATURE: 98.1 F | WEIGHT: 262.8 LBS

## 2023-01-11 DIAGNOSIS — M79.672 PAIN OF LEFT HEEL: ICD-10-CM

## 2023-01-11 DIAGNOSIS — I10 PRIMARY HYPERTENSION: ICD-10-CM

## 2023-01-11 DIAGNOSIS — R60.0 EDEMA LEG: ICD-10-CM

## 2023-01-11 DIAGNOSIS — R25.2 MUSCLE CRAMPS: ICD-10-CM

## 2023-01-11 DIAGNOSIS — R00.2 PALPITATIONS: ICD-10-CM

## 2023-01-11 DIAGNOSIS — E89.0 POSTSURGICAL HYPOTHYROIDISM: Primary | ICD-10-CM

## 2023-01-11 DIAGNOSIS — R25.2 LEG CRAMPS: ICD-10-CM

## 2023-01-11 LAB
ANION GAP SERPL CALCULATED.3IONS-SCNC: 7 MMOL/L (ref 5–15)
BUN SERPL-MCNC: 16 MG/DL (ref 6–20)
BUN/CREAT SERPL: 16.2 (ref 7–25)
CALCIUM SPEC-SCNC: 9 MG/DL (ref 8.6–10.5)
CHLORIDE SERPL-SCNC: 104 MMOL/L (ref 98–107)
CO2 SERPL-SCNC: 31 MMOL/L (ref 22–29)
CREAT SERPL-MCNC: 0.99 MG/DL (ref 0.57–1)
EGFRCR SERPLBLD CKD-EPI 2021: 69.2 ML/MIN/1.73
FERRITIN SERPL-MCNC: 48.2 NG/ML (ref 13–150)
GLUCOSE SERPL-MCNC: 78 MG/DL (ref 65–99)
IRON 24H UR-MRATE: 104 MCG/DL (ref 37–145)
IRON SATN MFR SERPL: 29 % (ref 20–50)
MAGNESIUM SERPL-MCNC: 2.7 MG/DL (ref 1.6–2.6)
POTASSIUM SERPL-SCNC: 4.7 MMOL/L (ref 3.5–5.2)
SODIUM SERPL-SCNC: 142 MMOL/L (ref 136–145)
TIBC SERPL-MCNC: 356 MCG/DL (ref 298–536)
TRANSFERRIN SERPL-MCNC: 239 MG/DL (ref 200–360)
VIT B12 BLD-MCNC: 556 PG/ML (ref 211–946)

## 2023-01-11 PROCEDURE — 82728 ASSAY OF FERRITIN: CPT

## 2023-01-11 PROCEDURE — 82607 VITAMIN B-12: CPT

## 2023-01-11 PROCEDURE — 83540 ASSAY OF IRON: CPT

## 2023-01-11 PROCEDURE — 80048 BASIC METABOLIC PNL TOTAL CA: CPT

## 2023-01-11 PROCEDURE — 36415 COLL VENOUS BLD VENIPUNCTURE: CPT

## 2023-01-11 PROCEDURE — 83735 ASSAY OF MAGNESIUM: CPT

## 2023-01-11 PROCEDURE — 84466 ASSAY OF TRANSFERRIN: CPT

## 2023-01-11 PROCEDURE — 99214 OFFICE O/P EST MOD 30 MIN: CPT | Performed by: NURSE PRACTITIONER

## 2023-01-11 NOTE — PROGRESS NOTES
Subjective   Letty Bach is a 52 y.o. female.     History of Present Illness  CC: [id number], hypertension, bilateral lower extremity edema, palpitations, muscle cramps  Hypertension  This is a chronic problem. The current episode started more than 1 year ago. The problem is controlled. Associated symptoms include palpitations. Pertinent negatives include no chest pain or shortness of breath. Risk factors for coronary artery disease include family history, dyslipidemia, obesity and sedentary lifestyle. Current antihypertension treatment includes beta blockers and diuretics. The current treatment provides significant improvement. Compliance problems include exercise and diet.  There is no history of angina, kidney disease or CAD/MI.   Hypothyroidism  This is a chronic problem. The current episode started more than 1 year ago. The problem occurs constantly. Associated symptoms include arthralgias (left heel pain.  No improvement with NSAIDs, rest and inserts) and myalgias (generalized muscle cramps). Pertinent negatives include no abdominal pain, chest pain, chills, congestion, coughing, fatigue, fever, joint swelling, nausea, rash, sore throat or vomiting. Treatments tried: levothryoxine. Improvement on treatment: stable.   Palpitations   This is a chronic problem. The current episode started more than 1 year ago. The problem has been gradually improving. Pertinent negatives include no chest pain, coughing, fever, nausea, shortness of breath or vomiting. She has tried beta blockers for the symptoms. The treatment provided significant relief.   Leg Swelling  This is a chronic problem. The current episode started more than 1 year ago. The problem occurs intermittently. The problem has been gradually improving. Associated symptoms include arthralgias (left heel pain.  No improvement with NSAIDs, rest and inserts) and myalgias (generalized muscle cramps). Pertinent negatives include no abdominal pain,  chest pain, chills, congestion, coughing, fatigue, fever, joint swelling, nausea, rash, sore throat or vomiting. Nothing aggravates the symptoms. Treatments tried: lasix. The treatment provided significant relief.        The following portions of the patient's history were reviewed and updated as appropriate: allergies, current medications, past family history, past medical history, past social history, past surgical history and problem list.    Review of Systems   Constitutional: Negative for activity change, appetite change, chills, fatigue, fever, unexpected weight gain and unexpected weight loss.   HENT: Negative for congestion, sore throat, trouble swallowing and voice change.    Eyes: Negative.    Respiratory: Negative for cough, chest tightness, shortness of breath and wheezing.    Cardiovascular: Positive for palpitations and leg swelling (ble stable, not responding to lasix). Negative for chest pain.   Gastrointestinal: Negative for abdominal pain, constipation, diarrhea, nausea and vomiting.   Endocrine: Negative.  Negative for cold intolerance, heat intolerance, polydipsia, polyphagia and polyuria.   Genitourinary: Negative for dysuria.   Musculoskeletal: Positive for arthralgias (left heel pain.  No improvement with NSAIDs, rest and inserts) and myalgias (generalized muscle cramps). Negative for joint swelling.   Skin: Negative for rash.   Allergic/Immunologic: Negative.    Neurological: Negative.    Hematological: Negative.    Psychiatric/Behavioral: Negative.        Objective   Physical Exam  Vitals and nursing note reviewed.   Constitutional:       General: She is not in acute distress.     Appearance: Normal appearance. She is well-developed. She is obese. She is not ill-appearing, toxic-appearing or diaphoretic.   HENT:      Head: Normocephalic and atraumatic.   Eyes:      Conjunctiva/sclera: Conjunctivae normal.   Cardiovascular:      Rate and Rhythm: Normal rate and regular rhythm.      Heart  sounds: Normal heart sounds.   Pulmonary:      Effort: Pulmonary effort is normal. No respiratory distress.      Breath sounds: Normal breath sounds. No stridor. No wheezing, rhonchi or rales.   Musculoskeletal:         General: No tenderness. Normal range of motion.      Cervical back: Normal range of motion.      Right lower le+ Edema present.      Left lower le+ Edema present.   Skin:     General: Skin is warm and dry.      Coloration: Skin is not pale.      Findings: No erythema or rash.   Neurological:      Mental Status: She is alert and oriented to person, place, and time.   Psychiatric:         Mood and Affect: Mood normal.         Behavior: Behavior normal.         Thought Content: Thought content normal.         Judgment: Judgment normal.           Assessment & Plan   Diagnoses and all orders for this visit:    1. Postsurgical hypothyroidism (Primary)   -Thyroid levels reviewed.  Follow-up with endocrinology as scheduled.  Continue Synthroid as prescribed.    2. Primary hypertension   - Controlled.  Continue propranolol as prescribed.  We will continue to monitor.    3. Palpitations   -Controlled.  Continue propranolol as prescribed.    4. Edema leg   -No improvement in edema with Lasix.  Her lower leg edema may be more lymphedema as opposed to fluid accumulation.  Recommended patient stop Lasix and potassium due to generalized muscle cramps and an increase in creatinine.  Maintain adequate hydration.  We will obtain labs as noted below with #5 and repeat BMP in 2 to 3 weeks after stopping Lasix.  We will continue to monitor.    5. Muscle cramps  -     Basic Metabolic Panel; Future  -     Magnesium; Future  -     Ferritin; Future  -     Iron Profile; Future  -     Vitamin B12; Future, will call with results.  Plan of care stated above #4.  Repeat BMP in 2 to 3 weeks    6. Pain of left heel  -     Ambulatory Referral to Podiatry, no improvement in heel pain with inserts, anti-inflammatories.   Referral to podiatry for further evaluation.    7.  Follow-up in 6 months or sooner for any acute needs.            This document has been electronically signed by MEL Houser on January 11, 2023 10:44 CST

## 2023-01-12 NOTE — PROGRESS NOTES
Labs stable or within normal limits.  Magnesium slightly high thus additional supplementation not needed.  Follow-up as scheduled.

## 2023-01-20 ENCOUNTER — OFFICE VISIT (OUTPATIENT)
Dept: PODIATRY | Facility: CLINIC | Age: 53
End: 2023-01-20
Payer: COMMERCIAL

## 2023-01-20 VITALS — HEART RATE: 88 BPM | WEIGHT: 262 LBS | OXYGEN SATURATION: 99 % | HEIGHT: 65 IN | BODY MASS INDEX: 43.65 KG/M2

## 2023-01-20 DIAGNOSIS — M72.2 PLANTAR FASCIITIS OF LEFT FOOT: Primary | ICD-10-CM

## 2023-01-20 DIAGNOSIS — M79.672 LEFT FOOT PAIN: ICD-10-CM

## 2023-01-20 PROCEDURE — 20605 DRAIN/INJ JOINT/BURSA W/O US: CPT | Performed by: NURSE PRACTITIONER

## 2023-01-20 PROCEDURE — 99213 OFFICE O/P EST LOW 20 MIN: CPT | Performed by: NURSE PRACTITIONER

## 2023-01-20 RX ORDER — TRIAMCINOLONE ACETONIDE 40 MG/ML
20 INJECTION, SUSPENSION INTRA-ARTICULAR; INTRAMUSCULAR ONCE
Status: COMPLETED | OUTPATIENT
Start: 2023-01-20 | End: 2023-01-20

## 2023-01-20 RX ORDER — DEXAMETHASONE SODIUM PHOSPHATE 4 MG/ML
2 INJECTION, SOLUTION INTRA-ARTICULAR; INTRALESIONAL; INTRAMUSCULAR; INTRAVENOUS; SOFT TISSUE ONCE
Status: COMPLETED | OUTPATIENT
Start: 2023-01-20 | End: 2023-01-20

## 2023-01-20 RX ADMIN — TRIAMCINOLONE ACETONIDE 20 MG: 40 INJECTION, SUSPENSION INTRA-ARTICULAR; INTRAMUSCULAR at 12:22

## 2023-01-20 RX ADMIN — DEXAMETHASONE SODIUM PHOSPHATE 2 MG: 4 INJECTION, SOLUTION INTRA-ARTICULAR; INTRALESIONAL; INTRAMUSCULAR; INTRAVENOUS; SOFT TISSUE at 12:22

## 2023-01-20 NOTE — PROGRESS NOTES
Letty Bach  1970  52 y.o. female      01/20/2023    Chief Complaint   Patient presents with   • Left Foot - Pain       History of Present Illness    Letty Bach is a 52 y.o.female presents to the clinic today for left foot pain. Xrays obtained today. Patient has tried stretching, antiinflammatories, and better shoes.        Past Medical History:   Diagnosis Date   • Cancer (HCC)    • Disease of thyroid gland     nodule, mass   • Elevated liver enzymes    • Fatty liver    • GERD (gastroesophageal reflux disease)    • History of medical problems 11/4/2021    Thyroid cancer   • Urinary tract infection    • Varicella          Past Surgical History:   Procedure Laterality Date   • CHOLECYSTECTOMY  06/06/2019   • CHOLECYSTECTOMY WITH INTRAOPERATIVE CHOLANGIOGRAM N/A 06/06/2019    Procedure: CHOLECYSTECTOMY LAPAROSCOPIC INTRAOPERATIVE CHOLANGIOGRAM      (c-arm#2);  Surgeon: Felipe Overton MD;  Location: [facility];  Service: General   • COLONOSCOPY N/A 02/12/2021    Procedure: COLONOSCOPY;  Surgeon: Joao Bruce MD;  Location: [facility] ENDOSCOPY;  Service: Gastroenterology;  Laterality: N/A;   • COLONOSCOPY     • ENDOSCOPY N/A 11/14/2019    Procedure: ESOPHAGOGASTRODUODENOSCOPY;  Surgeon: Joao Bruce MD;  Location: [facility] ENDOSCOPY;  Service: Gastroenterology   • HYSTERECTOMY  2007   • THYROIDECTOMY Right 11/04/2021    Procedure: RIGHT LOBE THYROIDECTOMY and ISTHMUSECTOMY;  Surgeon: Benjamin Renteria MD;  Location: [facility];  Service: General;  Laterality: Right;   • THYROIDECTOMY Left 02/24/2022    Procedure: LEFT THYROIDECTOMY;  Surgeon: Benjamin Renteria MD;  Location: [facility];  Service: General;  Laterality: Left;   • TOTAL ABDOMINAL HYSTERECTOMY WITH SALPINGO OOPHORECTOMY  2007   • UPPER GASTROINTESTINAL ENDOSCOPY  11/14/2019   • US GUIDED FINE NEEDLE ASPIRATION  09/01/2021   • WISDOM TOOTH EXTRACTION           Family History   Problem Relation Age of Onset   • Breast cancer Mother    • Thyroid  disease Mother    • Hypertension Mother    • Cancer Mother         Breast cancer   • Hypertension Father    • Diabetes Father    • Kidney disease Father    • Heart disease Father    • No Known Problems Sister    • No Known Problems Brother    • Seizures Daughter    • Hypertension Daughter    • COPD Maternal Grandmother    • Asthma Maternal Grandmother    • No Known Problems Sister    • ADD / ADHD Daughter    • Polymyositis Daughter        Allergies   Allergen Reactions   • Contrave [Naltrexone-Bupropion Hcl Er] Dizziness       Social History     Socioeconomic History   • Marital status:    Tobacco Use   • Smoking status: Former     Packs/day: 0.50     Years: 5.00     Pack years: 2.50     Types: Cigarettes     Quit date: 1995     Years since quittin.0   • Smokeless tobacco: Never   • Tobacco comments:     passive at work   Vaping Use   • Vaping Use: Never used   • Passive vaping exposure: Yes   Substance and Sexual Activity   • Alcohol use: Not Currently     Comment: none in 2 years   • Drug use: Never   • Sexual activity: Defer         Current Outpatient Medications   Medication Sig Dispense Refill   • levothyroxine (Synthroid) 112 MCG tablet Take 1 tablet by mouth Daily. 30 tablet 11   • pravastatin (PRAVACHOL) 10 MG tablet TAKE ONE TABLET BY MOUTH ONCE NIGHTLY 90 tablet 2   • propranolol (INDERAL) 20 MG tablet Take 1 tablet by mouth Daily As Needed (elevated heart rate). 90 tablet 0   • furosemide (LASIX) 20 MG tablet Take 1 tablet by mouth Daily. 30 tablet 3   • potassium chloride 10 MEQ CR tablet Take 1 tablet by mouth Daily. 30 tablet 3     No current facility-administered medications for this visit.       Review of Systems   Constitutional: Negative.    HENT: Negative.    Eyes: Negative.    Respiratory: Negative.    Cardiovascular: Negative.    Gastrointestinal: Negative.    Endocrine: Negative.    Genitourinary: Negative.    Musculoskeletal:        Foot pain   Skin: Negative.   "  Allergic/Immunologic: Negative.    Neurological: Negative.    Hematological: Negative.    Psychiatric/Behavioral: Negative.          OBJECTIVE    Pulse 88   Ht 165.1 cm (65\")   Wt 119 kg (262 lb)   SpO2 99%   BMI 43.60 kg/m²     Physical Exam  Vitals reviewed.   Constitutional:       Appearance: Normal appearance. She is well-developed.   HENT:      Head: Normocephalic and atraumatic.   Neck:      Trachea: Trachea and phonation normal.   Cardiovascular:      Pulses:           Dorsalis pedis pulses are 2+ on the right side and 2+ on the left side.        Posterior tibial pulses are 2+ on the right side and 2+ on the left side.   Pulmonary:      Effort: Pulmonary effort is normal. No respiratory distress.   Abdominal:      General: There is no distension.      Palpations: Abdomen is soft.   Musculoskeletal:        Feet:    Feet:      Right foot:      Skin integrity: Skin integrity normal.      Toenail Condition: Right toenails are normal.      Left foot:      Skin integrity: Skin integrity normal.      Toenail Condition: Left toenails are normal.   Skin:     General: Skin is warm and dry.   Neurological:      Mental Status: She is alert and oriented to person, place, and time.      GCS: GCS eye subscore is 4. GCS verbal subscore is 5. GCS motor subscore is 6.   Psychiatric:         Speech: Speech normal.         Behavior: Behavior normal. Behavior is cooperative.         Thought Content: Thought content normal.         Judgment: Judgment normal.        AP lateral and oblique view of the left foot reveals a small calcaneal spur with no other acute radiological abnormality noted.        Procedures  Left Plantar Fasciits Injection  Date/Time: 01/20/2023  Performed by: Praneeth Puckett  Authorized by: Praneeth Puckett   Consent: Verbal consent obtained. Written consent obtained.  Risks and benefits: risks, benefits and alternatives were discussed  Consent given by: patient  Patient identity confirmed: verbally with " patient  Indications: pain relief    Sedation:  Patient sedated: no    Patient position: sitting  Needle size: 27 G  Local anesthetic: 0.5% Marcaine plain, Kenalog 40 mg/ml , Decadron 4 mg/mL.   Outcome: pain improved  Patient tolerance: Patient tolerated the procedure well with no immediate complications    ASSESSMENT AND PLAN    Diagnoses and all orders for this visit:    1. Plantar fasciitis of left foot (Primary)    2. Left foot pain  -     XR Foot 3+ View Left  -     dexamethasone (DECADRON) injection 2 mg  -     triamcinolone acetonide (KENALOG-40) injection 20 mg      After risk, benefits and treatment options were discussed with the patient in detail we proceeded with plantar fascial injection.  Patient left the office ambulatory we will continue the use of the recommended inserts, home exercises which were provided to her prior to the office discharge, ice, activity modification based on pain and follow-up in 2 weeks for recheck unless symptoms worsen.        This document has been electronically signed by Praneeth LEAL, FNP-C, ONP-C on January 20, 2023 12:58 CST

## 2023-02-03 ENCOUNTER — OFFICE VISIT (OUTPATIENT)
Dept: PODIATRY | Facility: CLINIC | Age: 53
End: 2023-02-03
Payer: COMMERCIAL

## 2023-02-03 ENCOUNTER — LAB (OUTPATIENT)
Dept: LAB | Facility: HOSPITAL | Age: 53
End: 2023-02-03
Payer: COMMERCIAL

## 2023-02-03 VITALS — WEIGHT: 262 LBS | OXYGEN SATURATION: 95 % | HEART RATE: 81 BPM | BODY MASS INDEX: 43.65 KG/M2 | HEIGHT: 65 IN

## 2023-02-03 DIAGNOSIS — R25.2 MUSCLE CRAMPS: ICD-10-CM

## 2023-02-03 DIAGNOSIS — M72.2 PLANTAR FASCIITIS OF LEFT FOOT: Primary | ICD-10-CM

## 2023-02-03 DIAGNOSIS — R60.0 EDEMA LEG: ICD-10-CM

## 2023-02-03 DIAGNOSIS — M79.672 LEFT FOOT PAIN: ICD-10-CM

## 2023-02-03 PROCEDURE — 99213 OFFICE O/P EST LOW 20 MIN: CPT | Performed by: NURSE PRACTITIONER

## 2023-02-03 PROCEDURE — 36415 COLL VENOUS BLD VENIPUNCTURE: CPT

## 2023-02-03 PROCEDURE — 80048 BASIC METABOLIC PNL TOTAL CA: CPT

## 2023-02-03 NOTE — PROGRESS NOTES
Letty Bach  1970  52 y.o. female      2/3/2023    Chief Complaint   Patient presents with   • Left Foot - Follow-up       History of Present Illness    Letty Bach is a 52 y.o.female returns to clinic for a follow up appointment of left foot plantar fascitis.       Past Medical History:   Diagnosis Date   • Cancer (HCC)    • Disease of thyroid gland     nodule, mass   • Elevated liver enzymes    • Fatty liver    • GERD (gastroesophageal reflux disease)    • History of medical problems 11/4/2021    Thyroid cancer   • Urinary tract infection    • Varicella          Past Surgical History:   Procedure Laterality Date   • CHOLECYSTECTOMY  06/06/2019   • CHOLECYSTECTOMY WITH INTRAOPERATIVE CHOLANGIOGRAM N/A 06/06/2019    Procedure: CHOLECYSTECTOMY LAPAROSCOPIC INTRAOPERATIVE CHOLANGIOGRAM      (c-arm#2);  Surgeon: Felipe Overton MD;  Location: [facility] OR;  Service: General   • COLONOSCOPY N/A 02/12/2021    Procedure: COLONOSCOPY;  Surgeon: Joao Bruce MD;  Location: [facility] ENDOSCOPY;  Service: Gastroenterology;  Laterality: N/A;   • COLONOSCOPY     • ENDOSCOPY N/A 11/14/2019    Procedure: ESOPHAGOGASTRODUODENOSCOPY;  Surgeon: Joao Bruce MD;  Location: [facility] ENDOSCOPY;  Service: Gastroenterology   • HYSTERECTOMY  2007   • THYROIDECTOMY Right 11/04/2021    Procedure: RIGHT LOBE THYROIDECTOMY and ISTHMUSECTOMY;  Surgeon: Benjamin Renteria MD;  Location: [facility] OR;  Service: General;  Laterality: Right;   • THYROIDECTOMY Left 02/24/2022    Procedure: LEFT THYROIDECTOMY;  Surgeon: Benjamin Renteria MD;  Location: [facility];  Service: General;  Laterality: Left;   • TOTAL ABDOMINAL HYSTERECTOMY WITH SALPINGO OOPHORECTOMY  2007   • UPPER GASTROINTESTINAL ENDOSCOPY  11/14/2019   • US GUIDED FINE NEEDLE ASPIRATION  09/01/2021   • WISDOM TOOTH EXTRACTION           Family History   Problem Relation Age of Onset   • Breast cancer Mother    • Thyroid disease Mother    • Hypertension Mother    • Cancer Mother          Breast cancer   • Hypertension Father    • Diabetes Father    • Kidney disease Father    • Heart disease Father    • No Known Problems Sister    • No Known Problems Brother    • Seizures Daughter    • Hypertension Daughter    • COPD Maternal Grandmother    • Asthma Maternal Grandmother    • No Known Problems Sister    • ADD / ADHD Daughter    • Polymyositis Daughter        Allergies   Allergen Reactions   • Contrave [Naltrexone-Bupropion Hcl Er] Dizziness       Social History     Socioeconomic History   • Marital status:    Tobacco Use   • Smoking status: Former     Packs/day: 0.50     Years: 5.00     Pack years: 2.50     Types: Cigarettes     Quit date: 1995     Years since quittin.1   • Smokeless tobacco: Never   • Tobacco comments:     passive at work   Vaping Use   • Vaping Use: Never used   • Passive vaping exposure: Yes   Substance and Sexual Activity   • Alcohol use: Not Currently     Comment: none in 2 years   • Drug use: Never   • Sexual activity: Defer         Current Outpatient Medications   Medication Sig Dispense Refill   • levothyroxine (Synthroid) 112 MCG tablet Take 1 tablet by mouth Daily. 30 tablet 11   • pravastatin (PRAVACHOL) 10 MG tablet TAKE ONE TABLET BY MOUTH ONCE NIGHTLY 90 tablet 2   • propranolol (INDERAL) 20 MG tablet Take 1 tablet by mouth Daily As Needed (elevated heart rate). 90 tablet 0   • furosemide (LASIX) 20 MG tablet Take 1 tablet by mouth Daily. 30 tablet 3   • potassium chloride 10 MEQ CR tablet Take 1 tablet by mouth Daily. 30 tablet 3     No current facility-administered medications for this visit.       Review of Systems   Constitutional: Negative.    HENT: Negative.    Eyes: Negative.    Respiratory: Negative.    Cardiovascular: Negative.    Gastrointestinal: Negative.    Endocrine: Negative.    Genitourinary: Negative.    Musculoskeletal:        Foot pain   Skin: Negative.    Allergic/Immunologic: Negative.    Neurological: Negative.   "  Hematological: Negative.    Psychiatric/Behavioral: Negative.          OBJECTIVE    Pulse 81   Ht 165.1 cm (65\")   Wt 119 kg (262 lb)   SpO2 95%   BMI 43.60 kg/m²     Physical Exam  Vitals reviewed.   Constitutional:       Appearance: Normal appearance. She is well-developed.   HENT:      Head: Normocephalic and atraumatic.   Neck:      Trachea: Trachea and phonation normal.   Cardiovascular:      Pulses:           Dorsalis pedis pulses are 2+ on the right side and 2+ on the left side.        Posterior tibial pulses are 2+ on the right side and 2+ on the left side.   Pulmonary:      Effort: Pulmonary effort is normal. No respiratory distress.   Abdominal:      General: There is no distension.      Palpations: Abdomen is soft.   Musculoskeletal:        Feet:    Feet:      Right foot:      Skin integrity: Skin integrity normal.      Toenail Condition: Right toenails are normal.      Left foot:      Skin integrity: Skin integrity normal.      Toenail Condition: Left toenails are normal.   Skin:     General: Skin is warm and dry.   Neurological:      Mental Status: She is alert and oriented to person, place, and time.      GCS: GCS eye subscore is 4. GCS verbal subscore is 5. GCS motor subscore is 6.   Psychiatric:         Speech: Speech normal.         Behavior: Behavior normal. Behavior is cooperative.         Thought Content: Thought content normal.         Judgment: Judgment normal.        AP lateral and oblique view of the left foot reveals a small calcaneal spur with no other acute radiological abnormality noted.        Procedures      ASSESSMENT AND PLAN    Diagnoses and all orders for this visit:    1. Plantar fasciitis of left foot (Primary)  -     Ambulatory Referral to Physical Therapy Evaluate and treat (send to [facility] in [place] )    2. Left foot pain  -     Ambulatory Referral to Physical Therapy Evaluate and treat (send to [facility] in [place] )    improved, progress motion and activity as " tolerated based pain and f/u in one month for recheck           This document has been electronically signed by Praneeth LEAL, FNP-C, ONP-C on February 3, 2023 13:52 CST

## 2023-02-04 LAB
ANION GAP SERPL CALCULATED.3IONS-SCNC: 8.5 MMOL/L (ref 5–15)
BUN SERPL-MCNC: 11 MG/DL (ref 6–20)
BUN/CREAT SERPL: 12 (ref 7–25)
CALCIUM SPEC-SCNC: 9.1 MG/DL (ref 8.6–10.5)
CHLORIDE SERPL-SCNC: 105 MMOL/L (ref 98–107)
CO2 SERPL-SCNC: 27.5 MMOL/L (ref 22–29)
CREAT SERPL-MCNC: 0.92 MG/DL (ref 0.57–1)
EGFRCR SERPLBLD CKD-EPI 2021: 75.5 ML/MIN/1.73
GLUCOSE SERPL-MCNC: 136 MG/DL (ref 65–99)
POTASSIUM SERPL-SCNC: 4.3 MMOL/L (ref 3.5–5.2)
SODIUM SERPL-SCNC: 141 MMOL/L (ref 136–145)

## 2023-02-13 RX ORDER — MIRTAZAPINE 15 MG/1
TABLET, FILM COATED ORAL
Qty: 1 TABLET | Refills: 0 | OUTPATIENT
Start: 2023-02-13

## 2023-02-14 DIAGNOSIS — I47.1 PAROXYSMAL SVT (SUPRAVENTRICULAR TACHYCARDIA): ICD-10-CM

## 2023-02-14 RX ORDER — PROPRANOLOL HYDROCHLORIDE 20 MG/1
TABLET ORAL
Qty: 90 TABLET | Refills: 3 | Status: SHIPPED | OUTPATIENT
Start: 2023-02-14

## 2023-02-24 ENCOUNTER — OFFICE VISIT (OUTPATIENT)
Dept: CARDIOLOGY | Facility: CLINIC | Age: 53
End: 2023-02-24
Payer: COMMERCIAL

## 2023-02-24 VITALS
SYSTOLIC BLOOD PRESSURE: 116 MMHG | BODY MASS INDEX: 44.65 KG/M2 | DIASTOLIC BLOOD PRESSURE: 70 MMHG | HEART RATE: 71 BPM | OXYGEN SATURATION: 98 % | WEIGHT: 268 LBS | HEIGHT: 65 IN

## 2023-02-24 DIAGNOSIS — E66.01 CLASS 3 SEVERE OBESITY DUE TO EXCESS CALORIES WITH SERIOUS COMORBIDITY AND BODY MASS INDEX (BMI) OF 40.0 TO 44.9 IN ADULT: ICD-10-CM

## 2023-02-24 DIAGNOSIS — E78.00 ELEVATED LDL CHOLESTEROL LEVEL: ICD-10-CM

## 2023-02-24 DIAGNOSIS — I47.1 PAROXYSMAL SVT (SUPRAVENTRICULAR TACHYCARDIA): Primary | ICD-10-CM

## 2023-02-24 DIAGNOSIS — R60.0 EDEMA LEG: ICD-10-CM

## 2023-02-24 PROCEDURE — 99213 OFFICE O/P EST LOW 20 MIN: CPT | Performed by: NURSE PRACTITIONER

## 2023-02-24 NOTE — PROGRESS NOTES
Paroxysmal SVT (supraventricular tachycardia) (HCC)      Palpitations   Associated symptoms include shortness of breath. Pertinent negatives include no anxiety, chest pain, coughing, dizziness, fever, irregular heartbeat, malaise/fatigue, syncope or weakness.     Ms. Letty Bach is a 52-year-old  female with medical history of chronic left upper quadrant pain, fatty liver, thyroid cancer status post thyroidectomy, postsurgical hypothyroidism (chronic, managed with levothyroxine), GERD (chronic, managed with Pepcid) who presents today for follow-up.  She has been having intermittent palpitations since May 2021 described as fluttering.    EKG normal sinus rhythm no acute ST-T wave changes.  Holter monitor was completed in April 2022 by PCP showing relatively benign study.  Good average heart rate.  Patient did not trigger any events.  There were 3 episodes of SVT, peak heart rate 126 bpm, longest run was 5 beats, Very short in duration. Echocardiogram which showed unremarkable study, normal LVEF.  She had some concerns with leg swelling. Ultrasound of legs negative for blood clot. There is pulsatile venous flow.  Today she presents for routine follow-up she reports 3 episodes of palpitations that lasted around 1 minute while she was at work.  She has not tried the propranolol.  Lasix did not help her leg swelling and actually made her have myalgias.  This was stopped.  We discussed trying compression stockings.  proBNP was normal    The 10-year ASCVD risk score (Morris DK, et al., 2019) is: 1.7%    Values used to calculate the score:      Age: 52 years      Sex: Female      Is Non- : No      Diabetic: No      Tobacco smoker: No      Systolic Blood Pressure: 116 mmHg      Is BP treated: Yes      HDL Cholesterol: 55 mg/dL      Total Cholesterol: 217 mg/dL        Past Medical History:   Diagnosis Date   • Cancer (HCC)    • Disease of thyroid gland     nodule, mass   • Elevated liver  enzymes    • Fatty liver    • GERD (gastroesophageal reflux disease)    • History of medical problems 2021    Thyroid cancer   • Urinary tract infection    • Varicella      Past Surgical History:   Procedure Laterality Date   • CHOLECYSTECTOMY  2019   • CHOLECYSTECTOMY WITH INTRAOPERATIVE CHOLANGIOGRAM N/A 2019    Procedure: CHOLECYSTECTOMY LAPAROSCOPIC INTRAOPERATIVE CHOLANGIOGRAM      (c-arm#2);  Surgeon: Felipe Overton MD;  Location: [facility] OR;  Service: General   • COLONOSCOPY N/A 2021    Procedure: COLONOSCOPY;  Surgeon: Joao Bruce MD;  Location: [facility] ENDOSCOPY;  Service: Gastroenterology;  Laterality: N/A;   • COLONOSCOPY     • ENDOSCOPY N/A 2019    Procedure: ESOPHAGOGASTRODUODENOSCOPY;  Surgeon: Joao Bruce MD;  Location: [facility] ENDOSCOPY;  Service: Gastroenterology   • HYSTERECTOMY  2007   • THYROIDECTOMY Right 2021    Procedure: RIGHT LOBE THYROIDECTOMY and ISTHMUSECTOMY;  Surgeon: Benjamin Renteria MD;  Location: [facility] OR;  Service: General;  Laterality: Right;   • THYROIDECTOMY Left 2022    Procedure: LEFT THYROIDECTOMY;  Surgeon: Benjamin Renteria MD;  Location: [facility] OR;  Service: General;  Laterality: Left;   • TOTAL ABDOMINAL HYSTERECTOMY WITH SALPINGO OOPHORECTOMY     • UPPER GASTROINTESTINAL ENDOSCOPY  2019   • US GUIDED FINE NEEDLE ASPIRATION  2021   • WISDOM TOOTH EXTRACTION       Social History     Socioeconomic History   • Marital status:    Tobacco Use   • Smoking status: Former     Packs/day: 0.50     Years: 5.00     Pack years: 2.50     Types: Cigarettes     Quit date: 1995     Years since quittin.1   • Smokeless tobacco: Never   • Tobacco comments:     passive at work   Vaping Use   • Vaping Use: Never used   • Passive vaping exposure: Yes   Substance and Sexual Activity   • Alcohol use: Not Currently     Comment: none in 2 years   • Drug use: Never   • Sexual activity: Defer     Family History   Problem  Relation Age of Onset   • Breast cancer Mother    • Thyroid disease Mother    • Hypertension Mother    • Cancer Mother         Breast cancer   • Hypertension Father    • Diabetes Father    • Kidney disease Father    • Heart disease Father    • No Known Problems Sister    • No Known Problems Brother    • Seizures Daughter    • Hypertension Daughter    • COPD Maternal Grandmother    • Asthma Maternal Grandmother    • No Known Problems Sister    • ADD / ADHD Daughter    • Polymyositis Daughter        ALLERGIES:  Allergies   Allergen Reactions   • Contrave [Naltrexone-Bupropion Hcl Er] Dizziness         Review of Systems   Constitutional: Negative for chills, fever, malaise/fatigue and weight gain.   HENT: Negative for nosebleeds and tinnitus.    Eyes: Negative for blurred vision and double vision.   Cardiovascular: Positive for leg swelling and palpitations. Negative for chest pain, irregular heartbeat and syncope.   Respiratory: Positive for shortness of breath. Negative for cough, sleep disturbances due to breathing and snoring.    Endocrine: Negative for polydipsia, polyphagia and polyuria.   Hematologic/Lymphatic: Negative for bleeding problem. Does not bruise/bleed easily.   Skin: Negative for color change and suspicious lesions.   Musculoskeletal: Negative for falls and myalgias.   Gastrointestinal: Negative for bloating, heartburn and hematochezia.   Genitourinary: Negative for dysuria and hematuria.   Neurological: Negative for dizziness, headaches, seizures, vertigo and weakness.   Psychiatric/Behavioral: Negative for altered mental status and depression. The patient does not have insomnia and is not nervous/anxious.    Allergic/Immunologic: Negative for environmental allergies and persistent infections.       Current Outpatient Medications   Medication Sig Dispense Refill   • propranolol (INDERAL) 20 MG tablet TAKE ONE TABLET BY MOUTH DAILY AS NEEDED 90 tablet 3   • levothyroxine (Synthroid) 112 MCG tablet  Take 1 tablet by mouth Daily. 30 tablet 11   • pravastatin (PRAVACHOL) 10 MG tablet TAKE ONE TABLET BY MOUTH ONCE NIGHTLY 90 tablet 2     No current facility-administered medications for this visit.       OBJECTIVE:    Physical Exam:   Vitals reviewed.   Constitutional:       General: Not in acute distress.     Appearance: Normal appearance. Obese. Not ill-appearing, toxic-appearing or diaphoretic.   Eyes:      General: Lids are normal.      Conjunctiva/sclera: Conjunctivae normal.   HENT:      Head: Normocephalic and atraumatic.      Right Ear: External ear normal.      Left Ear: External ear normal.   Neck:      Vascular: No JVD.   Pulmonary:      Effort: Pulmonary effort is normal. No respiratory distress.      Breath sounds: Normal breath sounds. No decreased breath sounds. No wheezing. No rales.   Chest:      Chest wall: Not tender to palpatation.   Cardiovascular:      PMI at left midclavicular line. Normal rate. Regular rhythm. Normal S1 with normal intensity. Normal S2.      Murmurs: There is no murmur.      No gallop. No S3 and S4 gallop. No click. No rub.   Pulses:     Intact distal pulses. No decreased pulses.   Edema:     Peripheral edema present.     Pretibial: bilateral trace edema of the pretibial area.     Ankle: bilateral trace edema of the ankle.     Feet: bilateral trace edema of the feet.  Abdominal:      General: Bowel sounds are normal. There is no distension.      Palpations: Abdomen is soft.      Tenderness: There is no abdominal tenderness.   Musculoskeletal:      Cervical back: Normal range of motion and neck supple. Skin:     General: Skin is warm and dry.      Coloration: Skin is not pale.      Findings: No erythema or rash.   Neurological:      Mental Status: Alert and oriented to person, place, and time.      Gait: Gait normal.   Psychiatric:         Behavior: Behavior normal.         Thought Content: Thought content normal.         Judgment: Judgment normal.       Vitals:    02/24/23  "1012   BP: 116/70   Pulse: 71   SpO2: 98%   Weight: 122 kg (268 lb)   Height: 165.1 cm (65\")       DATA REVIEWED:   Results for orders placed in visit on 11/10/22    Adult Transthoracic Echo Complete w/ Color, Spectral and Contrast if Necessary Per Protocol    Interpretation Summary  •  Left ventricular systolic function is normal. Left ventricular ejection fraction appears to be 61 - 65%.  •  Left ventricular diastolic function was normal.  •  Estimated right ventricular systolic pressure from tricuspid regurgitation is normal (<35 mmHg).      No radiology results for the last 30 days.     Labs: BMP, CBC, LIPID, TSH  Lab Results   Component Value Date    GLUCOSE 136 (H) 02/03/2023    CALCIUM 9.1 02/03/2023     02/03/2023    K 4.3 02/03/2023    CO2 27.5 02/03/2023     02/03/2023    BUN 11 02/03/2023    CREATININE 0.92 02/03/2023    EGFRIFNONA 75 12/29/2021    BCR 12.0 02/03/2023    ANIONGAP 8.5 02/03/2023     Lab Results   Component Value Date    WBC 7.17 12/30/2022    HGB 13.5 12/30/2022    HCT 41.3 12/30/2022    MCV 92.4 12/30/2022     12/30/2022     Lab Results   Component Value Date    CHOL 217 (H) 03/04/2022    CHOL 204 (H) 08/18/2021    CHOL 186 05/13/2021     Lab Results   Component Value Date    TRIG 89 10/21/2022    TRIG 123 03/04/2022    TRIG 89 09/09/2021     Lab Results   Component Value Date    HDL 55 03/04/2022    HDL 61 (H) 08/18/2021    HDL 58 05/13/2021     No components found for: LDLCALC  Lab Results   Component Value Date     (H) 03/04/2022     (H) 08/18/2021     (H) 05/13/2021     No results found for: HDLLDLRATIO  No components found for: CHOLHDL  Lab Results   Component Value Date    TSH 0.577 12/30/2022     Lab Results   Component Value Date    PROBNP 192.0 12/02/2022     EKG:         HOLTER:'  Study date: 4/25/22       Interpretation Summary    · The predominant rhythm noted during the testing period was sinus rhythm. Rare PACs and rare PVCs were " noted.  · There were three episodes of supraventricular tachycardia. The peak heart rate was 126 beats per minute. Longest run of SVT was 5 beats long. No symptoms were reported during these episodes.  · Sinus tachycardia was noted during two patient triggered events with no reported symptoms.          The following portions of the patient's history were reviewed and updated as appropriate: allergies, current medications, past family history, past medical history, past social history, past surgical history and problem list.  Old records reviewed and pertinent information is included in the above objective data.     ASSESSMENT/PLAN:       Diagnosis Plan   1. Paroxysmal SVT (supraventricular tachycardia) (HCC)        2. Edema leg        3. Class 3 severe obesity due to excess calories with serious comorbidity and body mass index (BMI) of 40.0 to 44.9 in adult (HCC)        4. Elevated LDL cholesterol level            #1,  Possible paroxysmal supraventricular tachycardia.  Holter monitor in April 2022 showing 3 episodes of SVT, longest lasting 5 beats.  Triggered events however correlated with sinus tachycardia.  Due to resting heart rate in the 60s patient was given propranolol to take as needed during episodes.  She has not required propranolol.  Symptoms have improved.  Echocardiogram was unremarkable.    Discussed the benign nature of the majority of causes of palpitations.   Discussed lifestyle modifications including reducing caffeine intake, reducing stress, and increasing exercise tolerance.  Reassurance given to patient.    #2.  Elevated LDL: She is on pravastatin 10 mg    Lab Results   Component Value Date    CHOL 217 (H) 03/04/2022    TRIG 89 10/21/2022    HDL 55 03/04/2022     (H) 03/04/2022     #3.  Edema: Chronic.  Ultrasound of legs was negative for DVT.  There was pulsatile flow.  proBNP was normal.  Echo was normal.  No evidence of CHF.  She did not improve with Lasix.  Recommend compression  stockings.  Swelling has improved today. Reduce salt intake in diet.     Class 3 Severe Obesity (BMI >=40). Obesity-related health conditions include the following: dyslipidemias and GERD. Obesity is unchanged. BMI is is above average; BMI management plan is completed. We discussed portion control and increasing exercise.      Follow up: 6 months    I spent 21 minutes caring for Letty on this date of service. This time includes time spent by me in the following activities: preparing for the visit, reviewing tests, obtaining and/or reviewing a separately obtained history, performing a medically appropriate examination and/or evaluation, counseling and educating the patient/family/caregiver, ordering medications, tests, or procedures, documenting information in the medical record and independently interpreting results and communicating that information with the patient/family/caregiver            This document has been electronically signed by MEL John on February 24, 2023 10:46 CST

## 2023-03-13 ENCOUNTER — OFFICE VISIT (OUTPATIENT)
Dept: ENDOCRINOLOGY | Facility: CLINIC | Age: 53
End: 2023-03-13
Payer: COMMERCIAL

## 2023-03-13 ENCOUNTER — LAB (OUTPATIENT)
Dept: LAB | Facility: HOSPITAL | Age: 53
End: 2023-03-13
Payer: COMMERCIAL

## 2023-03-13 VITALS
WEIGHT: 266.6 LBS | OXYGEN SATURATION: 97 % | SYSTOLIC BLOOD PRESSURE: 130 MMHG | HEIGHT: 65 IN | HEART RATE: 65 BPM | DIASTOLIC BLOOD PRESSURE: 98 MMHG | BODY MASS INDEX: 44.42 KG/M2

## 2023-03-13 DIAGNOSIS — Z85.850 HISTORY OF THYROID CANCER: ICD-10-CM

## 2023-03-13 DIAGNOSIS — R73.01 IMPAIRED FASTING GLUCOSE: ICD-10-CM

## 2023-03-13 DIAGNOSIS — R53.83 OTHER FATIGUE: ICD-10-CM

## 2023-03-13 DIAGNOSIS — E89.0 POSTSURGICAL HYPOTHYROIDISM: Primary | ICD-10-CM

## 2023-03-13 LAB
ALBUMIN SERPL-MCNC: 3.9 G/DL (ref 3.5–5.2)
ALBUMIN/GLOB SERPL: 1.1 G/DL
ALP SERPL-CCNC: 123 U/L (ref 39–117)
ALT SERPL W P-5'-P-CCNC: 14 U/L (ref 1–33)
ANION GAP SERPL CALCULATED.3IONS-SCNC: 9 MMOL/L (ref 5–15)
AST SERPL-CCNC: 17 U/L (ref 1–32)
BASOPHILS # BLD AUTO: 0.04 10*3/MM3 (ref 0–0.2)
BASOPHILS NFR BLD AUTO: 0.6 % (ref 0–1.5)
BILIRUB SERPL-MCNC: 0.4 MG/DL (ref 0–1.2)
BUN SERPL-MCNC: 13 MG/DL (ref 6–20)
BUN/CREAT SERPL: 15.3 (ref 7–25)
CALCIUM SPEC-SCNC: 9.1 MG/DL (ref 8.6–10.5)
CHLORIDE SERPL-SCNC: 104 MMOL/L (ref 98–107)
CO2 SERPL-SCNC: 27 MMOL/L (ref 22–29)
CREAT SERPL-MCNC: 0.85 MG/DL (ref 0.57–1)
DEPRECATED RDW RBC AUTO: 44.3 FL (ref 37–54)
EGFRCR SERPLBLD CKD-EPI 2021: 82.6 ML/MIN/1.73
EOSINOPHIL # BLD AUTO: 0.07 10*3/MM3 (ref 0–0.4)
EOSINOPHIL NFR BLD AUTO: 1.1 % (ref 0.3–6.2)
ERYTHROCYTE [DISTWIDTH] IN BLOOD BY AUTOMATED COUNT: 12.9 % (ref 12.3–15.4)
GLOBULIN UR ELPH-MCNC: 3.7 GM/DL
GLUCOSE SERPL-MCNC: 87 MG/DL (ref 65–99)
HBA1C MFR BLD: 5.4 % (ref 4.8–5.6)
HCT VFR BLD AUTO: 43.3 % (ref 34–46.6)
HGB BLD-MCNC: 14.1 G/DL (ref 12–15.9)
IMM GRANULOCYTES # BLD AUTO: 0.01 10*3/MM3 (ref 0–0.05)
IMM GRANULOCYTES NFR BLD AUTO: 0.2 % (ref 0–0.5)
LYMPHOCYTES # BLD AUTO: 1.62 10*3/MM3 (ref 0.7–3.1)
LYMPHOCYTES NFR BLD AUTO: 25.2 % (ref 19.6–45.3)
MCH RBC QN AUTO: 30.4 PG (ref 26.6–33)
MCHC RBC AUTO-ENTMCNC: 32.6 G/DL (ref 31.5–35.7)
MCV RBC AUTO: 93.3 FL (ref 79–97)
MONOCYTES # BLD AUTO: 0.36 10*3/MM3 (ref 0.1–0.9)
MONOCYTES NFR BLD AUTO: 5.6 % (ref 5–12)
NEUTROPHILS NFR BLD AUTO: 4.34 10*3/MM3 (ref 1.7–7)
NEUTROPHILS NFR BLD AUTO: 67.3 % (ref 42.7–76)
NRBC BLD AUTO-RTO: 0 /100 WBC (ref 0–0.2)
PLATELET # BLD AUTO: 302 10*3/MM3 (ref 140–450)
PMV BLD AUTO: 10.4 FL (ref 6–12)
POTASSIUM SERPL-SCNC: 4.2 MMOL/L (ref 3.5–5.2)
PROT SERPL-MCNC: 7.6 G/DL (ref 6–8.5)
RBC # BLD AUTO: 4.64 10*6/MM3 (ref 3.77–5.28)
SODIUM SERPL-SCNC: 140 MMOL/L (ref 136–145)
TSH SERPL DL<=0.05 MIU/L-ACNC: 1.15 UIU/ML (ref 0.27–4.2)
WBC NRBC COR # BLD: 6.44 10*3/MM3 (ref 3.4–10.8)

## 2023-03-13 PROCEDURE — 36415 COLL VENOUS BLD VENIPUNCTURE: CPT | Performed by: NURSE PRACTITIONER

## 2023-03-13 PROCEDURE — 86800 THYROGLOBULIN ANTIBODY: CPT | Performed by: NURSE PRACTITIONER

## 2023-03-13 PROCEDURE — 85025 COMPLETE CBC W/AUTO DIFF WBC: CPT | Performed by: NURSE PRACTITIONER

## 2023-03-13 PROCEDURE — 83036 HEMOGLOBIN GLYCOSYLATED A1C: CPT | Performed by: NURSE PRACTITIONER

## 2023-03-13 PROCEDURE — 80053 COMPREHEN METABOLIC PANEL: CPT | Performed by: NURSE PRACTITIONER

## 2023-03-13 PROCEDURE — 84443 ASSAY THYROID STIM HORMONE: CPT | Performed by: NURSE PRACTITIONER

## 2023-03-13 PROCEDURE — 84432 ASSAY OF THYROGLOBULIN: CPT | Performed by: NURSE PRACTITIONER

## 2023-03-13 PROCEDURE — 99214 OFFICE O/P EST MOD 30 MIN: CPT | Performed by: NURSE PRACTITIONER

## 2023-03-13 NOTE — PROGRESS NOTES
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      Mental Status: She is alert.   Psychiatric:         Mood and Affect: Mood normal.         Thought Content: Thought content normal.         Judgment: Judgment normal.        Result Review :   The following data was reviewed by: MEL Brown on 04/01/2022:  Common labs    Common Labs 12/30/22 12/30/22 12/30/22 1/11/23 2/3/23    1056 1056 1056     Glucose  94  78 136 (A)   BUN  17  16 11   Creatinine  1.01 (A)  0.99 0.92   Sodium  139  142 141   Potassium  4.0  4.7 4.3   Chloride  101  104 105   Calcium  9.2  9.0 9.1   Albumin  4.1      Total Bilirubin  0.4      Alkaline Phosphatase  127 (A)      AST (SGOT)  20      ALT (SGPT)  15      WBC 7.17       Hemoglobin 13.5       Hematocrit 41.3       Platelets 300       Hemoglobin A1C   5.50     (A) Abnormal value                                                  Assessment []Collapse by Default          Assessment and Plan    Diagnoses and all orders for this visit:    1. Postsurgical hypothyroidism (Primary)  -     CBC & " Differential  -     Comprehensive Metabolic Panel  -     TSH  -     Thyroglobulin With Anti-TG    2. History of thyroid cancer  -     CBC & Differential  -     Comprehensive Metabolic Panel  -     TSH  -     Thyroglobulin With Anti-TG    3. Impaired fasting glucose  -     Hemoglobin A1c    4. Other fatigue           Total thyroidectomy   Thyroid cancer   Postoperative hypothyroidism         Cytology above      Considered low risk based on FLORENTIN guidelines       Tg and TG ab today and I will call with results      Target tsh 0.27 to 2         Lab Results   Component Value Date    TSH 0.577 12/30/2022         Taking  levothyroxine 112 mcg -keep        Medication must me taken on an empty stomach at least one hour before food, drink and other medications. Only take with water. If taking vitamins or antiacids needs to be 4 hours before or after.      Baseline with TSH of 17     Component      Latest Ref Rng & Units 3/4/2022 3/4/2022           2:15 PM  2:15 PM   Thyroglobulin Ab      0.0 - 0.9 IU/mL <1.0 <1.0   Thyroglobulin      1.5 - 38.5 ng/mL 12.6    THYROGLOBULIN BY KATHRYN      1.5 - 38.5 ng/mL 13.7 13.7           Tissue markers are pending at time of lab          calcium is normal      She can continue tums      Lab Results   Component Value Date    CALCIUM 9.1 02/03/2023    PHOS 2.9 03/04/2022     Vitamin d def    Taking supplement            impaired fasting    Lab Results   Component Value Date    HGBA1C 5.50 12/30/2022         Fatigue     Reassess             Follow Up   No follow-ups on file.  Patient was given instructions and counseling regarding her condition or for health maintenance advice. Please see specific information pulled into the AVS if appropriate.         This document has been electronically signed by MEL Brown on March 13, 2023 10:08 CDT.

## 2023-03-15 LAB
THYROGLOB AB SERPL-ACNC: <1 IU/ML (ref 0–0.9)
THYROGLOB SERPL-MCNC: 2.1 NG/ML (ref 1.5–38.5)

## 2023-03-17 ENCOUNTER — OFFICE VISIT (OUTPATIENT)
Dept: PODIATRY | Facility: CLINIC | Age: 53
End: 2023-03-17
Payer: COMMERCIAL

## 2023-03-17 VITALS — WEIGHT: 266 LBS | OXYGEN SATURATION: 97 % | BODY MASS INDEX: 44.32 KG/M2 | HEART RATE: 80 BPM | HEIGHT: 65 IN

## 2023-03-17 DIAGNOSIS — M25.572 ACUTE LEFT ANKLE PAIN: ICD-10-CM

## 2023-03-17 DIAGNOSIS — M72.2 PLANTAR FASCIITIS OF LEFT FOOT: ICD-10-CM

## 2023-03-17 DIAGNOSIS — M76.62 LEFT ACHILLES TENDINITIS: Primary | ICD-10-CM

## 2023-03-17 PROCEDURE — 99214 OFFICE O/P EST MOD 30 MIN: CPT | Performed by: NURSE PRACTITIONER

## 2023-03-17 NOTE — PROGRESS NOTES
Letty Bach  1970  53 y.o. female      3/17/2023    Chief Complaint   Patient presents with   • Left Foot - Follow-up     Plantar fascitis          History of Present Illness    Letty Bach is a 53 y.o.female returns to clinic for a follow up appointment of left foot plantar fascitis. Patient states she has went to PT about eight visits. Steroid injection has helped with plantar fascitis.     53-year-old female in the office today for follow-up evaluation of her plantar fasciitis and ankle pain.  She is reporting after 8 therapy sessions, the anti-inflammatories and the injection her plantar foot pain has resolved however she is continued to experience pain at the insertion site of the Achilles with a palpable tenderness in the surrounding area.  As she is more active this area swells.  She currently denies any known injury fever, chills or evidence of infection      Past Medical History:   Diagnosis Date   • Cancer (HCC)    • Disease of thyroid gland     nodule, mass   • Elevated liver enzymes    • Fatty liver    • GERD (gastroesophageal reflux disease)    • History of medical problems 11/4/2021    Thyroid cancer   • Urinary tract infection    • Varicella          Past Surgical History:   Procedure Laterality Date   • CHOLECYSTECTOMY  06/06/2019   • CHOLECYSTECTOMY WITH INTRAOPERATIVE CHOLANGIOGRAM N/A 06/06/2019    Procedure: CHOLECYSTECTOMY LAPAROSCOPIC INTRAOPERATIVE CHOLANGIOGRAM      (c-arm#2);  Surgeon: Felipe Overton MD;  Location: [facility] OR;  Service: General   • COLONOSCOPY N/A 02/12/2021    Procedure: COLONOSCOPY;  Surgeon: Joao Bruce MD;  Location: [facility] ENDOSCOPY;  Service: Gastroenterology;  Laterality: N/A;   • COLONOSCOPY     • ENDOSCOPY N/A 11/14/2019    Procedure: ESOPHAGOGASTRODUODENOSCOPY;  Surgeon: Joao Bruce MD;  Location: [facility] ENDOSCOPY;  Service: Gastroenterology   • HYSTERECTOMY  2007   • THYROIDECTOMY Right 11/04/2021    Procedure: RIGHT LOBE THYROIDECTOMY  and ISTHMUSECTOMY;  Surgeon: Benjamin Renteria MD;  Location: [facility];  Service: General;  Laterality: Right;   • THYROIDECTOMY Left 2022    Procedure: LEFT THYROIDECTOMY;  Surgeon: Benjamin Renteria MD;  Location: [facility];  Service: General;  Laterality: Left;   • TOTAL ABDOMINAL HYSTERECTOMY WITH SALPINGO OOPHORECTOMY     • UPPER GASTROINTESTINAL ENDOSCOPY  2019   • US GUIDED FINE NEEDLE ASPIRATION  2021   • WISDOM TOOTH EXTRACTION           Family History   Problem Relation Age of Onset   • Breast cancer Mother    • Thyroid disease Mother    • Hypertension Mother    • Cancer Mother         Breast cancer   • Hypertension Father    • Diabetes Father    • Kidney disease Father    • Heart disease Father    • No Known Problems Sister    • No Known Problems Brother    • Seizures Daughter    • Hypertension Daughter    • COPD Maternal Grandmother    • Asthma Maternal Grandmother    • No Known Problems Sister    • ADD / ADHD Daughter    • Polymyositis Daughter        Allergies   Allergen Reactions   • Contrave [Naltrexone-Bupropion Hcl Er] Dizziness       Social History     Socioeconomic History   • Marital status:    Tobacco Use   • Smoking status: Former     Packs/day: 0.50     Years: 5.00     Pack years: 2.50     Types: Cigarettes     Quit date: 1995     Years since quittin.2   • Smokeless tobacco: Never   • Tobacco comments:     passive at work   Vaping Use   • Vaping Use: Never used   • Passive vaping exposure: Yes   Substance and Sexual Activity   • Alcohol use: Not Currently     Comment: none in 2 years   • Drug use: Never   • Sexual activity: Defer         Current Outpatient Medications   Medication Sig Dispense Refill   • levothyroxine (Synthroid) 112 MCG tablet Take 1 tablet by mouth Daily. 30 tablet 11   • pravastatin (PRAVACHOL) 10 MG tablet TAKE ONE TABLET BY MOUTH ONCE NIGHTLY 90 tablet 2   • propranolol (INDERAL) 20 MG tablet TAKE ONE TABLET BY MOUTH DAILY AS NEEDED 90  "tablet 3     No current facility-administered medications for this visit.       Review of Systems   Constitutional: Negative.    HENT: Negative.    Eyes: Negative.    Respiratory: Negative.    Cardiovascular: Negative.    Gastrointestinal: Negative.    Endocrine: Negative.    Genitourinary: Negative.    Musculoskeletal:        Foot pain   Skin: Negative.    Allergic/Immunologic: Negative.    Neurological: Negative.    Hematological: Negative.    Psychiatric/Behavioral: Negative.    All other systems reviewed and are negative.        OBJECTIVE    Pulse 80   Ht 165.1 cm (65\")   Wt 121 kg (266 lb)   SpO2 97%   BMI 44.26 kg/m²     Physical Exam  Vitals reviewed.   Constitutional:       Appearance: Normal appearance. She is well-developed.   HENT:      Head: Normocephalic and atraumatic.   Neck:      Trachea: Trachea and phonation normal.   Cardiovascular:      Pulses:           Dorsalis pedis pulses are 2+ on the right side and 2+ on the left side.        Posterior tibial pulses are 2+ on the right side and 2+ on the left side.   Pulmonary:      Effort: Pulmonary effort is normal. No respiratory distress.   Abdominal:      General: There is no distension.      Palpations: Abdomen is soft.   Musculoskeletal:      Right ankle: Normal.      Left ankle: Swelling (Posterior ankle) present. Decreased range of motion.      Left Achilles Tendon: Tenderness and defect present. Mcqueen's test positive.        Feet:    Feet:      Right foot:      Skin integrity: Skin integrity normal.      Toenail Condition: Right toenails are normal.      Left foot:      Skin integrity: Skin integrity normal.      Toenail Condition: Left toenails are normal.   Skin:     General: Skin is warm and dry.   Neurological:      Mental Status: She is alert and oriented to person, place, and time.      GCS: GCS eye subscore is 4. GCS verbal subscore is 5. GCS motor subscore is 6.   Psychiatric:         Speech: Speech normal.         Behavior: " Behavior normal. Behavior is cooperative.         Thought Content: Thought content normal.         Judgment: Judgment normal.        AP lateral and oblique view of the left foot reveals a small calcaneal spur with no other acute radiological abnormality noted.    Reviewed x-rays of the ankle    Procedures      ASSESSMENT AND PLAN    Diagnoses and all orders for this visit:    1. Left Achilles tendinitis (Primary)  -     XR Ankle 3+ View Left  -     MRI Ankle Left Without Contrast; Future    2. Plantar fasciitis of left foot  -     XR Ankle 3+ View Left  -     MRI Ankle Left Without Contrast; Future    3. Acute left ankle pain  -     XR Ankle 3+ View Left  -     MRI Ankle Left Without Contrast; Future    Plantar foot pain has improved however now the patient is complaining of an Achilles pain and on exam there is a palpable defect and significant tenderness.  Will recommend proceeding with an MRI of the ankle to rule out an Achilles tendon tear and in the meantime the patient was given a heel insert to see if this improves her symptoms and will to continue her guided physical therapy regiment.  She was instructed to contact the office for any worsening symptom          This document has been electronically signed by Praneeth LEAL, FNP-YANICK, ONP-C on March 17, 2023 11:44 CDT

## 2023-03-28 ENCOUNTER — HOSPITAL ENCOUNTER (OUTPATIENT)
Dept: MRI IMAGING | Facility: HOSPITAL | Age: 53
Discharge: HOME | End: 2023-03-28
Admitting: NURSE PRACTITIONER
Payer: COMMERCIAL

## 2023-03-28 DIAGNOSIS — M25.572 ACUTE LEFT ANKLE PAIN: ICD-10-CM

## 2023-03-28 DIAGNOSIS — M72.2 PLANTAR FASCIITIS OF LEFT FOOT: ICD-10-CM

## 2023-03-28 DIAGNOSIS — M76.62 LEFT ACHILLES TENDINITIS: ICD-10-CM

## 2023-03-28 PROCEDURE — 73721 MRI JNT OF LWR EXTRE W/O DYE: CPT

## 2023-04-14 ENCOUNTER — OFFICE VISIT (OUTPATIENT)
Dept: PODIATRY | Facility: CLINIC | Age: 53
End: 2023-04-14
Payer: COMMERCIAL

## 2023-04-14 VITALS — HEIGHT: 65 IN | WEIGHT: 260 LBS | HEART RATE: 96 BPM | BODY MASS INDEX: 43.32 KG/M2 | OXYGEN SATURATION: 98 %

## 2023-04-14 DIAGNOSIS — M25.572 ACUTE LEFT ANKLE PAIN: ICD-10-CM

## 2023-04-14 DIAGNOSIS — C73 THYROID CANCER: ICD-10-CM

## 2023-04-14 DIAGNOSIS — M72.2 PLANTAR FASCIITIS OF LEFT FOOT: Primary | ICD-10-CM

## 2023-04-14 PROCEDURE — 99213 OFFICE O/P EST LOW 20 MIN: CPT | Performed by: NURSE PRACTITIONER

## 2023-04-14 RX ORDER — LEVOTHYROXINE SODIUM 112 UG/1
TABLET ORAL
Qty: 30 TABLET | Refills: 11 | Status: SHIPPED | OUTPATIENT
Start: 2023-04-14

## 2023-04-14 NOTE — PROGRESS NOTES
Letty Bach  1970  53 y.o. female      4/14/2023    Chief Complaint   Patient presents with   • Left Foot - Results       History of Present Illness    Letty Bach is a 53 y.o.female returns to clinic for MRI results of left foot.  No improvement in posterior ankle pain, the patient is purchased special shoes to improve her symptoms.  She is also completed several courses of physical therapy, taken anti-inflammatories and continues to experience significant pain in the Achilles insertion          Past Medical History:   Diagnosis Date   • Cancer    • Disease of thyroid gland     nodule, mass   • Elevated liver enzymes    • Fatty liver    • GERD (gastroesophageal reflux disease)    • History of medical problems 11/4/2021    Thyroid cancer   • Urinary tract infection    • Varicella          Past Surgical History:   Procedure Laterality Date   • CHOLECYSTECTOMY  06/06/2019   • CHOLECYSTECTOMY WITH INTRAOPERATIVE CHOLANGIOGRAM N/A 06/06/2019    Procedure: CHOLECYSTECTOMY LAPAROSCOPIC INTRAOPERATIVE CHOLANGIOGRAM      (c-arm#2);  Surgeon: Felipe Overton MD;  Location: [facility] OR;  Service: General   • COLONOSCOPY N/A 02/12/2021    Procedure: COLONOSCOPY;  Surgeon: Joao Bruce MD;  Location: [facility] ENDOSCOPY;  Service: Gastroenterology;  Laterality: N/A;   • COLONOSCOPY     • ENDOSCOPY N/A 11/14/2019    Procedure: ESOPHAGOGASTRODUODENOSCOPY;  Surgeon: Joao Bruce MD;  Location: [facility] ENDOSCOPY;  Service: Gastroenterology   • HYSTERECTOMY  2007   • THYROIDECTOMY Right 11/04/2021    Procedure: RIGHT LOBE THYROIDECTOMY and ISTHMUSECTOMY;  Surgeon: Benjamin Renteria MD;  Location: [facility] OR;  Service: General;  Laterality: Right;   • THYROIDECTOMY Left 02/24/2022    Procedure: LEFT THYROIDECTOMY;  Surgeon: Benjamin Renteria MD;  Location: [facility] OR;  Service: General;  Laterality: Left;   • TOTAL ABDOMINAL HYSTERECTOMY WITH SALPINGO OOPHORECTOMY  2007   • UPPER GASTROINTESTINAL ENDOSCOPY  11/14/2019   •  US GUIDED FINE NEEDLE ASPIRATION  2021   • WISDOM TOOTH EXTRACTION           Family History   Problem Relation Age of Onset   • Breast cancer Mother    • Thyroid disease Mother    • Hypertension Mother    • Cancer Mother         Breast cancer   • Hypertension Father    • Diabetes Father    • Kidney disease Father    • Heart disease Father    • No Known Problems Sister    • No Known Problems Brother    • Seizures Daughter    • Hypertension Daughter    • COPD Maternal Grandmother    • Asthma Maternal Grandmother    • No Known Problems Sister    • ADD / ADHD Daughter    • Polymyositis Daughter        Allergies   Allergen Reactions   • Contrave [Naltrexone-Bupropion Hcl Er] Dizziness       Social History     Socioeconomic History   • Marital status:    Tobacco Use   • Smoking status: Former     Packs/day: 0.50     Years: 5.00     Pack years: 2.50     Types: Cigarettes     Quit date: 1995     Years since quittin.3   • Smokeless tobacco: Never   • Tobacco comments:     passive at work   Vaping Use   • Vaping Use: Never used   • Passive vaping exposure: Yes   Substance and Sexual Activity   • Alcohol use: Not Currently     Comment: none in 2 years   • Drug use: Never   • Sexual activity: Defer         Current Outpatient Medications   Medication Sig Dispense Refill   • levothyroxine (SYNTHROID, LEVOTHROID) 112 MCG tablet TAKE ONE TABLET BY MOUTH DAILY 30 tablet 11   • pravastatin (PRAVACHOL) 10 MG tablet TAKE ONE TABLET BY MOUTH ONCE NIGHTLY 90 tablet 2   • propranolol (INDERAL) 20 MG tablet TAKE ONE TABLET BY MOUTH DAILY AS NEEDED 90 tablet 3     No current facility-administered medications for this visit.       Review of Systems   Constitutional: Negative.    HENT: Negative.    Eyes: Negative.    Respiratory: Negative.    Cardiovascular: Negative.    Gastrointestinal: Negative.    Endocrine: Negative.    Genitourinary: Negative.    Musculoskeletal:        Foot pain   Skin: Negative.   "  Allergic/Immunologic: Negative.    Neurological: Negative.    Hematological: Negative.    Psychiatric/Behavioral: Negative.    All other systems reviewed and are negative.        OBJECTIVE    Pulse 96   Ht 165.1 cm (65\")   Wt 118 kg (260 lb)   SpO2 98%   BMI 43.27 kg/m²     Physical Exam  Vitals reviewed.   Constitutional:       Appearance: Normal appearance. She is well-developed.   HENT:      Head: Normocephalic and atraumatic.   Neck:      Trachea: Trachea and phonation normal.   Cardiovascular:      Pulses:           Dorsalis pedis pulses are 2+ on the right side and 2+ on the left side.        Posterior tibial pulses are 2+ on the right side and 2+ on the left side.   Pulmonary:      Effort: Pulmonary effort is normal. No respiratory distress.   Abdominal:      General: There is no distension.      Palpations: Abdomen is soft.   Musculoskeletal:      Right ankle: Normal.      Left ankle: Swelling (Posterior ankle) present. Decreased range of motion.      Left Achilles Tendon: Tenderness and defect present. Mcqueen's test positive.        Feet:    Feet:      Right foot:      Skin integrity: Skin integrity normal.      Toenail Condition: Right toenails are normal.      Left foot:      Skin integrity: Skin integrity normal.      Toenail Condition: Left toenails are normal.   Skin:     General: Skin is warm and dry.   Neurological:      Mental Status: She is alert and oriented to person, place, and time.      GCS: GCS eye subscore is 4. GCS verbal subscore is 5. GCS motor subscore is 6.   Psychiatric:         Speech: Speech normal.         Behavior: Behavior normal. Behavior is cooperative.         Thought Content: Thought content normal.         Judgment: Judgment normal.        XR Ankle 3+ View Left    Result Date: 3/18/2023  Narrative: Three view left ankle HISTORY: Pain at insertion of Achilles tendon. Achilles tendinitis. Plantar fascial fibromatosis. Pain in the left ankle and joints of the left foot. " Weight-bearing AP, lateral and oblique views obtained. COMPARISON: Left foot January 20, 2023 FINDINGS: No acute fracture or dislocation. Stable 7 mm fragmented osteophyte at the insertion of Achilles tendon. Stable small to moderate-sized plantar calcaneal spur. No other osseous or articular abnormality.     Impression: CONCLUSION: Stable 7 mm fragmented osteophyte at the insertion of Achilles tendon. Stable small to moderate-sized plantar calcaneal spur. [id number] Electronically signed by:  Charles Baires MD  3/18/2023 2:36 PM CDT Workstation: [id number]    MRI Ankle Left Without Contrast    Result Date: 3/28/2023  Narrative: EXAM: MRI left ankle without gadolinium CLINICAL INDICATION: Pain near Achilles insertion COMPARISON: No relevant priors available TECHNIQUE: Multiplanar/multisequence MR images left ankle No intra-articular gadolinium utilized for exam FINDINGS: There is no fracture, dislocation or aggressive/destructive lesion. No erosive or productive arthritic change. There is mild subcutaneous edema along the anterior lateral ankle. No large ankle joint effusion or loose body seen. The sinus Tarsi shows normal fatty signal on all sequences. There is mild thickening and heterogeneity of the distal Achilles tendon at or near the insertion, as well as mild retrocalcaneal fluid signal. There is calcaneal Achilles spurring with fragmentation. There is no full-thickness or retracted tear of the Achilles tendon. There is plantar calcaneal enthesophyte also appreciated. Plantar fascial aponeurosis appears normal. The flexor/extensor tendons appear normal. The anterior/posterior tibiofibular and anterior/posterior talofibular ligaments are normal. The calcaneofibular ligament appears normal. The superficial and deep fibers of deltoid ligament are intact. Visualized spring ligament is normal.     Impression: CONCLUSION: 1. Achilles spurring/fragmentation and probable sequela of chronic mild insertional Achilles  tendinopathy. Trace reactive retrocalcaneal bursitis. No full-thickness Achilles tendon tear. 2. Plantar calcaneal enthesopathy without significant plantar fasciitis or aponeurosis tear. See report for other details. Electronically signed by:  Lukasz Pollock DO  3/28/2023 10:16 AM CDT Workstation: [id number]            Reviewed x-rays of the ankle    Procedures      ASSESSMENT AND PLAN    Diagnoses and all orders for this visit:    1. Plantar fasciitis of left foot (Primary)    2. Acute left ankle pain         Reviewed MRI results/films  Discussed options with the patient  She was placed in a padded sleeve  We will continue stretches/ice/home exercise program  Possible follow-up with Dr. Burnett to discuss other options          This document has been electronically signed by Praneeth LEAL, FNP-C, ONP-C on April 14, 2023 14:27 CDT

## 2023-04-26 ENCOUNTER — OFFICE VISIT (OUTPATIENT)
Dept: PODIATRY | Facility: CLINIC | Age: 53
End: 2023-04-26
Payer: COMMERCIAL

## 2023-04-26 VITALS
BODY MASS INDEX: 43.32 KG/M2 | SYSTOLIC BLOOD PRESSURE: 115 MMHG | HEIGHT: 65 IN | WEIGHT: 260 LBS | OXYGEN SATURATION: 94 % | HEART RATE: 79 BPM | DIASTOLIC BLOOD PRESSURE: 60 MMHG

## 2023-04-26 DIAGNOSIS — M77.32 HEEL SPUR, LEFT: ICD-10-CM

## 2023-04-26 DIAGNOSIS — M21.862 ACQUIRED POSTERIOR EQUINUS, LEFT: ICD-10-CM

## 2023-04-26 DIAGNOSIS — M76.62 ACHILLES TENDINITIS OF LEFT LOWER EXTREMITY: Primary | ICD-10-CM

## 2023-04-26 NOTE — PROGRESS NOTES
Letty Bach  1970  53 y.o. female      04/26/2023    Chief Complaint   Patient presents with   • Left Foot - Follow-up     Surgery Discussion        History of Present Illness    Letty Bach is a 53 y.o.female Patient came to clinic for concern of left achilles pain. Patient seen Stuart and went to PT for 8 weeks and still having pain. Patient had a xray on 3/17/2023 and MRI on 3/28/2023.      Past Medical History:   Diagnosis Date   • Cancer    • Disease of thyroid gland     nodule, mass   • Elevated liver enzymes    • Fatty liver    • GERD (gastroesophageal reflux disease)    • History of medical problems 11/4/2021    Thyroid cancer   • Urinary tract infection    • Varicella          Past Surgical History:   Procedure Laterality Date   • CHOLECYSTECTOMY  06/06/2019   • CHOLECYSTECTOMY WITH INTRAOPERATIVE CHOLANGIOGRAM N/A 06/06/2019    Procedure: CHOLECYSTECTOMY LAPAROSCOPIC INTRAOPERATIVE CHOLANGIOGRAM      (c-arm#2);  Surgeon: Felipe Overton MD;  Location: [facility] OR;  Service: General   • COLONOSCOPY N/A 02/12/2021    Procedure: COLONOSCOPY;  Surgeon: Joao Bruce MD;  Location: [facility] ENDOSCOPY;  Service: Gastroenterology;  Laterality: N/A;   • COLONOSCOPY     • ENDOSCOPY N/A 11/14/2019    Procedure: ESOPHAGOGASTRODUODENOSCOPY;  Surgeon: Joao Bruce MD;  Location: [facility] ENDOSCOPY;  Service: Gastroenterology   • HYSTERECTOMY  2007   • THYROIDECTOMY Right 11/04/2021    Procedure: RIGHT LOBE THYROIDECTOMY and ISTHMUSECTOMY;  Surgeon: Benjamin Renteria MD;  Location: [facility] OR;  Service: General;  Laterality: Right;   • THYROIDECTOMY Left 02/24/2022    Procedure: LEFT THYROIDECTOMY;  Surgeon: Benjamin Renteria MD;  Location: [facility] OR;  Service: General;  Laterality: Left;   • TOTAL ABDOMINAL HYSTERECTOMY WITH SALPINGO OOPHORECTOMY  2007   • UPPER GASTROINTESTINAL ENDOSCOPY  11/14/2019   • US GUIDED FINE NEEDLE ASPIRATION  09/01/2021   • WISDOM TOOTH EXTRACTION           Family History  "  Problem Relation Age of Onset   • Breast cancer Mother    • Thyroid disease Mother    • Hypertension Mother    • Cancer Mother         Breast cancer   • Hypertension Father    • Diabetes Father    • Kidney disease Father    • Heart disease Father    • No Known Problems Sister    • No Known Problems Brother    • Seizures Daughter    • Hypertension Daughter    • COPD Maternal Grandmother    • Asthma Maternal Grandmother    • No Known Problems Sister    • ADD / ADHD Daughter    • Polymyositis Daughter        Allergies   Allergen Reactions   • Contrave [Naltrexone-Bupropion Hcl Er] Dizziness       Social History     Socioeconomic History   • Marital status:    Tobacco Use   • Smoking status: Former     Packs/day: 0.50     Years: 5.00     Pack years: 2.50     Types: Cigarettes     Quit date: 1995     Years since quittin.3   • Smokeless tobacco: Never   • Tobacco comments:     passive at work   Vaping Use   • Vaping Use: Never used   • Passive vaping exposure: Yes   Substance and Sexual Activity   • Alcohol use: Not Currently     Comment: none in 2 years   • Drug use: Never   • Sexual activity: Defer         Current Outpatient Medications   Medication Sig Dispense Refill   • levothyroxine (SYNTHROID, LEVOTHROID) 112 MCG tablet TAKE ONE TABLET BY MOUTH DAILY 30 tablet 11   • pravastatin (PRAVACHOL) 10 MG tablet TAKE ONE TABLET BY MOUTH ONCE NIGHTLY 90 tablet 2   • propranolol (INDERAL) 20 MG tablet TAKE ONE TABLET BY MOUTH DAILY AS NEEDED 90 tablet 3     No current facility-administered medications for this visit.       Review of Systems   Musculoskeletal:        Left heel pain    All other systems reviewed and are negative.        OBJECTIVE    /60   Pulse 79   Ht 165.1 cm (65\")   Wt 118 kg (260 lb)   SpO2 94%   BMI 43.27 kg/m²     Physical Exam  Vitals reviewed.   Constitutional:       General: She is not in acute distress.     Appearance: She is well-developed.   Cardiovascular:      Pulses:  "          Dorsalis pedis pulses are 2+ on the right side and 2+ on the left side.        Posterior tibial pulses are 2+ on the right side and 2+ on the left side.   Pulmonary:      Effort: Pulmonary effort is normal.   Musculoskeletal:      Right lower leg: Edema present.      Left lower leg: No edema.      Left foot: Decreased range of motion.        Feet:    Feet:      Right foot:      Skin integrity: Skin integrity normal.      Left foot:      Skin integrity: Skin integrity normal.   Skin:     General: Skin is warm and dry.      Capillary Refill: Capillary refill takes less than 2 seconds.   Neurological:      Mental Status: She is alert and oriented to person, place, and time.      Gait: Gait is intact. Gait normal.   Psychiatric:         Behavior: Behavior normal. Behavior is cooperative.         Thought Content: Thought content normal. Thought content is not delusional.                Procedures        ASSESSMENT AND PLAN    Diagnoses and all orders for this visit:    1. Achilles tendinitis of left lower extremity (Primary)  -     Case Request; Standing  -     ceFAZolin (ANCEF) 2 g in sodium chloride 0.9 % 100 mL IVPB  -     Case Request    2. Heel spur, left    3. Acquired posterior equinus, left    Other orders  -     Follow Anesthesia Guidelines / Protocol; Future  -     Follow Anesthesia Guidelines / Protocol; Standing  -     Verify NPO Status; Standing  -     Obtain informed consent (if not collected inpatient or PAT); Standing  -     Notify Physician - Standard; Standing        -Patient examined and evaluated  -Imaging reviewed  -Discussed conservative and surgical treatment options for left Achilles pain.  Patient elected to proceed with surgical intervention  -Surgical plan is left gastrocnemius recession, heel spur resection, Achilles tendon repair and all indicated procedures.  -Risks and benefits of the procedure including but not limited to postoperative infection, incisional dehiscence, numbness,  swelling, residual pain and postoperative blood clot discussed in detail.  No guarantees were given or implied.  -Tentative date for the surgery June 1, 2023  -All questions were answered  -Recheck following surgery           This document has been electronically signed by Frankie Burnett DPM on April 30, 2023 10:58 CDT     4/30/2023  10:58 CDT

## 2023-05-01 PROBLEM — M76.62 ACHILLES TENDINITIS OF LEFT LOWER EXTREMITY: Status: ACTIVE | Noted: 2023-05-01

## 2023-05-02 ENCOUNTER — TELEPHONE (OUTPATIENT)
Dept: PODIATRY | Facility: CLINIC | Age: 53
End: 2023-05-02
Payer: COMMERCIAL

## 2023-05-02 NOTE — TELEPHONE ENCOUNTER
PT REQUESTS A CALL BACK RE PT SAYS MY CHART SHOWS HER SURGERY AS SCHEDULED FOR 5-11-23 AND PT SAYS HER SURGERY IS FOR 6-1-23.  CALL BACK # [phone].  THANK YOU.

## 2023-05-31 ENCOUNTER — ANESTHESIA EVENT (OUTPATIENT)
Dept: PERIOP | Facility: HOSPITAL | Age: 53
End: 2023-05-31
Payer: COMMERCIAL

## 2023-06-01 ENCOUNTER — APPOINTMENT (OUTPATIENT)
Dept: GENERAL RADIOLOGY | Facility: HOSPITAL | Age: 53
End: 2023-06-01
Payer: COMMERCIAL

## 2023-06-01 ENCOUNTER — HOSPITAL ENCOUNTER (OUTPATIENT)
Facility: HOSPITAL | Age: 53
Setting detail: HOSPITAL OUTPATIENT SURGERY
Discharge: HOME | End: 2023-06-01
Attending: PODIATRIST | Admitting: PODIATRIST
Payer: COMMERCIAL

## 2023-06-01 ENCOUNTER — ANESTHESIA (OUTPATIENT)
Dept: PERIOP | Facility: HOSPITAL | Age: 53
End: 2023-06-01
Payer: COMMERCIAL

## 2023-06-01 VITALS
WEIGHT: 266.76 LBS | HEIGHT: 65 IN | TEMPERATURE: 96.7 F | DIASTOLIC BLOOD PRESSURE: 62 MMHG | HEART RATE: 96 BPM | SYSTOLIC BLOOD PRESSURE: 112 MMHG | BODY MASS INDEX: 44.44 KG/M2 | OXYGEN SATURATION: 96 % | RESPIRATION RATE: 20 BRPM

## 2023-06-01 DIAGNOSIS — M76.62 ACHILLES TENDINITIS OF LEFT LOWER EXTREMITY: ICD-10-CM

## 2023-06-01 PROCEDURE — S0260 H&P FOR SURGERY: HCPCS | Performed by: PODIATRIST

## 2023-06-01 PROCEDURE — C1713 ANCHOR/SCREW BN/BN,TIS/BN: HCPCS | Performed by: PODIATRIST

## 2023-06-01 PROCEDURE — 25010000002 FENTANYL CITRATE (PF) 50 MCG/ML SOLUTION: Performed by: NURSE ANESTHETIST, CERTIFIED REGISTERED

## 2023-06-01 PROCEDURE — 76000 FLUOROSCOPY <1 HR PHYS/QHP: CPT

## 2023-06-01 PROCEDURE — 25010000002 HYDROMORPHONE 1 MG/ML SOLUTION: Performed by: NURSE ANESTHETIST, CERTIFIED REGISTERED

## 2023-06-01 PROCEDURE — 25010000002 ONDANSETRON PER 1 MG: Performed by: NURSE ANESTHETIST, CERTIFIED REGISTERED

## 2023-06-01 PROCEDURE — 25010000002 DEXAMETHASONE PER 1 MG: Performed by: NURSE ANESTHETIST, CERTIFIED REGISTERED

## 2023-06-01 PROCEDURE — 27654 REPAIR OF ACHILLES TENDON: CPT | Performed by: PODIATRIST

## 2023-06-01 PROCEDURE — 25010000002 SUCCINYLCHOLINE PER 20 MG: Performed by: NURSE ANESTHETIST, CERTIFIED REGISTERED

## 2023-06-01 PROCEDURE — 25010000002 MIDAZOLAM PER 1 MG: Performed by: NURSE ANESTHETIST, CERTIFIED REGISTERED

## 2023-06-01 PROCEDURE — 25010000002 NEOSTIGMINE 10 MG/10ML SOLUTION: Performed by: NURSE ANESTHETIST, CERTIFIED REGISTERED

## 2023-06-01 PROCEDURE — 27687 REVISION OF CALF TENDON: CPT | Performed by: PODIATRIST

## 2023-06-01 PROCEDURE — 25010000002 PROPOFOL 10 MG/ML EMULSION: Performed by: NURSE ANESTHETIST, CERTIFIED REGISTERED

## 2023-06-01 PROCEDURE — 25010000002 CEFAZOLIN PER 500 MG: Performed by: PODIATRIST

## 2023-06-01 PROCEDURE — 28118 REMOVAL OF HEEL BONE: CPT | Performed by: PODIATRIST

## 2023-06-01 PROCEDURE — 25010000002 ROPIVACAINE PER 1 MG: Performed by: PODIATRIST

## 2023-06-01 RX ORDER — NALOXONE HCL 0.4 MG/ML
0.4 VIAL (ML) INJECTION AS NEEDED
Status: DISCONTINUED | OUTPATIENT
Start: 2023-06-01 | End: 2023-06-01 | Stop reason: HOSPADM

## 2023-06-01 RX ORDER — PROMETHAZINE HYDROCHLORIDE 25 MG/1
25 TABLET ORAL ONCE AS NEEDED
Status: DISCONTINUED | OUTPATIENT
Start: 2023-06-01 | End: 2023-06-01 | Stop reason: HOSPADM

## 2023-06-01 RX ORDER — DIPHENHYDRAMINE HYDROCHLORIDE 50 MG/ML
12.5 INJECTION INTRAMUSCULAR; INTRAVENOUS
Status: DISCONTINUED | OUTPATIENT
Start: 2023-06-01 | End: 2023-06-01 | Stop reason: HOSPADM

## 2023-06-01 RX ORDER — FLUMAZENIL 0.1 MG/ML
0.2 INJECTION INTRAVENOUS AS NEEDED
Status: DISCONTINUED | OUTPATIENT
Start: 2023-06-01 | End: 2023-06-01 | Stop reason: HOSPADM

## 2023-06-01 RX ORDER — ONDANSETRON 2 MG/ML
4 INJECTION INTRAMUSCULAR; INTRAVENOUS ONCE AS NEEDED
Status: DISCONTINUED | OUTPATIENT
Start: 2023-06-01 | End: 2023-06-01 | Stop reason: HOSPADM

## 2023-06-01 RX ORDER — EPHEDRINE SULFATE 50 MG/ML
5 INJECTION, SOLUTION INTRAVENOUS ONCE AS NEEDED
Status: DISCONTINUED | OUTPATIENT
Start: 2023-06-01 | End: 2023-06-01 | Stop reason: HOSPADM

## 2023-06-01 RX ORDER — BUPIVACAINE HCL/0.9 % NACL/PF 0.1 %
2 PLASTIC BAG, INJECTION (ML) EPIDURAL ONCE
Status: COMPLETED | OUTPATIENT
Start: 2023-06-01 | End: 2023-06-01

## 2023-06-01 RX ORDER — FENTANYL CITRATE 50 UG/ML
INJECTION, SOLUTION INTRAMUSCULAR; INTRAVENOUS AS NEEDED
Status: DISCONTINUED | OUTPATIENT
Start: 2023-06-01 | End: 2023-06-01 | Stop reason: SURG

## 2023-06-01 RX ORDER — SODIUM CHLORIDE, SODIUM GLUCONATE, SODIUM ACETATE, POTASSIUM CHLORIDE AND MAGNESIUM CHLORIDE 526; 502; 368; 37; 30 MG/100ML; MG/100ML; MG/100ML; MG/100ML; MG/100ML
1000 INJECTION, SOLUTION INTRAVENOUS CONTINUOUS PRN
Status: DISCONTINUED | OUTPATIENT
Start: 2023-06-01 | End: 2023-06-01 | Stop reason: HOSPADM

## 2023-06-01 RX ORDER — HYDROCODONE BITARTRATE AND ACETAMINOPHEN 10; 325 MG/1; MG/1
1 TABLET ORAL EVERY 6 HOURS PRN
Qty: 30 TABLET | Refills: 0 | Status: SHIPPED | OUTPATIENT
Start: 2023-06-01

## 2023-06-01 RX ORDER — PROPOFOL 10 MG/ML
VIAL (ML) INTRAVENOUS AS NEEDED
Status: DISCONTINUED | OUTPATIENT
Start: 2023-06-01 | End: 2023-06-01 | Stop reason: SURG

## 2023-06-01 RX ORDER — DEXAMETHASONE SODIUM PHOSPHATE 4 MG/ML
INJECTION, SOLUTION INTRA-ARTICULAR; INTRALESIONAL; INTRAMUSCULAR; INTRAVENOUS; SOFT TISSUE AS NEEDED
Status: DISCONTINUED | OUTPATIENT
Start: 2023-06-01 | End: 2023-06-01 | Stop reason: SURG

## 2023-06-01 RX ORDER — MIDAZOLAM HYDROCHLORIDE 1 MG/ML
INJECTION INTRAMUSCULAR; INTRAVENOUS AS NEEDED
Status: DISCONTINUED | OUTPATIENT
Start: 2023-06-01 | End: 2023-06-01 | Stop reason: SURG

## 2023-06-01 RX ORDER — ACETAMINOPHEN 325 MG/1
650 TABLET ORAL ONCE AS NEEDED
Status: DISCONTINUED | OUTPATIENT
Start: 2023-06-01 | End: 2023-06-01 | Stop reason: HOSPADM

## 2023-06-01 RX ORDER — ROPIVACAINE HYDROCHLORIDE 5 MG/ML
INJECTION, SOLUTION EPIDURAL; INFILTRATION; PERINEURAL AS NEEDED
Status: DISCONTINUED | OUTPATIENT
Start: 2023-06-01 | End: 2023-06-01 | Stop reason: HOSPADM

## 2023-06-01 RX ORDER — PROMETHAZINE HYDROCHLORIDE 25 MG/1
25 SUPPOSITORY RECTAL ONCE AS NEEDED
Status: DISCONTINUED | OUTPATIENT
Start: 2023-06-01 | End: 2023-06-01 | Stop reason: HOSPADM

## 2023-06-01 RX ORDER — LIDOCAINE HYDROCHLORIDE 20 MG/ML
INJECTION, SOLUTION EPIDURAL; INFILTRATION; INTRACAUDAL; PERINEURAL AS NEEDED
Status: DISCONTINUED | OUTPATIENT
Start: 2023-06-01 | End: 2023-06-01 | Stop reason: SURG

## 2023-06-01 RX ORDER — MEPERIDINE HYDROCHLORIDE 25 MG/ML
12.5 INJECTION INTRAMUSCULAR; INTRAVENOUS; SUBCUTANEOUS
Status: DISCONTINUED | OUTPATIENT
Start: 2023-06-01 | End: 2023-06-01 | Stop reason: HOSPADM

## 2023-06-01 RX ORDER — NEOSTIGMINE METHYLSULFATE 1 MG/ML
INJECTION, SOLUTION INTRAVENOUS AS NEEDED
Status: DISCONTINUED | OUTPATIENT
Start: 2023-06-01 | End: 2023-06-01 | Stop reason: SURG

## 2023-06-01 RX ORDER — BACITRACIN ZINC 500 [USP'U]/G
OINTMENT TOPICAL AS NEEDED
Status: DISCONTINUED | OUTPATIENT
Start: 2023-06-01 | End: 2023-06-01 | Stop reason: HOSPADM

## 2023-06-01 RX ORDER — HYDROCODONE BITARTRATE AND ACETAMINOPHEN 10; 325 MG/1; MG/1
1 TABLET ORAL ONCE AS NEEDED
Status: COMPLETED | OUTPATIENT
Start: 2023-06-01 | End: 2023-06-01

## 2023-06-01 RX ORDER — ONDANSETRON 2 MG/ML
INJECTION INTRAMUSCULAR; INTRAVENOUS AS NEEDED
Status: DISCONTINUED | OUTPATIENT
Start: 2023-06-01 | End: 2023-06-01 | Stop reason: SURG

## 2023-06-01 RX ORDER — SUCCINYLCHOLINE CHLORIDE 20 MG/ML
INJECTION INTRAMUSCULAR; INTRAVENOUS AS NEEDED
Status: DISCONTINUED | OUTPATIENT
Start: 2023-06-01 | End: 2023-06-01 | Stop reason: SURG

## 2023-06-01 RX ORDER — ROCURONIUM BROMIDE 10 MG/ML
INJECTION, SOLUTION INTRAVENOUS AS NEEDED
Status: DISCONTINUED | OUTPATIENT
Start: 2023-06-01 | End: 2023-06-01 | Stop reason: SURG

## 2023-06-01 RX ADMIN — FENTANYL CITRATE 50 MCG: 50 INJECTION, SOLUTION INTRAMUSCULAR; INTRAVENOUS at 12:32

## 2023-06-01 RX ADMIN — SODIUM CHLORIDE, SODIUM GLUCONATE, SODIUM ACETATE, POTASSIUM CHLORIDE AND MAGNESIUM CHLORIDE 1000 ML: 526; 502; 368; 37; 30 INJECTION, SOLUTION INTRAVENOUS at 09:32

## 2023-06-01 RX ADMIN — MIDAZOLAM HYDROCHLORIDE 2 MG: 1 INJECTION, SOLUTION INTRAMUSCULAR; INTRAVENOUS at 11:54

## 2023-06-01 RX ADMIN — PROPOFOL 150 MG: 10 INJECTION, EMULSION INTRAVENOUS at 12:03

## 2023-06-01 RX ADMIN — Medication 2 G: at 12:10

## 2023-06-01 RX ADMIN — ROCURONIUM BROMIDE 30 MG: 10 INJECTION INTRAVENOUS at 12:15

## 2023-06-01 RX ADMIN — ROCURONIUM BROMIDE 5 MG: 10 INJECTION INTRAVENOUS at 12:02

## 2023-06-01 RX ADMIN — HYDROMORPHONE HYDROCHLORIDE 0.5 MG: 1 INJECTION, SOLUTION INTRAMUSCULAR; INTRAVENOUS; SUBCUTANEOUS at 13:35

## 2023-06-01 RX ADMIN — HYDROMORPHONE HYDROCHLORIDE 0.5 MG: 1 INJECTION, SOLUTION INTRAMUSCULAR; INTRAVENOUS; SUBCUTANEOUS at 13:45

## 2023-06-01 RX ADMIN — ONDANSETRON 4 MG: 2 INJECTION INTRAMUSCULAR; INTRAVENOUS at 13:03

## 2023-06-01 RX ADMIN — NEOSTIGMINE METHYLSULFATE 3 MG: 0.5 INJECTION INTRAVENOUS at 13:07

## 2023-06-01 RX ADMIN — FENTANYL CITRATE 50 MCG: 50 INJECTION, SOLUTION INTRAMUSCULAR; INTRAVENOUS at 12:02

## 2023-06-01 RX ADMIN — LIDOCAINE HYDROCHLORIDE 60 MG: 20 INJECTION, SOLUTION EPIDURAL; INFILTRATION; INTRACAUDAL; PERINEURAL at 12:03

## 2023-06-01 RX ADMIN — DEXAMETHASONE SODIUM PHOSPHATE 4 MG: 4 INJECTION, SOLUTION INTRAMUSCULAR; INTRAVENOUS at 12:14

## 2023-06-01 RX ADMIN — SUCCINYLCHOLINE CHLORIDE 140 MG: 20 INJECTION, SOLUTION INTRAMUSCULAR; INTRAVENOUS at 12:03

## 2023-06-01 RX ADMIN — HYDROCODONE BITARTRATE AND ACETAMINOPHEN 1 TABLET: 10; 325 TABLET ORAL at 14:41

## 2023-06-01 RX ADMIN — GLYCOPYRROLATE 0.4 MG: 0.2 INJECTION, SOLUTION INTRAMUSCULAR; INTRAVITREAL at 13:07

## 2023-06-01 NOTE — BRIEF OP NOTE
BONE SPUR REATTACHMENT ACHILLES TENDON  Progress Note    Letty Bach  6/1/2023    Pre-op Diagnosis:   Achilles tendinitis of left lower extremity [M76.62]       Post-Op Diagnosis Codes:     * Achilles tendinitis of left lower extremity [M76.62]    Procedure/CPT® Codes:        Procedure(s):  LEFT GASTROCNEMIUS RECESSION, HEEL SPUR RESECTION, ACHILLES TENDON REPAIR AND ALL INDICATED PROCEDURES          Surgeon(s):  Frankie Burnett DPM    Anesthesia: General    Staff:   Circulator: Alona Cortez RN  Scrub Person: Jade Johnson  Vendor Representative: Ayleen Evans  Assistant: Rosette Grissom MA  Assistant: Rosette Grissom MA      Estimated Blood Loss: 5 mL    Urine Voided: * No values recorded between 6/1/2023 12:00 PM and 6/1/2023  1:24 PM *    Specimens:                None          Drains: * No LDAs found *    Findings: none        Complications: none    Assistant: Rosette Grissom MA  was responsible for performing the following activities: Retraction, Suction, Irrigation and Placing Dressing and their skilled assistance was necessary for the success of this case.    Frankie Burnett DPM     Date: 6/1/2023  Time: 14:06 CDT

## 2023-06-01 NOTE — ANESTHESIA PROCEDURE NOTES
Airway  Urgency: elective    Date/Time: 6/1/2023 12:04 PM  End Time:6/1/2023 12:04 PM  Airway not difficult    General Information and Staff    Patient location during procedure: OR  CRNA/CAA: Maylin Loya CRNA  SRNA: Anabela Anthony SRNA  Indications and Patient Condition  Indications for airway management: airway protection    Preoxygenated: yes  Mask difficulty assessment: 0 - not attempted    Final Airway Details  Final airway type: endotracheal airway      Successful airway: ETT  Cuffed: yes   Successful intubation technique: video laryngoscopy  Facilitating devices/methods: intubating stylet  Endotracheal tube insertion site: oral  Blade: Zamarripa  Blade size: 3  ETT size (mm): 7.0  Cormack-Lehane Classification: grade I - full view of glottis  Placement verified by: chest auscultation and capnometry   Cuff volume (mL): 7  Measured from: gums  ETT/EBT to gums (cm): 20  Number of attempts at approach: 1  Assessment: lips, teeth, and gum same as pre-op and atraumatic intubation

## 2023-06-01 NOTE — H&P
[facility]   PREOPERATIVE HISTORY AND PHYSICAL    Patient Name:Letty Bach  : 1970  MRN: [id number]  Primary Care Physician: Aldair Esteves APRN  Date of admission: 2023    Subjective   Subjective     Chief Complaint: preoperative evaluation    History of Present Illness  Letty Bach is a 53 y.o. female who presents for preoperative evaluation. She is scheduled for LEFT GASTROCNEMIUS RECESSION, HEEL SPUR RESECTION, ACHILLES TENDON REPAIR AND ALL INDICATED PROCEDURES (Left)    Review of Systems   Constitutional: Negative.    Respiratory: Negative.    Cardiovascular: Negative.    Gastrointestinal: Negative.    Skin: Negative.    Psychiatric/Behavioral: Negative.         Personal History     Past Medical History:   Diagnosis Date   • Anxiety    • Cancer    • Disease of thyroid gland     nodule, mass   • Elevated liver enzymes    • Fatty liver    • GERD (gastroesophageal reflux disease)    • History of medical problems 2021    Thyroid cancer   • Urinary tract infection    • Varicella        Past Surgical History:   Procedure Laterality Date   • ABDOMINAL SURGERY     • CHOLECYSTECTOMY  2019   • CHOLECYSTECTOMY WITH INTRAOPERATIVE CHOLANGIOGRAM N/A 2019    Procedure: CHOLECYSTECTOMY LAPAROSCOPIC INTRAOPERATIVE CHOLANGIOGRAM      (c-arm#2);  Surgeon: Felipe Oevrton MD;  Location: [facility] OR;  Service: General   • COLONOSCOPY N/A 2021    Procedure: COLONOSCOPY;  Surgeon: Joao Bruce MD;  Location: [facility] ENDOSCOPY;  Service: Gastroenterology;  Laterality: N/A;   • COLONOSCOPY     • ENDOSCOPY N/A 2019    Procedure: ESOPHAGOGASTRODUODENOSCOPY;  Surgeon: Joao Bruce MD;  Location: [facility] ENDOSCOPY;  Service: Gastroenterology   • HYSTERECTOMY  2007   • THYROIDECTOMY Right 2021    Procedure: RIGHT LOBE THYROIDECTOMY and ISTHMUSECTOMY;  Surgeon: Benjamin Renteria MD;  Location: [facility] OR;  Service: General;  Laterality: Right;   • THYROIDECTOMY Left  02/24/2022    Procedure: LEFT THYROIDECTOMY;  Surgeon: Benjamin Renteria MD;  Location: [facility];  Service: General;  Laterality: Left;   • TOTAL ABDOMINAL HYSTERECTOMY WITH SALPINGO OOPHORECTOMY  2007   • UPPER GASTROINTESTINAL ENDOSCOPY  11/14/2019   • US GUIDED FINE NEEDLE ASPIRATION  09/01/2021   • WISDOM TOOTH EXTRACTION         Family History: Her family history includes ADD / ADHD in her daughter; Asthma in her maternal grandmother; Breast cancer in her mother; COPD in her maternal grandmother; Cancer in her mother; Diabetes in her father; Heart disease in her father; Hypertension in her daughter, father, and mother; Kidney disease in her father; No Known Problems in her brother, sister, and sister; Polymyositis in her daughter; Seizures in her daughter; Thyroid disease in her mother.     Social History: She  reports that she quit smoking about 28 years ago. Her smoking use included cigarettes. She has a 2.50 pack-year smoking history. She has never used smokeless tobacco. She reports that she does not currently use alcohol. She reports that she does not use drugs.    Home Medications:  levothyroxine, pravastatin, and propranolol    Allergies:  She is allergic to contrave [naltrexone-bupropion hcl er].    Objective    Objective     Vitals:    Temp:  [97.8 °F (36.6 °C)] 97.8 °F (36.6 °C)  Heart Rate:  [69] 69  Resp:  [20] 20  BP: (134)/(67) 134/67    Physical Exam  Vitals reviewed.   Constitutional:       General: She is not in acute distress.     Appearance: She is well-developed.   Cardiovascular:      Pulses:           Dorsalis pedis pulses are 2+ on the left side.        Posterior tibial pulses are 2+ on the left side.   Pulmonary:      Effort: Pulmonary effort is normal.   Musculoskeletal:      Right lower leg: No edema.      Left lower leg: No edema.        Feet:    Feet:      Left foot:      Skin integrity: Skin integrity normal.   Skin:     General: Skin is warm and dry.      Capillary Refill: Capillary  refill takes less than 2 seconds.   Neurological:      Mental Status: She is alert and oriented to person, place, and time.      Gait: Gait is intact. Gait normal.   Psychiatric:         Behavior: Behavior normal. Behavior is cooperative.         Thought Content: Thought content normal. Thought content is not delusional.         Assessment & Plan   Assessment / Plan     Brief Patient Summary:  Letty Bach is a 53 y.o. female who presents for preoperative evaluation.    Pre-Op Diagnosis Codes:     * Achilles tendinitis of left lower extremity [M76.62]    Active Hospital Problems:  Active Hospital Problems    Diagnosis    • **Achilles tendinitis of left lower extremity      Plan:   Procedure(s):  LEFT GASTROCNEMIUS RECESSION, HEEL SPUR RESECTION, ACHILLES TENDON REPAIR AND ALL INDICATED PROCEDURES    The risks, benefits, and alternatives of the procedure including but not limited to infection, wound healing complications and blood clots and risks of the anesthesia were discussed in detail with the patient and questions were answered. No guarantees were made or implied. Informed consent was obtained.    Frankie Burnett DPM

## 2023-06-01 NOTE — ANESTHESIA POSTPROCEDURE EVALUATION
Patient: Letty Bach    Procedure Summary     Date: 06/01/23 Room / Location: [facility] OR  / [facility] OR    Anesthesia Start: 1200 Anesthesia Stop: 1327    Procedure: LEFT GASTROCNEMIUS RECESSION, HEEL SPUR RESECTION, ACHILLES TENDON REPAIR AND ALL INDICATED PROCEDURES (Left) Diagnosis:       Achilles tendinitis of left lower extremity      (Achilles tendinitis of left lower extremity [M76.62])    Surgeons: Frankie Burnett DPM Provider: Maylin Loya CRNA    Anesthesia Type: general ASA Status: 3          Anesthesia Type: general    Vitals  No vitals data found for the desired time range.          Post Anesthesia Care and Evaluation    Patient location during evaluation: PACU  Patient participation: complete - patient participated  Level of consciousness: sleepy but conscious  Pain score: 0  Pain management: adequate    Airway patency: patent  Anesthetic complications: No anesthetic complications  PONV Status: none  Cardiovascular status: acceptable and hemodynamically stable  Respiratory status: acceptable, spontaneous ventilation and face mask  Hydration status: acceptable    Comments: HR 76  /57  SPO2 100%  TEMP 98

## 2023-06-01 NOTE — ANESTHESIA PREPROCEDURE EVALUATION
Anesthesia Evaluation     Patient summary reviewed and Nursing notes reviewed   no history of anesthetic complications:  NPO Solid Status: > 8 hours  NPO Liquid Status: > 8 hours           Airway   Mallampati: II  TM distance: <3 FB  Neck ROM: full  Possible difficult intubation and Anterior  Comment: Date/Time: 2021 8:52 AM  Airway not difficult     Final Airway Details  Final airway type: endotracheal airway  Successful airway: ETT  Cuffed: yes   Successful intubation technique: video laryngoscopy  Facilitating devices/methods: intubating stylet  Endotracheal tube insertion site: oral  Blade: Zamarripa  Blade size: 3  ETT size (mm): 7.5  Cormack-Lehane Classification: grade I - full view of glottis  Placement verified by: chest auscultation and capnometry   Measured from: lips  Number of attempts at approach: 2  Assessment: lips, teeth, and gum same as pre-op and atraumatic intubation     Additional Comments  Initial attempt by RODRIGUEZ Multani. Second successful attempt by YOLANDE Santillan.      Redundant tissue surrounding airway   Dental    (+) poor dentition    Pulmonary     breath sounds clear to auscultation  (+) a smoker Former,   (-) asthma, shortness of breath, sleep apnea, rhonchi, decreased breath sounds, wheezes  Cardiovascular   Exercise tolerance: good (4-7 METS)    NYHA Classification: II  ECG reviewed  Patient on routine beta blocker  Rhythm: regular  Rate: normal    (+) dysrhythmias (SVT) Bradycardia, hyperlipidemia,   (-) hypertension, valvular problems/murmurs, past MI, angina, murmur, cardiac stents, DVT    ROS comment: EK22  Normal sinus rhythm  Normal ECG  When compared with ECG of 2021 09:23,  No significant change was found    TTE: 11/10/22  Interpretation Summary  •  Left ventricular systolic function is normal. Left ventricular ejection fraction appears to be 61 - 65%.  •  Left ventricular diastolic function was normal.  •  Estimated right ventricular systolic pressure from  tricuspid regurgitation is normal (<35 mmHg).      Neuro/Psych  (+) psychiatric history Anxiety and Depression,    (-) seizures, TIA, CVA  GI/Hepatic/Renal/Endo    (+) morbid obesity, GERD well controlled,  liver disease fatty liver disease, thyroid problem hypothyroidism, thyroid cancer and thyroid nodules    Musculoskeletal     Abdominal   (+) obese,    Substance History   (-) alcohol use, drug use     OB/GYN negative ob/gyn ROS   (-)  Pregnant    Comment: Previous Hysterectomy        Other   arthritis,    history of cancer (Thyroid) remission    ROS/Med Hx Other: Hx Thyroid Cancer: Has had a thyroidectomy     Hx:GERD: takes Tums occasionally if needed.                   Anesthesia Plan    ASA 3     general     intravenous induction     Anesthetic plan, risks, benefits, and alternatives have been provided, discussed and informed consent has been obtained with: patient.

## 2023-06-01 NOTE — DISCHARGE INSTRUCTIONS
Discharge home  Resume normal diet  Keep dressing c/d/I  Ice and elevate  Nwb operative extremity   Norco 10mg prn pain. Alternate with 600mg ibu  Follow up in clinic within 1 week          This document has been electronically signed by Frankie Burnett DPM on June 1, 2023 14:08 CDT

## 2023-06-06 ENCOUNTER — OFFICE VISIT (OUTPATIENT)
Dept: PODIATRY | Facility: CLINIC | Age: 53
End: 2023-06-06
Payer: COMMERCIAL

## 2023-06-06 VITALS — HEIGHT: 65 IN | WEIGHT: 266 LBS | BODY MASS INDEX: 44.32 KG/M2

## 2023-06-06 DIAGNOSIS — M21.862 ACQUIRED POSTERIOR EQUINUS, LEFT: ICD-10-CM

## 2023-06-06 DIAGNOSIS — M77.32 HEEL SPUR, LEFT: ICD-10-CM

## 2023-06-06 DIAGNOSIS — M76.62 ACHILLES TENDINITIS OF LEFT LOWER EXTREMITY: Primary | ICD-10-CM

## 2023-06-06 PROCEDURE — 99024 POSTOP FOLLOW-UP VISIT: CPT | Performed by: NURSE PRACTITIONER

## 2023-06-06 PROCEDURE — 29405 APPL SHORT LEG CAST: CPT | Performed by: NURSE PRACTITIONER

## 2023-06-06 NOTE — PROGRESS NOTES
Letty Bach  1970  53 y.o. female        06/06/2023    Chief Complaint   Patient presents with    Left Foot - Pain, Follow-up       History of Present Illness    Letty Bach is a 53 y.o.female Patient presents to clinic for left post op. LEFT GASTROCNEMIUS RECESSION, HEEL SPUR RESECTION, ACHILLES TENDON REPAIR  DOS 6/1/23      Past Medical History:   Diagnosis Date    Anxiety     Cancer     Disease of thyroid gland     nodule, mass    Elevated liver enzymes     Fatty liver     GERD (gastroesophageal reflux disease)     History of medical problems 11/4/2021    Thyroid cancer    Urinary tract infection     Varicella          Past Surgical History:   Procedure Laterality Date    ABDOMINAL SURGERY      CHOLECYSTECTOMY  06/06/2019    CHOLECYSTECTOMY WITH INTRAOPERATIVE CHOLANGIOGRAM N/A 06/06/2019    Procedure: CHOLECYSTECTOMY LAPAROSCOPIC INTRAOPERATIVE CHOLANGIOGRAM      (c-arm#2);  Surgeon: Felipe Overton MD;  Location: [facility] OR;  Service: General    COLONOSCOPY N/A 02/12/2021    Procedure: COLONOSCOPY;  Surgeon: Joao Bruce MD;  Location: [facility] ENDOSCOPY;  Service: Gastroenterology;  Laterality: N/A;    COLONOSCOPY      ENDOSCOPY N/A 11/14/2019    Procedure: ESOPHAGOGASTRODUODENOSCOPY;  Surgeon: Joao Bruce MD;  Location: [facility] ENDOSCOPY;  Service: Gastroenterology    HYSTERECTOMY  2007    THYROIDECTOMY Right 11/04/2021    Procedure: RIGHT LOBE THYROIDECTOMY and ISTHMUSECTOMY;  Surgeon: Benjamin Renteria MD;  Location: [facility] OR;  Service: General;  Laterality: Right;    THYROIDECTOMY Left 02/24/2022    Procedure: LEFT THYROIDECTOMY;  Surgeon: Benjamin Renteria MD;  Location: [facility] OR;  Service: General;  Laterality: Left;    TOTAL ABDOMINAL HYSTERECTOMY WITH SALPINGO OOPHORECTOMY  2007    UPPER GASTROINTESTINAL ENDOSCOPY  11/14/2019    US GUIDED FINE NEEDLE ASPIRATION  09/01/2021    WISDOM TOOTH EXTRACTION           Family History   Problem Relation Age of Onset    Breast cancer Mother      "Thyroid disease Mother     Hypertension Mother     Cancer Mother         Breast cancer    Hypertension Father     Diabetes Father     Kidney disease Father     Heart disease Father     No Known Problems Sister     No Known Problems Brother     Seizures Daughter     Hypertension Daughter     COPD Maternal Grandmother     Asthma Maternal Grandmother     No Known Problems Sister     ADD / ADHD Daughter     Polymyositis Daughter        Allergies   Allergen Reactions    Contrave [Naltrexone-Bupropion Hcl Er] Dizziness       Social History     Socioeconomic History    Marital status:    Tobacco Use    Smoking status: Former     Packs/day: 0.50     Years: 5.00     Pack years: 2.50     Types: Cigarettes     Quit date: 1995     Years since quittin.4    Smokeless tobacco: Never    Tobacco comments:     passive at work   Vaping Use    Vaping Use: Never used    Passive vaping exposure: Yes   Substance and Sexual Activity    Alcohol use: Not Currently     Comment: none in 2 years    Drug use: Never    Sexual activity: Defer         Current Outpatient Medications   Medication Sig Dispense Refill    HYDROcodone-acetaminophen (Norco)  MG per tablet Take 1 tablet by mouth Every 6 (Six) Hours As Needed for Moderate Pain. 30 tablet 0    levothyroxine (SYNTHROID, LEVOTHROID) 112 MCG tablet TAKE ONE TABLET BY MOUTH DAILY (Patient taking differently: 1 tablet Daily.) 30 tablet 11    pravastatin (PRAVACHOL) 10 MG tablet TAKE ONE TABLET BY MOUTH ONCE NIGHTLY (Patient taking differently: Take 1 tablet by mouth Every Night.) 90 tablet 2    propranolol (INDERAL) 20 MG tablet TAKE ONE TABLET BY MOUTH DAILY AS NEEDED (Patient taking differently: Take 1 tablet by mouth Daily As Needed. R/T\" ANXIETY AND THYROID\") 90 tablet 3     No current facility-administered medications for this visit.       Review of Systems      OBJECTIVE    Ht 165.1 cm (65\")   Wt 121 kg (266 lb)   BMI 44.26 kg/m²     Body mass index is 44.26 " kg/m².        Physical Exam  Feet:      Comments: Edges of the incision remained approximated with no evidence of infection noted             Procedures  Patient was most placed in a short leg fiberglass cast left lower extremity without difficulty.  She was neurovascularly intact post procedure      ASSESSMENT AND PLAN    Diagnoses and all orders for this visit:    1. Achilles tendinitis of left lower extremity (Primary)    2. Heel spur, left    3. Acquired posterior equinus, left      Body mass index is 44.26 kg/m².    Recommend follow-up with primary care to discuss BMI greater than 30    Continue nonweightbearing status  Continue swelling modalities  Follow-up in 1 week for recheck with possible suture removal  Contact office for worsening symptoms      This document has been electronically signed by Praneeth LEAL, FNP-C, ONP-C on June 6, 2023 13:05 CDT

## 2023-06-06 NOTE — OP NOTE
Date of Procedure:  6/1/23     SURGEON: Frankie Burnett DPM      ASSISTANT: maria teresa Grissom CST     PREOPERATIVE DIAGNOSIS:   1. Achilles calcific tendinitis left lower extremity  2.  Heel spur left foot  3.  Equinus contracture of left lower extremity     POSTOPERATIVE DIAGNOSIS: Same as pre-op     PROCEDURES PERFORMED:   1.  Repair of right Achilles tendon with Arthrex speed bridge  2.  Exostectomy right calcaneus  3.  Gastrocnemius recession right lower extremity     ANESTHESIA: Gen      HEMOSTASIS: Pneumatic thigh tourniquet set at 220 mmHg.      ESTIMATED BLOOD LOSS: 25 mL.      MATERIALS: Arthrex speed bridge     INJECTABLES: none     COMPLICATIONS: None.      CONDITION: Stable.      PATHOLOGY: None     INDICATIONS FOR PROCEDURE: This is a patient of mine who has significant left Achilles calcific tendinitis and a painful heel spur to the rightlower extremity. She agrees to undergo the surgical intervention at this time. Consent is signed and documented in the chart. History and physical exam were reviewed prior to patient being taken to the operating room and n.p.o. status was confirmed. No guarantees were given or implied regarding the outcome of this treatment.      DESCRIPTION OF PROCEDURE: After mild sedation was administered by the anesthesia team in the preoperative holding area the patient was transported to the operating room and general anesthesia was administered.  The patient was placed in the prone position and the right lower extremity was prepped and draped in usual sterile technique and a timeout was performed to confirm the correct patient, correct site, and correct procedure.      Attention was then directed to the left lower extremity which was exsanguinated using an Esmarch bandage and the tourniquet was rapidly inflated to 300 mmHg.  Next a midline linear incision was made directly over the  area of the gastrocnemius aponeurosis in the posterior aspect of the leg.  This incision was  deepened through subcutaneous tissue down to level of the aponurosis.  Under direct visualization the gastrocnemius aponeurosis was transected with a #15 blade.  There is noted to be an increase in dorsiflexion at the ankle joint following gastrocnemius recession.  The incision site was flushed with copious amounts of antibiotic irrigation and a layered closure was performed utilizing 3-0 Vicryl, 4-0 Vicryl and 4-0 nylon for the skin.     Next attention was directed distally where a curvilinear incision was made to the medial aspect of the Achilles tendon extending distally and curving around the painful heel spur and calcified tendon.  This incision was deepened through subcutaneous tissue down to level of the tendon.  Next the Achilles tendon was reflected off of its insertion site with a #15 blade.  At this time utilizing a sagittal bone saw the prominent heel spur was resected.  C-arm was used throughout this process to ensure that appropriate amount of bone was excised.  Next the Achilles tendon was debrided and was secondarily repaired and reattached to the calcaneus utilizing the Arthrex speedbridge.  This incision site was flushed with copious amounts of normal saline solution and a layered closure was performed utilizing 3-0 Vicryl for subcutaneous tissue followed by 4-0 nylon.  A sterile dressing was applied.  The tourniquet wasn't deflated and a rapid hyperemic response noted to the distal digits.  A well-padded posterior mold splint was applied in gravity equinus.  Gen. anesthesia was then reversed and the patient was transported from operating room to the PACU with vital signs stable and neurovascular status intact to the operative extremity.     The plan is to discharge patient home once stable per anesthesia. FRANCO GABRIEL. F/u in 1 week.           This document has been electronically signed by Frankie Burnett DPM on June 6, 2023 13:47 CDT

## 2023-06-07 ENCOUNTER — TELEPHONE (OUTPATIENT)
Dept: PODIATRY | Facility: CLINIC | Age: 53
End: 2023-06-07
Payer: COMMERCIAL

## 2023-06-07 NOTE — TELEPHONE ENCOUNTER
PT REQUESTS A CALL BACK RE CAST IS TOO TIGHT AND IS MAKING FOOT MORE PAINFUL.  CALL BACK # [phone].  THANK YOU.

## 2023-06-07 NOTE — TELEPHONE ENCOUNTER
Called and spoke with patient and let her know to come back in and we would take cast off and apply a new one.

## 2023-06-10 ENCOUNTER — HOSPITAL ENCOUNTER (EMERGENCY)
Facility: HOSPITAL | Age: 53
Discharge: HOME | End: 2023-06-10
Attending: STUDENT IN AN ORGANIZED HEALTH CARE EDUCATION/TRAINING PROGRAM
Payer: COMMERCIAL

## 2023-06-10 VITALS
HEIGHT: 65 IN | WEIGHT: 255 LBS | BODY MASS INDEX: 42.49 KG/M2 | HEART RATE: 80 BPM | OXYGEN SATURATION: 98 % | SYSTOLIC BLOOD PRESSURE: 139 MMHG | TEMPERATURE: 97.7 F | DIASTOLIC BLOOD PRESSURE: 85 MMHG | RESPIRATION RATE: 18 BRPM

## 2023-06-10 DIAGNOSIS — G89.18 POSTOPERATIVE PAIN: Primary | ICD-10-CM

## 2023-06-10 PROCEDURE — 99283 EMERGENCY DEPT VISIT LOW MDM: CPT

## 2023-06-10 RX ORDER — ASPIRIN 325 MG
325 TABLET ORAL 2 TIMES DAILY
COMMUNITY

## 2023-06-10 RX ORDER — HYDROCODONE BITARTRATE AND ACETAMINOPHEN 7.5; 325 MG/1; MG/1
1 TABLET ORAL ONCE
Status: COMPLETED | OUTPATIENT
Start: 2023-06-10 | End: 2023-06-10

## 2023-06-10 RX ADMIN — HYDROCODONE BITARTRATE AND ACETAMINOPHEN 1 TABLET: 7.5; 325 TABLET ORAL at 21:29

## 2023-06-11 DIAGNOSIS — E78.00 ELEVATED LDL CHOLESTEROL LEVEL: ICD-10-CM

## 2023-06-11 NOTE — ED PROVIDER NOTES
Subjective   History of Present Illness  This is a 53-year-old female who is 10 days postop from a Achilles tendon surgery with podiatry.  The patient's last postoperative visit was 4 days ago and a cast was noted.  The patient states that about 2 days ago she started to have an aching pain over the area of her Achilles tendon which was more painful than in the days immediately postoperative.  She is still in good supply of her home pain medication.  She denies any fevers or chills or rash.  She denies any increase in lower extremity swelling.  She denies any new injuries or overexertion's.  She denies numbness or weakness of the foot.      Review of Systems   All other systems reviewed and are negative.    Past Medical History:   Diagnosis Date    Anxiety     Cancer     Disease of thyroid gland     nodule, mass    Elevated liver enzymes     Fatty liver     GERD (gastroesophageal reflux disease)     History of medical problems 11/4/2021    Thyroid cancer    Urinary tract infection     Varicella        Allergies   Allergen Reactions    Contrave [Naltrexone-Bupropion Hcl Er] Dizziness       Past Surgical History:   Procedure Laterality Date    ABDOMINAL SURGERY      CHOLECYSTECTOMY  06/06/2019    CHOLECYSTECTOMY WITH INTRAOPERATIVE CHOLANGIOGRAM N/A 06/06/2019    Procedure: CHOLECYSTECTOMY LAPAROSCOPIC INTRAOPERATIVE CHOLANGIOGRAM      (c-arm#2);  Surgeon: Felipe Overton MD;  Location: [facility] OR;  Service: General    COLONOSCOPY N/A 02/12/2021    Procedure: COLONOSCOPY;  Surgeon: Joao Bruce MD;  Location: [facility] ENDOSCOPY;  Service: Gastroenterology;  Laterality: N/A;    COLONOSCOPY      ENDOSCOPY N/A 11/14/2019    Procedure: ESOPHAGOGASTRODUODENOSCOPY;  Surgeon: Joao Bruce MD;  Location: [facility] ENDOSCOPY;  Service: Gastroenterology    HYSTERECTOMY  2007    THYROIDECTOMY Right 11/04/2021    Procedure: RIGHT LOBE THYROIDECTOMY and ISTHMUSECTOMY;  Surgeon: Benjamin Renteria MD;  Location: [facility] OR;   Service: General;  Laterality: Right;    THYROIDECTOMY Left 2022    Procedure: LEFT THYROIDECTOMY;  Surgeon: Benjamin Renteria MD;  Location: [facility];  Service: General;  Laterality: Left;    TOTAL ABDOMINAL HYSTERECTOMY WITH SALPINGO OOPHORECTOMY      UPPER GASTROINTESTINAL ENDOSCOPY  2019    US GUIDED FINE NEEDLE ASPIRATION  2021    WISDOM TOOTH EXTRACTION         Family History   Problem Relation Age of Onset    Breast cancer Mother     Thyroid disease Mother     Hypertension Mother     Cancer Mother         Breast cancer    Hypertension Father     Diabetes Father     Kidney disease Father     Heart disease Father     No Known Problems Sister     No Known Problems Brother     Seizures Daughter     Hypertension Daughter     COPD Maternal Grandmother     Asthma Maternal Grandmother     No Known Problems Sister     ADD / ADHD Daughter     Polymyositis Daughter        Social History     Socioeconomic History    Marital status:    Tobacco Use    Smoking status: Former     Packs/day: 0.50     Years: 5.00     Pack years: 2.50     Types: Cigarettes     Quit date: 1995     Years since quittin.4    Smokeless tobacco: Never    Tobacco comments:     passive at work   Vaping Use    Vaping Use: Never used    Passive vaping exposure: Yes   Substance and Sexual Activity    Alcohol use: Not Currently     Comment: none in 2 years    Drug use: Never    Sexual activity: Defer           Objective   Physical Exam  Vitals and nursing note reviewed.   Constitutional:       General: She is not in acute distress.     Appearance: She is obese.   HENT:      Head: Normocephalic and atraumatic.   Eyes:      General: No scleral icterus.  Cardiovascular:      Rate and Rhythm: Normal rate and regular rhythm.      Pulses: Normal pulses.   Pulmonary:      Effort: Pulmonary effort is normal.      Breath sounds: Normal breath sounds.   Musculoskeletal:      Right lower leg: No edema.      Left lower leg: No edema.  "  Skin:     General: Skin is warm and dry.      Capillary Refill: Capillary refill takes less than 2 seconds.      Findings: No rash.      Comments: Postoperative wounds appear clean and intact.   Neurological:      Mental Status: She is alert.      Comments: Normal motor and sensory function of the feet bilaterally.   Psychiatric:         Behavior: Behavior normal.       Procedures           ED Course                                   KASEY reviewed by Bubba Sullivan MD, Carlton Alves DO       Medical Decision Making  The patient's recent past medical charts for this facility as well as outside facilities via the \"care everywhere\" application of epic reviewed.  The patient was seen 4 days ago for postoperative follow-up in the podiatry clinic.  10 days ago the patient had a surgery of her Achilles tendon and bone spur and was subsequently placed in a fiberglass cast.    The differential diagnosis includes postoperative pain, painful cast fit, compartment syndrome, and pressure ulcer, among others.    The patient's cast was removed by nursing/ED tech.  A new posterior short leg splint that was looser with added padding in a slightly foot plantarflexed position was applied by nursing/ED tech to the patient who already has crutches.    On final reevaluation the patient is in stable condition.  We discussed discharge instructions and need for follow-up.  We discussed reasons for early return to the emergency department.        Problems Addressed:  Postoperative pain: complicated acute illness or injury    Risk  OTC drugs.  Prescription drug management.        Final diagnoses:   Postoperative pain       ED Disposition  ED Disposition       ED Disposition   Discharge    Condition   Stable    Comment   --               Aldair Esteves, MEL  [address] [place] [facility] [address]  [phone]      As needed    Frankie Burnett, DPCELY  [address]  [facility] [address] [place] [address] " [id number] [phone]    Call in 2 days  To make an appointment to be reevaluated and to have a new cast applied.         Medication List        Changed      levothyroxine 112 MCG tablet  Commonly known as: SYNTHROID, LEVOTHROID  TAKE ONE TABLET BY MOUTH DAILY  What changed: how to take this     propranolol 20 MG tablet  Commonly known as: INDERAL  TAKE ONE TABLET BY MOUTH DAILY AS NEEDED  What changed: additional instructions                 Carlton Alves DO  06/11/23 0546

## 2023-06-11 NOTE — ED NOTES
"Pt reports she had heel surgery last week and had her cast changed on Wednesday; pt reports increase in pain \"at heel and incision area\"; pt states \"it feels like someone is pushing in on my ankle bone\"  "

## 2023-06-11 NOTE — ED NOTES
Cast removed VORB Dr. Alves; Dr. Alves inspected incision; VORB to place short leg orthoglass to left lower extremity per policy; cotton sleeve placed, cotton padding placed, orthoglass placed, secured with ace; splint inspected by Dr Alves

## 2023-06-12 ENCOUNTER — OFFICE VISIT (OUTPATIENT)
Dept: PODIATRY | Facility: CLINIC | Age: 53
End: 2023-06-12
Payer: COMMERCIAL

## 2023-06-12 VITALS — HEIGHT: 65 IN | WEIGHT: 255 LBS | BODY MASS INDEX: 42.49 KG/M2

## 2023-06-12 DIAGNOSIS — M77.32 HEEL SPUR, LEFT: ICD-10-CM

## 2023-06-12 DIAGNOSIS — M76.62 ACHILLES TENDINITIS OF LEFT LOWER EXTREMITY: Primary | ICD-10-CM

## 2023-06-12 PROCEDURE — 29405 APPL SHORT LEG CAST: CPT | Performed by: NURSE PRACTITIONER

## 2023-06-12 PROCEDURE — 99024 POSTOP FOLLOW-UP VISIT: CPT | Performed by: NURSE PRACTITIONER

## 2023-06-12 RX ORDER — PRAVASTATIN SODIUM 10 MG
TABLET ORAL
Qty: 90 TABLET | Refills: 2 | Status: SHIPPED | OUTPATIENT
Start: 2023-06-12

## 2023-06-12 NOTE — PROGRESS NOTES
Letty Bach  1970  53 y.o. female        06/12/2023    Chief Complaint   Patient presents with    Left Foot - Follow-up     Cast change        History of Present Illness    Letty Bach is a 53 y.o.female Patient presents to clinic for left foot cast change. Patient went to ER on 04/10/2023 to have it removed.       Past Medical History:   Diagnosis Date    Anxiety     Cancer     Disease of thyroid gland     nodule, mass    Elevated liver enzymes     Fatty liver     GERD (gastroesophageal reflux disease)     History of medical problems 11/4/2021    Thyroid cancer    Urinary tract infection     Varicella          Past Surgical History:   Procedure Laterality Date    ABDOMINAL SURGERY      CHOLECYSTECTOMY  06/06/2019    CHOLECYSTECTOMY WITH INTRAOPERATIVE CHOLANGIOGRAM N/A 06/06/2019    Procedure: CHOLECYSTECTOMY LAPAROSCOPIC INTRAOPERATIVE CHOLANGIOGRAM      (c-arm#2);  Surgeon: Felipe Overton MD;  Location: [facility] OR;  Service: General    COLONOSCOPY N/A 02/12/2021    Procedure: COLONOSCOPY;  Surgeon: Joao Bruce MD;  Location: [facility] ENDOSCOPY;  Service: Gastroenterology;  Laterality: N/A;    COLONOSCOPY      ENDOSCOPY N/A 11/14/2019    Procedure: ESOPHAGOGASTRODUODENOSCOPY;  Surgeon: Joao Bruce MD;  Location: [facility] ENDOSCOPY;  Service: Gastroenterology    HYSTERECTOMY  2007    THYROIDECTOMY Right 11/04/2021    Procedure: RIGHT LOBE THYROIDECTOMY and ISTHMUSECTOMY;  Surgeon: Benjamin Renteria MD;  Location: [facility] OR;  Service: General;  Laterality: Right;    THYROIDECTOMY Left 02/24/2022    Procedure: LEFT THYROIDECTOMY;  Surgeon: Benjamin Renteria MD;  Location: [facility] OR;  Service: General;  Laterality: Left;    TOTAL ABDOMINAL HYSTERECTOMY WITH SALPINGO OOPHORECTOMY  2007    UPPER GASTROINTESTINAL ENDOSCOPY  11/14/2019    US GUIDED FINE NEEDLE ASPIRATION  09/01/2021    WISDOM TOOTH EXTRACTION           Family History   Problem Relation Age of Onset    Breast cancer Mother     Thyroid  "disease Mother     Hypertension Mother     Cancer Mother         Breast cancer    Hypertension Father     Diabetes Father     Kidney disease Father     Heart disease Father     No Known Problems Sister     No Known Problems Brother     Seizures Daughter     Hypertension Daughter     COPD Maternal Grandmother     Asthma Maternal Grandmother     No Known Problems Sister     ADD / ADHD Daughter     Polymyositis Daughter        Allergies   Allergen Reactions    Contrave [Naltrexone-Bupropion Hcl Er] Dizziness       Social History     Socioeconomic History    Marital status:    Tobacco Use    Smoking status: Former     Packs/day: 0.50     Years: 5.00     Pack years: 2.50     Types: Cigarettes     Quit date: 1995     Years since quittin.4    Smokeless tobacco: Never    Tobacco comments:     passive at work   Vaping Use    Vaping Use: Never used    Passive vaping exposure: Yes   Substance and Sexual Activity    Alcohol use: Not Currently     Comment: none in 2 years    Drug use: Never    Sexual activity: Defer         Current Outpatient Medications   Medication Sig Dispense Refill    aspirin 325 MG tablet Take 1 tablet by mouth 2 (Two) Times a Day.      HYDROcodone-acetaminophen (Norco)  MG per tablet Take 1 tablet by mouth Every 6 (Six) Hours As Needed for Moderate Pain. 30 tablet 0    levothyroxine (SYNTHROID, LEVOTHROID) 112 MCG tablet TAKE ONE TABLET BY MOUTH DAILY (Patient taking differently: 1 tablet Daily.) 30 tablet 11    pravastatin (PRAVACHOL) 10 MG tablet TAKE ONE TABLET BY MOUTH ONCE NIGHTLY (Patient taking differently: Take 1 tablet by mouth Every Night.) 90 tablet 2    propranolol (INDERAL) 20 MG tablet TAKE ONE TABLET BY MOUTH DAILY AS NEEDED (Patient taking differently: Take 1 tablet by mouth Daily As Needed. R/T\" ANXIETY AND THYROID\") 90 tablet 3     No current facility-administered medications for this visit.       Review of Systems      OBJECTIVE    Ht 165.1 cm (65\")   Wt 116 kg " (255 lb)   BMI 42.43 kg/m²     Body mass index is 42.43 kg/m².        Physical Exam  Vitals reviewed.   Constitutional:       Appearance: Normal appearance. She is well-developed.   HENT:      Head: Normocephalic and atraumatic.   Neck:      Trachea: Trachea and phonation normal.   Pulmonary:      Effort: Pulmonary effort is normal. No respiratory distress.   Abdominal:      General: There is no distension.      Palpations: Abdomen is soft.   Feet:      Comments: Edges of the incision remained approximated with no evidence of infection noted  Skin:     General: Skin is warm and dry.   Neurological:      Mental Status: She is alert and oriented to person, place, and time.      GCS: GCS eye subscore is 4. GCS verbal subscore is 5. GCS motor subscore is 6.   Psychiatric:         Speech: Speech normal.         Behavior: Behavior normal. Behavior is cooperative.         Thought Content: Thought content normal.         Judgment: Judgment normal.              Procedures  Patient was most placed in a short leg fiberglass cast left lower extremity without difficulty.  She was neurovascularly intact post procedure      ASSESSMENT AND PLAN    Diagnoses and all orders for this visit:    1. Achilles tendinitis of left lower extremity (Primary)    2. Heel spur, left        Body mass index is 42.43 kg/m².    Recommend follow-up with primary care to discuss BMI greater than 30    Continue nonweightbearing status  Continue swelling modalities  Follow-up in 1 week for recheck with possible suture removal  Contact office for worsening symptoms      This document has been electronically signed by Praneeth LEAL, FNP-C, ONP-C on June 12, 2023 10:19 CDT

## 2023-06-12 NOTE — TELEPHONE ENCOUNTER
UPCOMING APPTS  With Family Medicine ([facility] H[facility]er)  07/14/2023 at 9:00 AM  LAST OFFICE VISIT - THIS DEPT  1/11/2023 Aldair Esteves APRN

## 2023-06-19 ENCOUNTER — OFFICE VISIT (OUTPATIENT)
Dept: PODIATRY | Facility: CLINIC | Age: 53
End: 2023-06-19
Payer: COMMERCIAL

## 2023-06-19 VITALS
WEIGHT: 255.73 LBS | HEART RATE: 79 BPM | BODY MASS INDEX: 42.61 KG/M2 | OXYGEN SATURATION: 95 % | DIASTOLIC BLOOD PRESSURE: 110 MMHG | HEIGHT: 65 IN | SYSTOLIC BLOOD PRESSURE: 152 MMHG

## 2023-06-19 DIAGNOSIS — Z98.890 STATUS POST ACHILLES TENDON REPAIR: Primary | ICD-10-CM

## 2023-06-19 NOTE — PROGRESS NOTES
Letty Bach  1970  53 y.o. female  PCP: Aldair Esteves APRN 01/11/2023 06/19/2023    Chief Complaint   Patient presents with   • Left Foot - Follow-up     Post operative       History of Present Illness    Letty Bach is a 53 y.o.female Patient presents to clinic for left foot cast change, and post-operative follow-up. DOS: 06/01/2023 LEFT GASTROCNEMIUS RECESSION, HEEL SPUR RESECTION, ACHILLES TENDON REPAIR AND ALL INDICATED PROCEDURES       Past Medical History:   Diagnosis Date   • Anxiety    • Cancer    • Disease of thyroid gland     nodule, mass   • Elevated liver enzymes    • Fatty liver    • GERD (gastroesophageal reflux disease)    • History of medical problems 11/4/2021    Thyroid cancer   • Urinary tract infection    • Varicella          Past Surgical History:   Procedure Laterality Date   • ABDOMINAL SURGERY     • ACHILLES TENDON SURGERY Left 6/1/2023    Procedure: LEFT GASTROCNEMIUS RECESSION, HEEL SPUR RESECTION, ACHILLES TENDON REPAIR AND ALL INDICATED PROCEDURES;  Surgeon: Frankie Burnett DPM;  Location: [facility] OR;  Service: Podiatry;  Laterality: Left;   • CHOLECYSTECTOMY  06/06/2019   • CHOLECYSTECTOMY WITH INTRAOPERATIVE CHOLANGIOGRAM N/A 06/06/2019    Procedure: CHOLECYSTECTOMY LAPAROSCOPIC INTRAOPERATIVE CHOLANGIOGRAM      (c-arm#2);  Surgeon: Felipe Overton MD;  Location: [facility] OR;  Service: General   • COLONOSCOPY N/A 02/12/2021    Procedure: COLONOSCOPY;  Surgeon: Joao Bruce MD;  Location: [facility] ENDOSCOPY;  Service: Gastroenterology;  Laterality: N/A;   • COLONOSCOPY     • ENDOSCOPY N/A 11/14/2019    Procedure: ESOPHAGOGASTRODUODENOSCOPY;  Surgeon: Joao Bruce MD;  Location: [facility] ENDOSCOPY;  Service: Gastroenterology   • HYSTERECTOMY  2007   • THYROIDECTOMY Right 11/04/2021    Procedure: RIGHT LOBE THYROIDECTOMY and ISTHMUSECTOMY;  Surgeon: Benjamin Renteria MD;  Location: [facility] OR;  Service: General;  Laterality: Right;   • THYROIDECTOMY Left  2022    Procedure: LEFT THYROIDECTOMY;  Surgeon: Benjamin Renteria MD;  Location: [facility];  Service: General;  Laterality: Left;   • TOTAL ABDOMINAL HYSTERECTOMY WITH SALPINGO OOPHORECTOMY     • UPPER GASTROINTESTINAL ENDOSCOPY  2019   • US GUIDED FINE NEEDLE ASPIRATION  2021   • WISDOM TOOTH EXTRACTION           Family History   Problem Relation Age of Onset   • Breast cancer Mother    • Thyroid disease Mother    • Hypertension Mother    • Cancer Mother         Breast cancer   • Hypertension Father    • Diabetes Father    • Kidney disease Father    • Heart disease Father    • No Known Problems Sister    • No Known Problems Brother    • Seizures Daughter    • Hypertension Daughter    • COPD Maternal Grandmother    • Asthma Maternal Grandmother    • No Known Problems Sister    • ADD / ADHD Daughter    • Polymyositis Daughter        Allergies   Allergen Reactions   • Contrave [Naltrexone-Bupropion Hcl Er] Dizziness       Social History     Socioeconomic History   • Marital status:    Tobacco Use   • Smoking status: Former     Packs/day: 0.50     Years: 5.00     Pack years: 2.50     Types: Cigarettes     Quit date: 1995     Years since quittin.4   • Smokeless tobacco: Never   • Tobacco comments:     passive at work   Vaping Use   • Vaping Use: Never used   • Passive vaping exposure: Yes   Substance and Sexual Activity   • Alcohol use: Not Currently     Comment: none in 2 years   • Drug use: Never   • Sexual activity: Defer         Current Outpatient Medications   Medication Sig Dispense Refill   • aspirin 325 MG tablet Take 1 tablet by mouth 2 (Two) Times a Day.     • HYDROcodone-acetaminophen (Norco)  MG per tablet Take 1 tablet by mouth Every 6 (Six) Hours As Needed for Moderate Pain. 30 tablet 0   • levothyroxine (SYNTHROID, LEVOTHROID) 112 MCG tablet TAKE ONE TABLET BY MOUTH DAILY (Patient taking differently: 1 tablet Daily.) 30 tablet 11   • pravastatin (PRAVACHOL) 10 MG  "tablet TAKE ONE TABLET BY MOUTH ONCE NIGHTLY 90 tablet 2   • propranolol (INDERAL) 20 MG tablet TAKE ONE TABLET BY MOUTH DAILY AS NEEDED (Patient taking differently: Take 1 tablet by mouth Daily As Needed. R/T\" ANXIETY AND THYROID\") 90 tablet 3     No current facility-administered medications for this visit.       Review of Systems   Musculoskeletal:         Left foot pain   All other systems reviewed and are negative.      OBJECTIVE    BP (!) 152/110   Pulse 79   Ht 165.1 cm (65\")   Wt 116 kg (255 lb 11.7 oz)   SpO2 95%   BMI 42.56 kg/m²     Body mass index is 42.56 kg/m².        Physical Exam  Vitals reviewed.   Constitutional:       Appearance: Normal appearance. She is well-developed.   HENT:      Head: Normocephalic and atraumatic.   Neck:      Trachea: Trachea and phonation normal.   Pulmonary:      Effort: Pulmonary effort is normal. No respiratory distress.   Abdominal:      General: There is no distension.      Palpations: Abdomen is soft.   Feet:      Comments: Edges of the incision are approximated with no evidence of infection.  Sutures were removed from both sites.  Skin:     General: Skin is warm and dry.   Neurological:      Mental Status: She is alert and oriented to person, place, and time.      GCS: GCS eye subscore is 4. GCS verbal subscore is 5. GCS motor subscore is 6.   Psychiatric:         Speech: Speech normal.         Behavior: Behavior normal. Behavior is cooperative.         Thought Content: Thought content normal.         Judgment: Judgment normal.              Procedures  Patient was most placed in a short leg fiberglass cast left lower extremity without difficulty.  She was neurovascularly intact post procedure      ASSESSMENT AND PLAN    Diagnoses and all orders for this visit:    1. Status post Achilles tendon repair (Primary)        Body mass index is 42.56 kg/m².    Recommend follow-up with primary care to discuss BMI greater than 30    Continue nonweightbearing " status  Continue swelling modalities  Follow-up in 1 week for recheck with possible suture removal  Contact office for worsening symptoms

## 2023-07-14 PROBLEM — E89.0 STATUS POST THYROIDECTOMY: Status: ACTIVE | Noted: 2023-04-25

## 2023-07-14 PROBLEM — E78.5 HYPERLIPIDEMIA: Status: ACTIVE | Noted: 2023-04-25

## 2023-07-21 PROBLEM — E55.9 VITAMIN D DEFICIENCY: Status: ACTIVE | Noted: 2023-07-21

## 2023-08-08 ENCOUNTER — OFFICE VISIT (OUTPATIENT)
Dept: PODIATRY | Facility: CLINIC | Age: 53
End: 2023-08-08
Payer: COMMERCIAL

## 2023-08-08 VITALS
WEIGHT: 266 LBS | DIASTOLIC BLOOD PRESSURE: 84 MMHG | OXYGEN SATURATION: 99 % | SYSTOLIC BLOOD PRESSURE: 150 MMHG | BODY MASS INDEX: 44.32 KG/M2 | HEART RATE: 62 BPM | HEIGHT: 65 IN

## 2023-08-08 DIAGNOSIS — Z98.890 STATUS POST ACHILLES TENDON REPAIR: Primary | ICD-10-CM

## 2023-08-08 NOTE — PROGRESS NOTES
Letty Bach  1970  53 y.o. female      08/08/2023    Chief Complaint   Patient presents with    Left Foot - Follow-up     Post op        History of Present Illness    Letty Bach is a 53 y.o.female Patient presents to clinic for post operative appointment. Patient had a left foot gastrocnemius recession, heel spur resections, and achilles tendon repair done on 06/01/2023. Patient seems to be doing well today.       Past Medical History:   Diagnosis Date    Anxiety     Cancer     Disease of thyroid gland     nodule, mass    Elevated liver enzymes     Fatty liver     GERD (gastroesophageal reflux disease)     History of medical problems 11/4/2021    Thyroid cancer    Urinary tract infection     Varicella          Past Surgical History:   Procedure Laterality Date    ABDOMINAL SURGERY      ACHILLES TENDON SURGERY Left 6/1/2023    Procedure: LEFT GASTROCNEMIUS RECESSION, HEEL SPUR RESECTION, ACHILLES TENDON REPAIR AND ALL INDICATED PROCEDURES;  Surgeon: Frankie Burnett DPM;  Location: [facility] OR;  Service: Podiatry;  Laterality: Left;    CHOLECYSTECTOMY  06/06/2019    CHOLECYSTECTOMY WITH INTRAOPERATIVE CHOLANGIOGRAM N/A 06/06/2019    Procedure: CHOLECYSTECTOMY LAPAROSCOPIC INTRAOPERATIVE CHOLANGIOGRAM      (c-arm#2);  Surgeon: Felipe Overton MD;  Location: [facility] OR;  Service: General    COLONOSCOPY N/A 02/12/2021    Procedure: COLONOSCOPY;  Surgeon: Joao Bruce MD;  Location: [facility] ENDOSCOPY;  Service: Gastroenterology;  Laterality: N/A;    COLONOSCOPY      ENDOSCOPY N/A 11/14/2019    Procedure: ESOPHAGOGASTRODUODENOSCOPY;  Surgeon: Joao Bruce MD;  Location: [facility] ENDOSCOPY;  Service: Gastroenterology    HYSTERECTOMY  2007    THYROIDECTOMY Right 11/04/2021    Procedure: RIGHT LOBE THYROIDECTOMY and ISTHMUSECTOMY;  Surgeon: Benjamin Renteria MD;  Location: [facility] OR;  Service: General;  Laterality: Right;    THYROIDECTOMY Left 02/24/2022    Procedure: LEFT THYROIDECTOMY;  Surgeon: Dexter  "Benjamin DE LEÓN MD;  Location: [facility];  Service: General;  Laterality: Left;    TOTAL ABDOMINAL HYSTERECTOMY WITH SALPINGO OOPHORECTOMY      UPPER GASTROINTESTINAL ENDOSCOPY  2019    US GUIDED FINE NEEDLE ASPIRATION  2021    WISDOM TOOTH EXTRACTION           Family History   Problem Relation Age of Onset    Breast cancer Mother     Thyroid disease Mother     Hypertension Mother     Cancer Mother         Breast cancer    Hypertension Father     Diabetes Father     Kidney disease Father     Heart disease Father     Vision loss Father         Glaucoma    No Known Problems Sister     No Known Problems Brother     Seizures Daughter     Hypertension Daughter     COPD Maternal Grandmother     Asthma Maternal Grandmother     No Known Problems Sister     ADD / ADHD Daughter     Polymyositis Daughter        Allergies   Allergen Reactions    Contrave [Naltrexone-Bupropion Hcl Er] Dizziness       Social History     Socioeconomic History    Marital status:    Tobacco Use    Smoking status: Former     Packs/day: 0.50     Years: 5.00     Pack years: 2.50     Types: Cigarettes     Quit date: 1995     Years since quittin.6    Smokeless tobacco: Never    Tobacco comments:     passive at work   Vaping Use    Vaping Use: Never used    Passive vaping exposure: Yes   Substance and Sexual Activity    Alcohol use: Not Currently     Comment: none in 2 years    Drug use: Never    Sexual activity: Defer         Current Outpatient Medications   Medication Sig Dispense Refill    aspirin 325 MG tablet Take 1 tablet by mouth 2 (Two) Times a Day.      levothyroxine (Synthroid) 125 MCG tablet Take 1 tablet by mouth Daily. 30 tablet 11    pravastatin (PRAVACHOL) 10 MG tablet TAKE ONE TABLET BY MOUTH ONCE NIGHTLY 90 tablet 2    propranolol (INDERAL) 20 MG tablet TAKE ONE TABLET BY MOUTH DAILY AS NEEDED (Patient taking differently: Take 1 tablet by mouth Daily As Needed. R/T\" ANXIETY AND THYROID\") 90 tablet 3     No current " "facility-administered medications for this visit.       Review of Systems   Constitutional: Negative.    Psychiatric/Behavioral: Negative.         OBJECTIVE    /84   Pulse 62   Ht 165.1 cm (65\")   Wt 121 kg (266 lb)   SpO2 99%   BMI 44.26 kg/mý     Physical Exam  Pulmonary:      Effort: Pulmonary effort is normal.   Neurological:      Mental Status: She is alert.   Psychiatric:         Mood and Affect: Mood normal.                 Procedures        ASSESSMENT AND PLAN    Diagnoses and all orders for this visit:    1. Status post Achilles tendon repair (Primary)        Patient doing well postoperatively.  Progressed activity as tolerated, no restrictions.  Recheck 8 weeks.           This document has been electronically signed by Frankie Burnett DPM on August 8, 2023 14:28 CDT     8/8/2023  14:28 CDT    "

## 2023-08-24 ENCOUNTER — LAB (OUTPATIENT)
Dept: LAB | Facility: HOSPITAL | Age: 53
End: 2023-08-24
Payer: COMMERCIAL

## 2023-08-24 ENCOUNTER — OFFICE VISIT (OUTPATIENT)
Dept: CARDIOLOGY | Facility: CLINIC | Age: 53
End: 2023-08-24
Payer: COMMERCIAL

## 2023-08-24 VITALS
SYSTOLIC BLOOD PRESSURE: 124 MMHG | BODY MASS INDEX: 44.4 KG/M2 | WEIGHT: 266.5 LBS | DIASTOLIC BLOOD PRESSURE: 86 MMHG | HEIGHT: 65 IN | OXYGEN SATURATION: 96 % | HEART RATE: 92 BPM

## 2023-08-24 DIAGNOSIS — I47.1 PAROXYSMAL SVT (SUPRAVENTRICULAR TACHYCARDIA): Primary | ICD-10-CM

## 2023-08-24 DIAGNOSIS — R60.0 EDEMA LEG: ICD-10-CM

## 2023-08-24 DIAGNOSIS — E78.00 ELEVATED LDL CHOLESTEROL LEVEL: ICD-10-CM

## 2023-08-24 DIAGNOSIS — E66.01 CLASS 3 SEVERE OBESITY DUE TO EXCESS CALORIES WITH SERIOUS COMORBIDITY AND BODY MASS INDEX (BMI) OF 40.0 TO 44.9 IN ADULT: ICD-10-CM

## 2023-08-24 PROCEDURE — 80053 COMPREHEN METABOLIC PANEL: CPT | Performed by: NURSE PRACTITIONER

## 2023-08-24 PROCEDURE — 85025 COMPLETE CBC W/AUTO DIFF WBC: CPT | Performed by: NURSE PRACTITIONER

## 2023-08-24 PROCEDURE — 84443 ASSAY THYROID STIM HORMONE: CPT | Performed by: NURSE PRACTITIONER

## 2023-08-24 PROCEDURE — 83036 HEMOGLOBIN GLYCOSYLATED A1C: CPT | Performed by: NURSE PRACTITIONER

## 2023-08-24 NOTE — PROGRESS NOTES
Paroxysmal SVT (supraventricular tachycardia)      Palpitations   Pertinent negatives include no anxiety, chest pain, coughing, dizziness, fever, irregular heartbeat, malaise/fatigue, shortness of breath, syncope or weakness.     Ms. Letty Bach is a 53 y.o.  female with medical history of chronic left upper quadrant pain, fatty liver, thyroid cancer status post thyroidectomy, postsurgical hypothyroidism (chronic, managed with levothyroxine), GERD (chronic, managed with Pepcid) who presents today for follow-up.  She has been having intermittent palpitations since May 2021 described as fluttering.    EKG normal sinus rhythm no acute ST-T wave changes.  Holter monitor was completed in April 2022 by PCP showing relatively benign study.  Good average heart rate.  Patient did not trigger any events.  There were 3 episodes of SVT, peak heart rate 126 bpm, longest run was 5 beats, Very short in duration. Echocardiogram which showed unremarkable study, normal LVEF.  She had some concerns with leg swelling. Ultrasound of legs negative for blood clot. There is pulsatile venous flow.  Patient was trialed on Lasix with no improvements.  She elevates her feet whenever possible and try compression stockings.  proBNP was normal.  Since last visit she reports having 2 episodes of heart palpitations which lasted for a few minutes.  One occurring at work, 1 while at home.  She reported going in a quiet place and taking propranolol with subsequent improvement in symptoms.  She denies any clear worsening factors.  Symptoms are relieved with propranolol.    The 10-year ASCVD risk score (Morris NUNEZ, et al., 2019) is: 1.5%    Values used to calculate the score:      Age: 53 years      Sex: Female      Is Non- : No      Diabetic: No      Tobacco smoker: No      Systolic Blood Pressure: 124 mmHg      Is BP treated: Yes      HDL Cholesterol: 70 mg/dL      Total Cholesterol: 204 mg/dL        Past Medical  History:   Diagnosis Date    Anxiety     Cancer     Disease of thyroid gland     nodule, mass    Elevated liver enzymes     Fatty liver     GERD (gastroesophageal reflux disease)     History of medical problems 11/4/2021    Thyroid cancer    Urinary tract infection     Varicella      Past Surgical History:   Procedure Laterality Date    ABDOMINAL SURGERY      ACHILLES TENDON SURGERY Left 6/1/2023    Procedure: LEFT GASTROCNEMIUS RECESSION, HEEL SPUR RESECTION, ACHILLES TENDON REPAIR AND ALL INDICATED PROCEDURES;  Surgeon: Frankie Burnett DPM;  Location: [facility] OR;  Service: Podiatry;  Laterality: Left;    CHOLECYSTECTOMY  06/06/2019    CHOLECYSTECTOMY WITH INTRAOPERATIVE CHOLANGIOGRAM N/A 06/06/2019    Procedure: CHOLECYSTECTOMY LAPAROSCOPIC INTRAOPERATIVE CHOLANGIOGRAM      (c-arm#2);  Surgeon: Felipe Overton MD;  Location: [facility] OR;  Service: General    COLONOSCOPY N/A 02/12/2021    Procedure: COLONOSCOPY;  Surgeon: Joao Bruce MD;  Location: [facility] ENDOSCOPY;  Service: Gastroenterology;  Laterality: N/A;    COLONOSCOPY      ENDOSCOPY N/A 11/14/2019    Procedure: ESOPHAGOGASTRODUODENOSCOPY;  Surgeon: Joao Bruce MD;  Location: [facility] ENDOSCOPY;  Service: Gastroenterology    HYSTERECTOMY  2007    THYROIDECTOMY Right 11/04/2021    Procedure: RIGHT LOBE THYROIDECTOMY and ISTHMUSECTOMY;  Surgeon: Benjamin Renteria MD;  Location: [facility] OR;  Service: General;  Laterality: Right;    THYROIDECTOMY Left 02/24/2022    Procedure: LEFT THYROIDECTOMY;  Surgeon: Benjamin Renteria MD;  Location: [facility] OR;  Service: General;  Laterality: Left;    TOTAL ABDOMINAL HYSTERECTOMY WITH SALPINGO OOPHORECTOMY  2007    UPPER GASTROINTESTINAL ENDOSCOPY  11/14/2019    US GUIDED FINE NEEDLE ASPIRATION  09/01/2021    WISDOM TOOTH EXTRACTION       Social History     Socioeconomic History    Marital status:    Tobacco Use    Smoking status: Former     Packs/day: 0.50     Years: 5.00     Pack years: 2.50     Types:  Cigarettes     Quit date: 1995     Years since quittin.6    Smokeless tobacco: Never    Tobacco comments:     passive at work   Vaping Use    Vaping Use: Never used    Passive vaping exposure: Yes   Substance and Sexual Activity    Alcohol use: Not Currently     Comment: none in 2 years    Drug use: Never    Sexual activity: Defer     Family History   Problem Relation Age of Onset    Breast cancer Mother     Thyroid disease Mother     Hypertension Mother     Cancer Mother         Breast cancer    Hypertension Father     Diabetes Father     Kidney disease Father     Heart disease Father     Vision loss Father         Glaucoma    No Known Problems Sister     No Known Problems Brother     Seizures Daughter     Hypertension Daughter     COPD Maternal Grandmother     Asthma Maternal Grandmother     No Known Problems Sister     ADD / ADHD Daughter     Polymyositis Daughter        ALLERGIES:  Allergies   Allergen Reactions    Contrave [Naltrexone-Bupropion Hcl Er] Dizziness         Review of Systems   Constitutional: Negative for chills, fever, malaise/fatigue and weight gain.   HENT:  Negative for nosebleeds and tinnitus.    Eyes:  Negative for blurred vision and double vision.   Cardiovascular:  Positive for leg swelling and palpitations. Negative for chest pain, irregular heartbeat and syncope.   Respiratory:  Negative for cough, shortness of breath, sleep disturbances due to breathing and snoring.    Endocrine: Negative for polydipsia, polyphagia and polyuria.   Hematologic/Lymphatic: Negative for bleeding problem. Does not bruise/bleed easily.   Skin:  Negative for color change and suspicious lesions.   Musculoskeletal:  Negative for falls and myalgias.   Gastrointestinal:  Negative for bloating, heartburn and hematochezia.   Genitourinary:  Negative for dysuria and hematuria.   Neurological:  Negative for dizziness, headaches, seizures, vertigo and weakness.   Psychiatric/Behavioral:  Negative for altered  "mental status and depression. The patient does not have insomnia and is not nervous/anxious.    Allergic/Immunologic: Negative for environmental allergies and persistent infections.     Current Outpatient Medications   Medication Sig Dispense Refill    levothyroxine (Synthroid) 125 MCG tablet Take 1 tablet by mouth Daily. 30 tablet 11    pravastatin (PRAVACHOL) 10 MG tablet TAKE ONE TABLET BY MOUTH ONCE NIGHTLY 90 tablet 2    propranolol (INDERAL) 20 MG tablet TAKE ONE TABLET BY MOUTH DAILY AS NEEDED (Patient taking differently: Take 1 tablet by mouth Daily As Needed. R/T\" ANXIETY AND THYROID\") 90 tablet 3     No current facility-administered medications for this visit.       OBJECTIVE:    Physical Exam:   Vitals reviewed.   Constitutional:       General: Not in acute distress.     Appearance: Normal appearance. Obese. Not ill-appearing, toxic-appearing or diaphoretic.   Eyes:      General: Lids are normal.      Conjunctiva/sclera: Conjunctivae normal.   HENT:      Head: Normocephalic and atraumatic.      Right Ear: External ear normal.      Left Ear: External ear normal.   Neck:      Vascular: No JVD.   Pulmonary:      Effort: Pulmonary effort is normal. No respiratory distress.      Breath sounds: Normal breath sounds. No decreased breath sounds. No wheezing. No rales.   Chest:      Chest wall: Not tender to palpatation.   Cardiovascular:      PMI at left midclavicular line. Normal rate. Regular rhythm. Normal S1 with normal intensity. Normal S2.       Murmurs: There is no murmur.      No gallop. No S3 and S4 gallop. No click. No rub.   Pulses:     Intact distal pulses. No decreased pulses.   Edema:     Peripheral edema present.     Pretibial: bilateral 1+ edema of the pretibial area.     Ankle: bilateral 1+ edema of the ankle.     Feet: bilateral 1+ edema of the feet.  Abdominal:      General: Bowel sounds are normal. There is no distension.      Palpations: Abdomen is soft.      Tenderness: There is no abdominal " "tenderness.   Musculoskeletal:      Cervical back: Normal range of motion and neck supple. Skin:     General: Skin is warm and dry.      Coloration: Skin is not pale.      Findings: No erythema or rash.   Neurological:      Mental Status: Alert and oriented to person, place, and time.      Gait: Gait normal.   Psychiatric:         Behavior: Behavior normal.         Thought Content: Thought content normal.         Judgment: Judgment normal.     Vitals:    08/24/23 0915   BP: 124/86   BP Location: Left arm   Patient Position: Sitting   Cuff Size: Adult   Pulse: 92   SpO2: 96%   Weight: 121 kg (266 lb 8 oz)   Height: 165.1 cm (65\")       DATA REVIEWED:   Results for orders placed in visit on 11/10/22    Adult Transthoracic Echo Complete w/ Color, Spectral and Contrast if Necessary Per Protocol    Interpretation Summary    Left ventricular systolic function is normal. Left ventricular ejection fraction appears to be 61 - 65%.    Left ventricular diastolic function was normal.    Estimated right ventricular systolic pressure from tricuspid regurgitation is normal (<35 mmHg).      No radiology results for the last 30 days.     Labs: BMP, CBC, LIPID, TSH  Lab Results   Component Value Date    GLUCOSE 87 03/13/2023    CALCIUM 9.1 03/13/2023     03/13/2023    K 4.2 03/13/2023    CO2 27.0 03/13/2023     03/13/2023    BUN 13 03/13/2023    CREATININE 0.85 03/13/2023    EGFRIFNONA 75 12/29/2021    BCR 15.3 03/13/2023    ANIONGAP 9.0 03/13/2023     Lab Results   Component Value Date    WBC 4.25 08/24/2023    HGB 14.0 08/24/2023    HCT 44.8 08/24/2023    MCV 95.1 08/24/2023     08/24/2023     Lab Results   Component Value Date    CHOL 204 (H) 07/14/2023    CHOL 217 (H) 03/04/2022    CHOL 204 (H) 08/18/2021     Lab Results   Component Value Date    TRIG 59 07/14/2023    TRIG 89 10/21/2022    TRIG 123 03/04/2022     Lab Results   Component Value Date    HDL 70 (H) 07/14/2023    HDL 55 03/04/2022    HDL 61 (H) " 08/18/2021     No components found for: LDLCALC  Lab Results   Component Value Date     (H) 07/14/2023     (H) 03/04/2022     (H) 08/18/2021     No results found for: HDLLDLRATIO  No components found for: CHOLHDL  Lab Results   Component Value Date    TSH 2.620 07/14/2023     Lab Results   Component Value Date    PROBNP 192.0 12/02/2022     EKG:         HOLTER:'  Study date: 4/25/22       Interpretation Summary    The predominant rhythm noted during the testing period was sinus rhythm. Rare PACs and rare PVCs were noted.  There were three episodes of supraventricular tachycardia. The peak heart rate was 126 beats per minute. Longest run of SVT was 5 beats long. No symptoms were reported during these episodes.  Sinus tachycardia was noted during two patient triggered events with no reported symptoms.          The following portions of the patient's history were reviewed and updated as appropriate: allergies, current medications, past family history, past medical history, past social history, past surgical history and problem list.  Old records reviewed and pertinent information is included in the above objective data.     ASSESSMENT/PLAN:       Diagnosis Plan   1. Paroxysmal SVT (supraventricular tachycardia)        2. Elevated LDL cholesterol level        3. Edema leg        4. Class 3 severe obesity due to excess calories with serious comorbidity and body mass index (BMI) of 40.0 to 44.9 in adult            #1 paroxysmal supraventricular tachycardia.  Holter monitor in April 2022 showing 3 episodes of SVT, longest lasting 5 beats.  Triggered events however correlated with sinus tachycardia.  She has taken propranolol 2 times since last visit with subsequent improvement in symptoms continue propranolol 20 mg as needed for palpitations.  Symptoms stable.  Echocardiogram was unremarkable.    Discussed the benign nature of the majority of causes of palpitations.   Discussed lifestyle modifications  including reducing caffeine intake, reducing stress, and increasing exercise tolerance.  Reassurance given to patient.    #2.  Elevated LDL: She is on pravastatin 10 mg    Lab Results   Component Value Date    CHOL 204 (H) 07/14/2023    TRIG 59 07/14/2023    HDL 70 (H) 07/14/2023     (H) 07/14/2023     #3.  Edema: Chronic.  Ultrasound of legs was negative for DVT.  There was pulsatile flow.  proBNP was normal.  Echo was normal.  No evidence of CHF.  She did not improve with Lasix.  Recommend compression stockings. Swelling is present today. Reduce salt intake in diet.     Class 3 Severe Obesity (BMI >=40). Obesity-related health conditions include the following: dyslipidemias and GERD. Obesity is unchanged. BMI is is above average; BMI management plan is completed. We discussed portion control and increasing exercise.      Follow up: 6 months    I spent 22 minutes caring for Letty on this date of service. This time includes time spent by me in the following activities: preparing for the visit, reviewing tests, obtaining and/or reviewing a separately obtained history, performing a medically appropriate examination and/or evaluation, counseling and educating the patient/family/caregiver, ordering medications, tests, or procedures, documenting information in the medical record and independently interpreting results and communicating that information with the patient/family/caregiver            This document has been electronically signed by MEL John on August 24, 2023 09:56 CDT

## 2024-03-25 NOTE — PROGRESS NOTES
Patient is 1 day out from laparoscopic cholecystectomy and normal intraoperative cholangiogram for a contracted chronically and acutely inflamed gallbladder.  MILA drain was placed at time of surgery and we asked the patient to come in today to see if we could remove the drain.  She is tolerating a diet.  Her pain is controlled with her pain medication.  She said minimal out of her MILA drain and is serosanguineous in nature.  She assures me she ate breakfast and ate supper last night.  She is nonicteric her abdomen is soft her other incisions appear to be clean.  We removed her MILA drain.  Instructed in local wound care.  She will follow-up with us next week or sooner if she has other concerns or questions.  
As per pt, pt lives in  with sister with 3 SIMON +left handrail and 1st floor set-up with walk in shower and raised toilet. Pt reports independence with B/IADLs using crutches and shower chair prior to admission and reports +working. Pt also reports utilizing "outdoor shower" as it is larger than inside.

## (undated) DEVICE — BLD BEAVR MINI RND/TIP GRN

## (undated) DEVICE — UNDRPD 23X36IN 10/PK

## (undated) DEVICE — STERILE POLYISOPRENE POWDER-FREE SURGICAL GLOVES: Brand: PROTEXIS

## (undated) DEVICE — GLV SURG SENSICARE PI LF PF 7.5 GRN STRL

## (undated) DEVICE — COVER,MAYO STAND,STERILE: Brand: MEDLINE

## (undated) DEVICE — LUER-LOK 360°: Brand: CONNECTA, LUER-LOK

## (undated) DEVICE — SINGLE-USE BIOPSY FORCEPS: Brand: RADIAL JAW 4

## (undated) DEVICE — SPNG DISSCT SECTO KTTNER PK/5

## (undated) DEVICE — DRSNG SPNG GZ WOVN 8PLY 4X4IN 2PK LF STRL BX/50PK

## (undated) DEVICE — STERILE POLYISOPRENE POWDER-FREE SURGICAL GLOVES WITH EMOLLIENT COATING: Brand: PROTEXIS

## (undated) DEVICE — SOL IRR NACL 0.9PCT BT 1000ML

## (undated) DEVICE — DRP WARMR MACH

## (undated) DEVICE — INTENDED FOR TISSUE SEPARATION, AND OTHER PROCEDURES THAT REQUIRE A SHARP SURGICAL BLADE TO PUNCTURE OR CUT.: Brand: BARD-PARKER ® CARBON RIB-BACK BLADES

## (undated) DEVICE — ANTIBACTERIAL UNDYED BRAIDED (POLYGLACTIN 910), SYNTHETIC ABSORBABLE SUTURE: Brand: COATED VICRYL

## (undated) DEVICE — GLV SURG SENSICARE POLYISPRN W/ALOE PF LF 6.5 GRN STRL

## (undated) DEVICE — GLV SURG SENSICARE PI PF LF 7 GRN STRL

## (undated) DEVICE — GOWN,PREVENTION PLUS,XLNG/XXLARGE,STRL: Brand: MEDLINE

## (undated) DEVICE — SUT ETHLN 2/0 FS 18IN 664H

## (undated) DEVICE — INTENDED TO BE USED TO OCCLUDE, RETRACT AND IDENTIFY ARTERIES, VEINS, TENDONS AND NERVES IN SURGICAL PROCEDURES: Brand: STERION®  VESSEL LOOP

## (undated) DEVICE — TBG PENCL TELESCP MEGADYNE SMOKE EVAC 10FT

## (undated) DEVICE — GLV SURG ORTHO BIOGEL OPTIFIT PF LTX SZ8

## (undated) DEVICE — TRAP FLD MINIVAC MEGADYNE 100ML

## (undated) DEVICE — SUT MONOCRYL 4/0 PS2 27IN Y426H ETY426H

## (undated) DEVICE — DISPOSABLE BIPOLAR CODE, 12' (3.66 M): Brand: CONMED

## (undated) DEVICE — SYS SUT/ANCH ACHILLES SPEEDBRIDGE BIOCOMP W/JUMPSTART
Type: IMPLANTABLE DEVICE | Site: ACHILLES TENDON | Status: NON-FUNCTIONAL
Removed: 2023-06-01

## (undated) DEVICE — PDS II VLT 0 107CM AG ST3: Brand: ENDOLOOP

## (undated) DEVICE — SOL IRRIG NACL 1000ML

## (undated) DEVICE — POSTN ELEV LEG PROCARE FM UNIV 45DEG 31.5X10IN

## (undated) DEVICE — HARMONIC FOCUS SHEARS 9CM LENGTH + ADAPTIVE TISSUE TECHNOLOGY FOR USE WITH BLUE HAND PIECE ONLY: Brand: HARMONIC FOCUS

## (undated) DEVICE — GAUZE,SPONGE,4"X4",16PLY,XRAY,STRL,LF: Brand: MEDLINE

## (undated) DEVICE — SUT PERMAHAND SILK 3/0 SH1 18IN

## (undated) DEVICE — GOWN,AURORA,NOREINF,RAGLAN,XL,STERILE: Brand: MEDLINE

## (undated) DEVICE — GLV SURG SENSICARE PI ORTHO SZ6.5 LF STRL

## (undated) DEVICE — PENCL ES MEGADINE EZ/CLEAN BUTN W/HOLSTR 10FT

## (undated) DEVICE — POSTN HD RING CUSH 9IN LF

## (undated) DEVICE — GLV SURG TRIUMPH LT PF LTX 6.5 STRL

## (undated) DEVICE — ENDOPOUCH RETRIEVER SPECIMEN RETRIEVAL BAGS: Brand: ENDOPOUCH RETRIEVER

## (undated) DEVICE — CANN SMPL SOFTECH BIFLO ETCO2 A/M 7FT

## (undated) DEVICE — APPL CHLORAPREP W/TINT 26ML ORNG

## (undated) DEVICE — BNDG GZ SOF-FORM CONFRM 3X75IN LF STRL

## (undated) DEVICE — SUT MNCRYL 2/0 RB1 27IN UD Y266H

## (undated) DEVICE — PK LAP CHOLE LF 60

## (undated) DEVICE — SPNG GZ WOVN 4X4IN 12PLY 10/BX STRL

## (undated) DEVICE — SUT NLY 2/0 664G

## (undated) DEVICE — PK EXTRM LF 60

## (undated) DEVICE — ST TBG IRR BONE SCLPL

## (undated) DEVICE — THE KUMAR CATHETER®, USED IN CONJUNCTION WITH KUMAR CHOLANGIOGRAPHY® CLAMP, IS MEANT TO PROVIDE A MEANS OF LAPAROSCOPIC CHOLANGIOGRAPHY. IT COMPRISES A TRANSLUCENT TUBING ( 76 CM. LENGTH AND 16 GA. ) THAT CARRIES A 19 GA., 1.25 CM LONG NEEDLE AT THE END. THE KUMAR CATHETER® IS USED TO PUNCTURE THE HARTMANN'S POUCH OF THE GALLBLADDER FOR BILIARY ACCESS AND / OR ASPIRATION. PRODUCT IS LATEX FREE.: Brand: KUMAR CATHETER®

## (undated) DEVICE — RESERVOIR,SUCTION,100CC,SILICONE: Brand: MEDLINE

## (undated) DEVICE — GLV SURG SENSICARE POLYISPRN W/ALOE PF LF 6 GRN STRL

## (undated) DEVICE — SUT VIC 2/0 UR6 27IN VCP602H

## (undated) DEVICE — PROXIMATE SKIN STAPLERS (35 WIDE) CONTAINS 35 STAINLESS STEEL STAPLES (FIXED HEAD): Brand: PROXIMATE

## (undated) DEVICE — GLV SURG SIGNATURE ESSENTIAL PF LTX SZ6.5

## (undated) DEVICE — VISUALIZATION SYSTEM: Brand: CLEARIFY

## (undated) DEVICE — LARGE TEAR CROSS CUT RASP (14.0 X 7.0MM)

## (undated) DEVICE — DRN WND FLT 90D HUBLSS FULL TROC 10MM LF

## (undated) DEVICE — SUT VICRYL 3-0 SH-1 PO 18IN J772D

## (undated) DEVICE — NDL SUT TONSL DAVIS 1/2 CIR 1849D

## (undated) DEVICE — SKIN AFFIX SURG ADHESIVE 72/CS 0.55ML: Brand: MEDLINE

## (undated) DEVICE — SUT SILK 3-0 TIES 18IN SA64H

## (undated) DEVICE — SUT SILK 4/0 18IN SA63H

## (undated) DEVICE — HARMONIC ACE +7 LAPAROSCOPIC SHEARS ADVANCED HEMOSTASIS 5MM DIAMETER 36CM SHAFT LENGTH  FOR USE WITH GRAY HAND PIECE ONLY: Brand: HARMONIC ACE

## (undated) DEVICE — PATIENT RETURN ELECTRODE, SINGLE-USE, CONTACT QUALITY MONITORING, ADULT, WITH 9FT CORD, FOR PATIENTS WEIGING OVER 33LBS. (15KG): Brand: MEGADYNE

## (undated) DEVICE — NDL HYPO ECLPS SFTY 25G 1 1/2IN

## (undated) DEVICE — BNDG ELAS ELITE V/CLOSE 6IN 5YD LF STRL

## (undated) DEVICE — CORE TRUMPET FOR SINGLE SOLUTION BAG: Brand: CORE DYNAMICS

## (undated) DEVICE — ENDOPATH XCEL DILATING TIP TROCARS WITH STABILITY SLEEVES: Brand: ENDOPATH XCEL

## (undated) DEVICE — NDL SUT SURG REV CUT 1/2CIR REG LT/G SZ2 2PK 1834-2DG

## (undated) DEVICE — GLV SURG TRIUMPH PF LTX 6.5 STRL

## (undated) DEVICE — MONOPOLAR METZENBAUM SCISSOR TIP, DISPOSABLE: Brand: MONOPOLAR METZENBAUM SCISSOR TIP, DISPOSABLE

## (undated) DEVICE — CVR SURG EQUIP BND RECTG 36X28

## (undated) DEVICE — PK MAJ PROC LF 60

## (undated) DEVICE — WOUND RETRACTOR AND PROTECTOR: Brand: ALEXIS WOUND PROTECTOR-RETRACTOR

## (undated) DEVICE — BITEBLOCK ENDO W/STRAP 60F A/ LF DISP

## (undated) DEVICE — GLV SURG TRIUMPH LT PF LTX 7.5 STRL

## (undated) DEVICE — CVR C/ARM MINI

## (undated) DEVICE — BNDG ESMARK 6INX9FT STRL

## (undated) DEVICE — GLV SURG TRIUMPH LT PF LTX 7 STRL

## (undated) DEVICE — ADHS SKIN PREMIERPRO EXOFIN TOPICAL HI/VISC .5ML

## (undated) DEVICE — BLUNT TIP TROCAR: Brand: AUTO SUTURE

## (undated) DEVICE — PRECISION THIN (9.0 X 0.38 X 31.0MM)

## (undated) DEVICE — SUT ETHLN 3-0 FS118IN 663H

## (undated) DEVICE — CONTAINER,SPECIMEN,OR STERILE,4OZ: Brand: MEDLINE

## (undated) DEVICE — SUT MNCRYL 3/0 Y936H

## (undated) DEVICE — PAD,ABDOMINAL,8"X10",ST,LF: Brand: MEDLINE

## (undated) DEVICE — GLV SURG TRIUMPH LT PF LTX 6 STRL

## (undated) DEVICE — SUT SILK 2/0 FS BLK 18IN 685G

## (undated) DEVICE — SUT ETHLN 3/0 PS1 18IN 1663H